# Patient Record
Sex: FEMALE | Race: BLACK OR AFRICAN AMERICAN | NOT HISPANIC OR LATINO | Employment: FULL TIME | URBAN - METROPOLITAN AREA
[De-identification: names, ages, dates, MRNs, and addresses within clinical notes are randomized per-mention and may not be internally consistent; named-entity substitution may affect disease eponyms.]

---

## 2017-10-23 ENCOUNTER — OFFICE VISIT (OUTPATIENT)
Dept: OPTOMETRY | Facility: CLINIC | Age: 32
End: 2017-10-23
Payer: COMMERCIAL

## 2017-10-23 DIAGNOSIS — H10.13 ALLERGIC CONJUNCTIVITIS OF BOTH EYES: Primary | ICD-10-CM

## 2017-10-23 DIAGNOSIS — H04.123 DRY EYE SYNDROME, BILATERAL: ICD-10-CM

## 2017-10-23 PROCEDURE — 92002 INTRM OPH EXAM NEW PATIENT: CPT | Mod: S$GLB,,, | Performed by: OPTOMETRIST

## 2017-10-23 PROCEDURE — 99999 PR PBB SHADOW E&M-NEW PATIENT-LVL II: CPT | Mod: PBBFAC,,, | Performed by: OPTOMETRIST

## 2017-10-23 RX ORDER — FLUOROMETHOLONE 1 MG/ML
1 SUSPENSION/ DROPS OPHTHALMIC 4 TIMES DAILY
Qty: 5 ML | Refills: 0 | Status: SHIPPED | OUTPATIENT
Start: 2017-10-23 | End: 2017-10-27

## 2017-10-25 NOTE — PROGRESS NOTES
HPI     Patient's age: 32 y.o.    Approximate date of last eye examination:  February 2017  Name of last eye doctor seen: [facility]    Pt states that she does have allergic conjunctivitis, but on the weekend   the eyes mainly the right got red, no pain but had a discharge this   morning from the right eye and has gotten redder.  before all this did use   a new facial cream.    Wears glasses? Yes, left       Wears CLs?:  no              ! OTC eyedrops currently using:  none   ! Prescription eye meds currently using:  Cromoglasic acid - OU for   allergic conjuctivitis         Last edited by Ebony Avila MA on 10/23/2017 10:20 AM. (History)            Assessment /Plan     For exam results, see Encounter Report.    Allergic conjunctivitis of both eyes    Dry eye syndrome, bilateral      -     fluorometholone 0.1% (FML) 0.1 % DrpS; Place 1 drop into the right eye 4 (four) times daily.x 4 days, then BID OU    Refresh BID OU    RTC if condition worsens or does not improve

## 2017-11-08 ENCOUNTER — OFFICE VISIT (OUTPATIENT)
Dept: OPTOMETRY | Facility: CLINIC | Age: 32
End: 2017-11-08
Payer: COMMERCIAL

## 2017-11-08 DIAGNOSIS — H52.13 MYOPIA, BILATERAL: Primary | ICD-10-CM

## 2017-11-08 DIAGNOSIS — H04.123 DRY EYE SYNDROME, BILATERAL: ICD-10-CM

## 2017-11-08 PROCEDURE — 92014 COMPRE OPH EXAM EST PT 1/>: CPT | Mod: S$GLB,,, | Performed by: OPTOMETRIST

## 2017-11-08 PROCEDURE — 92015 DETERMINE REFRACTIVE STATE: CPT | Mod: S$GLB,,, | Performed by: OPTOMETRIST

## 2017-11-08 PROCEDURE — 99999 PR PBB SHADOW E&M-EST. PATIENT-LVL I: CPT | Mod: PBBFAC,,, | Performed by: OPTOMETRIST

## 2017-11-08 NOTE — PROGRESS NOTES
HPI     Patient's age: 32 y.o.    Approximate date of last eye examination:  10/23/17  Name of last eye doctor seen: Dr. Goldberg    Pt states that eyes are doing better here for the full exam today.  Used   the drops as instructed only on the tears now.    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  no  Eye pain/discomfort?  no                                                                                     Flashes?  no  Floaters?  no  Diplopia/Double vision?  no    Patient's Ocular History:         Any eye surgeries? on         Any eye injury?  onno         Any treatment for eye disease?  no  Family history of eye disease?  no    Significant patient medical history:         1. Diabetes?  no       If yes, IDDM or NIDDM? no   2. HBP?  no                ! OTC eyedrops currently using:  Refresh - OU PRN   ! Prescription eye meds currently using:  none         Last edited by Ebony Avila MA on 11/8/2017  2:24 PM. (History)            Assessment /Plan     For exam results, see Encounter Report.    Myopia, bilateral    Dry eye syndrome, bilateral      Rx specs    Continue with refresh BID OU  Consider Xiidra in the future if problems persist    RTC 1 year, sooner PRN

## 2017-12-06 ENCOUNTER — OFFICE VISIT (OUTPATIENT)
Dept: INTERNAL MEDICINE | Facility: CLINIC | Age: 32
End: 2017-12-06
Payer: COMMERCIAL

## 2017-12-06 VITALS
SYSTOLIC BLOOD PRESSURE: 118 MMHG | TEMPERATURE: 99 F | DIASTOLIC BLOOD PRESSURE: 78 MMHG | RESPIRATION RATE: 16 BRPM | HEART RATE: 68 BPM | HEIGHT: 67 IN | BODY MASS INDEX: 34.5 KG/M2 | WEIGHT: 219.81 LBS

## 2017-12-06 DIAGNOSIS — Z00.00 ANNUAL PHYSICAL EXAM: Primary | ICD-10-CM

## 2017-12-06 PROCEDURE — 99385 PREV VISIT NEW AGE 18-39: CPT | Mod: S$GLB,,, | Performed by: INTERNAL MEDICINE

## 2017-12-06 PROCEDURE — 99999 PR PBB SHADOW E&M-EST. PATIENT-LVL III: CPT | Mod: PBBFAC,,, | Performed by: INTERNAL MEDICINE

## 2017-12-06 NOTE — PROGRESS NOTES
Subjective:       Patient ID: Joan Wilde is a 32 y.o. female.    Chief Complaint: Annual Exam (np, no labs done yet.  0 pain today   pronounce aw - zita arzate)    HPI   Previously was seen at [facility] for healthcare needs.     32 y.o. female here for annual exam.     Lipid disorders/ASCVD risk (ages >/= 45 or >/= 20 if increased risk ): due    DM (>45y yearly or if obese, HTN): A1c due  HIV (ages 15-65): negative   Sexual Screening: no concerns  STD screening: no concerns  Eye exam: utd yearly - wears glasses  Cervical Cancer (Pap Smear ages 21-65 every 3 years or Pap + HPV q5 years after 30 years of age):   Dr. Norm alvarado/ [facility] 05/2017 - normal     Vaccines:   Influenza (yearly) utd   Tetanus (every 10 yrs - 1st tdap) utd- unsure exact date       Exercise: cardio - treadmill 40 min x 5 days per week  Diet: low carb - ketogenic     Headaches a few weeks ago, now resolved. She thinks it may be d/t stress then. Still some neck pain. Normal ROM and no pain with that or tenderness. Suspect MSK- she works on computer a lot.   Past Medical History:   Diagnosis Date    Fibroid      Past Surgical History:   Procedure Laterality Date    HERNIA REPAIR  2014    umbilical     Family History   Problem Relation Age of Onset    Hypertension Mother     Diabetes Father     Hypertension Father     No Known Problems Brother     No Known Problems Brother      Social History     Social History    Marital status: Single     Spouse name: N/A    Number of children: N/A    Years of education: N/A     Occupational History    Not on file.     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          Review of Systems   Constitutional: Negative for activity change and unexpected weight change.   HENT: Negative for hearing loss, rhinorrhea and trouble swallowing.    Eyes: Negative for discharge and visual disturbance.   Respiratory: Negative for chest tightness and wheezing.    Cardiovascular: Negative for chest pain and palpitations.   Gastrointestinal: Negative for blood in stool, constipation, diarrhea and vomiting.   Endocrine: Negative for polydipsia and polyuria.   Genitourinary: Negative for difficulty urinating, dysuria, hematuria and menstrual problem.   Musculoskeletal: Positive for neck pain. Negative for arthralgias and joint swelling.   Neurological: Positive for headaches. Negative for weakness.   Psychiatric/Behavioral: Negative for confusion and dysphoric mood.       Objective:        Vitals:    12/06/17 1303   BP: 118/78   BP Location: Left arm   Patient Position: Sitting   BP Method: Medium (Manual)   Pulse: 68   Resp: 16   Temp: 99 °F (37.2 °C)   TempSrc: Oral   Weight: 99.7 kg (219 lb 12.8 oz)   Height: 5' 7" (1.702 m)       Body mass index is 34.43 kg/m².    Physical Exam   Constitutional: She is oriented to person, place, and time. She appears well-developed. No distress.   HENT:   Head: Normocephalic and atraumatic.   Right Ear: Tympanic membrane normal.   Left Ear: Tympanic membrane normal.   Nose: Nose normal.   Mouth/Throat: Oropharynx is clear and moist.   Eyes: Conjunctivae and EOM are normal. Pupils are equal, round, and reactive to light.   Neck: Normal range of motion and full passive range of motion without pain. Neck supple. No spinous process tenderness and no muscular tenderness present. No neck rigidity. No tracheal deviation and normal range of motion present. No thyromegaly present.   Cardiovascular: Normal rate, regular rhythm, normal heart sounds and intact " distal pulses.    Pulmonary/Chest: Effort normal and breath sounds normal.   Abdominal: Soft. Bowel sounds are normal. She exhibits no distension and no mass. There is no tenderness.   Musculoskeletal: Normal range of motion. She exhibits no edema.   Lymphadenopathy:     She has no cervical adenopathy.   Neurological: She is alert and oriented to person, place, and time. No sensory deficit.   Skin: Skin is warm and dry. She is not diaphoretic. No cyanosis. Nails show no clubbing.   Psychiatric: She has a normal mood and affect. Her behavior is normal. Judgment normal.       Assessment:     1. Annual physical exam           Plan:         1. Annual physical exam  - utd on immunizations- requesting immunization records from [facility] for tetanus date  - utd on pap, will need annual pelvic exam in May 2018 - pt to let me know if she needs referral to schedule  - CBC auto differential; Future  - Comprehensive metabolic panel; Future  - Urinalysis; Future  - Hemoglobin A1c; Future  - Lipid panel; Future  - TSH; Future

## 2017-12-12 ENCOUNTER — PATIENT MESSAGE (OUTPATIENT)
Dept: INTERNAL MEDICINE | Facility: CLINIC | Age: 32
End: 2017-12-12

## 2017-12-12 ENCOUNTER — LAB VISIT (OUTPATIENT)
Dept: LAB | Facility: HOSPITAL | Age: 32
End: 2017-12-12
Attending: INTERNAL MEDICINE
Payer: COMMERCIAL

## 2017-12-12 DIAGNOSIS — Z00.00 ANNUAL PHYSICAL EXAM: ICD-10-CM

## 2017-12-12 LAB
ALBUMIN SERPL BCP-MCNC: 3.5 G/DL
ALP SERPL-CCNC: 72 U/L
ALT SERPL W/O P-5'-P-CCNC: 32 U/L
ANION GAP SERPL CALC-SCNC: 7 MMOL/L
AST SERPL-CCNC: 30 U/L
BASOPHILS # BLD AUTO: 0.02 K/UL
BASOPHILS NFR BLD: 0.4 %
BILIRUB SERPL-MCNC: 0.3 MG/DL
BUN SERPL-MCNC: 14 MG/DL
CALCIUM SERPL-MCNC: 9.4 MG/DL
CHLORIDE SERPL-SCNC: 107 MMOL/L
CHOLEST SERPL-MCNC: 168 MG/DL
CHOLEST/HDLC SERPL: 3.2 {RATIO}
CO2 SERPL-SCNC: 25 MMOL/L
CREAT SERPL-MCNC: 1.1 MG/DL
DIFFERENTIAL METHOD: ABNORMAL
EOSINOPHIL # BLD AUTO: 0 K/UL
EOSINOPHIL NFR BLD: 0.8 %
ERYTHROCYTE [DISTWIDTH] IN BLOOD BY AUTOMATED COUNT: 14 %
EST. GFR  (AFRICAN AMERICAN): >60 ML/MIN/1.73 M^2
EST. GFR  (NON AFRICAN AMERICAN): >60 ML/MIN/1.73 M^2
ESTIMATED AVG GLUCOSE: 108 MG/DL
GLUCOSE SERPL-MCNC: 99 MG/DL
HBA1C MFR BLD HPLC: 5.4 %
HCT VFR BLD AUTO: 37.3 %
HDLC SERPL-MCNC: 53 MG/DL
HDLC SERPL: 31.5 %
HGB BLD-MCNC: 12.4 G/DL
IMM GRANULOCYTES # BLD AUTO: 0.01 K/UL
IMM GRANULOCYTES NFR BLD AUTO: 0.2 %
LDLC SERPL CALC-MCNC: 98.4 MG/DL
LYMPHOCYTES # BLD AUTO: 2.1 K/UL
LYMPHOCYTES NFR BLD: 42.1 %
MCH RBC QN AUTO: 25.6 PG
MCHC RBC AUTO-ENTMCNC: 33.2 G/DL
MCV RBC AUTO: 77 FL
MONOCYTES # BLD AUTO: 0.4 K/UL
MONOCYTES NFR BLD: 8.2 %
NEUTROPHILS # BLD AUTO: 2.4 K/UL
NEUTROPHILS NFR BLD: 48.3 %
NONHDLC SERPL-MCNC: 115 MG/DL
NRBC BLD-RTO: 0 /100 WBC
PLATELET # BLD AUTO: 318 K/UL
PMV BLD AUTO: 10.2 FL
POTASSIUM SERPL-SCNC: 4.3 MMOL/L
PROT SERPL-MCNC: 6.9 G/DL
RBC # BLD AUTO: 4.85 M/UL
SODIUM SERPL-SCNC: 139 MMOL/L
TRIGL SERPL-MCNC: 83 MG/DL
TSH SERPL DL<=0.005 MIU/L-ACNC: 1.78 UIU/ML
WBC # BLD AUTO: 4.89 K/UL

## 2017-12-12 PROCEDURE — 85025 COMPLETE CBC W/AUTO DIFF WBC: CPT

## 2017-12-12 PROCEDURE — 83036 HEMOGLOBIN GLYCOSYLATED A1C: CPT

## 2017-12-12 PROCEDURE — 80053 COMPREHEN METABOLIC PANEL: CPT

## 2017-12-12 PROCEDURE — 80061 LIPID PANEL: CPT

## 2017-12-12 PROCEDURE — 36415 COLL VENOUS BLD VENIPUNCTURE: CPT

## 2017-12-12 PROCEDURE — 84443 ASSAY THYROID STIM HORMONE: CPT

## 2018-01-25 ENCOUNTER — OFFICE VISIT (OUTPATIENT)
Dept: OBSTETRICS AND GYNECOLOGY | Facility: CLINIC | Age: 33
End: 2018-01-25
Payer: COMMERCIAL

## 2018-01-25 VITALS
BODY MASS INDEX: 34.08 KG/M2 | DIASTOLIC BLOOD PRESSURE: 72 MMHG | WEIGHT: 217.13 LBS | SYSTOLIC BLOOD PRESSURE: 124 MMHG | HEIGHT: 67 IN

## 2018-01-25 DIAGNOSIS — N89.8 VAGINAL DISCHARGE: Primary | ICD-10-CM

## 2018-01-25 DIAGNOSIS — D25.9 UTERINE LEIOMYOMA, UNSPECIFIED LOCATION: ICD-10-CM

## 2018-01-25 PROCEDURE — 87480 CANDIDA DNA DIR PROBE: CPT

## 2018-01-25 PROCEDURE — 99203 OFFICE O/P NEW LOW 30 MIN: CPT | Mod: S$GLB,,, | Performed by: NURSE PRACTITIONER

## 2018-01-25 PROCEDURE — 99999 PR PBB SHADOW E&M-EST. PATIENT-LVL III: CPT | Mod: PBBFAC,,, | Performed by: NURSE PRACTITIONER

## 2018-01-25 NOTE — PROGRESS NOTES
HISTORY OF PRESENT ILLNESS:    Joan Wilde is a 32 y.o. female, , No LMP recorded (approximate). Patient is not currently having periods (Reason: Other).,  presents with c/o vaginal discharge and fibroids.  -Had a vaginal discharge with irritation and odor and symptoms resolved with OTC Monistat.   -Wants culture to be sure her symptoms are gone.   -Also, was told she has fibroids and was to have an U/S every 6 months - is transferring her care to [facility] and is due for the ultrasound.   -Denies heavy periods, cramping, bladder symptoms and is on Nexplanon for cycle control (no periods).    Past Medical History:   Diagnosis Date    Fibroid        Past Surgical History:   Procedure Laterality Date    HERNIA REPAIR      umbilical       MEDICATIONS AND ALLERGIES:  No current outpatient prescriptions on file.    Review of patient's allergies indicates:   Allergen Reactions    Metoclopramide Other (See Comments)     Causes tongue to roll back in mouth       Family History   Problem Relation Age of Onset    Hypertension Mother     Diabetes Father     Hypertension Father     No Known Problems Brother     No Known Problems Brother        Social History     Social History    Marital status: Single     Spouse name: N/A    Number of children: N/A    Years of education: N/A     Occupational History    Not on file.     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     COMPREHENSIVE GYN HISTORY:  PAP History: Denies abnormal Paps. REPORTS NORMAL PAP OUTSIDE [facility] 2017.   Infection History: Denies STDs. Denies PID.  Benign History: Reports uterine fibroids. Denies ovarian cysts. Denies endometriosis. Denies other conditions.  Cancer History:  "Denies cervical cancer. Denies uterine cancer or hyperplasia. Denies ovarian cancer. Denies vulvar cancer or pre-cancer. Denies vaginal cancer or pre-cancer. Denies breast cancer. Denies colon cancer.  Sexual Activity History: Reports currently being sexually active  Menstrual History: Denies menses.   Dysmenorrhea History: Denies dysmenorrhea.   Contraception: per pt Nexplanon inserted one year ago.    ROS:  GENERAL: No fever or chills.   ABDOMEN: No pain. No nausea. No vomiting. No diarrhea. No constipation.  REPRODUCTIVE: No abnormal bleeding.   VULVA: No pain. No lesions. No itching.  VAGINA: No relaxation. No itching. No odor. No discharge. No lesions.  URINARY: No incontinence. No nocturia. No frequency. No dysuria.    /72   Ht 5' 7" (1.702 m)   Wt 98.5 kg (217 lb 2.5 oz)   LMP  (Approximate) Comment: Nexplanon  BMI 34.01 kg/m²     PE:  APPEARANCE: Well nourished, well developed, in no acute distress.  AFFECT: WNL, alert and oriented x 3.  PELVIC: External female genitalia without lesions.  Female hair distribution. Adequate perineal body, Normal urethral meatus. Vagina moist and well rugated without lesions or discharge.  No significant cystocele or rectocele present. Cervix pink without lesions, discharge or tenderness. Uterus is ? 8 week size, regular, mobile and nontender. Adnexa without masses or tenderness. EXAM DIFFICULT DUE TO BODY HABITUS.    DIAGNOSIS:  1. Vaginal discharge    2. Uterine leiomyoma, unspecified location        PLAN:    LABS AND TESTS ORDERED:  Affirm  Declined STD testing  Pelvic US    COUNSELING:  The patient was counseled today on:  -Vaginitis prevention including :  a. avoiding feminine products such as deoderant soaps, body wash, bubble bath, douches, scented toilet paper, deoderant tampons or pads, baby or feminine wipes, chronic pad use, etc. and       b. avoiding other vulvovaginal irritants such as long hot baths, humidity, tight, synthetic clothing, chlorine and " sitting around in wet bathing suits and   c. wearing cotton underwear, avoiding thong underwear and no underwear to bed and      d. taking showers instead of baths and use a hair dryer on cool setting afterwards to dry and  e.wearing cotton to exercise and shower immediately after exercise and change clothes and  f. using polyurethane condoms without spermicide if sexually active and symptoms are triggered by intercourse.  -STDs and prevention.    FOLLOW-UP with me pending test results and annually.

## 2018-01-26 ENCOUNTER — HOSPITAL ENCOUNTER (OUTPATIENT)
Dept: RADIOLOGY | Facility: HOSPITAL | Age: 33
Discharge: HOME | End: 2018-01-26
Attending: NURSE PRACTITIONER
Payer: COMMERCIAL

## 2018-01-26 DIAGNOSIS — D25.9 UTERINE LEIOMYOMA, UNSPECIFIED LOCATION: ICD-10-CM

## 2018-01-26 PROCEDURE — 76856 US EXAM PELVIC COMPLETE: CPT | Mod: 26,,, | Performed by: RADIOLOGY

## 2018-01-26 PROCEDURE — 76830 TRANSVAGINAL US NON-OB: CPT | Mod: TC

## 2018-01-26 PROCEDURE — 76830 TRANSVAGINAL US NON-OB: CPT | Mod: 26,,, | Performed by: RADIOLOGY

## 2018-01-27 LAB
CANDIDA RRNA VAG QL PROBE: NEGATIVE
G VAGINALIS RRNA GENITAL QL PROBE: NEGATIVE
T VAGINALIS RRNA GENITAL QL PROBE: NEGATIVE

## 2018-02-04 ENCOUNTER — NURSE TRIAGE (OUTPATIENT)
Dept: ADMINISTRATIVE | Facility: CLINIC | Age: 33
End: 2018-02-04

## 2018-02-04 ENCOUNTER — PATIENT MESSAGE (OUTPATIENT)
Dept: INTERNAL MEDICINE | Facility: CLINIC | Age: 33
End: 2018-02-04

## 2018-02-05 ENCOUNTER — PATIENT MESSAGE (OUTPATIENT)
Dept: INTERNAL MEDICINE | Facility: CLINIC | Age: 33
End: 2018-02-05

## 2018-02-05 ENCOUNTER — OFFICE VISIT (OUTPATIENT)
Dept: INTERNAL MEDICINE | Facility: CLINIC | Age: 33
End: 2018-02-05
Payer: COMMERCIAL

## 2018-02-05 VITALS
SYSTOLIC BLOOD PRESSURE: 127 MMHG | RESPIRATION RATE: 16 BRPM | HEART RATE: 88 BPM | DIASTOLIC BLOOD PRESSURE: 81 MMHG | BODY MASS INDEX: 34.05 KG/M2 | WEIGHT: 216.94 LBS | TEMPERATURE: 99 F | HEIGHT: 67 IN

## 2018-02-05 DIAGNOSIS — R68.89 FLU-LIKE SYMPTOMS: Primary | ICD-10-CM

## 2018-02-05 DIAGNOSIS — R11.0 NAUSEA: ICD-10-CM

## 2018-02-05 LAB
FLUAV AG SPEC QL IA: NEGATIVE
FLUBV AG SPEC QL IA: NEGATIVE
SPECIMEN SOURCE: NORMAL

## 2018-02-05 PROCEDURE — 99999 PR PBB SHADOW E&M-EST. PATIENT-LVL III: CPT | Mod: PBBFAC,,, | Performed by: INTERNAL MEDICINE

## 2018-02-05 PROCEDURE — 87400 INFLUENZA A/B EACH AG IA: CPT | Mod: 59,PO

## 2018-02-05 PROCEDURE — 3008F BODY MASS INDEX DOCD: CPT | Mod: S$GLB,,, | Performed by: INTERNAL MEDICINE

## 2018-02-05 PROCEDURE — 99213 OFFICE O/P EST LOW 20 MIN: CPT | Mod: S$GLB,,, | Performed by: INTERNAL MEDICINE

## 2018-02-05 RX ORDER — ONDANSETRON 4 MG/1
4 TABLET, FILM COATED ORAL EVERY 6 HOURS PRN
Qty: 20 TABLET | Refills: 0 | Status: SHIPPED | OUTPATIENT
Start: 2018-02-05 | End: 2018-04-16

## 2018-02-05 RX ORDER — OSELTAMIVIR PHOSPHATE 75 MG/1
75 CAPSULE ORAL 2 TIMES DAILY
Qty: 10 CAPSULE | Refills: 0 | Status: SHIPPED | OUTPATIENT
Start: 2018-02-05 | End: 2018-02-10

## 2018-02-05 NOTE — PROGRESS NOTES
Subjective:       Patient ID: Joan Wilde is a 32 y.o. female.    Chief Complaint: Cough (yesterday); Fever; Generalized Body Aches; Nasal Congestion; Headache; Fatigue; and Nausea    Cough   Associated symptoms include a fever, headaches, rhinorrhea and a sore throat. Pertinent negatives include no chest pain or wheezing.   Fever    Associated symptoms include coughing, diarrhea, headaches, nausea and a sore throat. Pertinent negatives include no chest pain, urinary pain, vomiting or wheezing.   Headache    Associated symptoms include coughing, a fever, nausea, rhinorrhea, a sore throat and weakness. Pertinent negatives include no hearing loss, neck pain or vomiting.   Fatigue   Associated symptoms include coughing, fatigue, a fever, headaches, nausea, a sore throat and weakness. Pertinent negatives include no arthralgias, chest pain, joint swelling, neck pain or vomiting.   Nausea   Associated symptoms include coughing, fatigue, a fever, headaches, nausea, a sore throat and weakness. Pertinent negatives include no arthralgias, chest pain, joint swelling, neck pain or vomiting.     Friday started sore throat. Saturday had nasal congestion. Sunday symptoms dramatically worsened to include cold sweats, fatigue, body aches, nausea. She took tylenol this morning.    Review of Systems   Constitutional: Positive for fatigue and fever. Negative for activity change and unexpected weight change.   HENT: Positive for rhinorrhea and sore throat. Negative for hearing loss and trouble swallowing.    Eyes: Negative for discharge and visual disturbance.   Respiratory: Positive for cough. Negative for chest tightness and wheezing.    Cardiovascular: Negative for chest pain and palpitations.   Gastrointestinal: Positive for diarrhea and nausea. Negative for blood in stool, constipation and vomiting.   Endocrine: Negative for polydipsia and polyuria.   Genitourinary: Negative for difficulty urinating, dysuria,  "hematuria and menstrual problem.   Musculoskeletal: Negative for arthralgias, joint swelling and neck pain.   Neurological: Positive for weakness and headaches.   Psychiatric/Behavioral: Negative for confusion and dysphoric mood.       Objective:        Vitals:    02/05/18 1107   BP: 127/81   BP Location: Left arm   Patient Position: Sitting   BP Method: Medium (Automatic)   Pulse: 88   Resp: 16   Temp: 98.6 °F (37 °C)   TempSrc: Oral   Weight: 98.4 kg (216 lb 14.9 oz)   Height: 5' 7" (1.702 m)       Body mass index is 33.98 kg/m².    Physical Exam   Constitutional: She is oriented to person, place, and time. She appears well-developed and well-nourished. She appears ill.   HENT:   Head: Normocephalic and atraumatic.   Right Ear: External ear and ear canal normal. Tympanic membrane is not injected, not erythematous and not bulging.   Left Ear: External ear and ear canal normal. Tympanic membrane is not injected, not erythematous and not bulging.   Nose: Mucosal edema and rhinorrhea present. Right sinus exhibits no maxillary sinus tenderness and no frontal sinus tenderness. Left sinus exhibits no maxillary sinus tenderness and no frontal sinus tenderness.   Mouth/Throat: Posterior oropharyngeal erythema present. No posterior oropharyngeal edema. No tonsillar exudate.   Eyes: Conjunctivae are normal. Right eye exhibits no discharge. Left eye exhibits no discharge. Right conjunctiva is not injected. Left conjunctiva is not injected.   Neck: Normal range of motion.   Cardiovascular: Normal rate, regular rhythm, normal heart sounds and intact distal pulses.    Pulmonary/Chest: Effort normal and breath sounds normal. No respiratory distress. She has no wheezes.   Abdominal: Soft. Bowel sounds are normal.   Lymphadenopathy:     She has no cervical adenopathy.   Neurological: She is alert and oriented to person, place, and time.   Skin: Skin is warm and dry. No rash noted.   Psychiatric: She has a normal mood and affect.    "    Assessment:     1. Flu-like symptoms    2. Nausea           Plan:         1. Flu-like symptoms  - symptomatic tx w/ alternating ibuprofen/tylenol; hydration, rest  - Influenza antigen Nasopharyngeal Swab  - oseltamivir (TAMIFLU) 75 MG capsule; Take 1 capsule (75 mg total) by mouth 2 (two) times daily.  Dispense: 10 capsule; Refill: 0    2. Nausea  - ondansetron (ZOFRAN) 4 MG tablet; Take 1 tablet (4 mg total) by mouth every 6 (six) hours as needed for Nausea.  Dispense: 20 tablet; Refill: 0    Work excuse provided

## 2018-02-05 NOTE — TELEPHONE ENCOUNTER
"  Reason for Disposition   [1] Patient is NOT HIGH RISK AND [2] strongly requests antiviral medicine AND [3] flu symptoms present < 48 hours    Answer Assessment - Initial Assessment Questions  1. WORST SYMPTOM: "What is your worst symptom?" (e.g., cough, runny nose, muscle aches, headache, sore throat, fever)       Fatigue/chills  2. ONSET: "When did your flu symptoms start?"       Last pm started with nasal congestion  3. COUGH: "How bad is the cough?"        Intermittent dry cough  4. RESPIRATORY DISTRESS: "Describe your breathing."       Stuffy nose but no distress   5. FEVER: "Do you have a fever?" If so, ask: "What is your temperature, how was it measured, and when did it start?"      Subjective - chills   6. EXPOSURE: "Were you exposed to someone with influenza?"        Fellow employess  7. FLU VACCINE: "Did you get a flu shot this year?"      Yes-   8. HIGH RISK DISEASE: "Do you any major health problems?" (e.g., heart or lung disease, asthma, weak immune system, or other HIGH RISK conditions)      none  9. PREGNANCY: "Is there any chance you are pregnant?" "When was your last menstrual period?"      no  10. OTHER SYMPTOMS: "Do you have any other symptoms?"  (e.g., runny nose, muscle aches, headache, sore throat)        Stuffy nose, headache, dry cough, fatigue,nausea    Protocols used: ST INFLUENZA - SEASONAL-A-    "

## 2018-02-06 ENCOUNTER — PATIENT MESSAGE (OUTPATIENT)
Dept: INTERNAL MEDICINE | Facility: CLINIC | Age: 33
End: 2018-02-06

## 2018-02-06 ENCOUNTER — TELEPHONE (OUTPATIENT)
Dept: INTERNAL MEDICINE | Facility: CLINIC | Age: 33
End: 2018-02-06

## 2018-02-06 NOTE — TELEPHONE ENCOUNTER
----- Message from Debbie Hanks sent at 2/6/2018  1:55 PM CST -----  Contact: Self   Pt is requesting a call back in regards to her rx. Pt would like advice on her rx and possibly calling in something else for her.       Pt can be contacted at [phone]

## 2018-03-23 ENCOUNTER — OFFICE VISIT (OUTPATIENT)
Dept: DERMATOLOGY | Facility: CLINIC | Age: 33
End: 2018-03-23
Payer: COMMERCIAL

## 2018-03-23 VITALS — WEIGHT: 216 LBS | BODY MASS INDEX: 33.83 KG/M2

## 2018-03-23 DIAGNOSIS — L70.9 ACNE, UNSPECIFIED ACNE TYPE: Primary | ICD-10-CM

## 2018-03-23 PROCEDURE — 99202 OFFICE O/P NEW SF 15 MIN: CPT | Mod: S$GLB,,, | Performed by: DERMATOLOGY

## 2018-03-23 PROCEDURE — 99999 PR PBB SHADOW E&M-EST. PATIENT-LVL III: CPT | Mod: PBBFAC,,, | Performed by: DERMATOLOGY

## 2018-03-23 RX ORDER — DOXYCYCLINE HYCLATE 100 MG
100 TABLET ORAL DAILY
Qty: 30 TABLET | Refills: 3 | Status: SHIPPED | OUTPATIENT
Start: 2018-03-23 | End: 2018-05-04

## 2018-03-23 RX ORDER — CLINDAMYCIN PHOSPHATE 10 UG/ML
LOTION TOPICAL
Qty: 60 ML | Refills: 3 | Status: SHIPPED | OUTPATIENT
Start: 2018-03-23 | End: 2018-03-23 | Stop reason: SDUPTHER

## 2018-03-23 RX ORDER — CLINDAMYCIN PHOSPHATE 10 UG/ML
LOTION TOPICAL
Qty: 60 ML | Refills: 3 | Status: SHIPPED | OUTPATIENT
Start: 2018-03-23 | End: 2019-03-12 | Stop reason: SDUPTHER

## 2018-03-23 RX ORDER — DOXYCYCLINE HYCLATE 100 MG
100 TABLET ORAL DAILY
Qty: 30 TABLET | Refills: 3 | Status: SHIPPED | OUTPATIENT
Start: 2018-03-23 | End: 2018-03-23 | Stop reason: SDUPTHER

## 2018-03-23 NOTE — PROGRESS NOTES
Subjective:       Patient ID:  Joan Wilde is a 33 y.o. female who presents for   Chief Complaint   Patient presents with    Acne     face     History of Present Illness: The patient presents with chief complaint of acne.  Location: face  Duration: months  Signs/Symptoms: none    Prior treatments: none          Review of Systems   Constitutional: Negative for fever.   Skin: Negative for itching and rash.   Hematologic/Lymphatic: Does not bruise/bleed easily.        Objective:    Physical Exam   Skin:   Areas Examined (abnormalities noted in diagram):   Head / Face Inspection Performed  Neck Inspection Performed  Chest / Axilla Inspection Performed  RUE Inspected  LUE Inspection Performed              Diagram Legend     Erythematous scaling macule/papule c/w actinic keratosis       Vascular papule c/w angioma      Pigmented verrucoid papule/plaque c/w seborrheic keratosis      Yellow umbilicated papule c/w sebaceous hyperplasia      Irregularly shaped tan macule c/w lentigo     1-2 mm smooth white papules consistent with Milia      Movable subcutaneous cyst with punctum c/w epidermal inclusion cyst      Subcutaneous movable cyst c/w pilar cyst      Firm pink to brown papule c/w dermatofibroma      Pedunculated fleshy papule(s) c/w skin tag(s)      Evenly pigmented macule c/w junctional nevus     Mildly variegated pigmented, slightly irregular-bordered macule c/w mildly atypical nevus      Flesh colored to evenly pigmented papule c/w intradermal nevus       Pink pearly papule/plaque c/w basal cell carcinoma      Erythematous hyperkeratotic cursted plaque c/w SCC      Surgical scar with no sign of skin cancer recurrence      Open and closed comedones      Inflammatory papules and pustules      Verrucoid papule consistent consistent with wart     Erythematous eczematous patches and plaques     Dystrophic onycholytic nail with subungual debris c/w onychomycosis     Umbilicated papule    Erythematous-base  heme-crusted tan verrucoid plaque consistent with inflamed seborrheic keratosis     Erythematous Silvery Scaling Plaque c/w Psoriasis     See annotation      Assessment / Plan:        Acne, unspecified acne type  -     doxycycline (VIBRA-TABS) 100 MG tablet; Take 1 tablet (100 mg total) by mouth once daily.  Dispense: 30 tablet; Refill: 3  -     clindamycin (CLEOCIN T) 1 % lotion; Use hs on face  Dispense: 60 mL; Refill: 3             Follow-up if symptoms worsen or fail to improve.

## 2018-04-16 ENCOUNTER — OFFICE VISIT (OUTPATIENT)
Dept: OBSTETRICS AND GYNECOLOGY | Facility: CLINIC | Age: 33
End: 2018-04-16
Payer: COMMERCIAL

## 2018-04-16 VITALS
WEIGHT: 216.25 LBS | BODY MASS INDEX: 33.94 KG/M2 | HEIGHT: 67 IN | DIASTOLIC BLOOD PRESSURE: 78 MMHG | SYSTOLIC BLOOD PRESSURE: 118 MMHG

## 2018-04-16 DIAGNOSIS — Z97.5 BREAKTHROUGH BLEEDING ON NEXPLANON: Primary | ICD-10-CM

## 2018-04-16 DIAGNOSIS — N92.1 BREAKTHROUGH BLEEDING ON NEXPLANON: Primary | ICD-10-CM

## 2018-04-16 DIAGNOSIS — N80.9 ENDOMETRIOSIS: ICD-10-CM

## 2018-04-16 PROCEDURE — 99999 PR PBB SHADOW E&M-EST. PATIENT-LVL II: CPT | Mod: PBBFAC,,, | Performed by: OBSTETRICS & GYNECOLOGY

## 2018-04-16 PROCEDURE — 99213 OFFICE O/P EST LOW 20 MIN: CPT | Mod: S$GLB,,, | Performed by: OBSTETRICS & GYNECOLOGY

## 2018-04-16 RX ORDER — IBUPROFEN 600 MG/1
600 TABLET ORAL EVERY 8 HOURS PRN
Qty: 15 TABLET | Refills: 2 | Status: SHIPPED | OUTPATIENT
Start: 2018-04-16 | End: 2018-04-21

## 2018-04-16 RX ORDER — NAPROXEN SODIUM 220 MG
220 TABLET ORAL
COMMUNITY
End: 2018-05-04

## 2018-04-16 NOTE — PROGRESS NOTES
Gynecology    SUBJECTIVE:     Chief Complaint: Metrorrhagia       History of Present Illness:  33 year old who presents with menstrual irregularities.  She has the nexplanon in place since May 2017.  She reports that with the nexplanon she had no period for one year, then had 10 days of bleeding the past ten days.  Bleeding was requiring 1-2 pads per day.      Patient also has pain.  She has a history of a periumbilical hernia repair in 2014.  Since that time, she has had pain with menstrual cycles and bloating just under the skin above her umbilicus.  Has tried OCPs for this pain, but this made her pain worse in this area.  She also had steroid injections in this area as well which have not helped.  Without the nexplanon, the patient does have painful menstrual cycles.  The pain is currently happening because of this bleeding that she is going through.    Patient is concerned that the soy in her diet is contributing to her pain given that she has had pain in the past with soy in this umbilical area.  She recently had soy the other day and is now having the pain again.    Patient has used depo provera in the past, but developed osteopenia (had a DEXA scan that was offered at work) and is not interested in using this again.  She has also used POP and this was very successful for her.  Has not tried a mirena.    Review of Systems:  Review of Systems   Constitutional: Negative for appetite change, fever and unexpected weight change.   Respiratory: Negative for shortness of breath.    Cardiovascular: Negative for chest pain.   Gastrointestinal: Positive for abdominal pain (see HPI). Negative for constipation, diarrhea, nausea and vomiting.   Genitourinary: Positive for menstrual problem and pelvic pain. Negative for menorrhagia, vaginal bleeding, vaginal discharge and vaginal pain.        OBJECTIVE:     Physical Exam:  Physical Exam   Constitutional: She is oriented to person, place, and time. She appears well-developed  and well-nourished.   Pulmonary/Chest: Effort normal.   Neurological: She is oriented to person, place, and time.   Skin: No pallor.   Psychiatric: She has a normal mood and affect. Her behavior is normal. Judgment and thought content normal.   Nursing note and vitals reviewed.        ASSESSMENT:       ICD-10-CM ICD-9-CM    1. Breakthrough bleeding on Nexplanon N92.1 626.6 ibuprofen (ADVIL,MOTRIN) 600 MG tablet    Z97.8 V45.59    2. Endometriosis N80.9 617.9           Plan:      Joan was seen today for metrorrhagia.    Diagnoses and all orders for this visit:    Breakthrough bleeding on Nexplanon  -     ibuprofen (ADVIL,MOTRIN) 600 MG tablet; Take 1 tablet (600 mg total) by mouth every 8 (eight) hours as needed for Pain.    Endometriosis    - discussion about bleeding irregularities with nexplanon; discussed treatment options including short course of estrogen vs NSAIDs; patient hesitant to try estrogen as this has worsened her pain in the umbilical region; will consider if ibuprofen doesn't work  - if continues bleeding irregularities, consider switching back to POP which worked well for patient in the past.     No orders of the defined types were placed in this encounter.      Follow-up for annual or prn.    Rani Ambrocio

## 2018-04-24 ENCOUNTER — PATIENT MESSAGE (OUTPATIENT)
Dept: OBSTETRICS AND GYNECOLOGY | Facility: CLINIC | Age: 33
End: 2018-04-24

## 2018-05-01 ENCOUNTER — PATIENT MESSAGE (OUTPATIENT)
Dept: OBSTETRICS AND GYNECOLOGY | Facility: CLINIC | Age: 33
End: 2018-05-01

## 2018-05-04 ENCOUNTER — OFFICE VISIT (OUTPATIENT)
Dept: OBSTETRICS AND GYNECOLOGY | Facility: CLINIC | Age: 33
End: 2018-05-04
Payer: COMMERCIAL

## 2018-05-04 VITALS
BODY MASS INDEX: 33.25 KG/M2 | SYSTOLIC BLOOD PRESSURE: 122 MMHG | WEIGHT: 212.31 LBS | DIASTOLIC BLOOD PRESSURE: 82 MMHG

## 2018-05-04 DIAGNOSIS — N80.9 ENDOMETRIOSIS: Primary | ICD-10-CM

## 2018-05-04 PROCEDURE — 99213 OFFICE O/P EST LOW 20 MIN: CPT | Mod: 25,S$GLB,, | Performed by: OBSTETRICS & GYNECOLOGY

## 2018-05-04 PROCEDURE — 3008F BODY MASS INDEX DOCD: CPT | Mod: CPTII,S$GLB,, | Performed by: OBSTETRICS & GYNECOLOGY

## 2018-05-04 PROCEDURE — 99999 PR PBB SHADOW E&M-EST. PATIENT-LVL II: CPT | Mod: PBBFAC,,, | Performed by: OBSTETRICS & GYNECOLOGY

## 2018-05-04 PROCEDURE — 11982 REMOVE DRUG IMPLANT DEVICE: CPT | Mod: S$GLB,,, | Performed by: OBSTETRICS & GYNECOLOGY

## 2018-05-04 RX ORDER — ACETAMINOPHEN AND CODEINE PHOSPHATE 120; 12 MG/5ML; MG/5ML
1 SOLUTION ORAL DAILY
Qty: 28 TABLET | Refills: 11 | Status: SHIPPED | OUTPATIENT
Start: 2018-05-04 | End: 2019-04-02 | Stop reason: SDUPTHER

## 2018-05-04 RX ORDER — IBUPROFEN 600 MG/1
TABLET ORAL
COMMUNITY
Start: 2018-04-30 | End: 2018-09-10

## 2018-05-04 RX ORDER — ACETAMINOPHEN AND CODEINE PHOSPHATE 120; 12 MG/5ML; MG/5ML
1 SOLUTION ORAL DAILY
Qty: 30 TABLET | Refills: 11 | Status: SHIPPED | OUTPATIENT
Start: 2018-05-04 | End: 2018-05-04 | Stop reason: SDUPTHER

## 2018-05-04 NOTE — PROCEDURES
Procedures     NEXPLANON REMOVAL:    Pt is a 33 y.o. with LMP Patient's last menstrual period was 04/06/2018. here for nexplanon removal because she desires a different progesterone only contraceptive method.      PRE-IMPLANON REMOVAL COUNSELING:  The patient was advised of minimal risks of bleeding and pain and she agrees to proceed.    EXAM:  Implanon palpable by palpation or imaging.  The end closest to the elbow was marked.    PROCEDURE:  TIME OUT PERFORMED.  The patient was placed in same position as for insertion.  The area was prepped with antiseptic.  2 cc of 1% lidocaine was injected just underneath end of implant closest to the elbow.  Downward pressure was placed on the end of the implant closest to the axilla.  Using sterile technique, a 2-3 mm incision was made in longitudinal direction of arm at tip of the implant closest to the elbow.    The entire 4 cm implant was removed.  The incision site was closed with steri strips and a small adhesive bandage and then pressure bandage was placed over insertion site.  The procedure was well tolerated     ASSESSMENT:  Contraceptive Management / Removal Implanon    POST IMPLANON REMOVAL COUNSELING:  Expect period-like flow to occur after Implanon removal and periods to return to pre-Implanon  pattern.  Manage post Implanon arm pain with Tylenol or Rx per [employer].  Keep arm elevated and apply intermittent ice packs to decrease pain and bruising for 24 hours.  May remove bandage in 24 hours.  Implanon removal danger signs (worsening pain at incision site, bleeding through  bandage, redness and/or pus drainage at incision site).    POST NEXPLANON REMOVAL CONTRACEPTION: micronor    Counseling lasted approximately 15 minutes and all her questions were answered.    FOLLOW-UP: with me for annual gyn exam or prn.

## 2018-05-04 NOTE — PROGRESS NOTES
Gynecology    SUBJECTIVE:     Chief Complaint: Follow-up       History of Present Illness:  33 year old who presents for follow up of painful cycles.  Is having irregular bleeding with the nexplanon and desires removal.  Pain is located in the umbilicus- see last note- and concerning for endometriosis. Patient did better with continuous progesterone and wants to return to this.     Review of Systems:  Review of Systems   Constitutional: Negative for appetite change, fever and unexpected weight change.   Respiratory: Negative for shortness of breath.    Cardiovascular: Negative for chest pain.   Gastrointestinal: Negative for nausea and vomiting.   Genitourinary: Positive for menstrual problem. Negative for menorrhagia, pelvic pain, vaginal bleeding, vaginal discharge and vaginal pain.        OBJECTIVE:     Physical Exam:  Physical Exam   Constitutional: She is oriented to person, place, and time. She appears well-developed and well-nourished.   Pulmonary/Chest: Effort normal.   Neurological: She is oriented to person, place, and time.   Skin: No pallor.   Psychiatric: She has a normal mood and affect. Her behavior is normal. Judgment and thought content normal.   Nursing note and vitals reviewed.      ASSESSMENT:       ICD-10-CM ICD-9-CM    1. Endometriosis N80.9 617.9 norethindrone (ORTHO MICRONOR) 0.35 mg tablet      DISCONTINUED: norethindrone (ORTHO MICRONOR) 0.35 mg tablet          Plan:      Joan was seen today for follow-up.    Diagnoses and all orders for this visit:    Endometriosis  -     Discontinue: norethindrone (ORTHO MICRONOR) 0.35 mg tablet; Take 1 tablet (0.35 mg total) by mouth once daily.  -     norethindrone (ORTHO MICRONOR) 0.35 mg tablet; Take 1 tablet (0.35 mg total) by mouth once daily.    - nexplanon removed today and micronor started  - patient desires surgery for removal of possible endometrial implant near umbilicus  - fax sent for op report from umblical hernia repair  - discussed  that surgery may not locate endometriosis, may locate something more serious than endometriosis, and may not resolve pain  - patient to notify me when there is a good time to proceed with surgery    No orders of the defined types were placed in this encounter.      Follow-up for preop.    Rani Ambrocio

## 2018-05-17 ENCOUNTER — PATIENT MESSAGE (OUTPATIENT)
Dept: OBSTETRICS AND GYNECOLOGY | Facility: CLINIC | Age: 33
End: 2018-05-17

## 2018-05-31 ENCOUNTER — HOSPITAL ENCOUNTER (OUTPATIENT)
Dept: RADIOLOGY | Facility: HOSPITAL | Age: 33
Discharge: HOME | End: 2018-05-31
Attending: INTERNAL MEDICINE
Payer: COMMERCIAL

## 2018-05-31 ENCOUNTER — PATIENT MESSAGE (OUTPATIENT)
Dept: INTERNAL MEDICINE | Facility: CLINIC | Age: 33
End: 2018-05-31

## 2018-05-31 ENCOUNTER — OFFICE VISIT (OUTPATIENT)
Dept: INTERNAL MEDICINE | Facility: CLINIC | Age: 33
End: 2018-05-31
Payer: COMMERCIAL

## 2018-05-31 VITALS
HEIGHT: 67 IN | DIASTOLIC BLOOD PRESSURE: 70 MMHG | WEIGHT: 204.38 LBS | HEART RATE: 82 BPM | BODY MASS INDEX: 32.08 KG/M2 | SYSTOLIC BLOOD PRESSURE: 101 MMHG | RESPIRATION RATE: 19 BRPM | TEMPERATURE: 98 F

## 2018-05-31 DIAGNOSIS — S90.424A BLISTER OF TOE OF RIGHT FOOT WITH INFECTION, INITIAL ENCOUNTER: Primary | ICD-10-CM

## 2018-05-31 DIAGNOSIS — L08.9 BLISTER OF TOE OF RIGHT FOOT WITH INFECTION, INITIAL ENCOUNTER: Primary | ICD-10-CM

## 2018-05-31 DIAGNOSIS — M86.171 ACUTE OSTEOMYELITIS OF PHALANX OF FOOT, RIGHT: ICD-10-CM

## 2018-05-31 DIAGNOSIS — L08.9 BLISTER OF TOE OF RIGHT FOOT WITH INFECTION, INITIAL ENCOUNTER: ICD-10-CM

## 2018-05-31 DIAGNOSIS — J45.20 MILD INTERMITTENT EXTRINSIC ASTHMA WITHOUT COMPLICATION: ICD-10-CM

## 2018-05-31 DIAGNOSIS — S90.424A BLISTER OF TOE OF RIGHT FOOT WITH INFECTION, INITIAL ENCOUNTER: ICD-10-CM

## 2018-05-31 PROBLEM — J45.909 EXTRINSIC ASTHMA WITHOUT COMPLICATION: Status: ACTIVE | Noted: 2018-05-31

## 2018-05-31 PROCEDURE — 73660 X-RAY EXAM OF TOE(S): CPT | Mod: TC,RT

## 2018-05-31 PROCEDURE — 3008F BODY MASS INDEX DOCD: CPT | Mod: CPTII,S$GLB,, | Performed by: INTERNAL MEDICINE

## 2018-05-31 PROCEDURE — 73660 X-RAY EXAM OF TOE(S): CPT | Mod: 26,RT,, | Performed by: RADIOLOGY

## 2018-05-31 PROCEDURE — 99214 OFFICE O/P EST MOD 30 MIN: CPT | Mod: S$GLB,,, | Performed by: INTERNAL MEDICINE

## 2018-05-31 PROCEDURE — 99999 PR PBB SHADOW E&M-EST. PATIENT-LVL III: CPT | Mod: PBBFAC,,, | Performed by: INTERNAL MEDICINE

## 2018-05-31 RX ORDER — MUPIROCIN 20 MG/G
OINTMENT TOPICAL 3 TIMES DAILY
Qty: 15 G | Refills: 0 | Status: SHIPPED | OUTPATIENT
Start: 2018-05-31 | End: 2019-06-11

## 2018-05-31 RX ORDER — ALBUTEROL SULFATE 90 UG/1
2 AEROSOL, METERED RESPIRATORY (INHALATION) EVERY 6 HOURS PRN
Qty: 18 G | Refills: 11 | Status: SHIPPED | OUTPATIENT
Start: 2018-05-31 | End: 2019-06-11 | Stop reason: SDUPTHER

## 2018-05-31 RX ORDER — SULFAMETHOXAZOLE AND TRIMETHOPRIM 800; 160 MG/1; MG/1
1 TABLET ORAL 2 TIMES DAILY
Qty: 14 TABLET | Refills: 0 | Status: SHIPPED | OUTPATIENT
Start: 2018-05-31 | End: 2018-06-07
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
## 2018-06-01 ENCOUNTER — OFFICE VISIT (OUTPATIENT)
Dept: ORTHOPEDICS | Facility: CLINIC | Age: 33
End: 2018-06-01
Payer: COMMERCIAL

## 2018-06-01 VITALS
WEIGHT: 205.94 LBS | HEART RATE: 91 BPM | BODY MASS INDEX: 32.32 KG/M2 | SYSTOLIC BLOOD PRESSURE: 117 MMHG | HEIGHT: 67 IN | DIASTOLIC BLOOD PRESSURE: 74 MMHG

## 2018-06-01 DIAGNOSIS — M79.674 PAIN OF TOE OF RIGHT FOOT: Primary | ICD-10-CM

## 2018-06-01 PROCEDURE — 3008F BODY MASS INDEX DOCD: CPT | Mod: CPTII,S$GLB,, | Performed by: PHYSICIAN ASSISTANT

## 2018-06-01 PROCEDURE — 99999 PR PBB SHADOW E&M-EST. PATIENT-LVL III: CPT | Mod: PBBFAC,,, | Performed by: PHYSICIAN ASSISTANT

## 2018-06-01 PROCEDURE — 99203 OFFICE O/P NEW LOW 30 MIN: CPT | Mod: S$GLB,,, | Performed by: PHYSICIAN ASSISTANT

## 2018-06-01 NOTE — PROGRESS NOTES
SUBJECTIVE:     Chief Complaint & History of Present Illness:  Joan Wilde is a  New  patient 33 y.o. female who is seen here today with a complaint of    Chief Complaint   Patient presents with    Right Foot - Pain    .   seen today as a referral from her primary care for a 1 mm blister on the lateral distal aspect of the little toe of her right foot.  She does not remember a specific trauma or injury to the foot but was wearing some tight shoes.  When she attempted to dress the toe 2 days ago she was.1 or 2 drops of blood and pus from this area was seen by her primary care has been placed on Bactrim and Band-Aid for dressing.  X-rays demonstrate no evidence of fracture dislocation patient reports that the blister did not erode into the underlying tissues was primarily surface in nature.  On a scale of 1-10, with 10 being worst pain imaginable, he rates this pain as 1 on good days and 3 on bad days.  she describes the pain as tender.    Review of patient's allergies indicates:   Allergen Reactions    Metoclopramide Other (See Comments)     Causes tongue to roll back in mouth         Current Outpatient Prescriptions   Medication Sig Dispense Refill    albuterol 90 mcg/actuation inhaler Inhale 2 puffs into the lungs every 6 (six) hours as needed for Wheezing. 18 g 11    clindamycin (CLEOCIN T) 1 % lotion Use hs on face 60 mL 3    ibuprofen (ADVIL,MOTRIN) 600 MG tablet       mupirocin (BACTROBAN) 2 % ointment Apply topically 3 (three) times daily. 15 g 0    norethindrone (ORTHO MICRONOR) 0.35 mg tablet Take 1 tablet (0.35 mg total) by mouth once daily. 30 tablet 11    sulfamethoxazole-trimethoprim 800-160mg (BACTRIM DS) 800-160 mg Tab Take 1 tablet by mouth 2 (two) times daily. 14 tablet 0     No current facility-administered medications for this visit.        Past Medical History:   Diagnosis Date    Fibroid     Obesity        Past Surgical History:   Procedure Laterality Date     "HERNIA REPAIR  2014    umbilical       Vital Signs (Most Recent)  Vitals:    06/01/18 1327   BP: 117/74   Pulse: 91       Review of Systems:  ROS:  Constitutional: no fever or chills  Eyes: no visual changes  ENT: no nasal congestion or sore throat  Respiratory: no cough or shortness of breath, Positive history of asthma  Cardiovascular: no chest pain or palpitations  Gastrointestinal: no nausea or vomiting, tolerating diet  Genitourinary: no hematuria or dysuria  Integument/Breast: no rash or pruritis  Hematologic/Lymphatic: no easy bruising or lymphadenopathy  Musculoskeletal: no arthralgias or myalgias  Neurological: no seizures or tremors  Behavioral/Psych: no auditory or visual hallucinations  Endocrine: no heat or cold intolerance      OBJECTIVE:     PHYSICAL EXAM:  Height: 5' 7" (170.2 cm) Weight: 93.4 kg (205 lb 14.6 oz), General Appearance: Well nourished, well developed, in no acute distress.  Neurological: Mood & affect are normal.  right  Foot/Ankle    Skin: normal and ulceration 1 mm ashley.  Swelling: none  Warmth: no warmth  Tenderness: mild  ROM: 90 degrees dorsiflexion, 180 degrees plantarflexion, 45 degrees inversion and 40 degrees eversion  Strength: 5 on 5  Gait: normal  Stability: stable to testing    One millimeter healing blister at the distal lateral aspect of the fifth digit without surrounding erythema and warmth drainage or discharge          RADIOGRAPHS:  X-rays from previous visit reviewed by me today demonstrate no ends of fracture dislocation no evidence of degenerative joint disease.  Bones of the distal phalanx of the fifth digit appeared to be well mineralized and in good condition    ASSESSMENT/PLAN:     Plan: Continue antibiotics as directed keep the toe clean and dry avoid impact and abrasions.  If symptoms do anything other than improve and resolve within 7-10 days contact clinic immediately.  Patient instructed she develops fevers chills increase in pain or erythema to contact the " clinic

## 2018-06-01 NOTE — LETTER
June 1, 2018      Jessica Barclay MD  [address] [address]           [facility] - Orthopedics  [address]  [facility] [address]  Phone: [phone]          Patient: Joan Wilde   MR Number: [id number]   YOB: 1985   Date of Visit: 6/1/2018       Dear Dr. Jessica Barclay:    Thank you for referring Joan Wilde to me for evaluation. Attached you will find relevant portions of my assessment and plan of care.    If you have questions, please do not hesitate to call me. I look forward to following Joan Wilde along with you.    Sincerely,    Casey Fleming PA-C    Enclosure  CC:  No Recipients    If you would like to receive this communication electronically, please contact [email or web address] or [phone] to request more information on [employer] [facility] access.    For providers and/or their staff who would like to refer a patient to [facility], please contact us through our one-stop-shop provider referral line, [facility], at [phone].    If you feel you have received this communication in error or would no longer like to receive these types of communications, please e-mail [email or web address]

## 2018-06-13 ENCOUNTER — OFFICE VISIT (OUTPATIENT)
Dept: DERMATOLOGY | Facility: CLINIC | Age: 33
End: 2018-06-13
Payer: COMMERCIAL

## 2018-06-13 DIAGNOSIS — D22.9 NEVUS: ICD-10-CM

## 2018-06-13 DIAGNOSIS — L81.0 POST-INFLAMMATORY HYPERPIGMENTATION: Primary | ICD-10-CM

## 2018-06-13 DIAGNOSIS — L70.0 ACNE VULGARIS: ICD-10-CM

## 2018-06-13 PROCEDURE — 99212 OFFICE O/P EST SF 10 MIN: CPT | Mod: S$GLB,,, | Performed by: DERMATOLOGY

## 2018-06-13 PROCEDURE — 99999 PR PBB SHADOW E&M-EST. PATIENT-LVL II: CPT | Mod: PBBFAC,,, | Performed by: DERMATOLOGY

## 2018-06-13 NOTE — PROGRESS NOTES
Subjective:       Patient ID:  Joan Wilde is a 33 y.o. female who presents for   Chief Complaint   Patient presents with    Skin Discoloration     neck    Lesion     right palm     HPI  Patient complains of lesion(s)  Location: neck  Duration: 10 days  Symptoms: flaking, scaling  Relieving factors/Previous treatments: Lamisil cream and miconazole cream OTC, helps    History of atopy and sensitive skin.  Rash now just discolored; no longer any symptoms.    Also noticed new brown spot on right palm to have checked.    Review of Systems   Skin: Positive for itching, rash, dry skin and daily sunscreen use.   Hematologic/Lymphatic: Does not bruise/bleed easily.        Objective:    Physical Exam   Constitutional: She appears well-developed and well-nourished. No distress.   Neurological: She is alert and oriented to person, place, and time. She is not disoriented.   Psychiatric: She has a normal mood and affect.   Skin:   Areas Examined (abnormalities noted in diagram):   Head / Face Inspection Performed  Neck Inspection Performed  RUE Inspected  LUE Inspection Performed              Diagram Legend     Erythematous scaling macule/papule c/w actinic keratosis       Vascular papule c/w angioma      Pigmented verrucoid papule/plaque c/w seborrheic keratosis      Yellow umbilicated papule c/w sebaceous hyperplasia      Irregularly shaped tan macule c/w lentigo     1-2 mm smooth white papules consistent with Milia      Movable subcutaneous cyst with punctum c/w epidermal inclusion cyst      Subcutaneous movable cyst c/w pilar cyst      Firm pink to brown papule c/w dermatofibroma      Pedunculated fleshy papule(s) c/w skin tag(s)      Evenly pigmented macule c/w junctional nevus     Mildly variegated pigmented, slightly irregular-bordered macule c/w mildly atypical nevus      Flesh colored to evenly pigmented papule c/w intradermal nevus       Pink pearly papule/plaque c/w basal cell carcinoma       Erythematous hyperkeratotic cursted plaque c/w SCC      Surgical scar with no sign of skin cancer recurrence      Open and closed comedones      Inflammatory papules and pustules      Verrucoid papule consistent consistent with wart     Erythematous eczematous patches and plaques     Dystrophic onycholytic nail with subungual debris c/w onychomycosis     Umbilicated papule    Erythematous-base heme-crusted tan verrucoid plaque consistent with inflamed seborrheic keratosis     Erythematous Silvery Scaling Plaque c/w Psoriasis     See annotation      Assessment / Plan:        Post-inflammatory hyperpigmentation - most likely from eczema - now resolved - in future could use OTC hydrocortisone cream  Patient instructed in importance in daily sun protection of at least spf 30. Sun avoidance and topical protection discussed.    Nevus  -appears benign, would monitor for changes  Discussed ABCDE's of nevi.  Monitor for new mole or moles that are becoming bigger, darker, irritated, or developing irregular borders.    Acne vulgaris  -much better after doxycycline and clindamycin topical             Follow-up if symptoms worsen or fail to improve.

## 2018-06-18 ENCOUNTER — PATIENT MESSAGE (OUTPATIENT)
Dept: OBSTETRICS AND GYNECOLOGY | Facility: CLINIC | Age: 33
End: 2018-06-18

## 2018-06-18 ENCOUNTER — TELEPHONE (OUTPATIENT)
Dept: OBSTETRICS AND GYNECOLOGY | Facility: CLINIC | Age: 33
End: 2018-06-18

## 2018-06-18 NOTE — TELEPHONE ENCOUNTER
Patient called.  Notified that surgery is not booked yet as we do not have her umbilical hernia operative report yet.

## 2018-07-05 ENCOUNTER — PATIENT MESSAGE (OUTPATIENT)
Dept: OBSTETRICS AND GYNECOLOGY | Facility: CLINIC | Age: 33
End: 2018-07-05

## 2018-07-06 ENCOUNTER — PATIENT MESSAGE (OUTPATIENT)
Dept: OBSTETRICS AND GYNECOLOGY | Facility: CLINIC | Age: 33
End: 2018-07-06

## 2018-07-13 ENCOUNTER — OFFICE VISIT (OUTPATIENT)
Dept: OBSTETRICS AND GYNECOLOGY | Facility: CLINIC | Age: 33
End: 2018-07-13
Payer: COMMERCIAL

## 2018-07-13 VITALS
HEIGHT: 67 IN | DIASTOLIC BLOOD PRESSURE: 70 MMHG | SYSTOLIC BLOOD PRESSURE: 110 MMHG | WEIGHT: 200 LBS | BODY MASS INDEX: 31.39 KG/M2

## 2018-07-13 DIAGNOSIS — N80.9 ENDOMETRIOSIS: Primary | ICD-10-CM

## 2018-07-13 PROCEDURE — 3008F BODY MASS INDEX DOCD: CPT | Mod: CPTII,S$GLB,, | Performed by: OBSTETRICS & GYNECOLOGY

## 2018-07-13 PROCEDURE — 99214 OFFICE O/P EST MOD 30 MIN: CPT | Mod: S$GLB,,, | Performed by: OBSTETRICS & GYNECOLOGY

## 2018-07-13 PROCEDURE — 99999 PR PBB SHADOW E&M-EST. PATIENT-LVL III: CPT | Mod: PBBFAC,,, | Performed by: OBSTETRICS & GYNECOLOGY

## 2018-07-13 RX ORDER — NORETHINDRONE 5 MG/1
5 TABLET ORAL DAILY
Qty: 30 TABLET | Refills: 11 | Status: SHIPPED | OUTPATIENT
Start: 2018-07-13 | End: 2019-07-10 | Stop reason: SDUPTHER

## 2018-07-13 NOTE — PROGRESS NOTES
Gynecology    SUBJECTIVE:     Chief Complaint: irregular bleeding       History of Present Illness:  33 year old who returns with endometriosis and irregular bleeding.   The patient's brief history is the following: umbilical hernia repair without mesh in 2014 at [facility] (op note received and in media), followed by supraumbilical swelling and pain with menstrual cycles.  Treated primarily with steroid shots in the umbilicus and with various contraceptive methods.  Combined OCPs made the pain worse.   Nexplanon worked well, but when she had irregular bleeding, pain was present.      Nexplanon was removed in May this year.  Patient was started on progestins.  Bleeding was absent for a while, but then 6/29-7/1, patient did have a heavy period.  Patient did not have a break in the pills during this time.  Pain was manageable because of the naproxen worked for her.  Patient has not had bleeding or pain since.  She does have some slight lingering umbilical pain, but tolerable.      Review of Systems:  Review of Systems   Constitutional: Negative for appetite change, fever and unexpected weight change.   Respiratory: Negative for shortness of breath.    Cardiovascular: Negative for chest pain.   Gastrointestinal: Negative for nausea and vomiting.   Genitourinary: Positive for menstrual problem and dysmenorrhea. Negative for menorrhagia, pelvic pain, vaginal bleeding, vaginal discharge and vaginal pain.        OBJECTIVE:     Physical Exam:  Physical Exam   Constitutional: She is oriented to person, place, and time. She appears well-developed and well-nourished.   Pulmonary/Chest: Effort normal.   Abdominal: Normal appearance. She exhibits mass. She exhibits no distension and no pulsatile midline mass. There is tenderness in the periumbilical area. There is no rigidity, no rebound and no guarding.       Neurological: She is oriented to person, place, and time.   Skin: No pallor.   Psychiatric: She has a normal mood and affect.  Her behavior is normal. Judgment and thought content normal.   Nursing note and vitals reviewed.        ASSESSMENT:       ICD-10-CM ICD-9-CM    1. Endometriosis N80.9 617.9 US Abdomen Limited      CANCELED: US Soft Tissue Misc          Plan:      Joan was seen today for irregular bleeding.    Diagnoses and all orders for this visit:    Endometriosis  -     Cancel: US Soft Tissue Misc; Future  -     US Abdomen Limited; Future    Other orders  -     norethindrone (AYGESTIN) 5 mg Tab; Take 1 tablet (5 mg total) by mouth once daily.    - long discussion with patient about options;  I also discussed the patient with Dr. Gao given his expertise in endometriosis/pain  - plan for an ultrasound of the abdomen to search for abnormalities present in the subcutaneous tissue  - patient is doing well with micronor, but will add aygestin to reduce irregular bleeding and improve pain; continue NSAIDs when patient does have menstrual cycle  - will try medical management for a few more months and if no relief, consider minor surgery to remove endometriosis; discussed with the patient that this may be difficult given that it is difficult to locate and remove endometriosis in the subcutaneous tissue even if it is found; patient agrees with plan    Orders Placed This Encounter   Procedures    US Abdomen Limited       Follow-up for follow up based on results.    Rani Ambrocio

## 2018-07-16 ENCOUNTER — HOSPITAL ENCOUNTER (OUTPATIENT)
Dept: RADIOLOGY | Facility: HOSPITAL | Age: 33
Discharge: HOME | End: 2018-07-16
Attending: OBSTETRICS & GYNECOLOGY
Payer: COMMERCIAL

## 2018-07-16 DIAGNOSIS — N80.9 ENDOMETRIOSIS: ICD-10-CM

## 2018-07-16 PROCEDURE — 76705 ECHO EXAM OF ABDOMEN: CPT | Mod: 26,,, | Performed by: INTERNAL MEDICINE

## 2018-07-16 PROCEDURE — 76705 ECHO EXAM OF ABDOMEN: CPT | Mod: TC

## 2018-07-30 ENCOUNTER — OFFICE VISIT (OUTPATIENT)
Dept: DERMATOLOGY | Facility: CLINIC | Age: 33
End: 2018-07-30
Payer: COMMERCIAL

## 2018-07-30 DIAGNOSIS — L70.9 ACNE, UNSPECIFIED ACNE TYPE: ICD-10-CM

## 2018-07-30 DIAGNOSIS — L30.9 ECZEMA, UNSPECIFIED TYPE: Primary | ICD-10-CM

## 2018-07-30 PROCEDURE — 99999 PR PBB SHADOW E&M-EST. PATIENT-LVL II: CPT | Mod: PBBFAC,,, | Performed by: DERMATOLOGY

## 2018-07-30 PROCEDURE — 99212 OFFICE O/P EST SF 10 MIN: CPT | Mod: S$GLB,,, | Performed by: DERMATOLOGY

## 2018-07-30 NOTE — PROGRESS NOTES
Subjective:       Patient ID:  Joan Wilde is a 33 y.o. female who presents for   Chief Complaint   Patient presents with    Rash     shoulders, axillas, scalp, x 1 months, comes and goes, after taking BC pills, tx hydrocortisone & claritin pills     Patient had PIH from eczema 6/13/2018 diagnosed t the last visit. She had been using OTC hydrocortisone for this as well. She notes the new rash below looked different from the neck rash before. Patient denies changes in cosmetic products and deodorants prior to rash onset.       Rash  - Initial  Affected locations: left axilla, right axilla, left shoulder and right shoulder  Duration: 1 week  Signs / symptoms: itching  Relieving factors/Treatments tried: OTC hydrocortisone and antihistamines (claritin)  Improvement on treatment: mild    This rash started after she sprayed a perfume under right breast.  History of eczema in past and asthma.    Review of Systems   Skin: Positive for rash. Negative for itching.   Hematologic/Lymphatic: Does not bruise/bleed easily.        Objective:    Physical Exam   Constitutional: She appears well-developed and well-nourished. No distress.   Neurological: She is alert and oriented to person, place, and time. She is not disoriented.   Psychiatric: She has a normal mood and affect.   Skin:   Areas Examined (abnormalities noted in diagram):   Head / Face Inspection Performed  Neck Inspection Performed  Chest / Axilla Inspection Performed  Abdomen Inspection Performed  Back Inspection Performed  RUE Inspected  LUE Inspection Performed              Diagram Legend     Erythematous scaling macule/papule c/w actinic keratosis       Vascular papule c/w angioma      Pigmented verrucoid papule/plaque c/w seborrheic keratosis      Yellow umbilicated papule c/w sebaceous hyperplasia      Irregularly shaped tan macule c/w lentigo     1-2 mm smooth white papules consistent with Milia      Movable subcutaneous cyst with punctum c/w  epidermal inclusion cyst      Subcutaneous movable cyst c/w pilar cyst      Firm pink to brown papule c/w dermatofibroma      Pedunculated fleshy papule(s) c/w skin tag(s)      Evenly pigmented macule c/w junctional nevus     Mildly variegated pigmented, slightly irregular-bordered macule c/w mildly atypical nevus      Flesh colored to evenly pigmented papule c/w intradermal nevus       Pink pearly papule/plaque c/w basal cell carcinoma      Erythematous hyperkeratotic cursted plaque c/w SCC      Surgical scar with no sign of skin cancer recurrence      Open and closed comedones      Inflammatory papules and pustules      Verrucoid papule consistent consistent with wart     Erythematous eczematous patches and plaques     Dystrophic onycholytic nail with subungual debris c/w onychomycosis     Umbilicated papule    Erythematous-base heme-crusted tan verrucoid plaque consistent with inflamed seborrheic keratosis     Erythematous Silvery Scaling Plaque c/w Psoriasis     See annotation      Assessment / Plan:        Eczema, unspecified type - resolved with OTC hydrocortisone and claritin  I advised changing to a fragrance free bar soap or body wash such as Dove, and fragrance free detergent such as Tide Free & Clear, for sensitive skin.      No fragrances or perfumes    Good skin care regimen discussed including limiting to one bath or shower/day, using lukewarm water with mild soap and moisturizing cream to skin 1 - 2x/day.     Acne, unspecified acne type  Improved with clindamycin lotion  Discussed topical retinoid vs vitamin c for hyperpigmentation, will try the latter, aware of irritation risk given her sensitive skin  Wearing spf daily             Follow-up if symptoms worsen or fail to improve.

## 2018-08-03 ENCOUNTER — PATIENT MESSAGE (OUTPATIENT)
Dept: OBSTETRICS AND GYNECOLOGY | Facility: CLINIC | Age: 33
End: 2018-08-03

## 2018-09-10 ENCOUNTER — OFFICE VISIT (OUTPATIENT)
Dept: DERMATOLOGY | Facility: CLINIC | Age: 33
End: 2018-09-10
Payer: COMMERCIAL

## 2018-09-10 DIAGNOSIS — L29.9 ITCHING: ICD-10-CM

## 2018-09-10 DIAGNOSIS — L30.9 ECZEMA, UNSPECIFIED TYPE: Primary | ICD-10-CM

## 2018-09-10 PROCEDURE — 99999 PR PBB SHADOW E&M-EST. PATIENT-LVL II: CPT | Mod: PBBFAC,,, | Performed by: DERMATOLOGY

## 2018-09-10 PROCEDURE — 99213 OFFICE O/P EST LOW 20 MIN: CPT | Mod: S$GLB,,, | Performed by: DERMATOLOGY

## 2018-09-10 RX ORDER — CLOTRIMAZOLE AND BETAMETHASONE DIPROPIONATE 10; .64 MG/G; MG/G
CREAM TOPICAL 2 TIMES DAILY
Qty: 15 G | Refills: 2 | Status: SHIPPED | OUTPATIENT
Start: 2018-09-10 | End: 2019-05-29 | Stop reason: SDUPTHER

## 2018-09-10 NOTE — PROGRESS NOTES
Subjective:       Patient ID:  Joan Wilde is a 33 y.o. female who presents for   Chief Complaint   Patient presents with    Rash     f/u     Ms. Llanos is a 34 yo F with a PMH of eczema, PIH from eczema who presents for follow up of a rash on bilateral axillae. She is very sensitive to fragrance but admits to still using an Bobby perfume, which flared up her rash.  She reports switched from Dove lavender to Dove coconut scented deodorant. She changed her body wash to Dove sensitive skin. She reports that using Hydrocortisone and claritin clears the rash temporarily, but when she uses fragrance again, it comes back.          Rash  - Follow-up  Symptom course: stable  Affected locations: left axilla, right axilla, left shoulder and right shoulder  Signs / symptoms: dryness and itching        Review of Systems   Skin: Positive for rash.   Hematologic/Lymphatic: Does not bruise/bleed easily.        Objective:    Physical Exam   Constitutional: She appears well-developed and well-nourished. No distress.   Neurological: She is alert and oriented to person, place, and time. She is not disoriented.   Psychiatric: She has a normal mood and affect.   Skin:   Areas Examined (abnormalities noted in diagram):   Head / Face Inspection Performed  Neck Inspection Performed  Chest / Axilla Inspection Performed  RUE Inspected  LUE Inspection Performed              Diagram Legend     Erythematous scaling macule/papule c/w actinic keratosis       Vascular papule c/w angioma      Pigmented verrucoid papule/plaque c/w seborrheic keratosis      Yellow umbilicated papule c/w sebaceous hyperplasia      Irregularly shaped tan macule c/w lentigo     1-2 mm smooth white papules consistent with Milia      Movable subcutaneous cyst with punctum c/w epidermal inclusion cyst      Subcutaneous movable cyst c/w pilar cyst      Firm pink to brown papule c/w dermatofibroma      Pedunculated fleshy papule(s) c/w skin tag(s)       Evenly pigmented macule c/w junctional nevus     Mildly variegated pigmented, slightly irregular-bordered macule c/w mildly atypical nevus      Flesh colored to evenly pigmented papule c/w intradermal nevus       Pink pearly papule/plaque c/w basal cell carcinoma      Erythematous hyperkeratotic cursted plaque c/w SCC      Surgical scar with no sign of skin cancer recurrence      Open and closed comedones      Inflammatory papules and pustules      Verrucoid papule consistent consistent with wart     Erythematous eczematous patches and plaques     Dystrophic onycholytic nail with subungual debris c/w onychomycosis     Umbilicated papule    Erythematous-base heme-crusted tan verrucoid plaque consistent with inflamed seborrheic keratosis     Erythematous Silvery Scaling Plaque c/w Psoriasis     See annotation      Assessment / Plan:        Eczema - flared by fragrances/perfumes - pt would like definitive patch testing to test for fragrance allergy, will schedule  -     Patch Testing; Future    Itching  -claritin PRN  , benadryl PRN severe itching    Trial of lotrisone given eczematous component and ? Tinea component as well  -     clotrimazole-betamethasone 1-0.05% (LOTRISONE) cream; Apply topically 2 (two) times daily. To rash under arms  Dispense: 15 g; Refill: 2    Encouraged to change to ALL sensitive skin fragrance free products and d/c all perfumes           Follow-up in about 2 weeks (around 9/24/2018) for for patch testing.

## 2018-09-11 ENCOUNTER — OFFICE VISIT (OUTPATIENT)
Dept: INTERNAL MEDICINE | Facility: CLINIC | Age: 33
End: 2018-09-11
Payer: COMMERCIAL

## 2018-09-11 ENCOUNTER — HOSPITAL ENCOUNTER (OUTPATIENT)
Dept: RADIOLOGY | Facility: HOSPITAL | Age: 33
Discharge: HOME | End: 2018-09-11
Attending: INTERNAL MEDICINE
Payer: COMMERCIAL

## 2018-09-11 VITALS
HEIGHT: 67 IN | DIASTOLIC BLOOD PRESSURE: 68 MMHG | HEART RATE: 88 BPM | BODY MASS INDEX: 30.93 KG/M2 | WEIGHT: 197.06 LBS | TEMPERATURE: 98 F | OXYGEN SATURATION: 99 % | SYSTOLIC BLOOD PRESSURE: 112 MMHG

## 2018-09-11 DIAGNOSIS — G89.29 CHRONIC PAIN OF LEFT KNEE: ICD-10-CM

## 2018-09-11 DIAGNOSIS — Z00.00 ANNUAL PHYSICAL EXAM: Primary | ICD-10-CM

## 2018-09-11 DIAGNOSIS — M25.562 CHRONIC PAIN OF LEFT KNEE: ICD-10-CM

## 2018-09-11 LAB
BILIRUB UR QL STRIP: NEGATIVE
CLARITY UR REFRACT.AUTO: CLEAR
COLOR UR AUTO: YELLOW
GLUCOSE UR QL STRIP: NEGATIVE
HGB UR QL STRIP: NEGATIVE
KETONES UR QL STRIP: NEGATIVE
LEUKOCYTE ESTERASE UR QL STRIP: NEGATIVE
NITRITE UR QL STRIP: NEGATIVE
PH UR STRIP: 6 [PH] (ref 5–8)
PROT UR QL STRIP: NEGATIVE
SP GR UR STRIP: 1.01 (ref 1–1.03)
URN SPEC COLLECT METH UR: NORMAL
UROBILINOGEN UR STRIP-ACNC: NEGATIVE EU/DL

## 2018-09-11 PROCEDURE — 99999 PR PBB SHADOW E&M-EST. PATIENT-LVL IV: CPT | Mod: PBBFAC,,, | Performed by: INTERNAL MEDICINE

## 2018-09-11 PROCEDURE — 73562 X-RAY EXAM OF KNEE 3: CPT | Mod: TC,LT

## 2018-09-11 PROCEDURE — 99395 PREV VISIT EST AGE 18-39: CPT | Mod: S$GLB,,, | Performed by: INTERNAL MEDICINE

## 2018-09-11 PROCEDURE — 81003 URINALYSIS AUTO W/O SCOPE: CPT

## 2018-09-11 PROCEDURE — 73562 X-RAY EXAM OF KNEE 3: CPT | Mod: 26,LT,, | Performed by: RADIOLOGY

## 2018-09-11 NOTE — PROGRESS NOTES
CHIEF COMPLAINT:  Annual exam.  Transferring care.    HISTORY OF PRESENT ILLNESS:  The patient is a 33-year-old female that is coming   here to establish.  She will be due for a physical in about one month, so we   will do it today.  She does have one new complaint and that is her left knee.    She reports falling off a shuttle bus about three weeks ago.  The knee is much   better, but she still has some pain with certain positions.    REVIEW OF SYSTEMS:  Please see the patient entered review of systems.  The   patient does report that she did manage to lose 20 pounds.  She is walking about   2-3 miles a day.  The patient did see GYN in July 2018.  She had an eye exam in   November 2017.    PHYSICAL EXAMINATION:  GENERAL APPEARANCE:  No acute distress.  HEENT:  Conjunctivae clear.  Pupils are equal and reactive.  TMs were clear.    Nasal septum is midline without discharge.  Oropharynx is without erythema.  NECK:  Trachea is midline without JVD.  PULMONARY:  Good inspiratory, expiratory breath sounds are heard.  Lungs are   clear to auscultation.  CARDIOVASCULAR:  S1, S2.  Rhythm appears to be normal.  EXTREMITIES:  Without edema.  ABDOMEN:  Nontender, nondistended, without hepatosplenomegaly.  The patient's left knee was evaluated.  She had a negative anterior, posterior   drawer sign, good collateral stability.    ASSESSMENT:  1.  Annual physical exam.  2.  Left knee pain status post fall.    PLAN:  We will put the patient in for CBC, CMP, lipid panel, A1c, and UA.  We   will also see about getting an x-ray of the left knee.  The patient is to follow   up pending results.      JDS/HN  dd: 09/11/2018 09:46:19 (CDT)  td: 09/11/2018 18:55:08 (CDT)  Doc ID   #[id number]  Job ID #[id number]    CC:     Answers for HPI/ROS submitted by the patient on 9/11/2018   activity change: No  unexpected weight change: No  neck pain: No  hearing loss: No  rhinorrhea: No  trouble swallowing: No  eye discharge: No  visual disturbance:  No  chest tightness: No  wheezing: No  chest pain: No  palpitations: No  blood in stool: No  constipation: No  vomiting: No  diarrhea: No  polydipsia: No  polyuria: No  difficulty urinating: No  hematuria: No  menstrual problem: No  dysuria: No  joint swelling: No  arthralgias: No  headaches: No  weakness: No  confusion: No  dysphoric mood: No

## 2018-09-12 ENCOUNTER — LAB VISIT (OUTPATIENT)
Dept: LAB | Facility: HOSPITAL | Age: 33
End: 2018-09-12
Attending: INTERNAL MEDICINE
Payer: COMMERCIAL

## 2018-09-12 DIAGNOSIS — Z00.00 ANNUAL PHYSICAL EXAM: ICD-10-CM

## 2018-09-12 LAB
ALBUMIN SERPL BCP-MCNC: 3.7 G/DL
ALP SERPL-CCNC: 60 U/L
ALT SERPL W/O P-5'-P-CCNC: 19 U/L
ANION GAP SERPL CALC-SCNC: 6 MMOL/L
AST SERPL-CCNC: 17 U/L
BASOPHILS # BLD AUTO: 0.04 K/UL
BASOPHILS NFR BLD: 0.7 %
BILIRUB SERPL-MCNC: 0.4 MG/DL
BUN SERPL-MCNC: 18 MG/DL
CALCIUM SERPL-MCNC: 8.9 MG/DL
CHLORIDE SERPL-SCNC: 110 MMOL/L
CHOLEST SERPL-MCNC: 129 MG/DL
CHOLEST/HDLC SERPL: 3.5 {RATIO}
CO2 SERPL-SCNC: 24 MMOL/L
CREAT SERPL-MCNC: 1 MG/DL
DIFFERENTIAL METHOD: ABNORMAL
EOSINOPHIL # BLD AUTO: 0 K/UL
EOSINOPHIL NFR BLD: 0.5 %
ERYTHROCYTE [DISTWIDTH] IN BLOOD BY AUTOMATED COUNT: 14 %
EST. GFR  (AFRICAN AMERICAN): >60 ML/MIN/1.73 M^2
EST. GFR  (NON AFRICAN AMERICAN): >60 ML/MIN/1.73 M^2
ESTIMATED AVG GLUCOSE: 100 MG/DL
GLUCOSE SERPL-MCNC: 85 MG/DL
HBA1C MFR BLD HPLC: 5.1 %
HCT VFR BLD AUTO: 38.9 %
HDLC SERPL-MCNC: 37 MG/DL
HDLC SERPL: 28.7 %
HGB BLD-MCNC: 12.4 G/DL
IMM GRANULOCYTES # BLD AUTO: 0.01 K/UL
IMM GRANULOCYTES NFR BLD AUTO: 0.2 %
LDLC SERPL CALC-MCNC: 83.6 MG/DL
LYMPHOCYTES # BLD AUTO: 1.9 K/UL
LYMPHOCYTES NFR BLD: 34.2 %
MCH RBC QN AUTO: 25.7 PG
MCHC RBC AUTO-ENTMCNC: 31.9 G/DL
MCV RBC AUTO: 81 FL
MONOCYTES # BLD AUTO: 0.4 K/UL
MONOCYTES NFR BLD: 6.3 %
NEUTROPHILS # BLD AUTO: 3.2 K/UL
NEUTROPHILS NFR BLD: 58.1 %
NONHDLC SERPL-MCNC: 92 MG/DL
NRBC BLD-RTO: 0 /100 WBC
PLATELET # BLD AUTO: 334 K/UL
PMV BLD AUTO: 10.3 FL
POTASSIUM SERPL-SCNC: 4.8 MMOL/L
PROT SERPL-MCNC: 6.6 G/DL
RBC # BLD AUTO: 4.82 M/UL
SODIUM SERPL-SCNC: 140 MMOL/L
TRIGL SERPL-MCNC: 42 MG/DL
WBC # BLD AUTO: 5.52 K/UL

## 2018-09-12 PROCEDURE — 36415 COLL VENOUS BLD VENIPUNCTURE: CPT

## 2018-09-12 PROCEDURE — 83036 HEMOGLOBIN GLYCOSYLATED A1C: CPT

## 2018-09-12 PROCEDURE — 80053 COMPREHEN METABOLIC PANEL: CPT

## 2018-09-12 PROCEDURE — 80061 LIPID PANEL: CPT

## 2018-09-12 PROCEDURE — 85025 COMPLETE CBC W/AUTO DIFF WBC: CPT

## 2018-09-13 ENCOUNTER — PATIENT MESSAGE (OUTPATIENT)
Dept: OBSTETRICS AND GYNECOLOGY | Facility: CLINIC | Age: 33
End: 2018-09-13

## 2018-09-13 ENCOUNTER — PATIENT MESSAGE (OUTPATIENT)
Dept: DERMATOLOGY | Facility: CLINIC | Age: 33
End: 2018-09-13

## 2018-09-13 DIAGNOSIS — Z11.3 SCREEN FOR STD (SEXUALLY TRANSMITTED DISEASE): Primary | ICD-10-CM

## 2018-09-13 DIAGNOSIS — R21 RASH: Primary | ICD-10-CM

## 2018-09-13 RX ORDER — CLOTRIMAZOLE AND BETAMETHASONE DIPROPIONATE 10; .64 MG/G; MG/G
CREAM TOPICAL 2 TIMES DAILY
Qty: 45 G | Refills: 2 | Status: SHIPPED | OUTPATIENT
Start: 2018-09-13 | End: 2018-11-20

## 2018-09-14 ENCOUNTER — PATIENT MESSAGE (OUTPATIENT)
Dept: INTERNAL MEDICINE | Facility: CLINIC | Age: 33
End: 2018-09-14

## 2018-09-17 ENCOUNTER — TELEPHONE (OUTPATIENT)
Dept: OBSTETRICS AND GYNECOLOGY | Facility: CLINIC | Age: 33
End: 2018-09-17

## 2018-09-17 ENCOUNTER — LAB VISIT (OUTPATIENT)
Dept: LAB | Facility: HOSPITAL | Age: 33
End: 2018-09-17
Attending: OBSTETRICS & GYNECOLOGY
Payer: COMMERCIAL

## 2018-09-17 DIAGNOSIS — Z11.3 SCREEN FOR STD (SEXUALLY TRANSMITTED DISEASE): Primary | ICD-10-CM

## 2018-09-17 DIAGNOSIS — Z11.3 SCREEN FOR STD (SEXUALLY TRANSMITTED DISEASE): ICD-10-CM

## 2018-09-17 LAB
HAV IGM SERPL QL IA: NEGATIVE
HBV CORE IGM SERPL QL IA: NEGATIVE
HBV SURFACE AG SERPL QL IA: NEGATIVE
HCV AB SERPL QL IA: NEGATIVE
HIV 1+2 AB+HIV1 P24 AG SERPL QL IA: NEGATIVE

## 2018-09-17 PROCEDURE — 36415 COLL VENOUS BLD VENIPUNCTURE: CPT

## 2018-09-17 PROCEDURE — 80074 ACUTE HEPATITIS PANEL: CPT

## 2018-09-17 PROCEDURE — 86592 SYPHILIS TEST NON-TREP QUAL: CPT

## 2018-09-17 PROCEDURE — 86703 HIV-1/HIV-2 1 RESULT ANTBDY: CPT

## 2018-09-18 LAB — RPR SER QL: NORMAL

## 2018-10-01 ENCOUNTER — TELEPHONE (OUTPATIENT)
Dept: DERMATOLOGY | Facility: CLINIC | Age: 33
End: 2018-10-01

## 2018-10-01 ENCOUNTER — PATIENT MESSAGE (OUTPATIENT)
Dept: DERMATOLOGY | Facility: CLINIC | Age: 33
End: 2018-10-01

## 2018-10-01 NOTE — TELEPHONE ENCOUNTER
Responded via patient portal. ----- Message from Stella Garcia sent at 10/1/2018  3:26 PM CDT -----  Contact: pt   Cool- pt- pt is calling to speak with the nurse pt needs a diagnosis code for her insurance company for the patch testing can you can please call pt at ext [phone] [place]

## 2018-10-08 ENCOUNTER — CLINICAL SUPPORT (OUTPATIENT)
Dept: DERMATOLOGY | Facility: CLINIC | Age: 33
End: 2018-10-08
Payer: COMMERCIAL

## 2018-10-08 DIAGNOSIS — L30.9 ECZEMA, UNSPECIFIED TYPE: ICD-10-CM

## 2018-10-08 PROCEDURE — 95044 PATCH/APPLICATION TESTS: CPT | Mod: S$GLB,,, | Performed by: DERMATOLOGY

## 2018-10-08 PROCEDURE — 99999 PR PBB SHADOW E&M-EST. PATIENT-LVL II: CPT | Mod: PBBFAC,,,

## 2018-10-08 NOTE — PROGRESS NOTES
The patient was seen today for patch testing. The [employer] standard Series patch test was applied to the patient's posterior thorax on Monday, October 8, 2018.  Seventy allergens were applied for the diagnosis of eczema, unspecified type (icd10 code:  L30.9).  The patient is to follow up on Wednesday, October 10, 2018 for the first patch reading and Friday, October 12, 2018 for the second patch reading.

## 2018-10-10 ENCOUNTER — TELEPHONE (OUTPATIENT)
Dept: DERMATOLOGY | Facility: CLINIC | Age: 33
End: 2018-10-10

## 2018-10-10 ENCOUNTER — OFFICE VISIT (OUTPATIENT)
Dept: DERMATOLOGY | Facility: CLINIC | Age: 33
End: 2018-10-10
Payer: COMMERCIAL

## 2018-10-10 DIAGNOSIS — L30.9 ECZEMA, UNSPECIFIED TYPE: Primary | ICD-10-CM

## 2018-10-10 PROCEDURE — 99212 OFFICE O/P EST SF 10 MIN: CPT | Mod: S$GLB,,, | Performed by: DERMATOLOGY

## 2018-10-10 PROCEDURE — 99999 PR PBB SHADOW E&M-EST. PATIENT-LVL I: CPT | Mod: PBBFAC,,, | Performed by: DERMATOLOGY

## 2018-10-10 NOTE — TELEPHONE ENCOUNTER
Spoke with pt, wanted to know what her limitation were for the next couple of days. Informed her to continue her normal routine but she still can not get her back wet yet. She can let her clothes touch her back. Informed her that the numbers in highlight is still on her back but she is ok to treat it like normal skin. Pt verbalized understanding.     ----- Message from Hector Woods sent at 10/10/2018  3:24 PM CDT -----  Contact: Patient   Needs Advice    Reason for call: pt has follow up questions regarding her body after her patch testing today, please contact to discuss         Communication Preference: ext [phone]    Additional Information:

## 2018-10-10 NOTE — PROGRESS NOTES
Patient here for 1st patch test reading.    HPI:  Recurrent axillary dermatitis, responds to Lotrisone, thought to be perfume triggered.    PE:      Nickel sulfate +/-  Disperse blue  Mix +/-  compositae mix +/-    IMP/PLAN:  R/O Allergic Contact Dermatitis    F/u: 2 days for final reading

## 2018-10-11 ENCOUNTER — OFFICE VISIT (OUTPATIENT)
Dept: OBSTETRICS AND GYNECOLOGY | Facility: CLINIC | Age: 33
End: 2018-10-11
Payer: COMMERCIAL

## 2018-10-11 VITALS
WEIGHT: 194 LBS | BODY MASS INDEX: 30.45 KG/M2 | SYSTOLIC BLOOD PRESSURE: 124 MMHG | DIASTOLIC BLOOD PRESSURE: 82 MMHG | HEIGHT: 67 IN

## 2018-10-11 DIAGNOSIS — N89.8 VAGINAL DISCHARGE: Primary | ICD-10-CM

## 2018-10-11 PROCEDURE — 99999 PR PBB SHADOW E&M-EST. PATIENT-LVL III: CPT | Mod: PBBFAC,,, | Performed by: NURSE PRACTITIONER

## 2018-10-11 PROCEDURE — 99213 OFFICE O/P EST LOW 20 MIN: CPT | Mod: S$GLB,,, | Performed by: NURSE PRACTITIONER

## 2018-10-11 PROCEDURE — 3008F BODY MASS INDEX DOCD: CPT | Mod: CPTII,S$GLB,, | Performed by: NURSE PRACTITIONER

## 2018-10-11 PROCEDURE — 87660 TRICHOMONAS VAGIN DIR PROBE: CPT

## 2018-10-11 RX ORDER — METRONIDAZOLE 500 MG/1
500 TABLET ORAL 2 TIMES DAILY
Qty: 14 TABLET | Refills: 1 | Status: SHIPPED | OUTPATIENT
Start: 2018-10-11 | End: 2018-10-18

## 2018-10-11 NOTE — PROGRESS NOTES
"HISTORY OF PRESENT ILLNESS:    Joan Wilde is a 33 y.o. female  No LMP recorded. Patient is not currently having periods (Reason: Birth Control). presents today complaining of vaginal burning.   -c/o vaginal burning which occurs only with insertion of dildo.   -denies associated fever, pelvic pain, odor, itching, vaginal discharge, UTI sx or AUB (on continuous hormonal Rx due to history of endometriosis).  -has self treated with Monistat x2, Diflucan x2 and antibiotics "in case it was a UTI" but the symptoms persist.  -washes dildo with rubbing alcohol and antibiotic soap and rinses "thoroughly".  -uses "dildo water based lubricant".  -has not been sexually active with partner for two years.  -reviewed Dr Ambrocio's note 18.    DATA REVIEWED:  18: Genprobe negative    Past Medical History:   Diagnosis Date    Allergy     seasonal    Fibroid     Obesity        Past Surgical History:   Procedure Laterality Date    HERNIA REPAIR      umbilical       MEDICATIONS AND ALLERGIES:      Current Outpatient Medications:     albuterol 90 mcg/actuation inhaler, Inhale 2 puffs into the lungs every 6 (six) hours as needed for Wheezing., Disp: 18 g, Rfl: 11    clindamycin (CLEOCIN T) 1 % lotion, Use hs on face, Disp: 60 mL, Rfl: 3    clotrimazole-betamethasone 1-0.05% (LOTRISONE) cream, Apply topically 2 (two) times daily. To rash under arms, Disp: 15 g, Rfl: 2    clotrimazole-betamethasone 1-0.05% (LOTRISONE) cream, Apply topically 2 (two) times daily., Disp: 45 g, Rfl: 2    metroNIDAZOLE (FLAGYL) 500 MG tablet, Take 1 tablet (500 mg total) by mouth 2 (two) times daily. for 7 days Do not drink alcohol while taking this medication, Disp: 14 tablet, Rfl: 1    mupirocin (BACTROBAN) 2 % ointment, Apply topically 3 (three) times daily., Disp: 15 g, Rfl: 0    norethindrone (AYGESTIN) 5 mg Tab, Take 1 tablet (5 mg total) by mouth once daily., Disp: 30 tablet, Rfl: 11    norethindrone (ORTHO " MICRONOR) 0.35 mg tablet, Take 1 tablet (0.35 mg total) by mouth once daily., Disp: 28 tablet, Rfl: 11    Review of patient's allergies indicates:   Allergen Reactions    Metoclopramide Other (See Comments)     Causes tongue to roll back in mouth       OB HISTORY: None.     COMPREHENSIVE GYN HISTORY:  PAP History: Denies abnormal Paps. REPORTS NORMAL PAP OUTSIDE [facility] 5/2017.   Infection History: Denies STDs. Denies PID.  Benign History: Reports uterine fibroids. Denies ovarian cysts. Reports endometriosis. Denies other conditions.  Cancer History: Denies cervical cancer. Denies uterine cancer or hyperplasia. Denies ovarian cancer. Denies vulvar cancer or pre-cancer. Denies vaginal cancer or pre-cancer. Denies breast cancer. Denies colon cancer.  Sexual Activity History: Denies currently being sexually active  Menstrual History: Denies menses.   Dysmenorrhea History: Denies dysmenorrhea.   Contraception: per pt: Nexplanon inserted 1/2017 and removed 5/2018. Now on continues hormonal Rx.    ROS:  GENERAL: No fever or chills.  VULVA: + BURNING.  No lesions. No itching.  VAGINA: No relaxation. No itching. No odor. No discharge. No lesions.  URINARY: No incontinence. No nocturia. No frequency. No dysuria.    PE:  APPEARANCE: Well nourished, well developed, in no acute distress.  AFFECT: WNL, alert and oriented x 3.  PELVIC: Normal external female genitalia without lesions. Normal hair distribution. Adequate perineal body, normal urethral meatus. Vagina pink and well rugated without lesions. MINIMAL SL FISHY D/C present. Cervix pink without lesions, discharge or tenderness. No significant cystocele or rectocele. Bimanual exam shows uterus to be 4-6 weeks size, regular, mobile and nontender. Adnexa without masses or tenderness.    DIAGNOSIS:  1. Vaginal discharge        PLAN:    Orders Placed This Encounter    Vaginosis Screen by DNA Probe    metroNIDAZOLE (FLAGYL) 500 MG tablet       COUNSELING:  The patient was  counseled today on:  -Vaginitis prevention including :  a. avoiding feminine products such as deoderant soaps or detergents, body wash, bubble bath, douches, scented toilet paper, deoderant tampons or pads, baby or feminine wipes, chronic pad use, etc. and       b. avoiding other vulvovaginal irritants such as long hot baths, humidity, tight, synthetic clothing, chlorine and sitting around in wet bathing suits and   c. wearing cotton underwear, avoiding thong underwear and no underwear to bed and      d. taking showers instead of baths and use a hair dryer on cool setting afterwards to dry and  e.wearing cotton to exercise and shower immediately after exercise and change clothes and  f. using polyurethane condoms without spermicide if sexually active and symptoms are triggered by intercourse.  -Flagyl use and potential side effects followed by use of OTC vaginal RepHresh;  -pelvic rest for 7 days;  -consider getting a new dildo.     FOLLOW-UP with me pending test results.

## 2018-10-12 ENCOUNTER — OFFICE VISIT (OUTPATIENT)
Dept: DERMATOLOGY | Facility: CLINIC | Age: 33
End: 2018-10-12
Payer: COMMERCIAL

## 2018-10-12 DIAGNOSIS — L30.9 ECZEMA, UNSPECIFIED TYPE: Primary | ICD-10-CM

## 2018-10-12 PROCEDURE — 99999 PR PBB SHADOW E&M-EST. PATIENT-LVL I: CPT | Mod: PBBFAC,,, | Performed by: DERMATOLOGY

## 2018-10-12 PROCEDURE — 99212 OFFICE O/P EST SF 10 MIN: CPT | Mod: S$GLB,,, | Performed by: DERMATOLOGY

## 2018-10-13 NOTE — PROGRESS NOTES
Patient here for 2nd patch test reading.    HPI:  34 yo female recurrent axillary dermatitis and neck c/w eczema. Suspect fragrance allergy.    PE:    Nickel sulfate 2  compositae mix 2    IMP/PLAN:  R/O Allergic Contact Dermatitis  Given sheets for avoidance of her allergens  Would consider Zyrtec or Allegra daily    F/u prn  lotrisone PRN

## 2018-10-18 ENCOUNTER — PATIENT MESSAGE (OUTPATIENT)
Dept: OBSTETRICS AND GYNECOLOGY | Facility: CLINIC | Age: 33
End: 2018-10-18

## 2018-10-28 ENCOUNTER — PATIENT MESSAGE (OUTPATIENT)
Dept: OBSTETRICS AND GYNECOLOGY | Facility: CLINIC | Age: 33
End: 2018-10-28

## 2018-10-29 ENCOUNTER — OFFICE VISIT (OUTPATIENT)
Dept: UROGYNECOLOGY | Facility: CLINIC | Age: 33
End: 2018-10-29
Payer: COMMERCIAL

## 2018-10-29 ENCOUNTER — TELEPHONE (OUTPATIENT)
Dept: OBSTETRICS AND GYNECOLOGY | Facility: CLINIC | Age: 33
End: 2018-10-29

## 2018-10-29 ENCOUNTER — TELEPHONE (OUTPATIENT)
Dept: UROGYNECOLOGY | Facility: CLINIC | Age: 33
End: 2018-10-29

## 2018-10-29 VITALS
SYSTOLIC BLOOD PRESSURE: 120 MMHG | WEIGHT: 193.31 LBS | DIASTOLIC BLOOD PRESSURE: 60 MMHG | BODY MASS INDEX: 30.34 KG/M2 | HEIGHT: 67 IN

## 2018-10-29 DIAGNOSIS — N94.10 FEMALE DYSPAREUNIA: Primary | ICD-10-CM

## 2018-10-29 PROCEDURE — 3008F BODY MASS INDEX DOCD: CPT | Mod: CPTII,S$GLB,, | Performed by: NURSE PRACTITIONER

## 2018-10-29 PROCEDURE — 99999 PR PBB SHADOW E&M-EST. PATIENT-LVL III: CPT | Mod: PBBFAC,,, | Performed by: NURSE PRACTITIONER

## 2018-10-29 PROCEDURE — 99214 OFFICE O/P EST MOD 30 MIN: CPT | Mod: S$GLB,,, | Performed by: NURSE PRACTITIONER

## 2018-10-29 NOTE — LETTER
October 29, 2018      Jessica Barclay MD  [address] [facility] [place] [address]           [facility] [address] [place] [address]  Phone: [phone]          Patient: Joan Wilde   MR Number: [id number]   YOB: 1985   Date of Visit: 10/29/2018       Dear Dr. Jessica Barclay:    Thank you for referring Joan Wilde to me for evaluation. Attached you will find relevant portions of my assessment and plan of care.    If you have questions, please do not hesitate to call me. I look forward to following Joan Wilde along with you.    Sincerely,    Eleanor Isaac NP    Enclosure  CC:  No Recipients    If you would like to receive this communication electronically, please contact [email or web address] or [phone] to request more information on [employer] [employer] access.    For providers and/or their staff who would like to refer a patient to [facility], please contact us through our one-stop-shop provider referral line, [facility] , at [phone].    If you feel you have received this communication in error or would no longer like to receive these types of communications, please e-mail [email or web address]

## 2018-10-29 NOTE — TELEPHONE ENCOUNTER
----- Message from Deborah Márquez sent at 10/29/2018  3:54 PM CDT -----  Contact: Navdeep  Name of Who is Calling: Navdeep [facility]      What is the request in detail: Patient went to the [facility] in error she is on her way now she may be a little late       Can the clinic reply by [facility]: no      What Number to Call Back if not in [facility]: [phone]

## 2018-10-29 NOTE — PROGRESS NOTES
10/29/2018    SUBJECTIVE:   33 y.o. female for vaginal pain with vaginal penetration.    Past Medical History:   Diagnosis Date    Allergy     seasonal    Fibroid     Obesity        Past Surgical History:   Procedure Laterality Date    HERNIA REPAIR  2014    umbilical       Current Outpatient Medications   Medication Sig Dispense Refill    albuterol 90 mcg/actuation inhaler Inhale 2 puffs into the lungs every 6 (six) hours as needed for Wheezing. 18 g 11    clindamycin (CLEOCIN T) 1 % lotion Use hs on face 60 mL 3    clotrimazole-betamethasone 1-0.05% (LOTRISONE) cream Apply topically 2 (two) times daily. To rash under arms 15 g 2    clotrimazole-betamethasone 1-0.05% (LOTRISONE) cream Apply topically 2 (two) times daily. 45 g 2    mupirocin (BACTROBAN) 2 % ointment Apply topically 3 (three) times daily. 15 g 0    norethindrone (AYGESTIN) 5 mg Tab Take 1 tablet (5 mg total) by mouth once daily. 30 tablet 11    norethindrone (ORTHO MICRONOR) 0.35 mg tablet Take 1 tablet (0.35 mg total) by mouth once daily. 28 tablet 11    conjugated estrogens (PREMARIN) vaginal cream Use dime size amount of estrogen cream in vagina nightly x 2 weeks, then twice a week thereafter. 30 g 2     No current facility-administered medications for this visit.      Allergies: Metoclopramide   No LMP recorded. Patient is not currently having periods (Reason: Birth Control).      Well Woman:  Pap:2016 normal per patient report  Mammo:  Colonoscopy:  Dexa:      Family History  Family History   Problem Relation Age of Onset    Hypertension Mother     Diabetes Father     Hypertension Father     No Known Problems Brother     No Known Problems Brother     Breast cancer Neg Hx     Colon cancer Neg Hx     Ovarian cancer Neg Hx     Melanoma Neg Hx         ROS:  Feeling well.   No dyspnea or chest pain on exertion.    No abdominal pain, change in bowel habits, black or bloody stools.    No urinary tract symptoms.   GYN ROS: she  "complains of dyspareunia x 2 months.  All std's negative.  Using replens-- has not helped yet.. No neurological complaints.    OBJECTIVE:   The patient appears well, alert, oriented x 3, in no distress.  /60   Ht 5' 7" (1.702 m)   Wt 87.7 kg (193 lb 5.5 oz)   BMI 30.28 kg/m²   Abdomen soft without tenderness, guarding, mass or organomegaly.   Extremities show no edema, normal peripheral pulses.   Neurological is normal, no focal findings.    PELVIC EXAM:   VULVA: normal appearing vulva with no masses, tenderness or lesions,   VAGINA: atrophic--mild, but may be contributing to dyspareunia.    CERVIX: normal appearing cervix without discharge or lesions,   UTERUS: uterus is normal size, shape, consistency and nontender,   ADNEXA: no masses,   RECTAL: deferred    ASSESSMENT:   1. Female dyspareunia  conjugated estrogens (PREMARIN) vaginal cream       PLAN:   1. dypsaruenia  --start vaginal estrogen.  Dime size amount of estrogen cream in vagina nightly x 2 weeks, then twice a week thereafter.    --Use REPLENS or REFRESH OTC: 1/2 applicator full in vagina twice a week.    --after 2 months, consider changing to intrarosa  --message me if you are interested    2. RTC prn          25 minutes were spent in face to face time with this patient  90 % of this time was spent in counseling and/or coordination of care  Eleanor SANCHEZ [facility]and [facility]  Division of Female Pelvic Medicine and Reconstructive Surgery  Department of Obstetrics & Gynecology          "

## 2018-10-29 NOTE — PATIENT INSTRUCTIONS
1. dypsaruenia  --start vaginal estrogen.  Dime size amount of estrogen cream in vagina nightly x 2 weeks, then twice a week thereafter.    --Use REPLENS or REFRESH OTC: 1/2 applicator full in vagina twice a week.    --after 2 months, consider changing to intrarosa  --message me if you are interested    2. RTC prn

## 2018-11-06 ENCOUNTER — OFFICE VISIT (OUTPATIENT)
Dept: OPTOMETRY | Facility: CLINIC | Age: 33
End: 2018-11-06
Payer: COMMERCIAL

## 2018-11-06 DIAGNOSIS — H04.123 DRY EYE SYNDROME, BILATERAL: ICD-10-CM

## 2018-11-06 DIAGNOSIS — H52.13 MYOPIA, BILATERAL: Primary | ICD-10-CM

## 2018-11-06 DIAGNOSIS — H10.13 ALLERGIC CONJUNCTIVITIS OF BOTH EYES: ICD-10-CM

## 2018-11-06 PROCEDURE — 92015 DETERMINE REFRACTIVE STATE: CPT | Mod: S$GLB,,, | Performed by: OPTOMETRIST

## 2018-11-06 PROCEDURE — 99999 PR PBB SHADOW E&M-EST. PATIENT-LVL II: CPT | Mod: PBBFAC,,, | Performed by: OPTOMETRIST

## 2018-11-06 PROCEDURE — 92012 INTRM OPH EXAM EST PATIENT: CPT | Mod: S$GLB,,, | Performed by: OPTOMETRIST

## 2018-11-06 RX ORDER — AZELASTINE HYDROCHLORIDE 0.5 MG/ML
1 SOLUTION/ DROPS OPHTHALMIC 2 TIMES DAILY
Qty: 6 ML | Refills: 6 | Status: SHIPPED | OUTPATIENT
Start: 2018-11-06 | End: 2019-10-23 | Stop reason: SDUPTHER

## 2018-11-06 NOTE — PROGRESS NOTES
HPI     33yr old female present for Annual eye exam. Patient states blurry vision   increased OU, even when wearing glasses. Patient says she got Ronda color   computer glasses to help with the contrast of light. Pt having itching and   redness OU x 2 weeks. Pt using Refresh Gel PRN-OU and Cromoflicic TID-OU.   Pt denies seeing floaters, headaches and eye pain.     Last edited by Gasper Cunningham MA on 11/6/2018 10:45 AM. (History)            Assessment /Plan     For exam results, see Encounter Report.    Myopia, bilateral  Rx specs    Allergic conjunctivitis of both eyes       -     fluorometholone 0.25% (FML) 0.25 % DrpS; Place 1 drop into both eyes 4 (four) times daily. for 4 days  Then BID x 1 week,   then start    azelastine (OPTIVAR) 0.05 % ophthalmic solution; Place 1 drop into both eyes 2 (two) times daily.  Dispense: 6 mL; Refill: 6    Dry eye syndrome, bilateral    Use refresh liquigel PRN daily    RTC 1 year DFE/ annual

## 2018-11-07 ENCOUNTER — PATIENT MESSAGE (OUTPATIENT)
Dept: OPTOMETRY | Facility: CLINIC | Age: 33
End: 2018-11-07

## 2018-11-20 ENCOUNTER — OFFICE VISIT (OUTPATIENT)
Dept: INTERNAL MEDICINE | Facility: CLINIC | Age: 33
End: 2018-11-20
Payer: COMMERCIAL

## 2018-11-20 VITALS
WEIGHT: 192.56 LBS | HEART RATE: 94 BPM | DIASTOLIC BLOOD PRESSURE: 74 MMHG | TEMPERATURE: 99 F | HEIGHT: 67 IN | SYSTOLIC BLOOD PRESSURE: 112 MMHG | BODY MASS INDEX: 30.22 KG/M2 | OXYGEN SATURATION: 98 %

## 2018-11-20 DIAGNOSIS — L70.9 ACNE, UNSPECIFIED ACNE TYPE: ICD-10-CM

## 2018-11-20 DIAGNOSIS — B34.9 VIRAL SYNDROME: Primary | ICD-10-CM

## 2018-11-20 DIAGNOSIS — N80.9 ENDOMETRIOSIS: ICD-10-CM

## 2018-11-20 PROBLEM — M86.171: Status: RESOLVED | Noted: 2018-05-31 | Resolved: 2018-11-20

## 2018-11-20 LAB
FLUAV AG SPEC QL IA: NEGATIVE
FLUBV AG SPEC QL IA: NEGATIVE
SPECIMEN SOURCE: NORMAL

## 2018-11-20 PROCEDURE — 87400 INFLUENZA A/B EACH AG IA: CPT | Mod: 59

## 2018-11-20 PROCEDURE — 3008F BODY MASS INDEX DOCD: CPT | Mod: CPTII,S$GLB,, | Performed by: INTERNAL MEDICINE

## 2018-11-20 PROCEDURE — 99213 OFFICE O/P EST LOW 20 MIN: CPT | Mod: S$GLB,,, | Performed by: INTERNAL MEDICINE

## 2018-11-20 PROCEDURE — 99999 PR PBB SHADOW E&M-EST. PATIENT-LVL III: CPT | Mod: PBBFAC,,, | Performed by: INTERNAL MEDICINE

## 2018-11-20 RX ORDER — ONDANSETRON HYDROCHLORIDE 8 MG/1
8 TABLET, FILM COATED ORAL EVERY 8 HOURS PRN
Qty: 10 TABLET | Refills: 0 | Status: SHIPPED | OUTPATIENT
Start: 2018-11-20 | End: 2019-02-08 | Stop reason: ALTCHOICE

## 2018-11-20 NOTE — PROGRESS NOTES
Subjective:       Patient ID: Joan Wilde is a 33 y.o. female.    Chief Complaint: Emesis; Fever; Fatigue; Generalized Body Aches; Headache; Nausea; and Sore Throat    HPI   Sunday developed mild nausea and headaches.  Throbbing headache.  Aleve helpful.  Yesterday anorexia.  This am fever, severe nausea.  Sore throat this am and post nasal drip.  Sneezing.   Very fatigued.   Body aches. She had flu fax.   No vomiting.  No cough.    No dairrhea. C/o cramping this am.  Headache much better now; tends to occur at end of day.    Problem list updated with endometriosis, acne.    Review of Systems   Constitutional: Negative for fever and unexpected weight change.   HENT: Negative for congestion and postnasal drip.    Eyes: Negative for pain, discharge and visual disturbance.   Respiratory: Negative for cough, chest tightness, shortness of breath and wheezing.    Cardiovascular: Negative for chest pain and leg swelling.   Gastrointestinal: Positive for nausea. Negative for abdominal pain, constipation and diarrhea.   Genitourinary: Negative for difficulty urinating, dysuria and hematuria.   Musculoskeletal: Positive for myalgias.   Skin: Negative for rash.   Neurological: Positive for headaches.   Psychiatric/Behavioral: Negative for dysphoric mood and sleep disturbance. The patient is not nervous/anxious.        Objective:      Physical Exam   Constitutional: She is oriented to person, place, and time. She appears well-developed and well-nourished. No distress.   HENT:   Head: Normocephalic and atraumatic.   Mouth/Throat: Oropharynx is clear and moist. No oropharyngeal exudate.   Tm's clear   Neck: Neck supple.   Cardiovascular: Normal rate and regular rhythm.   Pulmonary/Chest: Effort normal and breath sounds normal. No respiratory distress. She has no wheezes. She has no rales.   Lymphadenopathy:     She has no cervical adenopathy.   Neurological: She is alert and oriented to person, place, and time.    Psychiatric: She has a normal mood and affect. Her behavior is normal.       Assessment:       1. Viral syndrome    2. Endometriosis    3. Acne, unspecified acne type        Plan:       Joan was seen today for emesis, fever, fatigue, generalized body aches, headache, nausea and sore throat.    Diagnoses and all orders for this visit:    Viral syndrome  -     Influenza antigen Nasopharyngeal Swab    Endometriosis    Acne, unspecified acne type    Other orders  -     ondansetron (ZOFRAN) 8 MG tablet; Take 1 tablet (8 mg total) by mouth every 8 (eight) hours as needed for Nausea.       Fluid, rest.     Work excuse for today.

## 2018-11-21 ENCOUNTER — PATIENT MESSAGE (OUTPATIENT)
Dept: INTERNAL MEDICINE | Facility: CLINIC | Age: 33
End: 2018-11-21

## 2018-11-21 NOTE — LETTER
November 21, 2018    Joan Wilde  [address] [facility] [place] [address]             [facility]  [address]  [place] [address]  Phone: [phone]  Fax: [phone] To Whom It May Concern:    Ms Wilde is ill. Please excuse her today.    Sincerely,        Alba Copeland MD

## 2018-11-21 NOTE — TELEPHONE ENCOUNTER
Pt was seen yesterday by Dr. White is still having some symptoms but is wondering if she would be ok and not contagious to return to work today. Pls advise.

## 2018-11-21 NOTE — TELEPHONE ENCOUNTER
Pt seen yesterday by Dr. White and has a few questions about some symptoms she is still having/ Pls see MO msg and advise.

## 2019-01-21 ENCOUNTER — PATIENT MESSAGE (OUTPATIENT)
Dept: INTERNAL MEDICINE | Facility: CLINIC | Age: 34
End: 2019-01-21

## 2019-01-25 ENCOUNTER — OFFICE VISIT (OUTPATIENT)
Dept: OBSTETRICS AND GYNECOLOGY | Facility: CLINIC | Age: 34
End: 2019-01-25
Payer: COMMERCIAL

## 2019-01-25 VITALS
BODY MASS INDEX: 31.71 KG/M2 | WEIGHT: 202 LBS | DIASTOLIC BLOOD PRESSURE: 80 MMHG | SYSTOLIC BLOOD PRESSURE: 132 MMHG | HEIGHT: 67 IN

## 2019-01-25 DIAGNOSIS — G44.219 EPISODIC TENSION-TYPE HEADACHE, NOT INTRACTABLE: ICD-10-CM

## 2019-01-25 DIAGNOSIS — N80.9 ENDOMETRIOSIS: Primary | ICD-10-CM

## 2019-01-25 PROCEDURE — 3008F PR BODY MASS INDEX (BMI) DOCUMENTED: ICD-10-PCS | Mod: CPTII,S$GLB,, | Performed by: OBSTETRICS & GYNECOLOGY

## 2019-01-25 PROCEDURE — 99213 OFFICE O/P EST LOW 20 MIN: CPT | Mod: S$GLB,,, | Performed by: OBSTETRICS & GYNECOLOGY

## 2019-01-25 PROCEDURE — 99213 PR OFFICE/OUTPT VISIT, EST, LEVL III, 20-29 MIN: ICD-10-PCS | Mod: S$GLB,,, | Performed by: OBSTETRICS & GYNECOLOGY

## 2019-01-25 PROCEDURE — 99999 PR PBB SHADOW E&M-EST. PATIENT-LVL III: ICD-10-PCS | Mod: PBBFAC,,, | Performed by: OBSTETRICS & GYNECOLOGY

## 2019-01-25 PROCEDURE — 3008F BODY MASS INDEX DOCD: CPT | Mod: CPTII,S$GLB,, | Performed by: OBSTETRICS & GYNECOLOGY

## 2019-01-25 PROCEDURE — 99999 PR PBB SHADOW E&M-EST. PATIENT-LVL III: CPT | Mod: PBBFAC,,, | Performed by: OBSTETRICS & GYNECOLOGY

## 2019-01-25 NOTE — PROGRESS NOTES
Gynecology    SUBJECTIVE:     Chief Complaint: Follow-up (discuss birth control(thinks headaches being caused by bc))       History of Present Illness:  33 year old who returns for follow up.  Patient has a long history of endometriosis now treated with micronor and aygestin.  She does have relief of her symptoms with this.  She is concerned because she has had about 3 months of headaches.  Typically at the end of the day.  Does have relief with tylenol.  Bitemporal, no vision changes, numbness, weakness.  Rest helps headaches as well.  She first noticed these when she was sick a few months ago.  The headaches were bad then, but have improved.        Review of Systems:  Review of Systems   Constitutional: Negative for appetite change, fever and unexpected weight change.   Respiratory: Negative for shortness of breath.    Cardiovascular: Negative for chest pain.   Gastrointestinal: Negative for abdominal pain, nausea and vomiting.   Genitourinary: Negative for menorrhagia, menstrual problem, pelvic pain, vaginal bleeding, vaginal discharge and vaginal pain.   Neurological: Positive for headaches. Negative for numbness.        OBJECTIVE:     Physical Exam:  Physical Exam   Constitutional: She is oriented to person, place, and time. She appears well-developed and well-nourished.   Pulmonary/Chest: Effort normal.   Neurological: She is oriented to person, place, and time.   Skin: No pallor.   Psychiatric: She has a normal mood and affect. Her behavior is normal. Judgment and thought content normal.   Nursing note and vitals reviewed.      ASSESSMENT:       ICD-10-CM ICD-9-CM    1. Endometriosis N80.9 617.9    2. Episodic tension-type headache, not intractable G44.219 339.11           Plan:      Joan was seen today for follow-up.    Diagnoses and all orders for this visit:    Endometriosis    Episodic tension-type headache, not intractable    - discussed the progesterone therapy is associated with headaches; low  suspicion for thrombosis given that her contraception is without estrogen and she has no other neurological findings; also headaches are relieved with medication  - reassurance provided    No orders of the defined types were placed in this encounter.      Follow-up for annual or prn.    Rani Ambrocio

## 2019-02-08 ENCOUNTER — OFFICE VISIT (OUTPATIENT)
Dept: INTERNAL MEDICINE | Facility: CLINIC | Age: 34
End: 2019-02-08
Attending: FAMILY MEDICINE
Payer: COMMERCIAL

## 2019-02-08 VITALS
SYSTOLIC BLOOD PRESSURE: 108 MMHG | BODY MASS INDEX: 31.76 KG/M2 | HEIGHT: 67 IN | WEIGHT: 202.38 LBS | DIASTOLIC BLOOD PRESSURE: 78 MMHG | TEMPERATURE: 99 F | OXYGEN SATURATION: 98 % | HEART RATE: 101 BPM

## 2019-02-08 DIAGNOSIS — R51.9 NONINTRACTABLE HEADACHE, UNSPECIFIED CHRONICITY PATTERN, UNSPECIFIED HEADACHE TYPE: Primary | ICD-10-CM

## 2019-02-08 PROCEDURE — 99214 OFFICE O/P EST MOD 30 MIN: CPT | Mod: S$GLB,,, | Performed by: FAMILY MEDICINE

## 2019-02-08 PROCEDURE — 99999 PR PBB SHADOW E&M-EST. PATIENT-LVL III: ICD-10-PCS | Mod: PBBFAC,,, | Performed by: FAMILY MEDICINE

## 2019-02-08 PROCEDURE — 99999 PR PBB SHADOW E&M-EST. PATIENT-LVL III: CPT | Mod: PBBFAC,,, | Performed by: FAMILY MEDICINE

## 2019-02-08 PROCEDURE — 99214 PR OFFICE/OUTPT VISIT, EST, LEVL IV, 30-39 MIN: ICD-10-PCS | Mod: S$GLB,,, | Performed by: FAMILY MEDICINE

## 2019-02-08 PROCEDURE — 3008F PR BODY MASS INDEX (BMI) DOCUMENTED: ICD-10-PCS | Mod: CPTII,S$GLB,, | Performed by: FAMILY MEDICINE

## 2019-02-08 PROCEDURE — 3008F BODY MASS INDEX DOCD: CPT | Mod: CPTII,S$GLB,, | Performed by: FAMILY MEDICINE

## 2019-02-08 RX ORDER — VERAPAMIL HYDROCHLORIDE 40 MG/1
40 TABLET ORAL 2 TIMES DAILY
Qty: 60 TABLET | Refills: 2 | Status: SHIPPED | OUTPATIENT
Start: 2019-02-08 | End: 2019-06-11

## 2019-02-08 NOTE — PROGRESS NOTES
Subjective:       Patient ID: Joan Wilde is a 33 y.o. female.    Chief Complaint: Headache    HPI  Review of Systems   Constitutional: Negative for chills, fatigue, fever and unexpected weight change.   HENT: Negative for congestion and trouble swallowing.    Eyes: Negative for redness and visual disturbance.   Respiratory: Negative for cough, chest tightness and shortness of breath.    Cardiovascular: Negative for chest pain, palpitations and leg swelling.   Gastrointestinal: Negative for abdominal pain and blood in stool.   Genitourinary: Negative for difficulty urinating, hematuria and menstrual problem.   Musculoskeletal: Negative for arthralgias, back pain, gait problem, joint swelling, myalgias and neck pain.   Skin: Negative for color change and rash.   Neurological: Positive for headaches. Negative for tremors, speech difficulty, weakness and numbness.   Hematological: Negative for adenopathy. Does not bruise/bleed easily.   Psychiatric/Behavioral: Negative for behavioral problems, confusion and sleep disturbance. The patient is not nervous/anxious.        Objective:      Physical Exam   Constitutional: She is oriented to person, place, and time. She appears well-developed and well-nourished. No distress.   HENT:   Head: Normocephalic.   Right Ear: Tympanic membrane, external ear and ear canal normal.   Left Ear: Tympanic membrane, external ear and ear canal normal.   Nose: Nose normal.   Mouth/Throat: Oropharynx is clear and moist. No oropharyngeal exudate.   Eyes: Conjunctivae are normal. Right eye exhibits no discharge. Left eye exhibits no discharge. No scleral icterus.   Fundoscopic exam:       The right eye shows no papilledema.        The left eye shows no papilledema.   Neck: Normal range of motion. Neck supple. No thyromegaly present.   Cardiovascular: Normal rate, regular rhythm, normal heart sounds and intact distal pulses. Exam reveals no gallop and no friction rub.   No murmur  heard.  Pulmonary/Chest: Effort normal and breath sounds normal. No respiratory distress. She has no wheezes. She has no rales. She exhibits no tenderness.   Abdominal: Soft. There is no tenderness.   Musculoskeletal: She exhibits no edema.   Lymphadenopathy:     She has no cervical adenopathy.   Neurological: She is alert and oriented to person, place, and time. No cranial nerve deficit. Coordination and gait normal. GCS eye subscore is 4. GCS verbal subscore is 5. GCS motor subscore is 6.   Skin: Skin is warm and dry. No rash noted. She is not diaphoretic.   Nursing note and vitals reviewed.      Assessment:       1. Nonintractable headache, unspecified chronicity pattern, unspecified headache type        Plan:   Joan was seen today for headache.    Diagnoses and all orders for this visit:    Nonintractable headache, unspecified chronicity pattern, unspecified headache type    Other orders  -     verapamil (CALAN) 40 MG Tab; Take 1 tablet (40 mg total) by mouth 2 (two) times daily.      See meds, orders, follow up, routing and instructions sections of encounter.  This is a patient with a headache following upper respiratory infection in   October.  It has been persistent, typically frontotemporal, occurring on a daily   basis, seems to be worse at the end of a day, rest helps.    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  She denies any significant posterior neck pain,   numbness, tingling or weakness in the upper extremities at this time.    RECOMMENDATIONS:  Discussed a prophylactic trial of Calan at low dose.    Discussed the fact that her hormonal treatment may be affecting her headaches to   some degree.  There is no aura reported, no focal neurologic deficits reported.    However, we could consider Neurology consult should things worsen or persist.    She would like to schedule a followup with her PCP, Dr. Phipps in approximately   3-4 weeks to  discuss the effectiveness of her medication.  Notify me for   worsening in the meantime.      GRACE/HN  dd: 02/08/2019 16:29:01 (CST)  td: 02/09/2019 07:19:43 (CST)  Doc ID   #[id number]  Job ID #[id number]    CC:

## 2019-03-12 RX ORDER — CLINDAMYCIN PHOSPHATE 10 UG/ML
LOTION TOPICAL
Qty: 60 ML | Refills: 3 | Status: SHIPPED | OUTPATIENT
Start: 2019-03-12 | End: 2020-06-01 | Stop reason: SDUPTHER

## 2019-03-28 NOTE — PROGRESS NOTES
INTERNAL MEDICINE CLINIC - SAME DAY APPOINTMENT  Progress Note    PRESENTING HISTORY     PCP: Jessica Barclay MD  Chief Complaint/Reason for Visit:   No chief complaint on file.      History of Present Illness & ROS : Ms. Joan Wilde is a 34 y.o. female.    Here for an UC visit.   New to this provider.   Presents to clinic today with recurrent symptoms that began 2 days ago,  including myalgia, fatigue, fever (not documented), no appetite, nasal congestion and nausea.   Works in [employer] dept at [employer].   + Sick contacts  No recent travels.   No history of smoking, active or remote.   Traveled to [place] in December.     She has been taking Oscillococccinum therapy and berry therapy, which are OTCs.     Of significance, has reportedly had a Positive Flu Swab in 11/2018.       Review of Systems:  Eyes: denies visual changes at this time denies floaters   ENT: no nasal congestion or sore throat  Respiratory: no cough or shorness of breath  Cardiovascular: no chest pain or palpitations  Gastrointestinal: no nausea or vomiting, no abdominal pain or change in bowel habits  Genitourinary: no hematuria or dysuria; denies frequency  Hematologic/Lymphatic: no easy bruising or lymphadenopathy  Musculoskeletal: no arthralgias or myalgias  Neurological: no seizures or tremors  Endocrine: no heat or cold intolerance      PAST HISTORY:     Past Medical History:   Diagnosis Date    Allergy     seasonal    Endometriosis     Fibroid     Obesity        Past Surgical History:   Procedure Laterality Date    HERNIA REPAIR  2014    umbilical       Family History   Problem Relation Age of Onset    Hypertension Mother     Diabetes Father     Hypertension Father     No Known Problems Brother     No Known Problems Brother     Breast cancer Neg Hx     Colon cancer Neg Hx     Ovarian cancer Neg Hx     Melanoma Neg Hx     Blindness Neg Hx     Amblyopia Neg Hx     Cataracts Neg Hx     Glaucoma Neg Hx     Macular  degeneration Neg Hx     Retinal detachment Neg Hx     Strabismus Neg Hx        Social History     Socioeconomic History    Marital status: Single     Spouse name: Not on file    Number of children: Not on file    Years of education: Not on file    Highest education level: Not on file   Occupational History    Not on file   Social Needs    Financial resource strain: Not on file    Food insecurity:     Worry: Not on file     Inability: Not on file    Transportation needs:     Medical: Not on file     Non-medical: Not on file   Tobacco Use    Smoking status: Never Smoker    Smokeless tobacco: Never Used   Substance and Sexual Activity    Alcohol use: No     Frequency: Never    Drug use: No    Sexual activity: Not Currently   Lifestyle    Physical activity:     Days per week: Not on file     Minutes per session: Not on file    Stress: Not on file   Relationships    Social connections:     Talks on phone: Not on file     Gets together: Not on file     Attends Sikhism service: Not on file     Active member of club or organization: Not on file     Attends meetings of clubs or organizations: Not on file     Relationship status: Not on file    Intimate partner violence:     Fear of current or ex partner: Not on file     Emotionally abused: Not on file     Physically abused: Not on file     Forced sexual activity: Not on file   Other Topics Concern    Are you pregnant or think you may be? Not Asked    Breast-feeding Not Asked   Social History Narrative    Performance improvement specialist at [employer]       MEDICATIONS & ALLERGIES:     Current Outpatient Medications on File Prior to Visit   Medication Sig Dispense Refill    albuterol 90 mcg/actuation inhaler Inhale 2 puffs into the lungs every 6 (six) hours as needed for Wheezing. 18 g 11    azelastine (OPTIVAR) 0.05 % ophthalmic solution Place 1 drop into both eyes 2 (two) times daily. 6 mL 6    clindamycin (CLEOCIN T) 1 % lotion Use every night at  bedtime on face 60 mL 3    clotrimazole-betamethasone 1-0.05% (LOTRISONE) cream Apply topically 2 (two) times daily. To rash under arms 15 g 2    conjugated estrogens (PREMARIN) vaginal cream Use dime size amount of estrogen cream in vagina nightly x 2 weeks, then twice a week thereafter. 30 g 2    mupirocin (BACTROBAN) 2 % ointment Apply topically 3 (three) times daily. 15 g 0    norethindrone (AYGESTIN) 5 mg Tab Take 1 tablet (5 mg total) by mouth once daily. 30 tablet 11    norethindrone (ORTHO MICRONOR) 0.35 mg tablet Take 1 tablet (0.35 mg total) by mouth once daily. 28 tablet 11    verapamil (CALAN) 40 MG Tab Take 1 tablet (40 mg total) by mouth 2 (two) times daily. 60 tablet 2     No current facility-administered medications on file prior to visit.         Review of patient's allergies indicates:   Allergen Reactions    Metoclopramide Other (See Comments)     Causes tongue to roll back in mouth       Medications Reconciliation:   I have reconciled the patient's home medications with the patient/family. I have updated all changes.  Refer to After-Visit Medication List.    OBJECTIVE:     Vital Signs:  There were no vitals filed for this visit.  Wt Readings from Last 1 Encounters:   02/08/19 1424 91.8 kg (202 lb 6.1 oz)     There is no height or weight on file to calculate BMI.     Wt Readings from Last 3 Encounters:   03/29/19 93 kg (205 lb 0.4 oz)   02/08/19 91.8 kg (202 lb 6.1 oz)   01/25/19 91.6 kg (202 lb)     Temp Readings from Last 3 Encounters:   03/29/19 98.8 °F (37.1 °C)   02/08/19 98.6 °F (37 °C)   11/20/18 98.8 °F (37.1 °C) (Oral)     BP Readings from Last 3 Encounters:   03/29/19 134/74   02/08/19 108/78   01/25/19 132/80     Pulse Readings from Last 3 Encounters:   03/29/19 88   02/08/19 101   11/20/18 94           Physical Exam:  General: Well developed, well nourished. No distress.  HEENT: Head is normocephalic, atraumatic;   Left Ear: + serous fluid; no erythema  Eyes: Clear  "conjunctiva.  Neck: Supple, symmetrical neck; trachea midline.  Lungs: Clear to auscultation bilaterally and normal respiratory effort.  Cardiovascular: Heart with regular rate and rhythm. No murmurs, gallops or rubs  Extremities: No LE edema. Pulses 2+ and symmetric.   Abdomen: Abdomen is soft, non-tender non-distended with normal bowel sounds.  Skin: Skin color, texture, turgor normal. No rashes.  Musculoskeletal: Normal gait.   Lymph Nodes: No cervical or supraclavicular adenopathy.  Neurologic: Normal strength and tone. No focal numbness or weakness.   Psychiatric: Not depressed.        Laboratory  Lab Results   Component Value Date    WBC 5.52 09/12/2018    HGB 12.4 09/12/2018    HCT 38.9 09/12/2018     09/12/2018    CHOL 129 09/12/2018    TRIG 42 09/12/2018    HDL 37 (L) 09/12/2018    ALT 19 09/12/2018    AST 17 09/12/2018     09/12/2018    K 4.8 09/12/2018     09/12/2018    CREATININE 1.0 09/12/2018    BUN 18 09/12/2018    CO2 24 09/12/2018    TSH 1.780 12/12/2017    HGBA1C 5.1 09/12/2018         ASSESSMENT & PLAN:       Here for an UC visit.   Flu Vaccine: had in 2018    Flu-like symptoms:   (cc: Myalgias, reported "fever and chills", body aches, anorexia)  Viral Etiology / Syndrome:   (Supportive care)  Based on symptomatology and clinical presentation, with given high risk exposures, will commence to tx therapy for Influenza  ` Tamiflu   ` check Flu Swab: negative  ` Medrol Dose       History of multiple allergies  Chronic Allergies:   ` suggest referral to Allergist  / Immunologist, and have advised to discuss this with his PCP.     Other orders  -     methylPREDNISolone (MEDROL DOSEPACK) 4 mg tablet; use as directed  Dispense: 1 Package; Refill: 0  -     oseltamivir (TAMIFLU) 75 MG capsule; Take 1 capsule (75 mg total) by mouth 2 (two) times daily. for 5 days  Dispense: 10 capsule; Refill: 0      *Have advised that if not improved to follow up with PCP or may report the ED for more " emergent medical care.        Medication List           Accurate as of 3/29/19  9:18 AM. If you have any questions, ask your nurse or doctor.               START taking these medications    methylPREDNISolone 4 mg tablet  Commonly known as:  MEDROL DOSEPACK  use as directed  Started by:  CONG Roberts     oseltamivir 75 MG capsule  Commonly known as:  TAMIFLU  Take 1 capsule (75 mg total) by mouth 2 (two) times daily. for 5 days  Started by:  CONG Roberts        CONTINUE taking these medications    azelastine 0.05 % ophthalmic solution  Commonly known as:  OPTIVAR  Place 1 drop into both eyes 2 (two) times daily.     clindamycin 1 % lotion  Commonly known as:  CLEOCIN T  Use every night at bedtime on face     clotrimazole-betamethasone 1-0.05% cream  Commonly known as:  LOTRISONE  Apply topically 2 (two) times daily. To rash under arms     mupirocin 2 % ointment  Commonly known as:  BACTROBAN  Apply topically 3 (three) times daily.     norethindrone 0.35 mg tablet  Commonly known as:  MICRONOR  Take 1 tablet (0.35 mg total) by mouth once daily.     norethindrone 5 mg Tab  Commonly known as:  AYGESTIN  Take 1 tablet (5 mg total) by mouth once daily.     PREMARIN vaginal cream  Generic drug:  conjugated estrogens  Use dime size amount of estrogen cream in vagina nightly x 2 weeks, then twice a week thereafter.     VENTOLIN HFA 90 mcg/actuation inhaler  Generic drug:  albuterol  Inhale 2 puffs into the lungs every 6 (six) hours as needed for Wheezing.     verapamil 40 MG Tab  Commonly known as:  CALAN  Take 1 tablet (40 mg total) by mouth 2 (two) times daily.           Where to Get Your Medications      These medications were sent to [facility] [address] [address]    Hours:  Mon-Fri 7a-7p, Sat 10a-4p, Sun 10a-4p Phone:  [phone]   · methylPREDNISolone 4 mg tablet  · oseltamivir 75 MG capsule       Signing Physician:  CONG Roberts

## 2019-03-29 ENCOUNTER — OFFICE VISIT (OUTPATIENT)
Dept: INTERNAL MEDICINE | Facility: CLINIC | Age: 34
End: 2019-03-29
Payer: COMMERCIAL

## 2019-03-29 ENCOUNTER — PATIENT MESSAGE (OUTPATIENT)
Dept: INTERNAL MEDICINE | Facility: CLINIC | Age: 34
End: 2019-03-29

## 2019-03-29 ENCOUNTER — TELEPHONE (OUTPATIENT)
Dept: INTERNAL MEDICINE | Facility: CLINIC | Age: 34
End: 2019-03-29

## 2019-03-29 VITALS
DIASTOLIC BLOOD PRESSURE: 74 MMHG | HEART RATE: 88 BPM | SYSTOLIC BLOOD PRESSURE: 134 MMHG | OXYGEN SATURATION: 98 % | TEMPERATURE: 99 F | BODY MASS INDEX: 32.18 KG/M2 | HEIGHT: 67 IN | WEIGHT: 205 LBS

## 2019-03-29 DIAGNOSIS — R68.89 FLU-LIKE SYMPTOMS: Primary | ICD-10-CM

## 2019-03-29 DIAGNOSIS — Z88.9 HISTORY OF MULTIPLE ALLERGIES: ICD-10-CM

## 2019-03-29 DIAGNOSIS — B34.9 ACUTE VIRAL SYNDROME: ICD-10-CM

## 2019-03-29 LAB
INFLUENZA A, MOLECULAR: NEGATIVE
INFLUENZA B, MOLECULAR: NEGATIVE
SPECIMEN SOURCE: NORMAL

## 2019-03-29 PROCEDURE — 99999 PR PBB SHADOW E&M-EST. PATIENT-LVL V: CPT | Mod: PBBFAC,,, | Performed by: NURSE PRACTITIONER

## 2019-03-29 PROCEDURE — 99214 OFFICE O/P EST MOD 30 MIN: CPT | Mod: S$GLB,,, | Performed by: NURSE PRACTITIONER

## 2019-03-29 PROCEDURE — 3008F BODY MASS INDEX DOCD: CPT | Mod: CPTII,S$GLB,, | Performed by: NURSE PRACTITIONER

## 2019-03-29 PROCEDURE — 99999 PR PBB SHADOW E&M-EST. PATIENT-LVL V: ICD-10-PCS | Mod: PBBFAC,,, | Performed by: NURSE PRACTITIONER

## 2019-03-29 PROCEDURE — 3008F PR BODY MASS INDEX (BMI) DOCUMENTED: ICD-10-PCS | Mod: CPTII,S$GLB,, | Performed by: NURSE PRACTITIONER

## 2019-03-29 PROCEDURE — 87502 INFLUENZA DNA AMP PROBE: CPT

## 2019-03-29 PROCEDURE — 99214 PR OFFICE/OUTPT VISIT, EST, LEVL IV, 30-39 MIN: ICD-10-PCS | Mod: S$GLB,,, | Performed by: NURSE PRACTITIONER

## 2019-03-29 RX ORDER — ONDANSETRON 4 MG/1
4 TABLET, FILM COATED ORAL EVERY 6 HOURS PRN
Qty: 20 TABLET | Refills: 1 | Status: SHIPPED | OUTPATIENT
Start: 2019-03-29 | End: 2019-04-15

## 2019-03-29 RX ORDER — OSELTAMIVIR PHOSPHATE 75 MG/1
75 CAPSULE ORAL 2 TIMES DAILY
Qty: 10 CAPSULE | Refills: 0 | Status: SHIPPED | OUTPATIENT
Start: 2019-03-29 | End: 2019-04-03

## 2019-03-29 RX ORDER — METHYLPREDNISOLONE 4 MG/1
TABLET ORAL
Qty: 21 TABLET | Refills: 0 | Status: SHIPPED | OUTPATIENT
Start: 2019-03-29 | End: 2019-04-15 | Stop reason: ALTCHOICE

## 2019-04-02 DIAGNOSIS — N80.9 ENDOMETRIOSIS: ICD-10-CM

## 2019-04-02 RX ORDER — ACETAMINOPHEN AND CODEINE PHOSPHATE 120; 12 MG/5ML; MG/5ML
1 SOLUTION ORAL DAILY
Qty: 28 TABLET | Refills: 11 | Status: SHIPPED | OUTPATIENT
Start: 2019-04-02 | End: 2020-01-31 | Stop reason: SDUPTHER

## 2019-04-15 ENCOUNTER — OFFICE VISIT (OUTPATIENT)
Dept: ALLERGY | Facility: CLINIC | Age: 34
End: 2019-04-15
Payer: COMMERCIAL

## 2019-04-15 ENCOUNTER — LAB VISIT (OUTPATIENT)
Dept: LAB | Facility: HOSPITAL | Age: 34
End: 2019-04-15
Attending: ALLERGY & IMMUNOLOGY
Payer: COMMERCIAL

## 2019-04-15 VITALS
SYSTOLIC BLOOD PRESSURE: 128 MMHG | WEIGHT: 209.19 LBS | DIASTOLIC BLOOD PRESSURE: 84 MMHG | HEIGHT: 67 IN | BODY MASS INDEX: 32.83 KG/M2

## 2019-04-15 DIAGNOSIS — R05.9 COUGH: Primary | ICD-10-CM

## 2019-04-15 DIAGNOSIS — H10.403 CHRONIC CONJUNCTIVITIS OF BOTH EYES, UNSPECIFIED CHRONIC CONJUNCTIVITIS TYPE: ICD-10-CM

## 2019-04-15 DIAGNOSIS — J31.0 CHRONIC RHINITIS: ICD-10-CM

## 2019-04-15 DIAGNOSIS — R06.2 WHEEZING: ICD-10-CM

## 2019-04-15 PROCEDURE — 86003 ALLG SPEC IGE CRUDE XTRC EA: CPT

## 2019-04-15 PROCEDURE — 36415 COLL VENOUS BLD VENIPUNCTURE: CPT

## 2019-04-15 PROCEDURE — 99244 OFF/OP CNSLTJ NEW/EST MOD 40: CPT | Mod: S$GLB,,, | Performed by: ALLERGY & IMMUNOLOGY

## 2019-04-15 PROCEDURE — 82785 ASSAY OF IGE: CPT

## 2019-04-15 PROCEDURE — 3008F BODY MASS INDEX DOCD: CPT | Mod: CPTII,S$GLB,, | Performed by: ALLERGY & IMMUNOLOGY

## 2019-04-15 PROCEDURE — 3008F PR BODY MASS INDEX (BMI) DOCUMENTED: ICD-10-PCS | Mod: CPTII,S$GLB,, | Performed by: ALLERGY & IMMUNOLOGY

## 2019-04-15 PROCEDURE — 99244 PR OFFICE CONSULTATION,LEVEL IV: ICD-10-PCS | Mod: S$GLB,,, | Performed by: ALLERGY & IMMUNOLOGY

## 2019-04-15 PROCEDURE — 86003 ALLG SPEC IGE CRUDE XTRC EA: CPT | Mod: 59

## 2019-04-15 NOTE — LETTER
April 15, 2019      Brigitte Villalta, FNP  [address]  [facility] [address]           [facility] All[facility]  [address]  [facility] [address]  Phone: [phone]  Fax: [phone]          Patient: Joan Wilde   MR Number: [id number]   YOB: 1985   Date of Visit: 4/15/2019       Dear Brigitte Villalta:    Thank you for referring Joan Wilde to me for evaluation. Attached you will find relevant portions of my assessment and plan of care.    If you have questions, please do not hesitate to call me. I look forward to following Joan Wilde along with you.    Sincerely,    ROSSY Mendoza III, MD    Enclosure  CC:  No Recipients    If you would like to receive this communication electronically, please contact [email or web address] or [phone] to request more information on [employer] access.    For providers and/or their staff who would like to refer a patient to [facility], please contact us through our one-stop-shop provider referral line, [facility], at [phone].    If you feel you have received this communication in error or would no longer like to receive these types of communications, please e-mail [email or web address]

## 2019-04-15 NOTE — PROGRESS NOTES
Joan Wilde is referred by DAVIDE Villalta for a consult regarding chronic rhinitis and wheezing.  She is here alone.    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  She [facility].  She is originally from [facility] [place] and has lived in the [place] for the past six years.    When she was growing up in [place] she had chronic rhinitis and conjunctivitis.  Her symptoms did initially improve but have worsened last several years.    She has pruritus of the nose, eyes, ears, and throat, sneezing, clear rhinorrhea, nasal congestion, and frequent throat clearing.  She also has intermittent shortness of breath and wheezing particularly in the summer.    About five days ago she developed increased rhinitis with sore throat and nonproductive cough.  She has been taking Claritin for the past five days.  She does think that she has a virus and is improving.    She has never been diagnosed with asthma.  She does have an albuterol inhaler.    She does not take Flonase or any other topical nasal steroids.  She does use cromolyn eyedrops.    She used to have chronic recurrent rashes that she attributes to fragrances in perfumes and deodorant.  She has not had hives.  She denies any angioedema.    Her significant other for the past nine months has said that she snores.  He has not seen any evidence of apnea.    [facility] ALLERGY QUESTIONNAIRE LONG 4/9/2019   Do you have symptoms in your head, eyes, ears, nose, or sinuses? Yes   Head or facial pain: No symptoms   Eyes: Itching   Do you have difficulty wearing contacts, if applicable?  Yes   Which kind of eye drops do you use, if applicable? cant remember   Ears: No symptoms   When did you have ear infections, if applicable? N/A   When did you have ear tubes, if applicable?  N/A   When did you have ear surgery, if applicable? N/A   Nose: Runny nose   If you had a blocked nose, was it blocked on both sides equally? Yes    When did you have nasal surgery, if applicable? Never   When did you break your nose, if applicable? Never   Throat: Frequent clearing   Sinuses: No symptoms   When and where did you have x-rays, if applicable? N/A   When and where did you have a CT scan, if applicable? N/A   Do you have symptoms in your lungs?  Yes   Lungs: Cough   Is your cough productive of mucus and/or phlegm , if applicable? No   Was your last chest x-ray normal or abnormal, if applicable? Normal   Have you ever has a tuberculosis skin test?  Yes   When did you have a tuberculosis skin test, if applicable? 2018   Was your tuberculosis skin test positive or negative, if applicable? Negative   Have you ever had a lung-function test? No   Have you had a flu shot this year? Yes   When was your flu shot, if applicable? September 3, 2018   Have you had the pneumonia vaccine?  No   Do you have any known problems with your immune system? Yes   Do you suspect you may have problems with your immune system? Yes   Do you have frequent infections? Yes   What type of frequent infection(s) do you have, if applicable? Runny nose, red and itchy eyes   Do you have skin symptoms? Yes   Skin: Itching, Hives, Rash   When did your hives and/or swelling first begin? Childhood   Please note the frequency of hives and/or swelling in days, weeks OR months. Every other month   Body location most commonly affected by hives: Arms, neck and chest   If you chose other, please list the body part that swells most commonly. None   What are the substances, contacts, activity, foods, or drugs, which you think are related to hives or swelling? Perfumes, rugs,  grass and other unknown   Which medications have been helpful in controlling hives or swelling? claritin   Are you taking this medication regularly? No   Have you associated the hives or swelling with any of the following? Not applicable   Have you had any other associated symptoms with the hives or swelling such as:  Shortness of breath, Chest tightness   When did these symptoms first occur? Runny nose and itchy eyes has happened since my early teens, Rashes and hives has happened due to use of a certain perfume and deoderant in 2018,  Chest tightness and shortness of breath started in 2016 during summer (seasonal)   Are they getting worse or better? Worse   How often do these symptoms occur? They occur with weather changes, and worse during summer or with the use of a perfume recently (Felipe villatoro) which I started using late 2018   When do these symptoms occur? Haphazardly   Do they occur year round? No   If there is any seasonal variation in your symptoms, when are they worse? During summer   Is there a particular time of the day or night when the symptoms are worse? Mornings   Is there anything you have identified, which can cause symptoms or make them worse? (such as dust, grass, plant or animal products, mold, heat, cold, strong odors, exercise) Dust, grass, mold, changes in heat to cold and vice versa   Is there anything you have identified, which can make symptoms better?  Claritin and sodium cromogylcic acid eye drops   What medications have you tried in the past to help control these symptoms?  Claritin and sodium cromogylcic acid eye drops   Please list all the vitamins or herbal medications you are taking. None   Please list all the other medications you are taking, including over-the-counter medications. Aygestin, progestin pills 0.35mg, Vitamin C, Albuterol inhaler   Have you ever seen an allergist for these symptoms? No   When did you see the allergist, if applicable? N/A   Have you ever had skin tests? Yes   When did you have skin tests, if applicable? Nov 2018   Have you ever had any other type of allergy testing? No   When did you have allergy tests, if applicable?  N/A   Have you ever had allergy shots? No   How long did you receive allergy shots, if applicable? Not applicable   Were the allergy  shots helpful, if applicable? No   Do you have food allergies? No   Do you have drug allergies? Yes   Please list the drug(s), type of reaction(s), last date of reaction(s), and if you have used the medication since discovering you are allergic.  Metaclopramide. I develop an acute dystonic reaction to it with my tongue rolling backwards.   Do you have insect allergies? No   Do you have latex allergies? No   Constitution: No symptoms   Cardiovascular: No symptoms   Gastrointestinal: Abdominal bloating   Genital/ urinary No symptoms   Musculoskeletal: No symptoms   Endocrine: No symptoms   Hematologic: No symptoms   Please note which family members have allergies or asthma and specify which they have. Father has  chronic sinusitis, Brother has allergic rhinitis and eczema/dermatitis, Uncle has chronic sinusitis   How long have you lived at your current address? 2 years   Has your residence ever had water or flood damage? No   When did your residence have water or flood damange, if applicable? None   Is there any evidence of mold in the house? No   Does your house have: Central air conditioning, Humidifier, Attic fan   Does your bedroom have: Carpeting, Ceiling fan, Venetian blinds   What type of pillow do you have, for example feather, foam and fiberfill?  bamboo fiber   Do you have pets? No   Does anyone in the house smoke? No   If you are answering this questionnaire for your child, is he or she in ? 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 Please specify which symptoms, if applicable.  It increases whenever I am outside the building and around nature/grass   Do you suspect anything at work or school, which may be causing your symptoms? If so, please elaborate.  No   Is there anything else that might be important for the doctor to know?  If I can think of anything, I will let him know during the appointment   Did you find this  questionnaire helpful in addressing your symptoms?  No     Physical Examination:  General: Well-developed, well-nourished, no acute distress.  Head: No sinus tenderness.  Eyes: Conjunctivae:  No bulbar or palpebral conjunctival injection.  Ears: EAC's clear.  TM's clear.  No pre-auricular nodes.  Nose: Nasal Mucosa:  Pink.  Septum: No apparent deviation.  Turbinates:  No significant edema.  Polyps/Mass:  None visible.  Teeth/Gums:  No bleeding noted.  Oropharynx: No exudates.  Neck: Supple without thyromegaly. No cervical lymphadenopathy.    Respiratory/Chest: Effort: Good.  Auscultation:  Clear bilaterally.  Cardiovascular:  No murmur, rubs, or gallop heard.   GI:  Non-tender.  No masses.  No organomegaly.  Extremities:  No cyanosis, clubbing, or edema.  Skin: Good turgor.  No urticaria or angioedema.  Neuro/Psych: Oriented x 3.    Assessment:  1.  Chronic rhinitis, consider allergic.  2.  Chronic conjunctivitis, consider allergic.  3.  Chronic cough with wheezing, consider bronchospasm.  4.  Chronic dermatitis, improved.  5.  Viral URI.  6.  Snoring.    Recommendations:  1.  Laboratory as ordered.  2.  Spirometry.  3.  Continue Claritin daily.  4.  Consider adding fluticasone two sprays each nostril daily.  5.  Albuterol as needed.

## 2019-04-16 ENCOUNTER — HOSPITAL ENCOUNTER (OUTPATIENT)
Dept: PULMONOLOGY | Facility: CLINIC | Age: 34
Discharge: HOME | End: 2019-04-16
Payer: COMMERCIAL

## 2019-04-16 DIAGNOSIS — R05.9 COUGH: ICD-10-CM

## 2019-04-16 LAB
IGE SERPL-ACNC: <35 IU/ML (ref 0–100)
POST FEV1 FVC: 0.81
POST FEV1: 2.64
POST FVC: 3.27
PRE FEV1 FVC: 78
PRE FEV1: 2.47
PRE FVC: 3.18
PREDICTED FEV1 FVC: 84
PREDICTED FEV1: 3.06
PREDICTED FVC: 3.65

## 2019-04-16 PROCEDURE — 94060 EVALUATION OF WHEEZING: CPT | Mod: S$GLB,,, | Performed by: INTERNAL MEDICINE

## 2019-04-16 PROCEDURE — 94060 PR EVAL OF BRONCHOSPASM: ICD-10-PCS | Mod: S$GLB,,, | Performed by: INTERNAL MEDICINE

## 2019-04-17 LAB
A ALTERNATA IGE QN: <0.35 KU/L
A FUMIGATUS IGE QN: <0.35 KU/L
BERMUDA GRASS IGE QN: <0.35 KU/L
CAT DANDER IGE QN: <0.35 KU/L
CEDAR IGE QN: <0.35 KU/L
D FARINAE IGE QN: <0.35 KU/L
D PTERONYSS IGE QN: <0.35 KU/L
DEPRECATED A ALTERNATA IGE RAST QL: NORMAL
DEPRECATED A FUMIGATUS IGE RAST QL: NORMAL
DEPRECATED BERMUDA GRASS IGE RAST QL: NORMAL
DEPRECATED CAT DANDER IGE RAST QL: NORMAL
DEPRECATED CEDAR IGE RAST QL: NORMAL
DEPRECATED D FARINAE IGE RAST QL: NORMAL
DEPRECATED D PTERONYSS IGE RAST QL: NORMAL
DEPRECATED DOG DANDER IGE RAST QL: NORMAL
DEPRECATED ELDER IGE RAST QL: NORMAL
DEPRECATED ENGL PLANTAIN IGE RAST QL: NORMAL
DEPRECATED PECAN/HICK TREE IGE RAST QL: NORMAL
DEPRECATED ROACH IGE RAST QL: NORMAL
DEPRECATED TIMOTHY IGE RAST QL: NORMAL
DEPRECATED WEST RAGWEED IGE RAST QL: NORMAL
DEPRECATED WHITE OAK IGE RAST QL: NORMAL
DOG DANDER IGE QN: <0.35 KU/L
ELDER IGE QN: <0.35 KU/L
ENGL PLANTAIN IGE QN: <0.35 KU/L
PECAN/HICK TREE IGE QN: <0.35 KU/L
ROACH IGE QN: <0.35 KU/L
TIMOTHY IGE QN: <0.35 KU/L
WEST RAGWEED IGE QN: <0.35 KU/L
WHITE OAK IGE QN: <0.35 KU/L

## 2019-04-30 ENCOUNTER — PATIENT OUTREACH (OUTPATIENT)
Dept: ADMINISTRATIVE | Facility: HOSPITAL | Age: 34
End: 2019-04-30

## 2019-05-01 ENCOUNTER — OFFICE VISIT (OUTPATIENT)
Dept: ALLERGY | Facility: CLINIC | Age: 34
End: 2019-05-01
Payer: COMMERCIAL

## 2019-05-01 VITALS — WEIGHT: 206.13 LBS | HEIGHT: 67 IN | TEMPERATURE: 99 F | BODY MASS INDEX: 32.35 KG/M2

## 2019-05-01 DIAGNOSIS — H10.403 CHRONIC CONJUNCTIVITIS OF BOTH EYES, UNSPECIFIED CHRONIC CONJUNCTIVITIS TYPE: ICD-10-CM

## 2019-05-01 DIAGNOSIS — R05.9 COUGH: ICD-10-CM

## 2019-05-01 DIAGNOSIS — J31.0 CHRONIC RHINITIS: Primary | ICD-10-CM

## 2019-05-01 PROCEDURE — 99999 PR PBB SHADOW E&M-EST. PATIENT-LVL III: CPT | Mod: PBBFAC,,, | Performed by: ALLERGY & IMMUNOLOGY

## 2019-05-01 PROCEDURE — 99999 PR PBB SHADOW E&M-EST. PATIENT-LVL III: ICD-10-PCS | Mod: PBBFAC,,, | Performed by: ALLERGY & IMMUNOLOGY

## 2019-05-01 PROCEDURE — 3008F BODY MASS INDEX DOCD: CPT | Mod: CPTII,S$GLB,, | Performed by: ALLERGY & IMMUNOLOGY

## 2019-05-01 PROCEDURE — 3008F PR BODY MASS INDEX (BMI) DOCUMENTED: ICD-10-PCS | Mod: CPTII,S$GLB,, | Performed by: ALLERGY & IMMUNOLOGY

## 2019-05-01 PROCEDURE — 99214 OFFICE O/P EST MOD 30 MIN: CPT | Mod: S$GLB,,, | Performed by: ALLERGY & IMMUNOLOGY

## 2019-05-01 PROCEDURE — 99214 PR OFFICE/OUTPT VISIT, EST, LEVL IV, 30-39 MIN: ICD-10-PCS | Mod: S$GLB,,, | Performed by: ALLERGY & IMMUNOLOGY

## 2019-05-01 NOTE — PROGRESS NOTES
Joan Wilde returns to clinic today for continued evaluation of chronic rhinitis and wheezing.  She is here alone.  She was last seen April 15, 2019.  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      Since her last visit, she has done well.  She is no longer having any rhinitis.  She is not having any wheezing.  Her cough has resolved.    She has not needed any Claritin or albuterol for the past two weeks.    She is originally from [place] [place] and has lived in the [place] for the past six years.  When she was growing up she had recurrent rhinitis and conjunctivitis.      Her symptoms initially improved but since being in [place] her symptoms have again increased.  She has had pruritus of the nose, eyes, ears, and throat, sneezing, clear rhinorrhea, nasal congestion, and frequent throat clearing.  Her symptoms are worse in the summer months.    [facility] ALLERGY QUESTIONNAIRE SHORT 5/1/2019   Are you taking any new medications since your last visit? No   Constitution: No symptoms   Head or facial pain: No symptoms   Eyes: Itching   Ears: No symptoms   Nose: No symptoms   Throat: No symptoms   Sinuses: No symptoms   Lungs: Chest tightness   Skin: Rash   Cardiovascular: No symptoms   Gastrointestinal: No symptoms   Genital/ urinary No symptoms   Musculoskeletal: No symptoms   Neurologic: No symptoms   Endocrine: No symptoms   Hematologic: No symptoms     Physical Examination:  General: Well-developed, well-nourished, no acute distress.  Head: No sinus tenderness.  Eyes: Conjunctivae:  No bulbar or palpebral conjunctival injection.  Ears: EAC's clear.  TM's clear.  No pre-auricular nodes.  Nose: Nasal Mucosa:  Pink.  Septum: No apparent deviation.  Turbinates:  No significant edema.  Polyps/Mass:  None visible.  Teeth/Gums:  No bleeding noted.  Oropharynx: No exudates.  Neck: Supple without thyromegaly. No cervical lymphadenopathy.    Respiratory/Chest: Effort: Good.  Auscultation:  Clear  bilaterally.  Skin: Good turgor.  No urticaria or angioedema.  Neuro/Psych: Oriented x 3.    Laboratory 04/15/2019:  IgE level:  Less than 35.  ImmunoCAP:  Normal.    Spirometry 04/16/2019:  Normal spirometry. Airflow not improved after bronchodilator.    Assessment:  1.  Chronic rhinitis, consider allergic.  2.  Chronic conjunctivitis, consider allergic.  3.  Chronic cough with wheezing, consider bronchospasm, resolved.  4.  Chronic dermatitis, improved.  5.  Viral URI, resolved.  6.  Snoring.    Recommendations:  1.  Claritin as needed.  2.  Albuterol as needed.  3.  Inhalant skin testing in two weeks.  4.  Return to clinic if any symptoms before then.

## 2019-05-06 ENCOUNTER — OFFICE VISIT (OUTPATIENT)
Dept: OBSTETRICS AND GYNECOLOGY | Facility: CLINIC | Age: 34
End: 2019-05-06
Payer: COMMERCIAL

## 2019-05-06 VITALS
BODY MASS INDEX: 33.11 KG/M2 | DIASTOLIC BLOOD PRESSURE: 73 MMHG | SYSTOLIC BLOOD PRESSURE: 113 MMHG | HEIGHT: 66 IN | WEIGHT: 206 LBS

## 2019-05-06 DIAGNOSIS — N92.1 BREAKTHROUGH BLEEDING ON BIRTH CONTROL PILLS: Primary | ICD-10-CM

## 2019-05-06 PROCEDURE — 99999 PR PBB SHADOW E&M-EST. PATIENT-LVL III: CPT | Mod: PBBFAC,,, | Performed by: OBSTETRICS & GYNECOLOGY

## 2019-05-06 PROCEDURE — 99213 OFFICE O/P EST LOW 20 MIN: CPT | Mod: S$GLB,,, | Performed by: OBSTETRICS & GYNECOLOGY

## 2019-05-06 PROCEDURE — 3008F PR BODY MASS INDEX (BMI) DOCUMENTED: ICD-10-PCS | Mod: CPTII,S$GLB,, | Performed by: OBSTETRICS & GYNECOLOGY

## 2019-05-06 PROCEDURE — 99999 PR PBB SHADOW E&M-EST. PATIENT-LVL III: ICD-10-PCS | Mod: PBBFAC,,, | Performed by: OBSTETRICS & GYNECOLOGY

## 2019-05-06 PROCEDURE — 3008F BODY MASS INDEX DOCD: CPT | Mod: CPTII,S$GLB,, | Performed by: OBSTETRICS & GYNECOLOGY

## 2019-05-06 PROCEDURE — 99213 PR OFFICE/OUTPT VISIT, EST, LEVL III, 20-29 MIN: ICD-10-PCS | Mod: S$GLB,,, | Performed by: OBSTETRICS & GYNECOLOGY

## 2019-05-06 NOTE — PROGRESS NOTES
Gynecology    SUBJECTIVE:     Chief Complaint: Vaginal Bleeding       History of Present Illness:  34 year old who has been treated with aygestin and micronor for endometriosis.  Has been working well up until 2-3 weeks ago when she started developing abdominal cramping like she was about to get her period.  Had to use naproxen during this time.  Just started her period today.  Bleeding is light, but she is in pain.  Pain is 6/10.  Patient also has some nausea.  Last intercourse was 5 months ago.    Review of Systems:  Review of Systems   Genitourinary: Positive for menstrual problem and pelvic pain. Negative for menorrhagia, vaginal bleeding, vaginal discharge, vaginal pain, postcoital bleeding, vaginal dryness and vaginal odor.        OBJECTIVE:     Physical Exam:  Physical Exam   Constitutional: She is oriented to person, place, and time. She appears well-developed and well-nourished.   Pulmonary/Chest: Effort normal.   Abdominal: Soft.   Genitourinary: Vagina normal and uterus normal. No labial fusion. There is no rash, tenderness, lesion or injury on the right labia. There is no rash, tenderness, lesion or injury on the left labia. Uterus is not deviated, not enlarged, not fixed and not tender. Cervix exhibits no motion tenderness, no discharge and no friability. Right adnexum displays no mass, no tenderness and no fullness. Left adnexum displays no mass, no tenderness and no fullness. No erythema, tenderness or bleeding in the vagina. No foreign body in the vagina. No signs of injury around the vagina. No vaginal discharge found.   Genitourinary Comments: Urethra: normal appearing urethra with no masses, tenderness or lesions  Urethral meatus: normal size, anterior vaginal wall with no prolapse, no lesions   Neurological: She is alert and oriented to person, place, and time.   Psychiatric: She has a normal mood and affect. Her behavior is normal. Judgment and thought content normal.   Nursing note and vitals  reviewed.      Chaperoned by: viri    ASSESSMENT:       ICD-10-CM ICD-9-CM    1. Breakthrough bleeding on birth control pills N92.1 626.6           Plan:      Joan was seen today for vaginal bleeding.    Diagnoses and all orders for this visit:    Breakthrough bleeding on birth control pills    - discussed that bleeding is likely breakthrough; recommended stopping pill for five days then restarting    No orders of the defined types were placed in this encounter.      Follow up for annual or prn.    Rani Ambrocio

## 2019-05-13 ENCOUNTER — OFFICE VISIT (OUTPATIENT)
Dept: ALLERGY | Facility: CLINIC | Age: 34
End: 2019-05-13
Payer: COMMERCIAL

## 2019-05-13 VITALS
HEIGHT: 66 IN | WEIGHT: 205.94 LBS | BODY MASS INDEX: 33.1 KG/M2 | DIASTOLIC BLOOD PRESSURE: 78 MMHG | SYSTOLIC BLOOD PRESSURE: 112 MMHG

## 2019-05-13 DIAGNOSIS — J30.89 ALLERGIC RHINITIS DUE TO DUST MITE: ICD-10-CM

## 2019-05-13 DIAGNOSIS — H10.13 ALLERGIC CONJUNCTIVITIS, BILATERAL: ICD-10-CM

## 2019-05-13 DIAGNOSIS — R05.9 COUGH: Primary | ICD-10-CM

## 2019-05-13 PROCEDURE — 99214 OFFICE O/P EST MOD 30 MIN: CPT | Mod: 25,S$GLB,, | Performed by: ALLERGY & IMMUNOLOGY

## 2019-05-13 PROCEDURE — 95004 PR ALLERGY SKIN TESTS,ALLERGENS: ICD-10-PCS | Mod: S$GLB,,, | Performed by: ALLERGY & IMMUNOLOGY

## 2019-05-13 PROCEDURE — 99214 PR OFFICE/OUTPT VISIT, EST, LEVL IV, 30-39 MIN: ICD-10-PCS | Mod: 25,S$GLB,, | Performed by: ALLERGY & IMMUNOLOGY

## 2019-05-13 PROCEDURE — 99999 PR PBB SHADOW E&M-EST. PATIENT-LVL III: CPT | Mod: PBBFAC,,, | Performed by: ALLERGY & IMMUNOLOGY

## 2019-05-13 PROCEDURE — 3008F PR BODY MASS INDEX (BMI) DOCUMENTED: ICD-10-PCS | Mod: CPTII,S$GLB,, | Performed by: ALLERGY & IMMUNOLOGY

## 2019-05-13 PROCEDURE — 99999 PR PBB SHADOW E&M-EST. PATIENT-LVL III: ICD-10-PCS | Mod: PBBFAC,,, | Performed by: ALLERGY & IMMUNOLOGY

## 2019-05-13 PROCEDURE — 95004 PERQ TESTS W/ALRGNC XTRCS: CPT | Mod: S$GLB,,, | Performed by: ALLERGY & IMMUNOLOGY

## 2019-05-13 PROCEDURE — 3008F BODY MASS INDEX DOCD: CPT | Mod: CPTII,S$GLB,, | Performed by: ALLERGY & IMMUNOLOGY

## 2019-05-13 NOTE — PROGRESS NOTES
Joan Wilde returns to clinic today for continued evaluation of chronic rhinitis and wheezing.  She is here alone.  She was last seen May 1, 2019.  Cold    Since her last visit, her symptoms have improved.  She is no longer having a cough.  Her wheezing has resolved.    Her rhinitis has been controlled.  She has not had any sneezing, nasal congestion, or runny nose.    She has had some itching of her eyes particularly the last two days.  She used azelastine yesterday and today with improvement.    She has avoided all other antihistamines.    She is not taking any topical nasal steroids.    [facility] ALLERGY QUESTIONNAIRE SHORT 5/7/2019   Are you taking any new medications since your last visit? No   Constitution: No symptoms, No changes since my last visit with this provider   Head or facial pain: No changes since my last visit with this provider   Eyes: Itching, Tearing, Redness   Ears: No symptoms   Nose: Sniffling, Sneezing   Throat: No symptoms   Sinuses: No symptoms   Lungs: Use of inhalers, Chest tightness   Skin: No symptoms   Cardiovascular: No symptoms   Gastrointestinal: No symptoms   Genital/ urinary No symptoms   Musculoskeletal: No symptoms   Neurologic: No symptoms   Endocrine: No symptoms   Hematologic: No symptoms     Physical Examination:  General: Well-developed, well-nourished, no acute distress.  Head: No sinus tenderness.  Eyes: Conjunctivae:  No bulbar or palpebral conjunctival injection.  Ears: EAC's clear.  TM's clear.  No pre-auricular nodes.  Nose: Nasal Mucosa:  Pink.  Septum: No apparent deviation.  Turbinates:  No significant edema.  Polyps/Mass:  None visible.  Teeth/Gums:  No bleeding noted.  Oropharynx: No exudates.  Neck: Supple without thyromegaly. No cervical lymphadenopathy.    Respiratory/Chest: Effort: Good.  Auscultation:  Clear bilaterally.  Skin: Good turgor.  No urticaria or angioedema.  Neuro/Psych: Oriented x 3.    Laboratory 04/15/2019:  IgE level:  Less than  35.  ImmunoCAP:  Normal.    Spirometry 04/16/2019:  Normal spirometry. Airflow not improved after bronchodilator.    Inhalant skin tests 05/13/2019:  3+ histamine control.  3+ dust mites.    Assessment:  1.  Allergic rhinitis.  2.  Allergic conjunctivitis.  3.  Chronic cough with wheezing, consider bronchospasm, resolved.  4.  Chronic dermatitis, improved.  5.  Viral URI, resolved.  6.  Snoring.    Recommendations:  1.  House dust mite avoidance.  2.  Claritin as needed.  3.  Albuterol as needed.  4.  Return to clinic in 2 to 3 months or sooner if needed.  5.  Discuss mechanisms on return to clinic.    May 13, 2019:  House dust mite avoidance was reviewed.  Handouts were given.

## 2019-05-29 ENCOUNTER — OFFICE VISIT (OUTPATIENT)
Dept: DERMATOLOGY | Facility: CLINIC | Age: 34
End: 2019-05-29
Payer: COMMERCIAL

## 2019-05-29 ENCOUNTER — PATIENT MESSAGE (OUTPATIENT)
Dept: DERMATOLOGY | Facility: CLINIC | Age: 34
End: 2019-05-29

## 2019-05-29 DIAGNOSIS — L30.9 ECZEMA, UNSPECIFIED TYPE: ICD-10-CM

## 2019-05-29 DIAGNOSIS — L65.9 ALOPECIA: ICD-10-CM

## 2019-05-29 DIAGNOSIS — L70.0 ACNE VULGARIS: Primary | ICD-10-CM

## 2019-05-29 PROCEDURE — 99213 PR OFFICE/OUTPT VISIT, EST, LEVL III, 20-29 MIN: ICD-10-PCS | Mod: S$GLB,,, | Performed by: DERMATOLOGY

## 2019-05-29 PROCEDURE — 99999 PR PBB SHADOW E&M-EST. PATIENT-LVL II: ICD-10-PCS | Mod: PBBFAC,,, | Performed by: DERMATOLOGY

## 2019-05-29 PROCEDURE — 99999 PR PBB SHADOW E&M-EST. PATIENT-LVL II: CPT | Mod: PBBFAC,,, | Performed by: DERMATOLOGY

## 2019-05-29 PROCEDURE — 99213 OFFICE O/P EST LOW 20 MIN: CPT | Mod: S$GLB,,, | Performed by: DERMATOLOGY

## 2019-05-29 RX ORDER — TRETINOIN 0.25 MG/G
CREAM TOPICAL NIGHTLY
Qty: 45 G | Refills: 2 | Status: SHIPPED | OUTPATIENT
Start: 2019-05-29 | End: 2021-07-23

## 2019-05-29 RX ORDER — CLOTRIMAZOLE AND BETAMETHASONE DIPROPIONATE 10; .64 MG/G; MG/G
CREAM TOPICAL 2 TIMES DAILY
Qty: 45 G | Refills: 2 | Status: SHIPPED | OUTPATIENT
Start: 2019-05-29 | End: 2023-11-02

## 2019-05-29 NOTE — PROGRESS NOTES
Subjective:       Patient ID:  Joan Wilde is a 34 y.o. female who presents for   Chief Complaint   Patient presents with    Itching     around neck     Ms. Llanos is a 34 yo F with a PMH of contact dermatitis, PIH from contact dermatitis who presents for pruritus on her posterior neck. No rash per patient. Very itchy. Improvement with lotrisone but itch returns after the medications wears off. However, notes that her shampoo and conditioner is fragranced. Uses Sheseido toner on neck and face.     Stopped frangrant deodorants, uses Dr. Conway's non fragrance deodarant, dove sensitive soap, Babb growth shampoo and conditioner. Skylar shampoo contains Y[place] which is a component of compositae mix which the patient is allergic to.     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      Review of Systems   Skin: Positive for itching and rash.   Hematologic/Lymphatic: Does not bruise/bleed easily.        Objective:    Physical Exam   Constitutional: She appears well-developed and well-nourished. No distress.   Neurological: She is alert and oriented to person, place, and time. She is not disoriented.   Psychiatric: She has a normal mood and affect.   Skin:   Areas Examined (abnormalities noted in diagram):   Scalp / Hair Palpated and Inspected  Head / Face Inspection Performed  Neck Inspection Performed              Diagram Legend     Erythematous scaling macule/papule c/w actinic keratosis       Vascular papule c/w angioma      Pigmented verrucoid papule/plaque c/w seborrheic keratosis      Yellow umbilicated papule c/w sebaceous hyperplasia      Irregularly shaped tan macule c/w lentigo     1-2 mm smooth white papules consistent with Milia      Movable subcutaneous cyst with punctum c/w epidermal inclusion cyst      Subcutaneous movable cyst c/w pilar cyst      Firm pink to  brown papule c/w dermatofibroma      Pedunculated fleshy papule(s) c/w skin tag(s)      Evenly pigmented macule c/w junctional nevus     Mildly variegated pigmented, slightly irregular-bordered macule c/w mildly atypical nevus      Flesh colored to evenly pigmented papule c/w intradermal nevus       Pink pearly papule/plaque c/w basal cell carcinoma      Erythematous hyperkeratotic cursted plaque c/w SCC      Surgical scar with no sign of skin cancer recurrence      Open and closed comedones      Inflammatory papules and pustules      Verrucoid papule consistent consistent with wart     Erythematous eczematous patches and plaques     Dystrophic onycholytic nail with subungual debris c/w onychomycosis     Umbilicated papule    Erythematous-base heme-crusted tan verrucoid plaque consistent with inflamed seborrheic keratosis     Erythematous Silvery Scaling Plaque c/w Psoriasis     See annotation      Assessment / Plan:        Acne vulgaris/facial hyperpigmentation  -     tretinoin (RETIN-A) 0.025 % cream; Apply topically to affected area every evening.  Dispense: 45 g; Refill: 2  If not covered OTC differin gel  Do not start until current rash resolved    Eczema, unspecified type - posterior neck - hx of contact dermatitis - ? Triggered by new shampoo. Stop shampoo, stop toner, hold retinol.    -     clotrimazole-betamethasone 1-0.05% (LOTRISONE) cream; Apply topically 2 (two) times daily to rash on neck x 2 weeks  Dispense: 45 g; Refill: 2    Alopecia  Ref to dr Nayla De Paz for 2nd opinion           Follow up if symptoms worsen or fail to improve.

## 2019-05-31 ENCOUNTER — TELEPHONE (OUTPATIENT)
Dept: PHARMACY | Facility: CLINIC | Age: 34
End: 2019-05-31

## 2019-06-03 ENCOUNTER — TELEPHONE (OUTPATIENT)
Dept: PHARMACY | Facility: CLINIC | Age: 34
End: 2019-06-03

## 2019-06-11 ENCOUNTER — OFFICE VISIT (OUTPATIENT)
Dept: INTERNAL MEDICINE | Facility: CLINIC | Age: 34
End: 2019-06-11
Payer: COMMERCIAL

## 2019-06-11 VITALS
HEIGHT: 66 IN | WEIGHT: 205 LBS | HEART RATE: 91 BPM | BODY MASS INDEX: 32.95 KG/M2 | SYSTOLIC BLOOD PRESSURE: 117 MMHG | DIASTOLIC BLOOD PRESSURE: 74 MMHG | OXYGEN SATURATION: 99 %

## 2019-06-11 DIAGNOSIS — N80.9 ENDOMETRIOSIS: ICD-10-CM

## 2019-06-11 DIAGNOSIS — Z76.89 ESTABLISHING CARE WITH NEW DOCTOR, ENCOUNTER FOR: Primary | ICD-10-CM

## 2019-06-11 DIAGNOSIS — J30.89 ALLERGIC RHINITIS DUE TO DUST MITE: ICD-10-CM

## 2019-06-11 DIAGNOSIS — J45.20 MILD INTERMITTENT ASTHMA WITHOUT COMPLICATION: ICD-10-CM

## 2019-06-11 DIAGNOSIS — L70.0 ACNE VULGARIS: ICD-10-CM

## 2019-06-11 PROCEDURE — 99395 PR PREVENTIVE VISIT,EST,18-39: ICD-10-PCS | Mod: S$GLB,,, | Performed by: INTERNAL MEDICINE

## 2019-06-11 PROCEDURE — 99999 PR PBB SHADOW E&M-EST. PATIENT-LVL IV: ICD-10-PCS | Mod: PBBFAC,,, | Performed by: INTERNAL MEDICINE

## 2019-06-11 PROCEDURE — 99999 PR PBB SHADOW E&M-EST. PATIENT-LVL IV: CPT | Mod: PBBFAC,,, | Performed by: INTERNAL MEDICINE

## 2019-06-11 PROCEDURE — 99395 PREV VISIT EST AGE 18-39: CPT | Mod: S$GLB,,, | Performed by: INTERNAL MEDICINE

## 2019-06-11 RX ORDER — ALBUTEROL SULFATE 90 UG/1
2 AEROSOL, METERED RESPIRATORY (INHALATION) EVERY 6 HOURS PRN
Qty: 18 G | Refills: 11 | Status: SHIPPED | OUTPATIENT
Start: 2019-06-11 | End: 2020-04-02 | Stop reason: SDUPTHER

## 2019-06-11 RX ORDER — IBUPROFEN 100 MG/5ML
2000 SUSPENSION, ORAL (FINAL DOSE FORM) ORAL DAILY
COMMUNITY
Start: 2019-06-11

## 2019-06-11 NOTE — PROGRESS NOTES
INTERNAL MEDICINE CLINIC    Initial Visit to Establish Care    PRESENTING HISTORY     Previous PCP: Jessica Barclay MD  Chief Complaint/Reason for Visit:     Chief Complaint   Patient presents with    Establish Care     History of Present Illness & ROS : Ms. Joan Wilde is a 34 y.o. female.      Review of Systems:  Answers for HPI/ROS submitted by the patient on 6/9/2019   activity change: No  unexpected weight change: No  neck pain: No  hearing loss: No  rhinorrhea: No  trouble swallowing: No  eye discharge: No  visual disturbance: No  chest tightness: No  wheezing: No  chest pain: No  palpitations: No  blood in stool: No  constipation: No  vomiting: No  diarrhea: No  polydipsia: No  polyuria: No  difficulty urinating: No  hematuria: No  menstrual problem: No  dysuria: No  joint swelling: No  arthralgias: No  headaches: No  weakness: No  confusion: No  dysphoric mood: No      PAST HISTORY:     Past Medical History:   Diagnosis Date    Acne vulgaris 11/20/2018    Allergic rhinitis due to dust mite 5/13/2019    Endometriosis 11/20/2018    Fibroid     Migraine syndrome     associated with flu vaccines    Mild intermittent asthma without complication 5/31/2018       Past Surgical History:   Procedure Laterality Date    HERNIA REPAIR  2014    umbilical       Family History   Problem Relation Age of Onset    Hypertension Mother     Diabetes Father     Hypertension Father     No Known Problems Brother     No Known Problems Brother     Breast cancer Neg Hx     Colon cancer Neg Hx     Ovarian cancer Neg Hx     Melanoma Neg Hx     Blindness Neg Hx     Amblyopia Neg Hx     Cataracts Neg Hx     Glaucoma Neg Hx     Macular degeneration Neg Hx     Retinal detachment Neg Hx     Strabismus Neg Hx        Social History     Socioeconomic History    Marital status: Single     Spouse name: Not on file    Number of children: Not on file    Years of education: Not on file    Highest education  level: Not on file   Occupational History    Not on file   Social Needs    Financial resource strain: Not hard at all    Food insecurity:     Worry: Never true     Inability: Never true    Transportation needs:     Medical: No     Non-medical: No   Tobacco Use    Smoking status: Never Smoker    Smokeless tobacco: Never Used   Substance and Sexual Activity    Alcohol use: No     Frequency: Never     Binge frequency: Never    Drug use: No    Sexual activity: Not Currently     Partners: Male   Lifestyle    Physical activity:     Days per week: 2 days     Minutes per session: 20 min    Stress: Rather much   Relationships    Social connections:     Talks on phone: Three times a week     Gets together: Patient refused     Attends Zoroastrianism service: Not on file     Active member of club or organization: No     Attends meetings of clubs or organizations: 1 to 4 times per year     Relationship status: Never    Other Topics Concern    Are you pregnant or think you may be? 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        MEDICATIONS & ALLERGIES:     Current Outpatient Medications on File Prior to Visit   Medication Sig Dispense Refill    azelastine (OPTIVAR) 0.05 % ophthalmic solution Place 1 drop into both eyes 2 (two) times daily. 6 mL 6    clindamycin (CLEOCIN T) 1 % lotion Use every night at bedtime on face 60 mL 3    clotrimazole-betamethasone 1-0.05% (LOTRISONE) cream Apply topically 2 (two) times daily to rash on neck x 2 weeks 45 g 2    conjugated estrogens (PREMARIN) vaginal cream Use dime size amount of estrogen cream in vagina nightly x 2 weeks, then twice a week thereafter. 30 g 2    norethindrone (AYGESTIN) 5 mg Tab Take 1 tablet (5 mg total) by mouth once daily. 30 tablet 11    norethindrone (ORTHO MICRONOR) 0.35 mg tablet Take 1 tablet (0.35 mg total) by mouth once daily. 28 tablet 11    tretinoin  (RETIN-A) 0.025 % cream Apply topically to affected area every evening. 45 g 2     albuterol 90 mcg/actuation inhaler Inhale 2 puffs into the lungs every 6 (six) hours as needed for Wheezing. 18 g 11       Review of patient's allergies indicates:   Allergen Reactions    Metoclopramide Other (See Comments)     Causes tongue to roll back in mouth       Medications Reconciliation:   I have reconciled the patient's home medications with the patient/family. I have updated all changes.  Refer to After-Visit Medication List.    OBJECTIVE:     Vital Signs:  Vitals:    06/11/19 1601   BP: 117/74   Pulse: 91     Wt Readings from Last 1 Encounters:   06/11/19 1601 93 kg (205 lb 0.4 oz)     Body mass index is 33.09 kg/m².     Physical Exam:  General: Well developed, well nourished. No distress.  HEENT: Head is normocephalic, atraumatic.  Eyes: Clear conjunctiva.  Neck: Supple, symmetrical neck; trachea midline.  Lungs: Clear to auscultation bilaterally and normal respiratory effort.  Cardiovascular: Heart with regular rate and rhythm.    Extremities: No LE edema.  Abdomen: Abdomen is soft, non-tender non-distended with normal bowel sounds.  Skin: Skin color, texture, turgor normal. No rashes.  Musculoskeletal: Normal gait.   Neurologic: Normal strength and tone. No focal numbness or weakness.   Psychiatric: Normal affect. Alert.    Laboratory  Lab Results   Component Value Date    WBC 5.52 09/12/2018    HGB 12.4 09/12/2018    HCT 38.9 09/12/2018     09/12/2018    CHOL 129 09/12/2018    TRIG 42 09/12/2018    HDL 37 (L) 09/12/2018    ALT 19 09/12/2018    AST 17 09/12/2018     09/12/2018    K 4.8 09/12/2018     09/12/2018    CREATININE 1.0 09/12/2018    BUN 18 09/12/2018    CO2 24 09/12/2018    TSH 1.780 12/12/2017    HGBA1C 5.1 09/12/2018       ASSESSMENT & PLAN:     Establishing care with new doctor, encounter for  - Reviewed and updated past and current medical problems.  Discussed treatment of current  medical problems    Mild intermittent asthma without complication  - No exacerbation.    Will refill:  -     albuterol (PROVENTIL/VENTOLIN HFA) 90 mcg/actuation inhaler; Inhale 2 puffs into the lungs every 6 (six) hours as needed for Wheezing.  Dispense: 18 g; Refill: 11    Allergic rhinitis due to dust mite  -     ascorbic acid, vitamin C, (VITAMIN C) 1000 MG tablet; Take 2 tablets (2,000 mg total) by mouth once daily.    Endometriosis  - On Hormones. Followed by GYN.    Acne vulgaris  - Followed by Derm.    Preventive Health Maintenance:  Up to date.    Recommend Vitamin C 2000 mg daily.  Walnuts.  Exercise.    Vitamin Replacement for migraine.  Vitamin D 1,000 units daily  Riboflavin (Vitamin B2) 100 mg daily  Coenzyme Q 10 200 mg daily    Return to Clinic for Follow Up with me:  1 Year    After Visit Medication List :     Medication List           Accurate as of 6/11/19  4:24 PM. If you have any questions, ask your nurse or doctor.               START taking these medications    ascorbic acid (vitamin C) 1000 MG tablet  Commonly known as:  VITAMIN C  Take 2 tablets (2,000 mg total) by mouth once daily.  Started by:  Fadi Wild MD        CONTINUE taking these medications    albuterol 90 mcg/actuation inhaler  Commonly known as:  PROVENTIL/VENTOLIN HFA  Inhale 2 puffs into the lungs every 6 (six) hours as needed for Wheezing.     azelastine 0.05 % ophthalmic solution  Commonly known as:  OPTIVAR  Place 1 drop into both eyes 2 (two) times daily.     clindamycin 1 % lotion  Commonly known as:  CLEOCIN T  Use every night at bedtime on face     clotrimazole-betamethasone 1-0.05% cream  Commonly known as:  LOTRISONE  Apply topically 2 (two) times daily to rash on neck x 2 weeks     norethindrone 0.35 mg tablet  Commonly known as:  MICRONOR  Take 1 tablet (0.35 mg total) by mouth once daily.     norethindrone 5 mg Tab  Commonly known as:  AYGESTIN  Take 1 tablet (5 mg total) by mouth once daily.     PREMARIN vaginal  cream  Generic drug:  conjugated estrogens  Use dime size amount of estrogen cream in vagina nightly x 2 weeks, then twice a week thereafter.     tretinoin 0.025 % cream  Commonly known as:  RETIN-A  Apply topically to affected area every evening.        STOP taking these medications    mupirocin 2 % ointment  Commonly known as:  BACTROBAN  Stopped by:  Fadi Wild MD     verapamil 40 MG Tab  Commonly known as:  CALAN  Stopped by:  Fadi Wild MD           Where to Get Your Medications      These medications were sent to [facility] [address], [place] [address]    Hours:  Mon-Fri 7a-7p, Sat 10a-4p, Sun 10a-4p Phone:  [phone]   · albuterol 90 mcg/actuation inhaler     You can get these medications from any pharmacy    You don't need a prescription for these medications  · ascorbic acid (vitamin C) 1000 MG tablet         Signing Physician:  Fadi Wild MD

## 2019-06-11 NOTE — PATIENT INSTRUCTIONS
Vitamin Replacement:  Vitamin D 1,000 units daily  Riboflavin (Vitamin B2) 100 mg daily  Coenzyme Q 10 200 mg daily

## 2019-07-10 RX ORDER — NORETHINDRONE 5 MG/1
5 TABLET ORAL DAILY
Qty: 30 TABLET | Refills: 1 | Status: SHIPPED | OUTPATIENT
Start: 2019-07-10 | End: 2019-07-12

## 2019-07-11 ENCOUNTER — PATIENT MESSAGE (OUTPATIENT)
Dept: OBSTETRICS AND GYNECOLOGY | Facility: CLINIC | Age: 34
End: 2019-07-11

## 2019-07-12 ENCOUNTER — PATIENT MESSAGE (OUTPATIENT)
Dept: OBSTETRICS AND GYNECOLOGY | Facility: CLINIC | Age: 34
End: 2019-07-12

## 2019-07-12 RX ORDER — NORETHINDRONE 5 MG/1
5 TABLET ORAL DAILY
Qty: 30 TABLET | Refills: 6 | Status: SHIPPED | OUTPATIENT
Start: 2019-07-12 | End: 2020-01-31 | Stop reason: SDUPTHER

## 2019-08-07 ENCOUNTER — PATIENT MESSAGE (OUTPATIENT)
Dept: OBSTETRICS AND GYNECOLOGY | Facility: CLINIC | Age: 34
End: 2019-08-07

## 2019-08-07 ENCOUNTER — TELEPHONE (OUTPATIENT)
Dept: OBSTETRICS AND GYNECOLOGY | Facility: CLINIC | Age: 34
End: 2019-08-07

## 2019-08-07 NOTE — TELEPHONE ENCOUNTER
Returned patient phone call regarding getting her annual schedule. Left voicemail for patient to give me a call back

## 2019-08-07 NOTE — TELEPHONE ENCOUNTER
Called patient to get her rescheduled for her annual. Left voicemail for patient to give me a call back to get this taken care of.

## 2019-08-07 NOTE — TELEPHONE ENCOUNTER
----- Message from Ayanna Marvin, Patient Care Assistant sent at 8/7/2019  4:57 PM CDT -----  Contact: RO CORBETT [[id number]]  Type:  Patient Returning Call    Who Called: RO CORBETT [[id number]]    Who Left Message for Patient: Kelly Ridley MA    Does the patient know what this is regarding?:Yes     Best Call Back Number: [phone]    Additional Information:   None

## 2019-08-07 NOTE — TELEPHONE ENCOUNTER
----- Message from Raven Shoshoni sent at 8/7/2019  4:38 PM CDT -----  Contact: RO CORBETT [[id number]]    Name of Who is Calling: RO CORBETT [[id number]]       What is the request in detail: Patient is requesting a call from staff in regards to rescheduling her appointment.....Please contact to further discuss and advise.     Can the clinic reply by [facility]: No     What Number to Call Back if not in [facility]:  [phone]

## 2019-08-19 ENCOUNTER — OFFICE VISIT (OUTPATIENT)
Dept: OBSTETRICS AND GYNECOLOGY | Facility: CLINIC | Age: 34
End: 2019-08-19
Payer: COMMERCIAL

## 2019-08-19 VITALS
DIASTOLIC BLOOD PRESSURE: 84 MMHG | SYSTOLIC BLOOD PRESSURE: 128 MMHG | HEIGHT: 66 IN | BODY MASS INDEX: 33.77 KG/M2 | WEIGHT: 210.13 LBS

## 2019-08-19 DIAGNOSIS — N94.10 FEMALE DYSPAREUNIA: ICD-10-CM

## 2019-08-19 DIAGNOSIS — Z01.419 WELL WOMAN EXAM WITH ROUTINE GYNECOLOGICAL EXAM: Primary | ICD-10-CM

## 2019-08-19 PROCEDURE — 99395 PR PREVENTIVE VISIT,EST,18-39: ICD-10-PCS | Mod: S$GLB,,, | Performed by: OBSTETRICS & GYNECOLOGY

## 2019-08-19 PROCEDURE — 99999 PR PBB SHADOW E&M-EST. PATIENT-LVL III: ICD-10-PCS | Mod: PBBFAC,,, | Performed by: OBSTETRICS & GYNECOLOGY

## 2019-08-19 PROCEDURE — 99999 PR PBB SHADOW E&M-EST. PATIENT-LVL III: CPT | Mod: PBBFAC,,, | Performed by: OBSTETRICS & GYNECOLOGY

## 2019-08-19 PROCEDURE — 88175 CYTOPATH C/V AUTO FLUID REDO: CPT

## 2019-08-19 PROCEDURE — 87624 HPV HI-RISK TYP POOLED RSLT: CPT

## 2019-08-19 PROCEDURE — 99395 PREV VISIT EST AGE 18-39: CPT | Mod: S$GLB,,, | Performed by: OBSTETRICS & GYNECOLOGY

## 2019-08-19 NOTE — PROGRESS NOTES
History & Physical  Gynecology      SUBJECTIVE:     Chief Complaint: Well Woman       History of Present Illness:  Annual Exam-Premenopausal  Patient presents for annual exam. The patient has complaints today of atrophic vaginitis; taking daily progestins for endometriosis. Menstrual cycles are absent.  The patient is sexually active. GYN screening history: last pap: approximate date  and was normal. The patient participates in regular exercise: yes.  Smoking status:  no    Contraception: aygestin and micronor, planning on freezing eggs    FH:  Breast cancer: neg  Colon cancer: neg  Ovarian cancer: neg    Review of patient's allergies indicates:   Allergen Reactions    Metoclopramide Other (See Comments)     Causes tongue to roll back in mouth       Past Medical History:   Diagnosis Date    Acne vulgaris 2018    Allergic rhinitis due to dust mite 2019    Endometriosis 2018    Fibroid     Migraine syndrome     associated with flu vaccines    Mild intermittent asthma without complication 2018     Past Surgical History:   Procedure Laterality Date    HERNIA REPAIR      umbilical     OB History        0    Para   0    Term   0       0    AB   0    Living   0       SAB   0    TAB   0    Ectopic   0    Multiple   0    Live Births   0               Family History   Problem Relation Age of Onset    Hypertension Mother     Diabetes Father     Hypertension Father     No Known Problems Brother     No Known Problems Brother     Breast cancer Neg Hx     Colon cancer Neg Hx     Ovarian cancer Neg Hx     Melanoma Neg Hx     Blindness Neg Hx     Amblyopia Neg Hx     Cataracts Neg Hx     Glaucoma Neg Hx     Macular degeneration Neg Hx     Retinal detachment Neg Hx     Strabismus Neg Hx      Social History     Tobacco Use    Smoking status: Never Smoker    Smokeless tobacco: Never Used   Substance Use Topics    Alcohol use: No     Frequency: Never     Binge  frequency: Never    Drug use: No       Current Outpatient Medications   Medication Sig    albuterol (PROVENTIL/VENTOLIN HFA) 90 mcg/actuation inhaler Inhale 2 puffs into the lungs every 6 (six) hours as needed for Wheezing.    ascorbic acid, vitamin C, (VITAMIN C) 1000 MG tablet Take 2 tablets (2,000 mg total) by mouth once daily.    azelastine (OPTIVAR) 0.05 % ophthalmic solution Place 1 drop into both eyes 2 (two) times daily.    clindamycin (CLEOCIN T) 1 % lotion Use every night at bedtime on face    clotrimazole-betamethasone 1-0.05% (LOTRISONE) cream Apply topically 2 (two) times daily to rash on neck x 2 weeks    conjugated estrogens (PREMARIN) vaginal cream Use dime size amount of estrogen cream in vagina nightly x 2 weeks, then twice a week thereafter.    norethindrone (AYGESTIN) 5 mg Tab Take 1 tablet (5 mg total) by mouth once daily.    norethindrone (ORTHO MICRONOR) 0.35 mg tablet Take 1 tablet (0.35 mg total) by mouth once daily.    tretinoin (RETIN-A) 0.025 % cream Apply topically to affected area every evening.     No current facility-administered medications for this visit.          Review of Systems:  Review of Systems   Constitutional: Negative for activity change, appetite change and fever.   Respiratory: Negative for shortness of breath.    Cardiovascular: Negative for chest pain.   Gastrointestinal: Negative for abdominal pain, constipation, diarrhea, nausea and vomiting.   Genitourinary: Negative for menorrhagia, menstrual problem, pelvic pain, vaginal bleeding, vaginal discharge and vaginal pain.   Integumentary:  Negative for nipple discharge.   Neurological: Negative for numbness and headaches.   Breast: Negative for mastodynia and nipple discharge       OBJECTIVE:     Physical Exam:  Physical Exam   Constitutional: She is oriented to person, place, and time. She appears well-developed and well-nourished.   Neck: Normal range of motion. Neck supple. No tracheal deviation present. No  thyromegaly present.   Cardiovascular: Normal rate, regular rhythm and normal heart sounds.   Pulmonary/Chest: Effort normal and breath sounds normal. Right breast exhibits no inverted nipple, no mass, no nipple discharge, no skin change and no tenderness. Left breast exhibits no inverted nipple, no mass, no nipple discharge, no skin change and no tenderness. Breasts are symmetrical.   Abdominal: Soft.   Genitourinary: Vagina normal and uterus normal. No labial fusion. There is no rash, tenderness, lesion or injury on the right labia. There is no rash, tenderness, lesion or injury on the left labia. Uterus is not deviated, not enlarged, not fixed and not tender. Cervix exhibits no motion tenderness, no discharge and no friability. Right adnexum displays no mass, no tenderness and no fullness. Left adnexum displays no mass, no tenderness and no fullness. No erythema, tenderness or bleeding in the vagina. No foreign body in the vagina. No signs of injury around the vagina. No vaginal discharge found.   Genitourinary Comments: Urethra: normal appearing urethra with no masses, tenderness or lesions  Urethral meatus: normal size, anterior vaginal wall with no prolapse, no lesions   Neurological: She is alert and oriented to person, place, and time.   Psychiatric: She has a normal mood and affect. Her behavior is normal. Judgment and thought content normal.   Nursing note and vitals reviewed.      Chaperoned by: French    ASSESSMENT:       ICD-10-CM ICD-9-CM    1. Well woman exam with routine gynecological exam Z01.419 V72.31 Liquid-based pap smear, screening      HPV High Risk Genotypes, PCR   2. Female dyspareunia N94.10 625.0 conjugated estrogens (PREMARIN) vaginal cream          Plan:      Joan was seen today for well woman.    Diagnoses and all orders for this visit:    Well woman exam with routine gynecological exam  -     Liquid-based pap smear, screening  -     HPV High Risk Genotypes, PCR    Female  dyspareunia  -     conjugated estrogens (PREMARIN) vaginal cream; Use dime size amount of estrogen cream in vagina nightly x 2 weeks, then twice a week thereafter.        Orders Placed This Encounter   Procedures    HPV High Risk Genotypes, PCR       Well Woman:  - Pap smear and hpv today  - Birth control: micronor  - GC/CT:n/a  - Mammogram: due age 38  - Smoking cessation: n/a  - Labs: none required   - Vaccines: none required  - Exercise counseling    Vaginal pain with intercourse:  - estrogen refilled    Follow up in one year for annual or prn.    Rani Ambrocio

## 2019-08-23 LAB
HPV HR 12 DNA CVX QL NAA+PROBE: NEGATIVE
HPV16 AG SPEC QL: NEGATIVE
HPV18 DNA SPEC QL NAA+PROBE: NEGATIVE

## 2019-09-20 ENCOUNTER — PATIENT MESSAGE (OUTPATIENT)
Dept: INTERNAL MEDICINE | Facility: CLINIC | Age: 34
End: 2019-09-20

## 2019-09-20 ENCOUNTER — OFFICE VISIT (OUTPATIENT)
Dept: URGENT CARE | Facility: CLINIC | Age: 34
End: 2019-09-20
Payer: COMMERCIAL

## 2019-09-20 VITALS
OXYGEN SATURATION: 100 % | HEIGHT: 66 IN | TEMPERATURE: 98 F | RESPIRATION RATE: 18 BRPM | HEART RATE: 79 BPM | DIASTOLIC BLOOD PRESSURE: 93 MMHG | BODY MASS INDEX: 33.27 KG/M2 | SYSTOLIC BLOOD PRESSURE: 128 MMHG | WEIGHT: 207 LBS

## 2019-09-20 DIAGNOSIS — R51.9 NONINTRACTABLE HEADACHE, UNSPECIFIED CHRONICITY PATTERN, UNSPECIFIED HEADACHE TYPE: ICD-10-CM

## 2019-09-20 DIAGNOSIS — R11.0 NAUSEA: ICD-10-CM

## 2019-09-20 DIAGNOSIS — B34.9 VIRAL SYNDROME: Primary | ICD-10-CM

## 2019-09-20 DIAGNOSIS — R68.83 CHILLS: ICD-10-CM

## 2019-09-20 LAB
CTP QC/QA: YES
FLUAV AG NPH QL: NEGATIVE
FLUBV AG NPH QL: NEGATIVE

## 2019-09-20 PROCEDURE — 3008F BODY MASS INDEX DOCD: CPT | Mod: CPTII,S$GLB,, | Performed by: FAMILY MEDICINE

## 2019-09-20 PROCEDURE — 99214 PR OFFICE/OUTPT VISIT, EST, LEVL IV, 30-39 MIN: ICD-10-PCS | Mod: S$GLB,,, | Performed by: FAMILY MEDICINE

## 2019-09-20 PROCEDURE — 87804 INFLUENZA ASSAY W/OPTIC: CPT | Mod: QW,S$GLB,, | Performed by: FAMILY MEDICINE

## 2019-09-20 PROCEDURE — 99214 OFFICE O/P EST MOD 30 MIN: CPT | Mod: S$GLB,,, | Performed by: FAMILY MEDICINE

## 2019-09-20 PROCEDURE — 87804 POCT INFLUENZA A/B: ICD-10-PCS | Mod: QW,S$GLB,, | Performed by: FAMILY MEDICINE

## 2019-09-20 PROCEDURE — 3008F PR BODY MASS INDEX (BMI) DOCUMENTED: ICD-10-PCS | Mod: CPTII,S$GLB,, | Performed by: FAMILY MEDICINE

## 2019-09-20 RX ORDER — ONDANSETRON 8 MG/1
8 TABLET, ORALLY DISINTEGRATING ORAL
Status: COMPLETED | OUTPATIENT
Start: 2019-09-20 | End: 2019-09-20

## 2019-09-20 RX ORDER — IBUPROFEN 800 MG/1
800 TABLET ORAL 3 TIMES DAILY
Qty: 30 TABLET | Refills: 0 | Status: SHIPPED | OUTPATIENT
Start: 2019-09-20 | End: 2019-09-30

## 2019-09-20 RX ORDER — ONDANSETRON 4 MG/1
4 TABLET, ORALLY DISINTEGRATING ORAL EVERY 8 HOURS PRN
Qty: 20 TABLET | Refills: 0 | Status: SHIPPED | OUTPATIENT
Start: 2019-09-20 | End: 2019-09-27

## 2019-09-20 RX ADMIN — ONDANSETRON 8 MG: 8 TABLET, ORALLY DISINTEGRATING ORAL at 04:09

## 2019-09-20 NOTE — PATIENT INSTRUCTIONS
"  Viral Syndrome (Adult)  A viral illness may cause a number of symptoms. The symptoms depend on the part of the body that the virus affects. If it settles in your nose, throat, and lungs, it may cause cough, sore throat, congestion, and sometimes headache. If it settles in your stomach and intestinal tract, it may cause vomiting and diarrhea. Sometimes it causes vague symptoms like "aching all over," feeling tired, loss of appetite, or fever.  A viral illness usually lasts 1 to 2 weeks, but sometimes it lasts longer. In some cases, a more serious infection can look like a viral syndrome in the first few days of the illness. You may need another exam and additional tests to know the difference. Watch for the warning signs listed below.  Home care  Follow these guidelines for taking care of yourself at home:  · If symptoms are severe, rest at home for the first 2 to 3 days.  · Stay away from cigarette smoke - both your smoke and the smoke from others.  · You may use over-the-counter acetaminophen or ibuprofen for fever, muscle aching, and headache, unless another medicine was prescribed for this. If you have chronic liver or kidney disease or ever had a stomach ulcer or GI bleeding, talk with your doctor before using these medicines. No one who is younger than 18 and ill with a fever should take aspirin. It may cause severe disease or death.  · Your appetite may be poor, so a light diet is fine. Avoid dehydration by drinking 8 to 12 8-ounce glasses of fluids each day. This may include water; orange juice; lemonade; apple, grape, and cranberry juice; clear fruit drinks; electrolyte replacement and sports drinks; and decaffeinated teas and coffee. If you have been diagnosed with a kidney disease, ask your doctor how much and what types of fluids you should drink to prevent dehydration. If you have kidney disease, drinking too much fluid can cause it build up in the your body and be dangerous to your " health.  · Over-the-counter remedies won't shorten the length of the illness but may be helpful for cough, sore throat; and nasal and sinus congestion. Don't use decongestants if you have high blood pressure.  Follow-up care  Follow up with your healthcare provider if you do not improve over the next week.  Call 911  Get emergency medical care if any of the following occur:  · Convulsion  · Feeling weak, dizzy, or like you are going to faint  · Chest pain, shortness of breath, wheezing, or difficulty breathing  When to seek medical advice  Call your healthcare provider right away if any of these occur:  · Cough with lots of colored sputum (mucus) or blood in your sputum  · Chest pain, shortness of breath, wheezing, or difficulty breathing  · Severe headache; face, neck, or ear pain  · Severe, constant pain in the lower right side of your belly (abdominal)  · Continued vomiting (cant keep liquids down)  · Frequent diarrhea (more than 5 times a day); blood (red or black color) or mucus in diarrhea  · Feeling weak, dizzy, or like you are going to faint  · Extreme thirst  · Fever of 100.4°F (38°C) or higher, or as directed by your healthcare provider  Date Last Reviewed: 9/25/2015  © 6587-7772 [employer]. [address] [place] [address], [place] [address]. All rights reserved. This information is not intended as a substitute for professional medical care. Always follow your healthcare professional's instructions.      Follow up with your doctor in a few days as needed.  Return to the urgent care or go to the ER if symptoms get worse.    Carl Fernández MD

## 2019-09-20 NOTE — PROGRESS NOTES
"Subjective:       Patient ID: Joan Wilde is a 34 y.o. female.    Vitals:  height is 5' 6" (1.676 m) and weight is 93.9 kg (207 lb). Her temperature is 98.3 °F (36.8 °C). Her blood pressure is 128/93 (abnormal) and her pulse is 79. Her respiration is 18 and oxygen saturation is 100%.     Chief Complaint: Nausea    Started today, feels like she has a heavy head, some dizziness, nausea and headeache    Nausea   This is a new problem. The current episode started today. The problem occurs constantly. The problem has been unchanged. Associated symptoms include chills, fatigue, headaches and nausea. Pertinent negatives include no arthralgias, chest pain, congestion, coughing, fever, joint swelling, myalgias, rash, sore throat, vertigo, vomiting or weakness. She has tried acetaminophen for the symptoms.       Constitution: Positive for chills and fatigue. Negative for fever.   HENT: Negative for congestion and sore throat.    Neck: Negative for painful lymph nodes.   Cardiovascular: Negative for chest pain and leg swelling.   Eyes: Negative for double vision and blurred vision.   Respiratory: Negative for cough and shortness of breath.    Gastrointestinal: Positive for nausea. Negative for vomiting and diarrhea.   Genitourinary: Negative for dysuria, frequency, urgency and history of kidney stones.   Musculoskeletal: Negative for joint pain, joint swelling, muscle cramps and muscle ache.   Skin: Negative for color change, pale, rash, erythema and bruising.   Allergic/Immunologic: Negative for seasonal allergies.   Neurological: Positive for dizziness and headaches. Negative for history of vertigo, light-headedness and passing out.   Hematologic/Lymphatic: Negative for swollen lymph nodes.   Psychiatric/Behavioral: Negative for nervous/anxious, sleep disturbance and depression. The patient is not nervous/anxious.        Objective:      Physical Exam   Constitutional: She is oriented to person, place, and time. " She appears well-developed and well-nourished.   HENT:   Head: Normocephalic and atraumatic.   Right Ear: External ear normal.   Left Ear: External ear normal.   Mouth/Throat: Oropharynx is clear and moist.   Eyes: Pupils are equal, round, and reactive to light. EOM are normal.   Neck: Normal range of motion. Neck supple.   Cardiovascular: Normal rate, regular rhythm and normal heart sounds. Exam reveals no gallop and no friction rub.   No murmur heard.  Pulmonary/Chest: Breath sounds normal. No respiratory distress. She has no wheezes. She has no rales. She exhibits no tenderness.   Abdominal: Soft. Bowel sounds are normal. She exhibits no distension. There is no tenderness. There is no rebound and no guarding.   Musculoskeletal: Normal range of motion. She exhibits no edema, tenderness or deformity.   Lymphadenopathy:     She has no cervical adenopathy.   Neurological: She is alert and oriented to person, place, and time. No cranial nerve deficit or sensory deficit. She exhibits normal muscle tone. Coordination normal.   Skin: Skin is warm. Capillary refill takes less than 2 seconds. No rash noted. No erythema. No pallor.   Psychiatric: She has a normal mood and affect. Her behavior is normal. Judgment and thought content normal.   Vitals reviewed.      Assessment:       1. Viral syndrome    2. Chills    3. Nausea    4. Nonintractable headache, unspecified chronicity pattern, unspecified headache type        Plan:         Viral syndrome  -     ibuprofen (ADVIL,MOTRIN) 800 MG tablet; Take 1 tablet (800 mg total) by mouth 3 (three) times daily. for 10 days  Dispense: 30 tablet; Refill: 0    Chills  -     POCT Influenza A/B    Nausea  -     ondansetron (ZOFRAN-ODT) 4 MG TbDL; Dissolve 1 tablet (4 mg total) by mouth every 8 (eight) hours as needed (nausea/vomit).  Dispense: 20 tablet; Refill: 0  -     ondansetron disintegrating tablet 8 mg    Nonintractable headache, unspecified chronicity pattern, unspecified  "headache type          Patient Instructions     Viral Syndrome (Adult)  A viral illness may cause a number of symptoms. The symptoms depend on the part of the body that the virus affects. If it settles in your nose, throat, and lungs, it may cause cough, sore throat, congestion, and sometimes headache. If it settles in your stomach and intestinal tract, it may cause vomiting and diarrhea. Sometimes it causes vague symptoms like "aching all over," feeling tired, loss of appetite, or fever.  A viral illness usually lasts 1 to 2 weeks, but sometimes it lasts longer. In some cases, a more serious infection can look like a viral syndrome in the first few days of the illness. You may need another exam and additional tests to know the difference. Watch for the warning signs listed below.  Home care  Follow these guidelines for taking care of yourself at home:  · If symptoms are severe, rest at home for the first 2 to 3 days.  · Stay away from cigarette smoke - both your smoke and the smoke from others.  · You may use over-the-counter acetaminophen or ibuprofen for fever, muscle aching, and headache, unless another medicine was prescribed for this. If you have chronic liver or kidney disease or ever had a stomach ulcer or GI bleeding, talk with your doctor before using these medicines. No one who is younger than 18 and ill with a fever should take aspirin. It may cause severe disease or death.  · Your appetite may be poor, so a light diet is fine. Avoid dehydration by drinking 8 to 12 8-ounce glasses of fluids each day. This may include water; orange juice; lemonade; apple, grape, and cranberry juice; clear fruit drinks; electrolyte replacement and sports drinks; and decaffeinated teas and coffee. If you have been diagnosed with a kidney disease, ask your doctor how much and what types of fluids you should drink to prevent dehydration. If you have kidney disease, drinking too much fluid can cause it build up in the your " body and be dangerous to your health.  · Over-the-counter remedies won't shorten the length of the illness but may be helpful for cough, sore throat; and nasal and sinus congestion. Don't use decongestants if you have high blood pressure.  Follow-up care  Follow up with your healthcare provider if you do not improve over the next week.  Call 911  Get emergency medical care if any of the following occur:  · Convulsion  · Feeling weak, dizzy, or like you are going to faint  · Chest pain, shortness of breath, wheezing, or difficulty breathing  When to seek medical advice  Call your healthcare provider right away if any of these occur:  · Cough with lots of colored sputum (mucus) or blood in your sputum  · Chest pain, shortness of breath, wheezing, or difficulty breathing  · Severe headache; face, neck, or ear pain  · Severe, constant pain in the lower right side of your belly (abdominal)  · Continued vomiting (cant keep liquids down)  · Frequent diarrhea (more than 5 times a day); blood (red or black color) or mucus in diarrhea  · Feeling weak, dizzy, or like you are going to faint  · Extreme thirst  · Fever of 100.4°F (38°C) or higher, or as directed by your healthcare provider  Date Last Reviewed: 9/25/2015  © 3423-2605 [employer] [address] [place] [address], [place] [address]. All rights reserved. This information is not intended as a substitute for professional medical care. Always follow your healthcare professional's instructions.      Follow up with your doctor in a few days as needed.  Return to the urgent care or go to the ER if symptoms get worse.    Carl Fernández MD

## 2019-10-23 ENCOUNTER — OFFICE VISIT (OUTPATIENT)
Dept: OPTOMETRY | Facility: CLINIC | Age: 34
End: 2019-10-23
Payer: COMMERCIAL

## 2019-10-23 DIAGNOSIS — H10.13 ALLERGIC CONJUNCTIVITIS OF BOTH EYES: ICD-10-CM

## 2019-10-23 DIAGNOSIS — H52.13 MYOPIA, BILATERAL: Primary | ICD-10-CM

## 2019-10-23 DIAGNOSIS — Z04.9 DISEASE RULED OUT AFTER EXAMINATION: ICD-10-CM

## 2019-10-23 DIAGNOSIS — H04.123 DRY EYE SYNDROME, BILATERAL: ICD-10-CM

## 2019-10-23 PROCEDURE — 92015 DETERMINE REFRACTIVE STATE: CPT | Mod: S$GLB,,, | Performed by: OPTOMETRIST

## 2019-10-23 PROCEDURE — 92014 COMPRE OPH EXAM EST PT 1/>: CPT | Mod: S$GLB,,, | Performed by: OPTOMETRIST

## 2019-10-23 PROCEDURE — 92015 PR REFRACTION: ICD-10-PCS | Mod: S$GLB,,, | Performed by: OPTOMETRIST

## 2019-10-23 PROCEDURE — 99999 PR PBB SHADOW E&M-EST. PATIENT-LVL II: ICD-10-PCS | Mod: PBBFAC,,, | Performed by: OPTOMETRIST

## 2019-10-23 PROCEDURE — 92014 PR EYE EXAM, EST PATIENT,COMPREHESV: ICD-10-PCS | Mod: S$GLB,,, | Performed by: OPTOMETRIST

## 2019-10-23 PROCEDURE — 99999 PR PBB SHADOW E&M-EST. PATIENT-LVL II: CPT | Mod: PBBFAC,,, | Performed by: OPTOMETRIST

## 2019-10-23 RX ORDER — AZELASTINE HYDROCHLORIDE 0.5 MG/ML
1 SOLUTION/ DROPS OPHTHALMIC 2 TIMES DAILY
Qty: 6 ML | Refills: 6 | Status: SHIPPED | OUTPATIENT
Start: 2019-10-23 | End: 2023-03-03

## 2019-10-24 NOTE — PROGRESS NOTES
HPI     34yr old female present for Annual eye exam. Patient states blurry vision   increased OU, even when wearing glasses. Patient says she got Ronda color   computer glasses to help with the contrast of light. Pt having itching and   redness OU x 2 weeks. Pt denies seeing floaters, headaches and eye pain.    Uses  Eye drops when she suffers seasonal allergies    Last edited by Huber Foster on 10/23/2019  3:23 PM.   (History)            Assessment /Plan     For exam results, see Encounter Report.      Disease ruled out after examination    Myopia, bilateral  Rx specs     Allergic conjunctivitis of both eyes       azelastine (OPTIVAR) 0.05 % ophthalmic solution; Place 1 drop into both eyes 2 (two) times daily.  Dispense: 6 mL; Refill: 6     Dry eye syndrome, bilateral     Use refresh liquigel PRN daily     RTC 1 year DFE/ annual

## 2019-10-29 ENCOUNTER — OFFICE VISIT (OUTPATIENT)
Dept: ALLERGY | Facility: CLINIC | Age: 34
End: 2019-10-29
Payer: COMMERCIAL

## 2019-10-29 VITALS — BODY MASS INDEX: 33.8 KG/M2 | HEIGHT: 67 IN | OXYGEN SATURATION: 99 % | HEART RATE: 83 BPM | WEIGHT: 215.38 LBS

## 2019-10-29 DIAGNOSIS — H10.13 ALLERGIC CONJUNCTIVITIS, BILATERAL: ICD-10-CM

## 2019-10-29 DIAGNOSIS — J45.20 MILD INTERMITTENT ASTHMA WITHOUT COMPLICATION: ICD-10-CM

## 2019-10-29 DIAGNOSIS — J06.9 VIRAL URI: ICD-10-CM

## 2019-10-29 DIAGNOSIS — J30.89 ALLERGIC RHINITIS DUE TO DUST MITE: Primary | ICD-10-CM

## 2019-10-29 PROCEDURE — 99999 PR PBB SHADOW E&M-EST. PATIENT-LVL III: ICD-10-PCS | Mod: PBBFAC,,, | Performed by: ALLERGY & IMMUNOLOGY

## 2019-10-29 PROCEDURE — 99999 PR PBB SHADOW E&M-EST. PATIENT-LVL III: CPT | Mod: PBBFAC,,, | Performed by: ALLERGY & IMMUNOLOGY

## 2019-10-29 PROCEDURE — 99214 PR OFFICE/OUTPT VISIT, EST, LEVL IV, 30-39 MIN: ICD-10-PCS | Mod: S$GLB,,, | Performed by: ALLERGY & IMMUNOLOGY

## 2019-10-29 PROCEDURE — 99214 OFFICE O/P EST MOD 30 MIN: CPT | Mod: S$GLB,,, | Performed by: ALLERGY & IMMUNOLOGY

## 2019-10-29 PROCEDURE — 3008F PR BODY MASS INDEX (BMI) DOCUMENTED: ICD-10-PCS | Mod: CPTII,S$GLB,, | Performed by: ALLERGY & IMMUNOLOGY

## 2019-10-29 PROCEDURE — 3008F BODY MASS INDEX DOCD: CPT | Mod: CPTII,S$GLB,, | Performed by: ALLERGY & IMMUNOLOGY

## 2019-10-29 NOTE — PROGRESS NOTES
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  She is here alone.  She was last seen May 13, 2019.    After her last visit, she did do some house dust mite avoidance procedures.  She does not have any covers for her bedding yet.  She did get a humidifier for her bedroom.  She also got a HEPA filter.    She has been taking Claritin as needed.  She has also been using albuterol as needed.  She does not need these frequently.    In September she developed a viral syndrome with chills, fatigue, headaches, and nausea.  She was seen in Urgent Care and prescribed Zofran.  She did improve.    Last week she developed upper respiratory infection that she thinks has a rhino virus.  She had increased rhinitis and nasal congestion.  She did not have any fever or chills.  She has been taking loratadine daily as well as NyQuil.  She does feel better but continues to have some mild nasal congestion.  She did not have any wheezing.    She does not take any topical nasal steroids.    [facility] ALLERGY QUESTIONNAIRE SHORT 10/27/2019   Are you taking any new medications since your last visit? No   Constitution: No changes since my last visit with this provider   Head or facial pain: No symptoms   Eyes: Itching   Ears: No symptoms   Nose: Sneezing, Runny nose, Blocked nose   Throat: Sore throat   Sinuses: No symptoms   Lungs: Cough   Skin: Hives   Cardiovascular: No symptoms   Gastrointestinal: No symptoms   Genital/ urinary No symptoms   Musculoskeletal: No symptoms   Neurologic: No symptoms   Endocrine: No symptoms   Hematologic: No symptoms     Physical Examination:  General: Well-developed, well-nourished, no acute distress.  Head: No sinus tenderness.  Eyes: Conjunctivae:  No bulbar or palpebral conjunctival injection.  Ears: EAC's clear.  TM's clear.  No pre-auricular nodes.  Nose: Nasal Mucosa:  Pink.  Septum: No apparent deviation.  Turbinates:  No significant edema.   Polyps/Mass:  None visible.  Teeth/Gums:  No bleeding noted.  Oropharynx: No exudates.  Neck: Supple without thyromegaly. No cervical lymphadenopathy.    Respiratory/Chest: Effort: Good.  Auscultation:  Clear bilaterally.  Skin: Good turgor.  No urticaria or angioedema.  Neuro/Psych: Oriented x 3.    Laboratory 04/15/2019:  IgE level:  Less than 35.  ImmunoCAP:  Negative.    Spirometry 04/16/2019:  Normal spirometry. Airflow not improved after bronchodilator.    Inhalant skin tests 05/13/2019:  3+ histamine control.  3+ dust mites.    Assessment:  1.  Allergic rhinitis.  2.  Allergic conjunctivitis.  3.  Chronic cough with wheezing, consider bronchospasm, resolved.  4.  Chronic dermatitis, improved.  5.  Viral URI, resolving.  6.  Snoring.    Recommendations:  1.  House dust mite avoidance.  Cautioned about humidifier.  She will get covers for for her pillows and mattress.  2.  Claritin as needed.  3.  Albuterol as needed.  4.  Return to clinic in 3 months or sooner if needed.      Patient education:  October 29, 2019:  Allergic mechanisms and treatment options were reviewed in detail.    May 13, 2019:  House dust mite avoidance was reviewed.  Handouts were given.

## 2019-10-30 ENCOUNTER — PATIENT MESSAGE (OUTPATIENT)
Dept: DERMATOLOGY | Facility: CLINIC | Age: 34
End: 2019-10-30

## 2019-10-30 ENCOUNTER — OFFICE VISIT (OUTPATIENT)
Dept: DERMATOLOGY | Facility: CLINIC | Age: 34
End: 2019-10-30
Payer: COMMERCIAL

## 2019-10-30 DIAGNOSIS — L25.9 CONTACT DERMATITIS, UNSPECIFIED CONTACT DERMATITIS TYPE, UNSPECIFIED TRIGGER: Primary | ICD-10-CM

## 2019-10-30 PROCEDURE — 99999 PR PBB SHADOW E&M-EST. PATIENT-LVL II: CPT | Mod: PBBFAC,,, | Performed by: PHYSICIAN ASSISTANT

## 2019-10-30 PROCEDURE — 99999 PR PBB SHADOW E&M-EST. PATIENT-LVL II: ICD-10-PCS | Mod: PBBFAC,,, | Performed by: PHYSICIAN ASSISTANT

## 2019-10-30 PROCEDURE — 99214 PR OFFICE/OUTPT VISIT, EST, LEVL IV, 30-39 MIN: ICD-10-PCS | Mod: S$GLB,,, | Performed by: PHYSICIAN ASSISTANT

## 2019-10-30 PROCEDURE — 99214 OFFICE O/P EST MOD 30 MIN: CPT | Mod: S$GLB,,, | Performed by: PHYSICIAN ASSISTANT

## 2019-10-30 RX ORDER — ALCLOMETASONE DIPROPIONATE 0.5 MG/G
CREAM TOPICAL
Qty: 60 G | Refills: 0 | Status: SHIPPED | OUTPATIENT
Start: 2019-10-30 | End: 2020-10-15 | Stop reason: SDUPTHER

## 2019-10-30 NOTE — PROGRESS NOTES
Subjective:       Patient ID:  Joan Wilde is a 34 y.o. female who presents for   Chief Complaint   Patient presents with    Dermatitis     face, X5days, dry skin, flaky, and itchy, no tx      Rash  - Initial  Affected locations: face  Duration: 5 days  Signs / symptoms: dryness, itching and scaling  Exacerbated by: started after using a shampoo that contains yarrow - known allergy.  Relieving factors/Treatments tried: nothing    Pt has a hx of contact derm. She was last seen 5/29/19 (Dr. Perea). Per previous note:  Pt uses [place] growth shampoo and conditioner. Skylar shampoo contains Yarrow which is a component of compositae mix which the patient is allergic to.      Patch test reading positive for  Nickel sulfate 2  Compositae mix 2    Review of Systems   Constitutional: Negative for fever and chills.   Eyes: Negative for itching.   Skin: Positive for itching, rash and dry skin.        Objective:    Physical Exam   Constitutional: She appears well-developed and well-nourished. No distress.   Neurological: She is alert and oriented to person, place, and time. She is not disoriented.   Psychiatric: She has a normal mood and affect.   Skin:   Areas Examined (abnormalities noted in diagram):   Head / Face Inspection Performed  Neck Inspection Performed              Diagram Legend     Erythematous scaling macule/papule c/w actinic keratosis       Vascular papule c/w angioma      Pigmented verrucoid papule/plaque c/w seborrheic keratosis      Yellow umbilicated papule c/w sebaceous hyperplasia      Irregularly shaped tan macule c/w lentigo     1-2 mm smooth white papules consistent with Milia      Movable subcutaneous cyst with punctum c/w epidermal inclusion cyst      Subcutaneous movable cyst c/w pilar cyst      Firm pink to brown papule c/w dermatofibroma      Pedunculated fleshy papule(s) c/w skin tag(s)      Evenly pigmented macule c/w junctional nevus     Mildly variegated pigmented, slightly  irregular-bordered macule c/w mildly atypical nevus      Flesh colored to evenly pigmented papule c/w intradermal nevus       Pink pearly papule/plaque c/w basal cell carcinoma      Erythematous hyperkeratotic cursted plaque c/w SCC      Surgical scar with no sign of skin cancer recurrence      Open and closed comedones      Inflammatory papules and pustules      Verrucoid papule consistent consistent with wart     Erythematous eczematous patches and plaques     Dystrophic onycholytic nail with subungual debris c/w onychomycosis     Umbilicated papule    Erythematous-base heme-crusted tan verrucoid plaque consistent with inflamed seborrheic keratosis     Erythematous Silvery Scaling Plaque c/w Psoriasis     See annotation    Assessment / Plan:      Contact dermatitis, unspecified contact dermatitis type, unspecified trigger (vs seb derm) - NP  -     alclomethasone (ACLOVATE) 0.05 % cream; Apply topically to face twice a day for 2 weeks.  Dispense: 60 g; Refill: 0    D/c shampoo with yarrow as ingredient.  D/c toner until condition resolves.    Pt instructed to contact me/ RTC if condition worsens or does not improve with the above tx regimen.         Follow up if symptoms worsen or fail to improve.

## 2019-11-20 ENCOUNTER — OFFICE VISIT (OUTPATIENT)
Dept: DERMATOLOGY | Facility: CLINIC | Age: 34
End: 2019-11-20
Payer: COMMERCIAL

## 2019-11-20 DIAGNOSIS — L25.9 CONTACT DERMATITIS, UNSPECIFIED CONTACT DERMATITIS TYPE, UNSPECIFIED TRIGGER: Primary | ICD-10-CM

## 2019-11-20 DIAGNOSIS — L70.9 ACNE, UNSPECIFIED ACNE TYPE: ICD-10-CM

## 2019-11-20 DIAGNOSIS — L81.0 POST-INFLAMMATORY HYPERPIGMENTATION: ICD-10-CM

## 2019-11-20 DIAGNOSIS — D22.9 MULTIPLE BENIGN NEVI: ICD-10-CM

## 2019-11-20 DIAGNOSIS — Z12.83 SCREENING EXAM FOR SKIN CANCER: ICD-10-CM

## 2019-11-20 DIAGNOSIS — L85.8 KERATOSIS PILARIS: ICD-10-CM

## 2019-11-20 PROCEDURE — 99999 PR PBB SHADOW E&M-EST. PATIENT-LVL II: CPT | Mod: PBBFAC,,, | Performed by: DERMATOLOGY

## 2019-11-20 PROCEDURE — 99214 PR OFFICE/OUTPT VISIT, EST, LEVL IV, 30-39 MIN: ICD-10-PCS | Mod: S$GLB,,, | Performed by: DERMATOLOGY

## 2019-11-20 PROCEDURE — 99999 PR PBB SHADOW E&M-EST. PATIENT-LVL II: ICD-10-PCS | Mod: PBBFAC,,, | Performed by: DERMATOLOGY

## 2019-11-20 PROCEDURE — 99214 OFFICE O/P EST MOD 30 MIN: CPT | Mod: S$GLB,,, | Performed by: DERMATOLOGY

## 2019-11-20 NOTE — PROGRESS NOTES
Subjective:       Patient ID:  Joan Wilde is a 34 y.o. female who presents for   Chief Complaint   Patient presents with    Dermatitis     face     34F here for follow-up of contact dermatitis, recently saw Mai Silva 10/30 when flaring. This was brought on by reuse of a shampoo that contains Yarrow, a known allergen that cross reacts to compositae mix that she is patch positive allergic to. She was advised to stop topical products and also given aclometasone which she used for 3 days and this resolved her rash. Today she says she is stable and is avoiding flares by not using any products with fragrance, including soaps, emollient, shampoos and deodorant.     She also notes that her tretinoin causes stinging and a raw sensation and has since stopped using that as well. Also uses salicylic acid topicals, tea tree oil gel, and a [employer] product containing tocopherol.     Patch test reading positive for  Nickel sulfate 2  Compositae mix 2      Review of Systems   Skin: Positive for itching, rash, dry skin and daily sunscreen use.   Hematologic/Lymphatic: Does not bruise/bleed easily.        Objective:    Physical Exam   Constitutional: She appears well-developed and well-nourished. No distress.   Neurological: She is alert and oriented to person, place, and time. She is not disoriented.   Psychiatric: She has a normal mood and affect.   Skin:   Areas Examined (abnormalities noted in diagram):   Scalp / Hair Palpated and Inspected  Head / Face Inspection Performed  Neck Inspection Performed  Chest / Axilla Inspection Performed  Abdomen Inspection Performed  Back Inspection Performed  RUE Inspected  LUE Inspection Performed                   Diagram Legend     Erythematous scaling macule/papule c/w actinic keratosis       Vascular papule c/w angioma      Pigmented verrucoid papule/plaque c/w seborrheic keratosis      Yellow umbilicated papule c/w sebaceous hyperplasia      Irregularly shaped tan  macule c/w lentigo     1-2 mm smooth white papules consistent with Milia      Movable subcutaneous cyst with punctum c/w epidermal inclusion cyst      Subcutaneous movable cyst c/w pilar cyst      Firm pink to brown papule c/w dermatofibroma      Pedunculated fleshy papule(s) c/w skin tag(s)      Evenly pigmented macule c/w junctional nevus     Mildly variegated pigmented, slightly irregular-bordered macule c/w mildly atypical nevus      Flesh colored to evenly pigmented papule c/w intradermal nevus       Pink pearly papule/plaque c/w basal cell carcinoma      Erythematous hyperkeratotic cursted plaque c/w SCC      Surgical scar with no sign of skin cancer recurrence      Open and closed comedones      Inflammatory papules and pustules      Verrucoid papule consistent consistent with wart     Erythematous eczematous patches and plaques     Dystrophic onycholytic nail with subungual debris c/w onychomycosis     Umbilicated papule    Erythematous-base heme-crusted tan verrucoid plaque consistent with inflamed seborrheic keratosis     Erythematous Silvery Scaling Plaque c/w Psoriasis     See annotation      Assessment / Plan:        Contact dermatitis, unspecified contact dermatitis type, unspecified trigger  Improved  Pt no longer using shampoo that has triggered several times in the past  Safe list given  No longer using topical steroid    Acne, unspecified acne type  Tretinoin 0.025 too stinging with use of other potentially irritating facial skin care products  Will try new sensitive skin tretinoin rx -   If not covered, change to OTC differin gel    Post-inflammatory hyperpigmentation  Daily spf 50    Screening exam for skin cancer    Upper body skin examination performed today including at least 6 points as noted in physical examination. No lesions suspicious for malignancy noted.    Multiple benign nevi  Discussed ABCDE's of nevi.  Monitor for new mole or moles that are becoming bigger, darker, irritated, or  developing irregular borders.     Keratosis pilaris  Emollients daily    Other orders  -     tretinoin (ALTRENO) 0.05 % Lotn; Apply One pea nightly to face  Dispense: 45 g; Refill: 2             Follow up if symptoms worsen or fail to improve.

## 2020-01-20 ENCOUNTER — TELEPHONE (OUTPATIENT)
Dept: OBSTETRICS AND GYNECOLOGY | Facility: CLINIC | Age: 35
End: 2020-01-20

## 2020-01-20 ENCOUNTER — PATIENT MESSAGE (OUTPATIENT)
Dept: OBSTETRICS AND GYNECOLOGY | Facility: CLINIC | Age: 35
End: 2020-01-20

## 2020-01-20 DIAGNOSIS — Z11.3 SCREEN FOR STD (SEXUALLY TRANSMITTED DISEASE): Primary | ICD-10-CM

## 2020-01-21 ENCOUNTER — LAB VISIT (OUTPATIENT)
Dept: LAB | Facility: HOSPITAL | Age: 35
End: 2020-01-21
Attending: OBSTETRICS & GYNECOLOGY
Payer: COMMERCIAL

## 2020-01-21 DIAGNOSIS — Z11.3 SCREEN FOR STD (SEXUALLY TRANSMITTED DISEASE): ICD-10-CM

## 2020-01-21 PROCEDURE — 87491 CHLMYD TRACH DNA AMP PROBE: CPT

## 2020-01-22 LAB
C TRACH DNA SPEC QL NAA+PROBE: NOT DETECTED
N GONORRHOEA DNA SPEC QL NAA+PROBE: NOT DETECTED

## 2020-01-31 ENCOUNTER — OFFICE VISIT (OUTPATIENT)
Dept: OBSTETRICS AND GYNECOLOGY | Facility: CLINIC | Age: 35
End: 2020-01-31
Payer: COMMERCIAL

## 2020-01-31 VITALS
WEIGHT: 224.88 LBS | DIASTOLIC BLOOD PRESSURE: 76 MMHG | HEIGHT: 67 IN | SYSTOLIC BLOOD PRESSURE: 120 MMHG | BODY MASS INDEX: 35.29 KG/M2

## 2020-01-31 DIAGNOSIS — N80.9 ENDOMETRIOSIS: ICD-10-CM

## 2020-01-31 DIAGNOSIS — Z31.69 INFERTILITY COUNSELING: Primary | ICD-10-CM

## 2020-01-31 PROCEDURE — 3008F PR BODY MASS INDEX (BMI) DOCUMENTED: ICD-10-PCS | Mod: CPTII,S$GLB,, | Performed by: OBSTETRICS & GYNECOLOGY

## 2020-01-31 PROCEDURE — 99213 OFFICE O/P EST LOW 20 MIN: CPT | Mod: S$GLB,,, | Performed by: OBSTETRICS & GYNECOLOGY

## 2020-01-31 PROCEDURE — 3008F BODY MASS INDEX DOCD: CPT | Mod: CPTII,S$GLB,, | Performed by: OBSTETRICS & GYNECOLOGY

## 2020-01-31 PROCEDURE — 99213 PR OFFICE/OUTPT VISIT, EST, LEVL III, 20-29 MIN: ICD-10-PCS | Mod: S$GLB,,, | Performed by: OBSTETRICS & GYNECOLOGY

## 2020-01-31 PROCEDURE — 99999 PR PBB SHADOW E&M-EST. PATIENT-LVL III: CPT | Mod: PBBFAC,,, | Performed by: OBSTETRICS & GYNECOLOGY

## 2020-01-31 PROCEDURE — 99999 PR PBB SHADOW E&M-EST. PATIENT-LVL III: ICD-10-PCS | Mod: PBBFAC,,, | Performed by: OBSTETRICS & GYNECOLOGY

## 2020-01-31 RX ORDER — ACETAMINOPHEN AND CODEINE PHOSPHATE 120; 12 MG/5ML; MG/5ML
1 SOLUTION ORAL DAILY
Qty: 28 TABLET | Refills: 6 | Status: SHIPPED | OUTPATIENT
Start: 2020-01-31 | End: 2020-07-24 | Stop reason: SDUPTHER

## 2020-01-31 RX ORDER — NORETHINDRONE 5 MG/1
5 TABLET ORAL DAILY
Qty: 30 TABLET | Refills: 6 | Status: SHIPPED | OUTPATIENT
Start: 2020-01-31 | End: 2020-07-24 | Stop reason: SDUPTHER

## 2020-04-02 ENCOUNTER — OFFICE VISIT (OUTPATIENT)
Dept: INTERNAL MEDICINE | Facility: CLINIC | Age: 35
End: 2020-04-02
Payer: COMMERCIAL

## 2020-04-02 ENCOUNTER — PATIENT MESSAGE (OUTPATIENT)
Dept: INTERNAL MEDICINE | Facility: CLINIC | Age: 35
End: 2020-04-02

## 2020-04-02 ENCOUNTER — CLINICAL SUPPORT (OUTPATIENT)
Dept: INTERNAL MEDICINE | Facility: CLINIC | Age: 35
End: 2020-04-02
Payer: COMMERCIAL

## 2020-04-02 DIAGNOSIS — J45.21 MILD INTERMITTENT ASTHMA WITH ACUTE EXACERBATION: Primary | ICD-10-CM

## 2020-04-02 DIAGNOSIS — Z20.822 SUSPECTED COVID-19 VIRUS INFECTION: ICD-10-CM

## 2020-04-02 DIAGNOSIS — R50.9 FEVER, UNSPECIFIED FEVER CAUSE: Primary | ICD-10-CM

## 2020-04-02 DIAGNOSIS — J45.20 MILD INTERMITTENT ASTHMA WITHOUT COMPLICATION: ICD-10-CM

## 2020-04-02 PROCEDURE — U0002 COVID-19 LAB TEST NON-CDC: HCPCS

## 2020-04-02 PROCEDURE — 99213 OFFICE O/P EST LOW 20 MIN: CPT | Mod: 95,,, | Performed by: INTERNAL MEDICINE

## 2020-04-02 PROCEDURE — 99213 PR OFFICE/OUTPT VISIT, EST, LEVL III, 20-29 MIN: ICD-10-PCS | Mod: 95,,, | Performed by: INTERNAL MEDICINE

## 2020-04-02 RX ORDER — MONTELUKAST SODIUM 10 MG/1
10 TABLET ORAL NIGHTLY
Qty: 90 TABLET | Refills: 3 | Status: SHIPPED | OUTPATIENT
Start: 2020-04-02 | End: 2020-09-24

## 2020-04-02 RX ORDER — ALBUTEROL SULFATE 90 UG/1
2 AEROSOL, METERED RESPIRATORY (INHALATION) EVERY 6 HOURS PRN
Qty: 18 G | Refills: 11 | Status: SHIPPED | OUTPATIENT
Start: 2020-04-02 | End: 2021-07-23

## 2020-04-02 NOTE — PROGRESS NOTES
INTERNAL MEDICINE CLINIC  TELEMEDICINE ENCOUNTER  Progress Note     PRESENTING HISTORY     PCP: Fadi Wild MD    Last Clinic Visit with me:  2019    Current Chief Complaint/Problem: Cough    The patient location is: Home  Visit type: Virtual visit with synchronous audio and video    History of Present Illness & ROS: Ms. Joan Wilde is a 35 y.o. female.    She is still working on the ground floor from [employer] at [employer].  Coworker tested positive.  Different cubicle.  Last week that person had mask.  Was sitting side by side during meeting. That person was quarantine 2 weeks.    Since Monday she started with asthma like symptoms.  She has been using inhalers more.  She has coughing and chest tightness. No malaise.  Normal smell.    Dry cough.  Positive allergic rhinitis.  No fever.     PAST HISTORY:     Past Medical History:   Diagnosis Date    Acne vulgaris 2018    Allergic rhinitis due to dust mite 2019    Endometriosis 2018    Fibroid     Migraine syndrome     associated with flu vaccines    Mild intermittent asthma without complication 2018       Past Surgical History:   Procedure Laterality Date    HERNIA REPAIR      umbilical       Family History   Problem Relation Age of Onset    Hypertension Mother     Diabetes Father     Hypertension Father     Stroke Father     No Known Problems Brother     No Known Problems Brother     Breast cancer Neg Hx     Colon cancer Neg Hx     Ovarian cancer Neg Hx     Melanoma Neg Hx     Blindness Neg Hx     Amblyopia Neg Hx     Cataracts Neg Hx     Glaucoma Neg Hx     Macular degeneration Neg Hx     Retinal detachment Neg Hx     Strabismus Neg Hx      labor Neg Hx     Cancer Neg Hx     Eclampsia Neg Hx        Social History     Socioeconomic History    Marital status: Single     Spouse name: Not on file    Number of children: Not on file    Years of education: Not on file    Highest  education level: Not on file   Occupational History    Not on file   Social Needs    Financial resource strain: Not hard at all    Food insecurity:     Worry: Never true     Inability: Never true    Transportation needs:     Medical: No     Non-medical: No   Tobacco Use    Smoking status: Never Smoker    Smokeless tobacco: Never Used   Substance and Sexual Activity    Alcohol use: No     Frequency: Never     Binge frequency: Never    Drug use: No    Sexual activity: Not Currently     Partners: Male   Lifestyle    Physical activity:     Days per week: 2 days     Minutes per session: 20 min    Stress: Rather much   Relationships    Social connections:     Talks on phone: Three times a week     Gets together: Patient refused     Attends Restorationism service: Not on file     Active member of club or organization: No     Attends meetings of clubs or organizations: 1 to 4 times per year     Relationship status: Never    Other Topics Concern    Are you pregnant or think you may be? Not Asked    Breast-feeding Not Asked   Social History Narrative    Performance improvement specialist at [employer]    She is single.        MEDICATIONS & ALLERGIES:     Current Outpatient Medications on File Prior to Visit   Medication Sig Dispense Refill    alclomethasone (ACLOVATE) 0.05 % cream Apply topically to face twice a day for 2 weeks. 60 g 0    ascorbic acid, vitamin C, (VITAMIN C) 1000 MG tablet Take 2 tablets (2,000 mg total) by mouth once daily.      azelastine (OPTIVAR) 0.05 % ophthalmic solution Place 1 drop into both eyes 2 (two) times daily. 6 mL 6    clindamycin (CLEOCIN T) 1 % lotion Use every night at bedtime on face 60 mL 3    clotrimazole-betamethasone 1-0.05% (LOTRISONE) cream Apply topically 2 (two) times daily to rash on neck x 2 weeks 45 g 2    conjugated estrogens (PREMARIN) vaginal cream Use dime size amount of estrogen cream in vagina nightly x 2 weeks, then twice a week thereafter. 30 g 2     DM/p-ephed/acetaminoph/doxylam (NYQUIL ORAL) Take by mouth.      norethindrone (AYGESTIN) 5 mg Tab Take 1 tablet (5 mg total) by mouth once daily. 30 tablet 6    norethindrone (ORTHO MICRONOR) 0.35 mg tablet Take 1 tablet (0.35 mg total) by mouth once daily. 28 tablet 6    tretinoin (ALTRENO) 0.05 % Lotn Apply pea-sized amount nightly to face (Patient not taking: Reported on 1/31/2020) 45 g 2    tretinoin (RETIN-A) 0.025 % cream Apply topically to affected area every evening. (Patient not taking: Reported on 1/31/2020) 45 g 2     albuterol (PROVENTIL/VENTOLIN HFA) 90 mcg/actuation inhaler Inhale 2 puffs into the lungs every 6 (six) hours as needed for Wheezing. 18 g 11     No current facility-administered medications on file prior to visit.         Review of patient's allergies indicates:   Allergen Reactions    Metoclopramide Other (See Comments)     Causes tongue to roll back in mouth       Medications Reconciliation:   I have reconciled the patient's home medications and discharge medications with the patient/family. I have updated all changes.  Refer to After-Visit Medication List.    OBJECTIVE:     Previous Weight and BMI:  Wt Readings from Last 1 Encounters:   01/31/20 1006 102 kg (224 lb 13.9 oz)     There is no height or weight on file to calculate BMI.     Physical Exam per Video:  General: Well developed, well nourished. No distress.  Eyes: Clear conjunctiva.  Lungs: Normal respiratory effort. Occasional dry cough noted during encounter.   Psychiatric: Normal affect. Alert.    Laboratory  Lab Results   Component Value Date    WBC 5.52 09/12/2018    HGB 12.4 09/12/2018    HCT 38.9 09/12/2018     09/12/2018    CHOL 129 09/12/2018    TRIG 42 09/12/2018    HDL 37 (L) 09/12/2018    ALT 19 09/12/2018    AST 17 09/12/2018     09/12/2018    K 4.8 09/12/2018     09/12/2018    CREATININE 1.0 09/12/2018    BUN 18 09/12/2018    CO2 24 09/12/2018    TSH 1.780 12/12/2017    HGBA1C 5.1 09/12/2018          ASSESSMENT & PLAN:     Mild intermittent asthma with acute exacerbation  -     montelukast (SINGULAIR) 10 mg tablet; Take 1 tablet (10 mg total) by mouth every evening.  Dispense: 90 tablet; Refill: 3  -     albuterol (PROVENTIL/VENTOLIN HFA) 90 mcg/actuation inhaler; Inhale 2 puffs into the lungs every 6 (six) hours as needed for Wheezing or Shortness of Breath.  Dispense: 18 g; Refill: 11    Suspected Covid-19 Virus Infection  - Employee with exposure, cough and SOB, complicating her asthma.  -     SARS- CoV-2 (COVID-19) QUALITATIVE PCR today.  Advised patient to wear mask.  Instructions for Patients Awaiting COVID-19 Test Results      Return to Clinic for Follow Up with me:   PRN    Total time spent with patient: 15 min  Each patient to whom he or she provides medical services by telemedicine is:  (1) informed of the relationship between the physician and patient and the respective role of any other health care provider with respect to management of the patient; and (2) notified that he or she may decline to receive medical services by telemedicine and may withdraw from such care at any time.      Scheduled Follow-up :  Future Appointments   Date Time Provider Department Center   4/2/2020  3:40 PM COVID TESTING, [facility]  Harley Santiago [facility]       After Visit Medication List :     Medication List           Accurate as of April 2, 2020  2:29 PM. If you have any questions, ask your nurse or doctor.               START taking these medications    montelukast 10 mg tablet  Commonly known as:  SINGULAIR  Take 1 tablet (10 mg total) by mouth every evening.  Started by:  Fadi Wild MD        CHANGE how you take these medications    albuterol 90 mcg/actuation inhaler  Commonly known as:  PROVENTIL/VENTOLIN HFA  Inhale 2 puffs into the lungs every 6 (six) hours as needed for Wheezing or Shortness of Breath.  What changed:  reasons to take this  Changed by:  Fadi Wild MD        CONTINUE taking these medications     alclomethasone 0.05 % cream  Commonly known as:  ACLOVATE  Apply topically to face twice a day for 2 weeks.     ascorbic acid (vitamin C) 1000 MG tablet  Commonly known as:  VITAMIN C  Take 2 tablets (2,000 mg total) by mouth once daily.     azelastine 0.05 % ophthalmic solution  Commonly known as:  OPTIVAR  Place 1 drop into both eyes 2 (two) times daily.     clindamycin 1 % lotion  Commonly known as:  CLEOCIN T  Use every night at bedtime on face     clotrimazole-betamethasone 1-0.05% cream  Commonly known as:  LOTRISONE  Apply topically 2 (two) times daily to rash on neck x 2 weeks     norethindrone 0.35 mg tablet  Commonly known as:  MICRONOR  Take 1 tablet (0.35 mg total) by mouth once daily.     norethindrone 5 mg Tab  Commonly known as:  AYGESTIN  Take 1 tablet (5 mg total) by mouth once daily.     NYQUIL ORAL     PREMARIN vaginal cream  Generic drug:  conjugated estrogens  Use dime size amount of estrogen cream in vagina nightly x 2 weeks, then twice a week thereafter.     * tretinoin 0.025 % cream  Commonly known as:  RETIN-A  Apply topically to affected area every evening.     * ALTRENO 0.05 % Lotn  Generic drug:  tretinoin  Apply pea-sized amount nightly to face         * This list has 2 medication(s) that are the same as other medications prescribed for you. Read the directions carefully, and ask your doctor or other care provider to review them with you.               Where to Get Your Medications      These medications were sent to [facility] [address], [place] [address]    Hours:  Mon-Fri 7a-7p, Sat 10a-4p, Sun 10a-4p Phone:  [phone]   · albuterol 90 mcg/actuation inhaler  · montelukast 10 mg tablet         Signing Physician:  Fadi Wild MD

## 2020-04-03 ENCOUNTER — TELEPHONE (OUTPATIENT)
Dept: INTERNAL MEDICINE | Facility: CLINIC | Age: 35
End: 2020-04-03

## 2020-04-03 ENCOUNTER — PATIENT MESSAGE (OUTPATIENT)
Dept: INTERNAL MEDICINE | Facility: CLINIC | Age: 35
End: 2020-04-03

## 2020-04-03 DIAGNOSIS — J45.21 MILD INTERMITTENT ASTHMA WITH ACUTE EXACERBATION: Primary | ICD-10-CM

## 2020-04-03 PROBLEM — Z20.822 SUSPECTED COVID-19 VIRUS INFECTION: Status: RESOLVED | Noted: 2020-04-02 | Resolved: 2020-04-03

## 2020-04-03 LAB — SARS-COV-2 RNA RESP QL NAA+PROBE: NOT DETECTED

## 2020-04-03 RX ORDER — PREDNISONE 20 MG/1
40 TABLET ORAL DAILY
Qty: 10 TABLET | Refills: 0 | Status: SHIPPED | OUTPATIENT
Start: 2020-04-03 | End: 2020-04-09

## 2020-04-03 RX ORDER — AZITHROMYCIN 250 MG/1
TABLET, FILM COATED ORAL
Qty: 6 TABLET | Refills: 0 | Status: SHIPPED | OUTPATIENT
Start: 2020-04-03 | End: 2020-04-20 | Stop reason: ALTCHOICE

## 2020-04-03 NOTE — TELEPHONE ENCOUNTER
----- Message from Lashell Peter sent at 4/3/2020  4:16 PM CDT -----  Contact: [phone]  Patient has tested negative for the COVID -19 .  Patient is requesting medication for the shortness of breath and dry cough.    [facility] [phone] (Phone)  [phone] (Fax)

## 2020-04-03 NOTE — TELEPHONE ENCOUNTER
COVID negative    For asthma exacerbation:    Take Z-pack as directly.  Take Prednisone 40 mg daily for 5 days.    predniSONE (DELTASONE) 20 MG tablet 10 tablet 0 4/3/2020 4/8/2020 --   Sig - Route: Take 2 tablets (40 mg total) by mouth once daily. for 5 days - Oral   Sent to pharmacy as: predniSONE (DELTASONE) 20 MG tablet   Class: Normal   Order: [id number]   Date/Time Signed: 4/3/2020 18:52       Pharmacy     [facility]          azithromycin (Z-LULU) 250 MG tablet 6 tablet 0 4/3/2020  --   Sig: Take 2 tablets by mouth on day 1; Take 1 tablet by mouth on days 2-5   Sent to pharmacy as: azithromycin (Z-LULU) 250 MG tablet   Class: Normal   Order: [id number]   Date/Time Signed: 4/3/2020 18:52       Pharmacy     [facility]

## 2020-04-06 ENCOUNTER — VITALS (OUTPATIENT)
Dept: INTERNAL MEDICINE | Facility: CLINIC | Age: 35
End: 2020-04-06
Payer: COMMERCIAL

## 2020-04-06 ENCOUNTER — CLINICAL SUPPORT (OUTPATIENT)
Dept: INTERNAL MEDICINE | Facility: CLINIC | Age: 35
End: 2020-04-06
Payer: COMMERCIAL

## 2020-04-06 ENCOUNTER — TELEPHONE (OUTPATIENT)
Dept: INTERNAL MEDICINE | Facility: CLINIC | Age: 35
End: 2020-04-06

## 2020-04-06 ENCOUNTER — PATIENT MESSAGE (OUTPATIENT)
Dept: INTERNAL MEDICINE | Facility: CLINIC | Age: 35
End: 2020-04-06

## 2020-04-06 ENCOUNTER — OFFICE VISIT (OUTPATIENT)
Dept: INTERNAL MEDICINE | Facility: CLINIC | Age: 35
End: 2020-04-06
Payer: COMMERCIAL

## 2020-04-06 VITALS — OXYGEN SATURATION: 99 % | HEART RATE: 89 BPM | RESPIRATION RATE: 16 BRPM

## 2020-04-06 DIAGNOSIS — Z20.822 SUSPECTED COVID-19 VIRUS INFECTION: ICD-10-CM

## 2020-04-06 DIAGNOSIS — Z20.822 SUSPECTED COVID-19 VIRUS INFECTION: Primary | ICD-10-CM

## 2020-04-06 PROCEDURE — 99999 PR PBB SHADOW E&M-EST. PATIENT-LVL II: CPT | Mod: PBBFAC,,,

## 2020-04-06 PROCEDURE — U0002 COVID-19 LAB TEST NON-CDC: HCPCS

## 2020-04-06 PROCEDURE — 99999 PR PBB SHADOW E&M-EST. PATIENT-LVL II: ICD-10-PCS | Mod: PBBFAC,,,

## 2020-04-06 PROCEDURE — 99213 OFFICE O/P EST LOW 20 MIN: CPT | Mod: 95,,, | Performed by: INTERNAL MEDICINE

## 2020-04-06 PROCEDURE — 99213 PR OFFICE/OUTPT VISIT, EST, LEVL III, 20-29 MIN: ICD-10-PCS | Mod: 95,,, | Performed by: INTERNAL MEDICINE

## 2020-04-06 RX ORDER — BENZONATATE 200 MG/1
200 CAPSULE ORAL 3 TIMES DAILY PRN
Qty: 20 CAPSULE | Refills: 0 | Status: SHIPPED | OUTPATIENT
Start: 2020-04-06 | End: 2020-04-16

## 2020-04-06 NOTE — PROGRESS NOTES
Subjective:       Patient ID: Joan Wilde is a 35 y.o. female.    Chief Complaint: No chief complaint on file.    The patient location is: home in [facility]  The chief complaint leading to consultation is: cough, covid symptoms  Visit type: Virtual visit with synchronous audio and video  Total time spent with patient: 15  Each patient to whom he or she provides medical services by telemedicine is:  (1) informed of the relationship between the physician and patient and the respective role of any other health care provider with respect to management of the patient; and (2) notified that he or she may decline to receive medical services by telemedicine and may withdraw from such care at any time.    Notes:   covid19 symptoms commenced 4/2.   Sob worse lying on R side, still low grade temps. She has asthma and is worried that her asthma is flaring as well. DARREN albuterol was last noc. Not severe coughing in middle of noc but clearly coughing when she wakes up.   No chest pains, some mild tightness.    Review of Systems   Constitutional: Positive for activity change, fatigue and fever.   HENT: Positive for congestion.    Respiratory: Positive for cough and shortness of breath.    Cardiovascular: Negative for chest pain, palpitations and leg swelling.   Gastrointestinal: Negative for abdominal distention, abdominal pain, nausea and vomiting.   Genitourinary: Negative for decreased urine volume.       Objective:      Physical Exam   Constitutional: She is oriented to person, place, and time. She appears well-developed and well-nourished. No distress.   Well appearing, breathing comfortably, speaking full sentences, rare coughing during telemed encounter     Pulmonary/Chest: Effort normal. She exhibits no tenderness.   Abdominal: Soft. Bowel sounds are normal. She exhibits no distension.   Neurological: She is alert and oriented to person, place, and time.   Skin: She is not diaphoretic.   Psychiatric: She has  a normal mood and affect. Her behavior is normal.       Assessment:       1. Suspected Covid-19 Virus Infection        Plan:       Diagnoses and all orders for this visit:    Suspected Covid-19 Virus Infection  -     benzonatate (TESSALON) 200 MG capsule; Take 1 capsule (200 mg total) by mouth 3 (three) times daily as needed.  -     SARS- CoV-2 (COVID-19) QUALITATIVE PCR; Future  -     COVID-19 Respiratory Check; Future  Remain out of work for the week.  I am not sure if she has wheezing, she appears well and no accessory muscle use on telemed, come in for resp check  Explained that if not better in 3-4 days, pt should rtc/call PCP

## 2020-04-06 NOTE — PROGRESS NOTES
[facility] INTERNAL MEDICINE  [facility] Respiratory Check    Date: 04/06/2020  Referred by: Dr. Kevin Alvarado    Narrative of symptoms: Patient states the symptoms started 5 days ago.  She has been taking Azithromycin but no cough syrup.  She has a fever in the evening and shortness of breath on exertion.     Vitals:    Pulse: 89    Pulse oximetry (on room air): 99 %.    Respirations: 16    Patient Assessment: stable,  cough syrup that was prescribed.  Call if symptoms worsen

## 2020-04-07 LAB — SARS-COV-2 RNA RESP QL NAA+PROBE: NOT DETECTED

## 2020-04-07 NOTE — TELEPHONE ENCOUNTER
Called patient to reschedule yesterday with Dr. Alvarado. Patient agreed to date and time of Virtual visit.

## 2020-04-09 ENCOUNTER — PATIENT MESSAGE (OUTPATIENT)
Dept: INTERNAL MEDICINE | Facility: CLINIC | Age: 35
End: 2020-04-09

## 2020-04-17 ENCOUNTER — PATIENT MESSAGE (OUTPATIENT)
Dept: INTERNAL MEDICINE | Facility: CLINIC | Age: 35
End: 2020-04-17

## 2020-04-17 DIAGNOSIS — J45.21 MILD INTERMITTENT ASTHMA WITH ACUTE EXACERBATION: Primary | ICD-10-CM

## 2020-04-17 RX ORDER — FLUTICASONE PROPIONATE 220 UG/1
1 AEROSOL, METERED RESPIRATORY (INHALATION) 2 TIMES DAILY
Qty: 12 G | Refills: 0 | Status: SHIPPED | OUTPATIENT
Start: 2020-04-17 | End: 2021-04-17

## 2020-04-17 NOTE — TELEPHONE ENCOUNTER
Pt wants to know if there is any other treatment for asthma other than what is he is currently on.    Jennifer

## 2020-04-20 ENCOUNTER — OFFICE VISIT (OUTPATIENT)
Dept: ALLERGY | Facility: CLINIC | Age: 35
End: 2020-04-20
Payer: COMMERCIAL

## 2020-04-20 VITALS — HEIGHT: 67 IN | WEIGHT: 218.25 LBS | BODY MASS INDEX: 34.26 KG/M2

## 2020-04-20 DIAGNOSIS — R09.89 CHRONIC THROAT CLEARING: ICD-10-CM

## 2020-04-20 DIAGNOSIS — R05.9 COUGH: ICD-10-CM

## 2020-04-20 DIAGNOSIS — J30.89 ALLERGIC RHINITIS DUE TO DUST MITE: ICD-10-CM

## 2020-04-20 DIAGNOSIS — J45.21 MILD INTERMITTENT ASTHMA WITH ACUTE EXACERBATION: Primary | ICD-10-CM

## 2020-04-20 DIAGNOSIS — K21.9 GASTROESOPHAGEAL REFLUX DISEASE, ESOPHAGITIS PRESENCE NOT SPECIFIED: ICD-10-CM

## 2020-04-20 PROCEDURE — 3008F PR BODY MASS INDEX (BMI) DOCUMENTED: ICD-10-PCS | Mod: CPTII,S$GLB,, | Performed by: ALLERGY & IMMUNOLOGY

## 2020-04-20 PROCEDURE — 99214 OFFICE O/P EST MOD 30 MIN: CPT | Mod: S$GLB,,, | Performed by: ALLERGY & IMMUNOLOGY

## 2020-04-20 PROCEDURE — 3008F BODY MASS INDEX DOCD: CPT | Mod: CPTII,S$GLB,, | Performed by: ALLERGY & IMMUNOLOGY

## 2020-04-20 PROCEDURE — 99999 PR PBB SHADOW E&M-EST. PATIENT-LVL III: CPT | Mod: PBBFAC,,, | Performed by: ALLERGY & IMMUNOLOGY

## 2020-04-20 PROCEDURE — 99214 PR OFFICE/OUTPT VISIT, EST, LEVL IV, 30-39 MIN: ICD-10-PCS | Mod: S$GLB,,, | Performed by: ALLERGY & IMMUNOLOGY

## 2020-04-20 PROCEDURE — 99999 PR PBB SHADOW E&M-EST. PATIENT-LVL III: ICD-10-PCS | Mod: PBBFAC,,, | Performed by: ALLERGY & IMMUNOLOGY

## 2020-04-20 RX ORDER — PREDNISONE 10 MG/1
TABLET ORAL
Qty: 21 TABLET | Refills: 0 | Status: SHIPPED | OUTPATIENT
Start: 2020-04-20 | End: 2020-04-24

## 2020-04-20 RX ORDER — OMEPRAZOLE 40 MG/1
40 CAPSULE, DELAYED RELEASE ORAL EVERY MORNING
Qty: 60 CAPSULE | Refills: 3 | Status: SHIPPED | OUTPATIENT
Start: 2020-04-20 | End: 2020-04-24

## 2020-04-20 NOTE — PROGRESS NOTES
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  She is here alone.  She was last seen October 29, 2019.    After her last visit, she did well until recently.  Her rhinitis and conjunctivitis were well controlled.  Her asthma was controlled.  She needed very little albuterol.    In January she was having a lot of heartburn.  She was taking Tums at least once a day.  She did get acid in her mouth.    She was having sore throats.    She started watching her diet and not eating after 8:00 p.m. before going to bed.  Her reflux has improved.  She is not having any indigestion or heartburn.    She has however developed increased throat clearing and the sensation that something is in the back of the throat.  She has also had a cough that is usually nonproductive.  The cough is worse first thing in the morning and at night usually before going to bed.    She has been exposed to COVID-19.  She has been tested twice with negative results.    She began to use her albuterol more.  She was seen by Dr. Wild who gave started her on montelukast.  She does not think this has helped.    She was also given a prednisone taper as well as Zithromax.  She does think that this helped while she was taking it.    She was later started on Flovent.  She does think that this helps a little.    She takes Claritin when needed.  This has not been often.  She also uses Optivar for her itchy eyes with improvement.    Since then her cough has returned.  She has been using her albuterol two to 3 times a day.  This does help.    She had an episode of substernal chest pain this past weekend that was severe.  She had difficulty breathing when it was occurring.  It lasted about 30 minutes before beginning to resolve.  She thought she was going to need to call 911.  She has not had since.    [facility] ALLERGY QUESTIONNAIRE SHORT 4/18/2020   Facial swelling? No   Sinus pain? No   Sinus  pressure? No   Ear discharge? No   Ear pain? No   Hearing loss? No   Nosebleeds? No   Postnasal drip? No   Sneezing? No   Runny nose? No   Congestion? No   Sore throat? No   Trouble swallowing? No   Voice change? No   Eye itching? No   Eye redness? No   Eye discharge? No   Eye pain?  No   Light sensitivity / light hurts the eyes? No   Cough? Yes   Wheezing? Yes   Shortness of breath? Yes   Apnea? No   Choking? No   Chest tightness? Yes   Rash? No   Color change of skin? No     Physical Examination:  General: Well-developed, well-nourished, no acute distress.  Clearing throat and coughing throughout interview.  Head: No sinus tenderness.  Eyes: Conjunctivae:  No bulbar or palpebral conjunctival injection.  Ears: EAC's clear.  TM's clear.  No pre-auricular nodes.  Nose: Nasal Mucosa:  Pink.  Septum: No apparent deviation.  Turbinates:  No significant edema.  Polyps/Mass:  None visible.  Teeth/Gums:  No bleeding noted.  Oropharynx: No exudates.  Neck: Supple without thyromegaly. No cervical lymphadenopathy.    Respiratory/Chest: Effort: Good.  Auscultation:  Clear bilaterally.  Skin: Good turgor.  No urticaria or angioedema.  Neuro/Psych: Oriented x 3.    Laboratory 04/15/2019:  IgE level:  Less than 35.  ImmunoCAP:  Negative.    Spirometry 04/16/2019:  Normal spirometry. Airflow not improved after bronchodilator.    Inhalant skin tests 05/13/2019:  3+ histamine control.  3+ dust mites.    Assessment:  1.  Allergic rhinitis.  2.  Allergic conjunctivitis.  3.  Chronic cough with wheezing, consider bronchospasm, resolved.  4.  Chronic dermatitis, improved.  5.  GERD.  6.  Consider LPR.  7.  Consider esophageal spasm.  8.  Snoring.    Recommendations:  1.  Continuehouse dust mite avoidance.    2.  Claritin as needed.  3.  Albuterol as needed.  4.  Continue Flovent for now.  5.  Start omeprazole 40 milligrams twice a day.  6.  Consider nebulizer for home use.  7.  Prednisone to have at home.  8.  Return to clinic in four  days.    Patient education:  April 20, 2020:  LPR and web site were reviewed.    Patient education:  October 29, 2019:  Allergic mechanisms and treatment options were reviewed in detail.    May 13, 2019:  House dust mite avoidance was reviewed.  Handouts were given.

## 2020-04-21 DIAGNOSIS — Z01.84 ANTIBODY RESPONSE EXAMINATION: ICD-10-CM

## 2020-04-24 ENCOUNTER — OFFICE VISIT (OUTPATIENT)
Dept: ALLERGY | Facility: CLINIC | Age: 35
End: 2020-04-24
Payer: COMMERCIAL

## 2020-04-24 ENCOUNTER — LAB VISIT (OUTPATIENT)
Dept: LAB | Facility: HOSPITAL | Age: 35
End: 2020-04-24
Attending: INTERNAL MEDICINE
Payer: COMMERCIAL

## 2020-04-24 VITALS — RESPIRATION RATE: 16 BRPM | HEIGHT: 67 IN | WEIGHT: 216.25 LBS | BODY MASS INDEX: 33.94 KG/M2

## 2020-04-24 DIAGNOSIS — J45.21 MILD INTERMITTENT ASTHMA WITH ACUTE EXACERBATION: Primary | ICD-10-CM

## 2020-04-24 DIAGNOSIS — J30.89 ALLERGIC RHINITIS DUE TO DUST MITE: ICD-10-CM

## 2020-04-24 DIAGNOSIS — Z01.84 ANTIBODY RESPONSE EXAMINATION: ICD-10-CM

## 2020-04-24 DIAGNOSIS — K21.9 GASTROESOPHAGEAL REFLUX DISEASE, ESOPHAGITIS PRESENCE NOT SPECIFIED: ICD-10-CM

## 2020-04-24 LAB — SARS-COV-2 IGG SERPL QL IA: NEGATIVE

## 2020-04-24 PROCEDURE — 3008F BODY MASS INDEX DOCD: CPT | Mod: CPTII,S$GLB,, | Performed by: ALLERGY & IMMUNOLOGY

## 2020-04-24 PROCEDURE — 86769 SARS-COV-2 COVID-19 ANTIBODY: CPT

## 2020-04-24 PROCEDURE — 36415 COLL VENOUS BLD VENIPUNCTURE: CPT

## 2020-04-24 PROCEDURE — 99213 PR OFFICE/OUTPT VISIT, EST, LEVL III, 20-29 MIN: ICD-10-PCS | Mod: S$GLB,,, | Performed by: ALLERGY & IMMUNOLOGY

## 2020-04-24 PROCEDURE — 99213 OFFICE O/P EST LOW 20 MIN: CPT | Mod: S$GLB,,, | Performed by: ALLERGY & IMMUNOLOGY

## 2020-04-24 PROCEDURE — 99999 PR PBB SHADOW E&M-EST. PATIENT-LVL III: CPT | Mod: PBBFAC,,, | Performed by: ALLERGY & IMMUNOLOGY

## 2020-04-24 PROCEDURE — 99999 PR PBB SHADOW E&M-EST. PATIENT-LVL III: ICD-10-PCS | Mod: PBBFAC,,, | Performed by: ALLERGY & IMMUNOLOGY

## 2020-04-24 PROCEDURE — 3008F PR BODY MASS INDEX (BMI) DOCUMENTED: ICD-10-PCS | Mod: CPTII,S$GLB,, | Performed by: ALLERGY & IMMUNOLOGY

## 2020-04-24 RX ORDER — OMEPRAZOLE 40 MG/1
40 CAPSULE, DELAYED RELEASE ORAL
Qty: 60 CAPSULE | Refills: 3 | Status: SHIPPED | OUTPATIENT
Start: 2020-04-24 | End: 2020-12-21 | Stop reason: SDUPTHER

## 2020-04-24 NOTE — PROGRESS NOTES
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  She is here.  She was last seen May 20, 2020.    After her last visit, she stop taking Singulair.  Afterwards her symptoms improved.    She is having less cough and shortness of breath.  She is having less chest pain.  She did have an episode last to see night when she was lying down.    She continues to clear her throat.    She has not needed albuterol.  She no longer think she would benefit from a nebulizer.    Her reflux has been improved.    She has not needed prednisone.    [facility] ALLERGY QUESTIONNAIRE SHORT 4/24/2020   Head or facial pain: No symptoms   Facial swelling? -   Sinus pain? -   Sinus pressure? -   Ears: No symptoms   Ear discharge? -   Ear pain? -   Hearing loss? -   Nosebleeds? No   Postnasal drip? No   Sneezing? No   Runny nose? No   Congestion? Yes   Throat: No symptoms   Sore throat? -   Trouble swallowing? -   Voice change? -   Eyes: No symptoms   Eye itching? -   Eye redness? -   Eye discharge? -   Eye pain?  -   Light sensitivity / light hurts the eyes? -   Cough? Yes   Wheezing? No   Shortness of breath? Yes   Apnea? No   Choking? No   Chest tightness? No   Skin: No symptoms   Rash? -   Color change of skin? -     Physical Examination:  General: Well-developed, well-nourished, no acute distress.  Not clearing throat or coughing.  Respiratory/Chest: Effort: Good.  Auscultation:  Clear bilaterally.  Skin: Good turgor.  No urticaria or angioedema.  Neuro/Psych: Oriented x 3.    Laboratory 04/15/2019:  IgE level:  Less than 35.  ImmunoCAP:  Negative.    Spirometry 04/16/2019:  Normal spirometry. Airflow not improved after bronchodilator.    Inhalant skin tests 05/13/2019:  3+ histamine control.  3+ dust mites.    Assessment:  1.  Allergic rhinitis.  2.  Allergic conjunctivitis.  3.  Chronic cough with wheezing, consider bronchospasm, improved.  4.  Chronic dermatitis, improved.  5.   GERD.  6.  Consider LPR.  7.  Consider esophageal spasm.  8.  Snoring.    Recommendations:  1.  Continuehouse dust mite avoidance.    2.  Claritin as needed.  3.  Albuterol as needed.  4.  Continue Flovent for now.  5.  Increase omeprazole 40 milligrams twice a day.  6.  Return to clinic in 4 to 8 weeks or sooner if needed.      Patient education:  April 20, 2020:  LPR and web site were reviewed.    Patient education:  October 29, 2019:  Allergic mechanisms and treatment options were reviewed in detail.    May 13, 2019:  House dust mite avoidance was reviewed.  Handouts were given.

## 2020-05-12 ENCOUNTER — PATIENT MESSAGE (OUTPATIENT)
Dept: ALLERGY | Facility: CLINIC | Age: 35
End: 2020-05-12

## 2020-05-13 ENCOUNTER — PATIENT MESSAGE (OUTPATIENT)
Dept: ALLERGY | Facility: CLINIC | Age: 35
End: 2020-05-13

## 2020-05-15 ENCOUNTER — OFFICE VISIT (OUTPATIENT)
Dept: ALLERGY | Facility: CLINIC | Age: 35
End: 2020-05-15
Payer: COMMERCIAL

## 2020-05-15 DIAGNOSIS — H10.13 ALLERGIC CONJUNCTIVITIS, BILATERAL: ICD-10-CM

## 2020-05-15 DIAGNOSIS — J30.89 ALLERGIC RHINITIS DUE TO DUST MITE: ICD-10-CM

## 2020-05-15 DIAGNOSIS — J45.21 MILD INTERMITTENT ASTHMA WITH ACUTE EXACERBATION: Primary | ICD-10-CM

## 2020-05-15 DIAGNOSIS — R09.89 CHRONIC THROAT CLEARING: ICD-10-CM

## 2020-05-15 DIAGNOSIS — R14.0 ABDOMINAL BLOATING: ICD-10-CM

## 2020-05-15 PROCEDURE — 99213 OFFICE O/P EST LOW 20 MIN: CPT | Mod: 95,,, | Performed by: ALLERGY & IMMUNOLOGY

## 2020-05-15 PROCEDURE — 99213 PR OFFICE/OUTPT VISIT, EST, LEVL III, 20-29 MIN: ICD-10-PCS | Mod: 95,,, | Performed by: ALLERGY & IMMUNOLOGY

## 2020-05-15 RX ORDER — FAMOTIDINE 40 MG/1
40 TABLET, FILM COATED ORAL DAILY
Qty: 30 TABLET | Refills: 11 | Status: SHIPPED | OUTPATIENT
Start: 2020-05-15 | End: 2020-09-24

## 2020-05-15 NOTE — PROGRESS NOTES
The patient location is: Office  The chief complaint leading to consultation is:  Abdominal bloating  Visit type:  Audiovisual  Total time spent with patient:  20 minutes  Each patient to whom he or she provides medical services by telemedicine is:  (1) informed of the relationship between the physician and patient and the respective role of any other health care provider with respect to management of the patient; and (2) notified that he or she may decline to receive medical services by telemedicine and may withdraw from such care at any time.    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  She was last seen April 24, 2020.    Since her last visit, she has been taking omeprazole 40 milligrams twice a day.  Her cough is much improved and has almost resolved.    She continues to have throat clearing and a sensation that something is in the back of her throat.    She has had about four episodes of chest pain that she thinks is esophageal spasm.    Soon after starting the PPI, she developed abdominal bloating.  This is not improving.  She has not had any abdominal pain.    In the past she has taken H2 blocker such as Zantac with no side effects and improvement in her reflux.  She is aware that Ranitidine is not available currently.    [facility] PEQ ALLERGY QUESTIONNAIRE SHORT 5/15/2020   Head or facial pain: -   Facial swelling? No   Sinus pain? No   Sinus pressure? No   Ears: -   Ear discharge? No   Ear pain? No   Hearing loss? No   Nosebleeds? No   Postnasal drip? Yes   Sneezing? No   Runny nose? No   Congestion? No   Throat: -   Sore throat? No   Trouble swallowing? No   Voice change? No   Eyes: -   Eye itching? No   Eye redness? No   Eye discharge? No   Eye pain?  No   Light sensitivity / light hurts the eyes? No   Cough? No   Wheezing? No   Shortness of breath? No   Apnea? No   Choking? No   Chest tightness? Yes   Skin: -   Rash? No   Color change of skin? No     Physical  Examination:  Televisit.    Laboratory 04/15/2019:  IgE level:  Less than 35.  ImmunoCAP:  Negative.    Spirometry 04/16/2019:  Normal spirometry. Airflow not improved after bronchodilator.    Inhalant skin tests 05/13/2019:  3+ histamine control.  3+ dust mites.    Assessment:  1.  Allergic rhinitis.  2.  Allergic conjunctivitis.  3.  Chronic cough with wheezing, consider bronchospasm, improved.  4.  Chronic dermatitis, improved.  5.  GERD.  6.  Consider LPR.  7.  Consider esophageal spasm.  8.  Snoring.  9.  Abdominal bloating probably secondary to PPI.    Recommendations:  1.  Continue house dust mite avoidance.    2.  Claritin as needed.  3.  Albuterol as needed.  4.  Continue Flovent for now.  5.  Decreased omeprazole to 40 milligrams once a day.  6.  Add Pepcid 40 milligrams at night.  7.  She will message any change in her symptoms in one week.    Patient education:  April 20, 2020:  LPR and web site were reviewed.    Patient education:  October 29, 2019:  Allergic mechanisms and treatment options were reviewed in detail.    May 13, 2019:  House dust mite avoidance was reviewed.  Handouts were given.

## 2020-05-20 ENCOUNTER — PATIENT MESSAGE (OUTPATIENT)
Dept: ALLERGY | Facility: CLINIC | Age: 35
End: 2020-05-20

## 2020-05-25 ENCOUNTER — OFFICE VISIT (OUTPATIENT)
Dept: URGENT CARE | Facility: CLINIC | Age: 35
End: 2020-05-25
Payer: COMMERCIAL

## 2020-05-25 ENCOUNTER — PATIENT MESSAGE (OUTPATIENT)
Dept: INTERNAL MEDICINE | Facility: CLINIC | Age: 35
End: 2020-05-25

## 2020-05-25 VITALS
RESPIRATION RATE: 16 BRPM | OXYGEN SATURATION: 97 % | SYSTOLIC BLOOD PRESSURE: 110 MMHG | BODY MASS INDEX: 33.75 KG/M2 | DIASTOLIC BLOOD PRESSURE: 70 MMHG | HEART RATE: 78 BPM | WEIGHT: 210 LBS | TEMPERATURE: 99 F | HEIGHT: 66 IN

## 2020-05-25 DIAGNOSIS — N89.8 VAGINAL ODOR: Primary | ICD-10-CM

## 2020-05-25 DIAGNOSIS — R82.90 ABNORMAL URINE ODOR: ICD-10-CM

## 2020-05-25 LAB
B-HCG UR QL: NEGATIVE
BILIRUB UR QL STRIP: NEGATIVE
CTP QC/QA: YES
GLUCOSE UR QL STRIP: NEGATIVE
KETONES UR QL STRIP: NEGATIVE
LEUKOCYTE ESTERASE UR QL STRIP: NEGATIVE
PH, POC UA: 5.5 (ref 5–8)
POC BLOOD, URINE: NEGATIVE
POC NITRATES, URINE: NEGATIVE
PROT UR QL STRIP: NEGATIVE
SP GR UR STRIP: <=1.005 (ref 1–1.03)
UROBILINOGEN UR STRIP-ACNC: NORMAL (ref 0.1–1.1)

## 2020-05-25 PROCEDURE — 99214 OFFICE O/P EST MOD 30 MIN: CPT | Mod: 25,S$GLB,, | Performed by: NURSE PRACTITIONER

## 2020-05-25 PROCEDURE — 87481 CANDIDA DNA AMP PROBE: CPT | Mod: 59

## 2020-05-25 PROCEDURE — 87086 URINE CULTURE/COLONY COUNT: CPT

## 2020-05-25 PROCEDURE — 87801 DETECT AGNT MULT DNA AMPLI: CPT

## 2020-05-25 PROCEDURE — 99214 PR OFFICE/OUTPT VISIT, EST, LEVL IV, 30-39 MIN: ICD-10-PCS | Mod: 25,S$GLB,, | Performed by: NURSE PRACTITIONER

## 2020-05-25 PROCEDURE — 81003 URINALYSIS AUTO W/O SCOPE: CPT | Mod: QW,S$GLB,, | Performed by: NURSE PRACTITIONER

## 2020-05-25 PROCEDURE — 81003 POCT URINALYSIS, DIPSTICK, AUTOMATED, W/O SCOPE: ICD-10-PCS | Mod: QW,S$GLB,, | Performed by: NURSE PRACTITIONER

## 2020-05-25 PROCEDURE — 81025 URINE PREGNANCY TEST: CPT | Mod: S$GLB,,, | Performed by: NURSE PRACTITIONER

## 2020-05-25 PROCEDURE — 81025 POCT URINE PREGNANCY: ICD-10-PCS | Mod: S$GLB,,, | Performed by: NURSE PRACTITIONER

## 2020-05-25 RX ORDER — METRONIDAZOLE 7.5 MG/G
5 GEL VAGINAL 2 TIMES DAILY
Qty: 70 G | Refills: 0 | Status: SHIPPED | OUTPATIENT
Start: 2020-05-25 | End: 2020-05-30

## 2020-05-25 NOTE — PATIENT INSTRUCTIONS

## 2020-05-25 NOTE — PROGRESS NOTES
"Subjective:       Patient ID: Joan Wilde is a 35 y.o. female.    Vitals:  height is 5' 6" (1.676 m) and weight is 95.3 kg (210 lb). Her temperature is 98.9 °F (37.2 °C). Her blood pressure is 110/70 and her pulse is 78. Her respiration is 16 and oxygen saturation is 97%.     Chief Complaint: Urine odor    Patient presents with c/o urine odor x 2 days. Denies dysuria, urinary urgency, or frequency. Denies hematuria. No fever.     Other   This is a new problem. Episode onset: 2 days. The problem occurs intermittently. The problem has been unchanged. Pertinent negatives include no abdominal pain, chills, fever, nausea, rash or vomiting. Treatments tried: increased fluids. The treatment provided no relief.       Constitution: Negative for chills and fever.   Neck: Negative for painful lymph nodes.   Gastrointestinal: Negative for abdominal pain, nausea and vomiting.   Genitourinary: Negative for dysuria, frequency, urgency, urine decreased, hematuria, history of kidney stones, painful menstruation, irregular menstruation, missed menses, heavy menstrual bleeding, ovarian cysts, genital trauma, vaginal pain, vaginal discharge, vaginal bleeding, vaginal odor, painful intercourse, genital sore, painful ejaculation and pelvic pain.        + urine odor   Musculoskeletal: Negative for back pain.   Skin: Negative for rash and lesion.   Hematologic/Lymphatic: Negative for swollen lymph nodes.       Objective:      Physical Exam   Constitutional: She is oriented to person, place, and time. She appears well-developed and well-nourished.   HENT:   Head: Normocephalic and atraumatic.   Right Ear: External ear normal.   Left Ear: External ear normal.   Nose: Nose normal. No nasal deformity. No epistaxis.   Mouth/Throat: Oropharynx is clear and moist and mucous membranes are normal.   Eyes: Lids are normal.   Neck: Trachea normal, normal range of motion and phonation normal. Neck supple.   Cardiovascular: Normal pulses. " "  Pulmonary/Chest: Effort normal.   Abdominal: Soft. Normal appearance and bowel sounds are normal. She exhibits no distension. There is no tenderness. There is no CVA tenderness.   Genitourinary:   Genitourinary Comments: "musty odor" described by patient    Vaginal exam deferred   Neurological: She is alert and oriented to person, place, and time.   Skin: Skin is warm, dry and intact.   Psychiatric: She has a normal mood and affect. Her speech is normal and behavior is normal. Cognition and memory are normal.   Nursing note and vitals reviewed.        Results for orders placed or performed in visit on 05/25/20   POCT Urinalysis, Dipstick, Automated, W/O Scope   Result Value Ref Range    POC Blood, Urine Negative Negative    POC Bilirubin, Urine Negative Negative    POC Urobilinogen, Urine norm 0.1 - 1.1    POC Ketones, Urine Negative Negative    POC Protein, Urine Negative Negative    POC Nitrates, Urine Negative Negative    POC Glucose, Urine Negative Negative    pH, UA 5.5 5 - 8    POC Specific Gravity, Urine <=1.005 1.003 - 1.029    POC Leukocytes, Urine Negative Negative   POCT urine pregnancy   Result Value Ref Range    POC Preg Test, Ur Negative Negative     Acceptable Yes      Assessment:       1. Vaginal odor    2. Abnormal urine odor        Plan:     Reviewed and discussed UA and POCT. Will treat for bacterial vaginosis. Will call with swab results.     Vaginal odor  -     metroNIDAZOLE (METROGEL) 0.75 % vaginal gel; Place 5 applicators vaginally 2 (two) times daily. for 5 days  Dispense: 70 g; Refill: 0  -     Culture, Urine  -     Bv, Trich, Candida by DNA Probe (Swab Only)    Abnormal urine odor  -     POCT Urinalysis, Dipstick, Automated, W/O Scope  -     POCT urine pregnancy         Patient Instructions   General Discharge Instructions   If you were prescribed a narcotic or controlled medication, do not drive or operate heavy equipment or machinery while taking these medications.  If " you were prescribed antibiotics, please take them to completion.  You must understand that you've received an Urgent Care treatment only and that you may be released before all your medical problems are known or treated. You, the patient, will arrange for follow up care as instructed.  Follow up with your PCP or specialty clinic as directed in the next 1-2 weeks if not improved or as needed.  You can call [phone] to schedule an appointment with the appropriate provider.  If your condition worsens we recommend that you receive another evaluation at the emergency room immediately or contact your primary medical clinics after hours call service to discuss your concerns.  Please return here or go to the Emergency Department for any concerns or worsening of condition.

## 2020-05-26 LAB
BACTERIAL VAGINOSIS DNA: NEGATIVE
CANDIDA GLABRATA DNA: NEGATIVE
CANDIDA KRUSEI DNA: NEGATIVE
CANDIDA RRNA VAG QL PROBE: NEGATIVE
T VAGINALIS RRNA GENITAL QL PROBE: NEGATIVE

## 2020-05-27 ENCOUNTER — TELEPHONE (OUTPATIENT)
Dept: URGENT CARE | Facility: CLINIC | Age: 35
End: 2020-05-27

## 2020-05-28 LAB — BACTERIA UR CULT: NORMAL

## 2020-06-01 ENCOUNTER — OFFICE VISIT (OUTPATIENT)
Dept: DERMATOLOGY | Facility: CLINIC | Age: 35
End: 2020-06-01
Payer: COMMERCIAL

## 2020-06-01 DIAGNOSIS — L70.0 ACNE VULGARIS: Primary | ICD-10-CM

## 2020-06-01 DIAGNOSIS — L81.0 POST-INFLAMMATORY HYPERPIGMENTATION: ICD-10-CM

## 2020-06-01 PROCEDURE — 99213 OFFICE O/P EST LOW 20 MIN: CPT | Mod: 95,,, | Performed by: DERMATOLOGY

## 2020-06-01 PROCEDURE — 99213 PR OFFICE/OUTPT VISIT, EST, LEVL III, 20-29 MIN: ICD-10-PCS | Mod: 95,,, | Performed by: DERMATOLOGY

## 2020-06-01 RX ORDER — CLINDAMYCIN PHOSPHATE 10 UG/ML
LOTION TOPICAL
Qty: 60 ML | Refills: 3 | Status: SHIPPED | OUTPATIENT
Start: 2020-06-01 | End: 2023-08-08

## 2020-06-01 NOTE — PROGRESS NOTES
[facility]  Tele-Consult    Consultation started: 2020 at 3:33 PM   The patient location is home  Consult requested by: No ref. provider found     Each patient to whom he or she provides medical services by telemedicine is: (1) informed of the relationship between the physician and patient and the respective role of any other health care provider with respect to management of the patient; and (2) notified that he or she may decline to receive medical services by telemedicine and may withdraw from such care at any time.        Subjective:       Patient ID:  Joan Wilde is a 35 y.o. female who presents for Acne.    HPI  36yo female here for acne f/u. Has been using Altreno (tretinoin 0.05 lotion) 4 nights/week (wed thru sat) since 2019.  She thinks it has helped w her acne and is calming to the skin.  Also using ELTA md uv clear spf 46.  She had tried some new products recently for evening out skin tone and blemishes, specifically:  The ordinary vitamin C product  The ordinary niacinamide/zinc product  Salicylic acid wash    She noticed that these products burned/irritated her skin and she had some cystic papules as well. These have improved after she stopped using those latter 3 products.  She also started using an [employer] foundation (typically uses powder based products) and thinks this may be contributing.    Right now pimples are better; still present on upper forehead and sides of neck.    Has  clindamycin lotion.    Review of Systems   Skin: Positive for itching and daily sunscreen use.        Objective:    Physical Exam   Constitutional: She appears well-developed and well-nourished. No distress.   Neurological: She is alert and oriented to person, place, and time. She is not disoriented.   Psychiatric: She has a normal mood and affect.   Skin:   Areas Examined (abnormalities noted in diagram):   Head / Face Inspection Performed              Diagram Legend      Erythematous scaling macule/papule c/w actinic keratosis       Vascular papule c/w angioma      Pigmented verrucoid papule/plaque c/w seborrheic keratosis      Yellow umbilicated papule c/w sebaceous hyperplasia      Irregularly shaped tan macule c/w lentigo     1-2 mm smooth white papules consistent with Milia      Movable subcutaneous cyst with punctum c/w epidermal inclusion cyst      Subcutaneous movable cyst c/w pilar cyst      Firm pink to brown papule c/w dermatofibroma      Pedunculated fleshy papule(s) c/w skin tag(s)      Evenly pigmented macule c/w junctional nevus     Mildly variegated pigmented, slightly irregular-bordered macule c/w mildly atypical nevus      Flesh colored to evenly pigmented papule c/w intradermal nevus       Pink pearly papule/plaque c/w basal cell carcinoma      Erythematous hyperkeratotic cursted plaque c/w SCC      Surgical scar with no sign of skin cancer recurrence      Open and closed comedones      Inflammatory papules and pustules      Verrucoid papule consistent consistent with wart     Erythematous eczematous patches and plaques     Dystrophic onycholytic nail with subungual debris c/w onychomycosis     Umbilicated papule    Erythematous-base heme-crusted tan verrucoid plaque consistent with inflamed seborrheic keratosis     Erythematous Silvery Scaling Plaque c/w Psoriasis     See annotation      Assessment / Plan:        Acne vulgaris - flared after new products  -     tretinoin (ALTRENO) 0.05 % Lotn; Apply pea-sized amount nightly to face  Dispense: 45 g; Refill: 2  -     clindamycin (CLEOCIN T) 1 % lotion; Use every night at bedtime on face for pimples  Dispense: 60 mL; Refill: 3    When using a topical retinoid her skin could not tolerate vit C/niacinamide products  Discussed that if she would like to further refresh her skin she could consider seeing [facility] for laser/peel options that would be better tolerated  Would need to hold retinoid prior  Continue  elta md uv 46 daily  Continue mild facewash  D/c foundation - mineral based makeup only    Post-inflammatory hyperpigmentation  Spf and retinoid           Follow up if symptoms worsen or fail to improve.  Patient provided medical information necessary for recommendation.   Consultation ended: 6/1/2020 at352 pm

## 2020-07-02 ENCOUNTER — OFFICE VISIT (OUTPATIENT)
Dept: INTERNAL MEDICINE | Facility: CLINIC | Age: 35
End: 2020-07-02
Payer: COMMERCIAL

## 2020-07-02 VITALS
WEIGHT: 215.81 LBS | BODY MASS INDEX: 34.68 KG/M2 | SYSTOLIC BLOOD PRESSURE: 112 MMHG | HEART RATE: 85 BPM | DIASTOLIC BLOOD PRESSURE: 68 MMHG | HEIGHT: 66 IN | TEMPERATURE: 99 F | OXYGEN SATURATION: 98 %

## 2020-07-02 DIAGNOSIS — Z00.00 ANNUAL PHYSICAL EXAM: Primary | ICD-10-CM

## 2020-07-02 LAB
BILIRUB UR QL STRIP: NEGATIVE
CLARITY UR REFRACT.AUTO: ABNORMAL
COLOR UR AUTO: ABNORMAL
GLUCOSE UR QL STRIP: NEGATIVE
HGB UR QL STRIP: NEGATIVE
KETONES UR QL STRIP: NEGATIVE
LEUKOCYTE ESTERASE UR QL STRIP: NEGATIVE
NITRITE UR QL STRIP: NEGATIVE
PH UR STRIP: 5 [PH] (ref 5–8)
PROT UR QL STRIP: NEGATIVE
SP GR UR STRIP: 1.01 (ref 1–1.03)
URN SPEC COLLECT METH UR: ABNORMAL

## 2020-07-02 PROCEDURE — 99395 PREV VISIT EST AGE 18-39: CPT | Mod: S$GLB,,, | Performed by: INTERNAL MEDICINE

## 2020-07-02 PROCEDURE — 99999 PR PBB SHADOW E&M-EST. PATIENT-LVL V: ICD-10-PCS | Mod: PBBFAC,,, | Performed by: INTERNAL MEDICINE

## 2020-07-02 PROCEDURE — 99999 PR PBB SHADOW E&M-EST. PATIENT-LVL V: CPT | Mod: PBBFAC,,, | Performed by: INTERNAL MEDICINE

## 2020-07-02 PROCEDURE — 99395 PR PREVENTIVE VISIT,EST,18-39: ICD-10-PCS | Mod: S$GLB,,, | Performed by: INTERNAL MEDICINE

## 2020-07-02 PROCEDURE — 81003 URINALYSIS AUTO W/O SCOPE: CPT

## 2020-07-04 NOTE — PROGRESS NOTES
CC:  Annual exam.    HPI:  Patient is a 35-year-old female with migraine headaches and mild asthma presents today for annual exam.    ROS:  Please see patient and review of system[employer]and for HPI/ROS submitted by the patient on 6/30/2020   activity change: No  unexpected weight change: No  neck pain: No  hearing loss: No  rhinorrhea: No  trouble swallowing: No  eye discharge: No  visual disturbance: No  chest tightness: No  wheezing: No  chest pain: No  palpitations: No  blood in stool: No  constipation: No  vomiting: No  diarrhea: No  polydipsia: No  polyuria: No  difficulty urinating: No  hematuria: No  menstrual problem: No  dysuria: No  joint swelling: No  arthralgias: No  headaches: No  weakness: No  confusion: No  dysphoric mood: No  In addition, the patient does report feeling down.  She studying for her [employer] exams.  She is not able to see her family because of travel restrictions.  She is not able to see her boyfriend for the same reason.  She feels like her life is on hold as result of the corona virus.    Physical exam:  General appearance:  No acute distress  HEENT:  Conjunctiva is clear.  Pupils equal.  TMs are clear.  Nasal septum is midline without discharge.  Oropharynx is without erythema.  Trachea is midline without JVD.  Without thyromegaly.  Pulmonary:  Good inspiratory, expiratory breath sounds are heard.  Her lungs were clear to auscultation.  Cardiovascular:  S1-S2, rhythm is normal.  Extremities without edema.  GI:  Abdomen was nontender, nondistended without hepatosplenomegaly  Comments:  Did discuss the patient about exercise and weight loss.  Was also discussed with the patient about counseling for situational anxiety.    Assessment:  1.  Annual exam    Plan:  1.  Will schedule a CBC, CMP, TSH, lipid panel and UA

## 2020-07-07 ENCOUNTER — LAB VISIT (OUTPATIENT)
Dept: LAB | Facility: HOSPITAL | Age: 35
End: 2020-07-07
Attending: INTERNAL MEDICINE
Payer: COMMERCIAL

## 2020-07-07 DIAGNOSIS — Z00.00 ANNUAL PHYSICAL EXAM: ICD-10-CM

## 2020-07-07 LAB
ALBUMIN SERPL BCP-MCNC: 3.8 G/DL (ref 3.5–5.2)
ALP SERPL-CCNC: 80 U/L (ref 55–135)
ALT SERPL W/O P-5'-P-CCNC: 26 U/L (ref 10–44)
ANION GAP SERPL CALC-SCNC: 7 MMOL/L (ref 8–16)
AST SERPL-CCNC: 20 U/L (ref 10–40)
BASOPHILS # BLD AUTO: 0.03 K/UL (ref 0–0.2)
BASOPHILS NFR BLD: 0.5 % (ref 0–1.9)
BILIRUB SERPL-MCNC: 0.3 MG/DL (ref 0.1–1)
BUN SERPL-MCNC: 17 MG/DL (ref 6–20)
CALCIUM SERPL-MCNC: 9.1 MG/DL (ref 8.7–10.5)
CHLORIDE SERPL-SCNC: 106 MMOL/L (ref 95–110)
CHOLEST SERPL-MCNC: 194 MG/DL (ref 120–199)
CHOLEST/HDLC SERPL: 5.2 {RATIO} (ref 2–5)
CO2 SERPL-SCNC: 24 MMOL/L (ref 23–29)
CREAT SERPL-MCNC: 1.1 MG/DL (ref 0.5–1.4)
DIFFERENTIAL METHOD: ABNORMAL
EOSINOPHIL # BLD AUTO: 0 K/UL (ref 0–0.5)
EOSINOPHIL NFR BLD: 0.5 % (ref 0–8)
ERYTHROCYTE [DISTWIDTH] IN BLOOD BY AUTOMATED COUNT: 13.8 % (ref 11.5–14.5)
EST. GFR  (AFRICAN AMERICAN): >60 ML/MIN/1.73 M^2
EST. GFR  (NON AFRICAN AMERICAN): >60 ML/MIN/1.73 M^2
ESTIMATED AVG GLUCOSE: 114 MG/DL (ref 68–131)
GLUCOSE SERPL-MCNC: 98 MG/DL (ref 70–110)
HBA1C MFR BLD HPLC: 5.6 % (ref 4–5.6)
HCT VFR BLD AUTO: 41.9 % (ref 37–48.5)
HDLC SERPL-MCNC: 37 MG/DL (ref 40–75)
HDLC SERPL: 19.1 % (ref 20–50)
HGB BLD-MCNC: 13.4 G/DL (ref 12–16)
IMM GRANULOCYTES # BLD AUTO: 0.02 K/UL (ref 0–0.04)
IMM GRANULOCYTES NFR BLD AUTO: 0.4 % (ref 0–0.5)
LDLC SERPL CALC-MCNC: 143 MG/DL (ref 63–159)
LYMPHOCYTES # BLD AUTO: 2.3 K/UL (ref 1–4.8)
LYMPHOCYTES NFR BLD: 40.2 % (ref 18–48)
MCH RBC QN AUTO: 26.5 PG (ref 27–31)
MCHC RBC AUTO-ENTMCNC: 32 G/DL (ref 32–36)
MCV RBC AUTO: 83 FL (ref 82–98)
MONOCYTES # BLD AUTO: 0.4 K/UL (ref 0.3–1)
MONOCYTES NFR BLD: 6.2 % (ref 4–15)
NEUTROPHILS # BLD AUTO: 3 K/UL (ref 1.8–7.7)
NEUTROPHILS NFR BLD: 52.2 % (ref 38–73)
NONHDLC SERPL-MCNC: 157 MG/DL
NRBC BLD-RTO: 0 /100 WBC
PLATELET # BLD AUTO: 341 K/UL (ref 150–350)
PMV BLD AUTO: 9.9 FL (ref 9.2–12.9)
POTASSIUM SERPL-SCNC: 4.3 MMOL/L (ref 3.5–5.1)
PROT SERPL-MCNC: 7.4 G/DL (ref 6–8.4)
RBC # BLD AUTO: 5.06 M/UL (ref 4–5.4)
SODIUM SERPL-SCNC: 137 MMOL/L (ref 136–145)
T4 SERPL-MCNC: 6.4 UG/DL (ref 4.5–11.5)
TRIGL SERPL-MCNC: 70 MG/DL (ref 30–150)
WBC # BLD AUTO: 5.65 K/UL (ref 3.9–12.7)

## 2020-07-07 PROCEDURE — 84436 ASSAY OF TOTAL THYROXINE: CPT

## 2020-07-07 PROCEDURE — 80053 COMPREHEN METABOLIC PANEL: CPT

## 2020-07-07 PROCEDURE — 83036 HEMOGLOBIN GLYCOSYLATED A1C: CPT

## 2020-07-07 PROCEDURE — 85025 COMPLETE CBC W/AUTO DIFF WBC: CPT

## 2020-07-07 PROCEDURE — 36415 COLL VENOUS BLD VENIPUNCTURE: CPT

## 2020-07-07 PROCEDURE — 80061 LIPID PANEL: CPT

## 2020-07-14 ENCOUNTER — TELEPHONE (OUTPATIENT)
Dept: INTERNAL MEDICINE | Facility: CLINIC | Age: 35
End: 2020-07-14

## 2020-07-14 NOTE — TELEPHONE ENCOUNTER
Patient referred by Dr. Phipps for Health coaching (weight loss, A1c 5.6, lifestyle changes).  Called patient and gave an explanation about Health Coaching program and invited participation.   Patient states she would like to participate.  Set appointment for 7.30.20   Gave direct contact number to call if she has any questions [phone].   Lashell Pedroza RN  Health

## 2020-07-14 NOTE — TELEPHONE ENCOUNTER
----- Message from Walter Phipps MD sent at 7/8/2020  4:40 PM CDT -----  Lashell,  Could you see this person for coaching. She is over weight and very worried about diabetes. Her A1C was right at 5.6.I sent her a message that you will be contacting her.

## 2020-07-24 ENCOUNTER — OFFICE VISIT (OUTPATIENT)
Dept: OBSTETRICS AND GYNECOLOGY | Facility: CLINIC | Age: 35
End: 2020-07-24
Payer: COMMERCIAL

## 2020-07-24 VITALS
WEIGHT: 215.63 LBS | DIASTOLIC BLOOD PRESSURE: 74 MMHG | BODY MASS INDEX: 34.65 KG/M2 | SYSTOLIC BLOOD PRESSURE: 114 MMHG | HEIGHT: 66 IN

## 2020-07-24 DIAGNOSIS — N80.9 ENDOMETRIOSIS: ICD-10-CM

## 2020-07-24 DIAGNOSIS — Z01.419 WELL WOMAN EXAM WITH ROUTINE GYNECOLOGICAL EXAM: Primary | ICD-10-CM

## 2020-07-24 DIAGNOSIS — N94.10 FEMALE DYSPAREUNIA: ICD-10-CM

## 2020-07-24 PROCEDURE — 99999 PR PBB SHADOW E&M-EST. PATIENT-LVL III: ICD-10-PCS | Mod: PBBFAC,,, | Performed by: OBSTETRICS & GYNECOLOGY

## 2020-07-24 PROCEDURE — 99999 PR PBB SHADOW E&M-EST. PATIENT-LVL III: CPT | Mod: PBBFAC,,, | Performed by: OBSTETRICS & GYNECOLOGY

## 2020-07-24 PROCEDURE — 99395 PREV VISIT EST AGE 18-39: CPT | Mod: S$GLB,,, | Performed by: OBSTETRICS & GYNECOLOGY

## 2020-07-24 PROCEDURE — 99395 PR PREVENTIVE VISIT,EST,18-39: ICD-10-PCS | Mod: S$GLB,,, | Performed by: OBSTETRICS & GYNECOLOGY

## 2020-07-24 RX ORDER — NORETHINDRONE 5 MG/1
5 TABLET ORAL DAILY
Qty: 30 TABLET | Refills: 11 | Status: SHIPPED | OUTPATIENT
Start: 2020-07-24 | End: 2021-07-23 | Stop reason: SDUPTHER

## 2020-07-24 RX ORDER — ACETAMINOPHEN AND CODEINE PHOSPHATE 120; 12 MG/5ML; MG/5ML
1 SOLUTION ORAL DAILY
Qty: 28 TABLET | Refills: 12 | Status: SHIPPED | OUTPATIENT
Start: 2020-07-24 | End: 2021-07-23 | Stop reason: SDUPTHER

## 2020-07-24 NOTE — PROGRESS NOTES
History & Physical  Gynecology      SUBJECTIVE:     Chief Complaint: Annual Exam       History of Present Illness:  Annual Exam-Premenopausal  Patient presents for annual exam. The patient has complaints today of pressure in the lower third of the vagina with use of the dildo.  Minimal pain, mostly pressure. Menstrual cycles are absent.  The patient is sexually active. GYN screening history: last pap: approximate date  paphpv and was normal. The patient participates in regular exercise: no.  Smoking status:  no    Contraception: ortho micronor, aygestin    FH:  Breast cancer: neg  Colon cancer: neg  Ovarian cancer: neg    Review of patient's allergies indicates:   Allergen Reactions    Metoclopramide Other (See Comments)     Causes tongue to roll back in mouth       Past Medical History:   Diagnosis Date    Acne vulgaris 2018    Allergic rhinitis due to dust mite 2019    Endometriosis 2018    Fibroid     Migraine syndrome     associated with flu vaccines    Mild intermittent asthma without complication 2018     Past Surgical History:   Procedure Laterality Date    HERNIA REPAIR  2014    umbilical     OB History        0    Para   0    Term   0       0    AB   0    Living   0       SAB   0    TAB   0    Ectopic   0    Multiple   0    Live Births   0               Family History   Problem Relation Age of Onset    Hypertension Mother     Diabetes Father     Hypertension Father     Stroke Father     No Known Problems Brother     No Known Problems Brother     Breast cancer Neg Hx     Colon cancer Neg Hx     Ovarian cancer Neg Hx     Melanoma Neg Hx     Blindness Neg Hx     Amblyopia Neg Hx     Cataracts Neg Hx     Glaucoma Neg Hx     Macular degeneration Neg Hx     Retinal detachment Neg Hx     Strabismus Neg Hx      labor Neg Hx     Cancer Neg Hx     Eclampsia Neg Hx      Social History     Tobacco Use    Smoking status: Never Smoker     Smokeless tobacco: Never Used   Substance Use Topics    Alcohol use: No     Frequency: Never     Binge frequency: Never    Drug use: No       Current Outpatient Medications   Medication Sig    albuterol (PROVENTIL/VENTOLIN HFA) 90 mcg/actuation inhaler Inhale 2 puffs into the lungs every 6 (six) hours as needed for Wheezing or Shortness of Breath.    alclomethasone (ACLOVATE) 0.05 % cream Apply topically to face twice a day for 2 weeks.    azelastine (OPTIVAR) 0.05 % ophthalmic solution Place 1 drop into both eyes 2 (two) times daily.    clindamycin (CLEOCIN T) 1 % lotion Use every night at bedtime on face for pimples    clotrimazole-betamethasone 1-0.05% (LOTRISONE) cream Apply topically 2 (two) times daily to rash on neck x 2 weeks    conjugated estrogens (PREMARIN) vaginal cream Use dime size amount of estrogen cream in vagina nightly for 2 weeks, then twice a week thereafter.    fluticasone propionate (FLOVENT HFA) 220 mcg/actuation inhaler Inhale 1 puff into the lungs 2 (two) times daily. Controller    norethindrone (AYGESTIN) 5 mg Tab Take 1 tablet (5 mg total) by mouth once daily.    norethindrone (ORTHO MICRONOR) 0.35 mg tablet Take 1 tablet (0.35 mg total) by mouth once daily.    omeprazole (PRILOSEC) 40 MG capsule Take 1 capsule (40 mg total) by mouth 2 (two) times daily before meals.    tretinoin (ALTRENO) 0.05 % Lotn Apply pea-sized amount nightly to face    tretinoin (RETIN-A) 0.025 % cream Apply topically to affected area every evening.    ascorbic acid, vitamin C, (VITAMIN C) 1000 MG tablet Take 2 tablets (2,000 mg total) by mouth once daily. (Patient not taking: Reported on 7/24/2020)    DM/p-ephed/acetaminoph/doxylam (NYQUIL ORAL) Take by mouth.    famotidine (PEPCID) 40 MG tablet Take 1 tablet (40 mg total) by mouth once daily. (Patient not taking: Reported on 7/24/2020)    montelukast (SINGULAIR) 10 mg tablet Take 1 tablet (10 mg total) by mouth every evening. (Patient not  taking: Reported on 7/24/2020)     No current facility-administered medications for this visit.          Review of Systems:  Review of Systems   Constitutional: Negative for activity change, appetite change and fever.   Respiratory: Negative for shortness of breath.    Cardiovascular: Negative for chest pain.   Gastrointestinal: Negative for abdominal pain, constipation, diarrhea, nausea and vomiting.   Genitourinary: Positive for vaginal pain. Negative for menorrhagia, menstrual problem, pelvic pain, vaginal bleeding, vaginal discharge and vaginal odor.   Integumentary:  Negative for nipple discharge.   Neurological: Negative for numbness and headaches.   Breast: Negative for mastodynia and nipple discharge       OBJECTIVE:     Physical Exam:  Physical Exam  Vitals signs and nursing note reviewed.   Constitutional:       Appearance: She is well-developed.   Neck:      Musculoskeletal: Normal range of motion and neck supple.      Thyroid: No thyromegaly.      Trachea: No tracheal deviation.   Cardiovascular:      Rate and Rhythm: Normal rate and regular rhythm.      Heart sounds: Normal heart sounds. No murmur. No friction rub. No gallop.    Pulmonary:      Effort: Pulmonary effort is normal. No respiratory distress.      Breath sounds: Normal breath sounds. No wheezing or rales.   Chest:      Breasts: Breasts are symmetrical.         Right: No inverted nipple, mass, nipple discharge, skin change or tenderness.         Left: No inverted nipple, mass, nipple discharge, skin change or tenderness.   Abdominal:      General: Bowel sounds are normal. There is no distension.      Palpations: Abdomen is soft.      Tenderness: There is no abdominal tenderness. There is no guarding or rebound.   Genitourinary:     Labia:         Right: No rash, tenderness, lesion or injury.         Left: No rash, tenderness, lesion or injury.       Vagina: Normal. No signs of injury and foreign body. No vaginal discharge, erythema,  tenderness or bleeding.      Adnexa:         Right: No mass, tenderness or fullness.          Left: No mass, tenderness or fullness.        Comments: Urethra: normal appearing urethra with no masses, tenderness or lesions  Urethral meatus: normal size, anterior vaginal wall with no prolapse, no lesions  Pain within the vagina with pressure on the pelvic floor muscles on the left  Neurological:      Mental Status: She is alert and oriented to person, place, and time.   Psychiatric:         Behavior: Behavior normal.         Thought Content: Thought content normal.         Judgment: Judgment normal.         Chaperoned by: French    ASSESSMENT:       ICD-10-CM ICD-9-CM    1. Well woman exam with routine gynecological exam  Z01.419 V72.31    2. Female dyspareunia  N94.10 625.0 conjugated estrogens (PREMARIN) vaginal cream   3. Endometriosis  N80.9 617.9 norethindrone (ORTHO MICRONOR) 0.35 mg tablet          Plan:      Joan was seen today for annual exam.    Diagnoses and all orders for this visit:    Well woman exam with routine gynecological exam    Female dyspareunia  -     conjugated estrogens (PREMARIN) vaginal cream; Use dime size amount of estrogen cream in vagina nightly for 2 weeks, then twice a week thereafter.    Endometriosis  -     norethindrone (ORTHO MICRONOR) 0.35 mg tablet; Take 1 tablet (0.35 mg total) by mouth once daily.    Other orders  -     norethindrone (AYGESTIN) 5 mg Tab; Take 1 tablet (5 mg total) by mouth once daily.        No orders of the defined types were placed in this encounter.      Well Woman:  - Pap smear up to date  - Birth control: medicines refilled  - GC/CT:n/a  - Mammogram: due age 38  - Smoking cessation: n/a  - Labs: none required   - Vaccines: none required  - Exercise counseling  - discussed pelvic floor pt if discomfort becomes a problem      Follow up in one year for annual or prn.    Rani Ambrocio

## 2020-07-30 ENCOUNTER — CLINICAL SUPPORT (OUTPATIENT)
Dept: INTERNAL MEDICINE | Facility: CLINIC | Age: 35
End: 2020-07-30
Payer: COMMERCIAL

## 2020-07-30 VITALS — BODY MASS INDEX: 34.52 KG/M2 | WEIGHT: 213.88 LBS

## 2020-07-30 NOTE — PROGRESS NOTES
Date:     7.30.20                 Time: 0900  Initial Health  Contact - [x]Clinic visit  []  Phone Visit  ASSEMENT: Information obtained through combination of chart review prior to seeing patient, patient self-report.   Primary Referral diagnosis/reason for referral:         Weight loss  (See physician progress note for complete list of diagnoses.)  PCP: Dr. Phipps        Referent :  [x] PCP       [] Transition Navigator    []  E.DAKOTA    []  APRN  []  Other:     Supports:      [employer]     Preferred Learning Style  (may be a combination)  [x]  Visual (pictures/ video)     [x] Auditory/ Listening   [x]  Reading    []  Hands-on/ Demonstration   []  1:1    []  Group        []  Alone       []  No preference   []  Combination  []  Other:         Presenting issues from patients point of view:  [x]  Improve nutrition/increase healthy choices.                    []  Improve /maintain current functioning.  []  Obtain and maintain healthy blood pressure.                  []  Stop smoking.  [x]  Improve weight management.                                       [x]  Lower cholesterol.  [x]  Improve blood glucose management.                            []  Improve breathing.  []  Increase energy level.                                                      []  Increase/maintain independence.   [x]  Improve sleep.                                                                []  Decrease stress.  []  Improve pain management.                                             []  Improve mood.  []  Other:                  Pt. Education Level:        Masters Degree  Disease specific education services attended:     [employer] to wellness Go 365 classes    Patient Employed:  [x]yes    [] No  Where _[facility]____________________  Days and Hours:            40 hours    Current Self-help Behaviors  []  Checks glucose level        times a day.  []  Tries to modify meals so more healthy.  [x]  Exercises by      Lifting  weights 4 x week for 15 min  []  Takes BP        times a       (insert frequency)  [x]  Weighs self daily         (how often)  [x]  Takes all medications as prescribed.  [x]  Rarely misses health care appointments.  []  Sleeps 8 hours without waking more than twice.  [x]  Consistently wears/uses functioning aids as recommended  (ie:  Contacts [] , glasses [x] , hearing  Aid [] , prosthesis [] ,  Walker [] ,  Wheelchair []  etc.)  [x]  Regularly engages in stress management activities I.e.: listens to music  []  Quit smoking   []  Purposefully educates self about health issues.  []  Pt did bring glucose log with him/her.  []  Other  (explain)           []  Comments:       []  Reported Blood Sugar Readings:          Health screenings   Last PCP visit:   7.2.20   GYN/Pap: 8.19.19                                                                DEXA:  Collette  2015                    Lipids: 7.7.20   CMP: 7.7.20   HgA1C: 7.7.20          Eye Exam:   10.23.19         Strengths:  [x]  Confident                       [x]  Determined/persistent           [x]  Enthusiastic         [x]  Good problem solving           [x]  Good self-control                   [x]  Hard working        []  Hopeful/optimistic                  [x]  Intelligent                                [x]  Self aware  [x]  Creative                                 [x]  Flexible          []  Sense of humor                     [x]  Open/willing          []  Spiritual                                  [x] Values health    [x]  Successful overcoming difficult challenges in the past.     []  Pos support network  [x]   Health literate (The degree to which individuals have the capacity to obtain, process, and    understand basic health information and services needed to make appropriate health decisions.- Dept. of Health and Human services.)  []   Other Comments:             Change Markers  Scale 0-10 with 10 representing most Ready 0 least ready, 10 most important  0 least important 10 most confident 0 least confident.     [] NA at this time.   Readiness:     10;  Importance:     10;  Confidence:    10    INTERVENTIONS:  [x] Given an explanation about health coaching program and invited participation.  [x] Listened reflectively; provided support, encouragement, and validation; respected  and maintained patient self-direction; explored pros/cons of change; personalized health risks of maintaining the status quo; and facilitated recognition of discrepancy between current behaviors and patients goals and values.  [x] Assessed stage of change and employed appropriate motivational interviewing strategies to facilitate movement toward the next stage of change and healthy behavior modification.  [x] Normalized occurrence of relapse, helped patient recognize outcome of new self-learning as a result of the relapse such as triggers, and helped focus on factors to reduce chances of returning to previous behaviors .  [x] Used readiness scale ratings to guide assessment of importance and confidence of successfully reaching goals.  [x] Assisted patient to develop goal, action plan, and address potential barriers to goal     achievement.  [] Patient was given a new (insert glucose meter or other supplies), taught to use, and      successfully demonstrated ability to carry out essential steps of process.    [] Rx for ( monitor/supplies, pen needles, etc.) was obtained.  [x] Provided educational review, written materials/handouts (Meal Planning Counting Carbs, Healthy Plate, 10 Rules for Eating Safely for Life, 10 Tips to Cutting Your Cravings).   [x] Topics discussed/provided:    GI of foods and how it affects your metabolism and helps you burn fat, carb counting    [x] Facilitated completion of needed exams and screenings by reviewing Diabetic/Health Maintenance Report Card with patient.  [x] Provided pt with my business card.  [x] Informed of/reviewed how to access health  and after  hours assistance.  [x] Discussed, planned, and scheduled future contacts.  [x]Challenges/Barriers identified discussed as identified by patient.  [x] Needs identified by this Health :    Education and support     [] Other:            For additional care management support patient was referred to:        []  Community resources for                        []  Medication assistance programs               []  Dietician              []  Diabetes  Boot Camp                                        []  Mental health services                           []                  []  Diabetes Elizabeth                                              []  Home health services                                []  Complex case management              []  PCP/covering provider due to                 []  Emergency Dept.                                  []  GYN              []  Optometrist/ Ophthalmologist                          []  Podiatrist                                                      [x]  N/A        []  Other:           RESPONSE/IMPRESSION  Behavior is consistent with the following stage of change:  []  Pre-contemplation  []  Contemplation  [x]  Preparation  []  Action  []  Maintenance  []  Relapse    Level of participation:  []  Refused to participate  []  Guarded / resistant  []  Passive participant  [x]  Active participant/interactive  [x]  Interested/receptive  [x]  Self-motivated  []  Other          Goal developed by patient today:  Stop my pre made green tea beverage 6/7  Stop putting honey in my greek yogurt 7/6  Replace yogurt with avocado for 30 days 9/10      Plan (needed actions to accomplish goal):          [x] Pt will work on action plan as stated above.        [x] Pt will follow up with Health  on 8.13.20.        [x] Health  will remain available.  [x] Health  will maintain regular contacts with patient to educate, support, encourage; help problem-solve; assist with development of  self-advocacy/increasing independent health management; and provide resources as needed.  [] Pt has declined Health  Program at this time. Provided pt with Health  Program information should they decide to participate at a later time.        [] Pt to facilitate contact with Health        [] Health  to facilitate contact with patient.        [] Other:          Notes:   Met with patient she is interested in weight loss.   She lost down to 187 lbs with optivia and she stopped in Nov 2019 and gained back.   She has tried Keto, modified keto (lost from 221 to 210 and stalled), sensible meals, 1600 michael /day diets and can not lose weight.   Her best reasons are to improve health, look good and fit in size 12 clothes.   Her family is in [place].   Goals set by patient and HC will continue to work with patient to assist to reach her goals.    Best Method of Contact:  [x] email:   [x] Phone:   [] Mail  [x] Office     Lashell Pedroza RN HC

## 2020-08-13 ENCOUNTER — CLINICAL SUPPORT (OUTPATIENT)
Dept: INTERNAL MEDICINE | Facility: CLINIC | Age: 35
End: 2020-08-13
Payer: COMMERCIAL

## 2020-08-13 VITALS — BODY MASS INDEX: 34.27 KG/M2 | WEIGHT: 212.31 LBS

## 2020-08-13 PROCEDURE — 99999 PR PBB SHADOW E&M-EST. PATIENT-LVL I: CPT | Mod: PBBFAC,,,

## 2020-08-13 PROCEDURE — 99999 PR PBB SHADOW E&M-EST. PATIENT-LVL I: ICD-10-PCS | Mod: PBBFAC,,,

## 2020-08-13 NOTE — PROGRESS NOTES
Health  Follow-Up Note   [x] Office  [] Phone  Notes from previous session reviewed.   [x] Previous Session Goals unchanged.   [] Patient/Caregiver Change in Goals.   Goals added or changed by Patient/Caregiver since program participation:  1. Continue same plan  2. Sensible meals paleo for 4- 5 months  3. Add more vegetables, almond butter and green apples      Additional/Changed support that patient/caregiver has experienced/sought?  (Indicate readiness, support from family, friends, others, community groups, etc)  1.  [employer]  (friend)    Additional/Changed obstacles that could prevent patient/caregiver from reaching their goals?  1.  Staying away from sugar (tea and matcha green tea latte)    Feedback provided:  1.  Praised for good job down 1.5 lbs    Diagnostic values/Desriptors for follow-up as needed for chronic condition(s)   Weight: 96.3 kg 212.3 lb    Interventions:   1. Health  listened reflectively, validated thoughts and feelings, offered support and encouragement.   2. Allowed patient to express themselves in a non-biased atmosphere.  3. Health  assisted pt to problem-solve obstacles such as being in a challenging environment and dealing with these challenges.   4. Motivational Interviewed interventions utilized (OARS).   5. Patient responded favorably to interventions and remained actively engaged in the session.   6. Health  will remain available and connected for patient by phone and/or office visits.   7. Positive reinforcement, emotional support and encouragement provided.   8. Focused Education: MI    Plan:  [x] Pt will work on goals as stated above.   [x] Pt will contact Health  for any questions, concerns or needs.  [x] Pt will follow up with Health  in office on  9.3.20.  [] Pt will follow up with Health  on phone in:        [x] Health  will remain available.   [] Health  will contact patient by phone in:        [] Health  will consult:         [] Health  will inform Provider via EPIC messaging.     Impression:  1. Behavior is consistent with Action Stage of Change.   2. Participation level:       [x] Receptive      [x] Interactive      [] Guarded and Resistant      [x] Self Motivated      [] Refused/Declined to participate   3. [x] Pt voiced understanding of all information presented.       [] Pt voiced needing further information/education. This will be arranged.       [x] Pt would benefit from further education/information as identified by this health . This will be arranged.     Lashell Pedroza RN HC

## 2020-09-02 ENCOUNTER — PATIENT OUTREACH (OUTPATIENT)
Dept: ADMINISTRATIVE | Facility: OTHER | Age: 35
End: 2020-09-02

## 2020-09-02 ENCOUNTER — OFFICE VISIT (OUTPATIENT)
Dept: DERMATOLOGY | Facility: CLINIC | Age: 35
End: 2020-09-02
Payer: COMMERCIAL

## 2020-09-02 DIAGNOSIS — L70.0 ACNE VULGARIS: Primary | ICD-10-CM

## 2020-09-02 PROCEDURE — 99999 PR PBB SHADOW E&M-EST. PATIENT-LVL III: ICD-10-PCS | Mod: PBBFAC,,, | Performed by: PHYSICIAN ASSISTANT

## 2020-09-02 PROCEDURE — 99212 PR OFFICE/OUTPT VISIT, EST, LEVL II, 10-19 MIN: ICD-10-PCS | Mod: S$GLB,,, | Performed by: PHYSICIAN ASSISTANT

## 2020-09-02 PROCEDURE — 99212 OFFICE O/P EST SF 10 MIN: CPT | Mod: S$GLB,,, | Performed by: PHYSICIAN ASSISTANT

## 2020-09-02 PROCEDURE — 99999 PR PBB SHADOW E&M-EST. PATIENT-LVL III: CPT | Mod: PBBFAC,,, | Performed by: PHYSICIAN ASSISTANT

## 2020-09-02 NOTE — PROGRESS NOTES
Subjective:       Patient ID:  Joan Wilde is a 35 y.o. female who presents for   Chief Complaint   Patient presents with    Dermatitis     Pt last seen for acne on 6/1/20 via virtual visit with Dr. Perea. She has been using Altreno (tret 0.05% ltn) qhs x 3 months now - was unable to tolerate other retinoids in the past. She initially noticed some improvement when only using it 3-4 times weekly but lately acne has not been improving and her face stings with application. She also uses clinda ltn prn. She has also noticed a dry, dark patch on the face (this AM and on one other occasion) - denies associated itching. States it is just there when she wakes up in the AM. Reports skin seemed to look better when she was using otc The Ordinary products.       Review of Systems   Genitourinary: Negative for irregular periods (on ocp).   Skin: Positive for daily sunscreen use. Negative for itching.        Objective:    Physical Exam   Constitutional: She appears well-developed and well-nourished. No distress.   Neurological: She is alert and oriented to person, place, and time. She is not disoriented.   Psychiatric: She has a normal mood and affect.   Skin:   Areas Examined (abnormalities noted in diagram):   Head / Face Inspection Performed              Diagram Legend     Erythematous scaling macule/papule c/w actinic keratosis       Vascular papule c/w angioma      Pigmented verrucoid papule/plaque c/w seborrheic keratosis      Yellow umbilicated papule c/w sebaceous hyperplasia      Irregularly shaped tan macule c/w lentigo     1-2 mm smooth white papules consistent with Milia      Movable subcutaneous cyst with punctum c/w epidermal inclusion cyst      Subcutaneous movable cyst c/w pilar cyst      Firm pink to brown papule c/w dermatofibroma      Pedunculated fleshy papule(s) c/w skin tag(s)      Evenly pigmented macule c/w junctional nevus     Mildly variegated pigmented, slightly irregular-bordered macule  "c/w mildly atypical nevus      Flesh colored to evenly pigmented papule c/w intradermal nevus       Pink pearly papule/plaque c/w basal cell carcinoma      Erythematous hyperkeratotic cursted plaque c/w SCC      Surgical scar with no sign of skin cancer recurrence      Open and closed comedones      Inflammatory papules and pustules      Verrucoid papule consistent consistent with wart     Erythematous eczematous patches and plaques     Dystrophic onycholytic nail with subungual debris c/w onychomycosis     Umbilicated papule    Erythematous-base heme-crusted tan verrucoid plaque consistent with inflamed seborrheic keratosis     Erythematous Silvery Scaling Plaque c/w Psoriasis     See annotation      Assessment / Plan:      Acne vulgaris  Recommend d/c prescription retinoid and restarting otc "The Ordinary" retinol again since they were better tolerated and pt reports skin being clearer then as well.  Also recommend "The Ordinary" azelaic acid qam.  Continue EltaMD qam.  Continue mild face cleanser.         Follow up if symptoms worsen or fail to improve.    "

## 2020-09-03 ENCOUNTER — CLINICAL SUPPORT (OUTPATIENT)
Dept: INTERNAL MEDICINE | Facility: CLINIC | Age: 35
End: 2020-09-03
Payer: COMMERCIAL

## 2020-09-03 VITALS — BODY MASS INDEX: 33.95 KG/M2 | WEIGHT: 210.31 LBS

## 2020-09-03 PROCEDURE — 99999 PR PBB SHADOW E&M-EST. PATIENT-LVL I: CPT | Mod: PBBFAC,,,

## 2020-09-03 PROCEDURE — 99999 PR PBB SHADOW E&M-EST. PATIENT-LVL I: ICD-10-PCS | Mod: PBBFAC,,,

## 2020-09-03 NOTE — PROGRESS NOTES
Health  Follow-Up Note   [x] Office  [] Phone  Notes from previous session reviewed.   [x] Previous Session Goals unchanged.   [] Patient/Caregiver Change in Goals.  Goals added or changed by Patient/Caregiver since program participation:  1. Weight lifting 4-5 per week  2. Continue same plan  3. Stay on track and get right back on if deter        Additional/Changed support that patient/caregiver has experienced/sought?  (Indicate readiness, support from family, friends, others, community groups, etc)  1.  Friend    Additional/Changed obstacles that could prevent patient/caregiver from reaching their goals?  1.  cravings for foods ate pizza    Feedback provided:  1.  Praised for good job down 2 lbs.    Diagnostic values/Desriptors for follow-up as needed for chronic condition(s)   Weight: 95.4 kg 210.32 lb    Interventions:   1. Health  listened reflectively, validated thoughts and feelings, offered support and encouragement.   2. Allowed patient to express themselves in a non-biased atmosphere.  3. Health  assisted pt to problem-solve obstacles such as being in a challenging environment and dealing with these challenges.   4. Motivational Interviewed interventions utilized (OARS).   5. Patient responded favorably to interventions and remained actively engaged in the session.   6. Health  will remain available and connected for patient by phone and/or office visits.   7. Positive reinforcement, emotional support and encouragement provided.   8. Focused Education: MI    Plan:  [x] Pt will work on goals as stated above.   [x] Pt will contact Health  for any questions, concerns or needs.  [x] Pt will follow up with Health  in office on  9.24.20.  [] Pt will follow up with Health  on phone in:        [x] Health  will remain available.   [] Health  will contact patient by phone in:        [] Health  will consult:        [] Health  will inform Provider via [employer]  messaging.     Impression:  1. Behavior is consistent with Action Stage of Change.   2. Participation level:       [x] Receptive      [x] Interactive      [] Guarded and Resistant      [x] Self Motivated      [] Refused/Declined to participate   3. [x] Pt voiced understanding of all information presented.       [] Pt voiced needing further information/education. This will be arranged.       [x] Pt would benefit from further education/information as identified by this health . This will be arranged.     Lashell Pedroza RN HC

## 2020-09-14 ENCOUNTER — IMMUNIZATION (OUTPATIENT)
Dept: PHARMACY | Facility: CLINIC | Age: 35
End: 2020-09-14
Payer: COMMERCIAL

## 2020-09-24 ENCOUNTER — CLINICAL SUPPORT (OUTPATIENT)
Dept: INTERNAL MEDICINE | Facility: CLINIC | Age: 35
End: 2020-09-24
Payer: COMMERCIAL

## 2020-09-24 ENCOUNTER — OFFICE VISIT (OUTPATIENT)
Dept: ALLERGY | Facility: CLINIC | Age: 35
End: 2020-09-24
Payer: COMMERCIAL

## 2020-09-24 VITALS — BODY MASS INDEX: 33.32 KG/M2 | OXYGEN SATURATION: 98 % | HEIGHT: 67 IN | WEIGHT: 212.31 LBS | HEART RATE: 87 BPM

## 2020-09-24 VITALS — WEIGHT: 209.19 LBS | BODY MASS INDEX: 33.77 KG/M2

## 2020-09-24 DIAGNOSIS — J45.21 MILD INTERMITTENT ASTHMA WITH ACUTE EXACERBATION: ICD-10-CM

## 2020-09-24 DIAGNOSIS — H10.13 ALLERGIC CONJUNCTIVITIS, BILATERAL: ICD-10-CM

## 2020-09-24 DIAGNOSIS — K21.9 GASTROESOPHAGEAL REFLUX DISEASE, ESOPHAGITIS PRESENCE NOT SPECIFIED: ICD-10-CM

## 2020-09-24 DIAGNOSIS — K21.9 LARYNGOPHARYNGEAL REFLUX: ICD-10-CM

## 2020-09-24 DIAGNOSIS — J30.89 ALLERGIC RHINITIS DUE TO DUST MITE: Primary | ICD-10-CM

## 2020-09-24 PROCEDURE — 3008F PR BODY MASS INDEX (BMI) DOCUMENTED: ICD-10-PCS | Mod: CPTII,S$GLB,, | Performed by: ALLERGY & IMMUNOLOGY

## 2020-09-24 PROCEDURE — 99999 PR PBB SHADOW E&M-EST. PATIENT-LVL IV: CPT | Mod: PBBFAC,,, | Performed by: ALLERGY & IMMUNOLOGY

## 2020-09-24 PROCEDURE — 3008F BODY MASS INDEX DOCD: CPT | Mod: CPTII,S$GLB,, | Performed by: ALLERGY & IMMUNOLOGY

## 2020-09-24 PROCEDURE — 99214 OFFICE O/P EST MOD 30 MIN: CPT | Mod: S$GLB,,, | Performed by: ALLERGY & IMMUNOLOGY

## 2020-09-24 PROCEDURE — 99999 PR PBB SHADOW E&M-EST. PATIENT-LVL IV: ICD-10-PCS | Mod: PBBFAC,,, | Performed by: ALLERGY & IMMUNOLOGY

## 2020-09-24 PROCEDURE — 99214 PR OFFICE/OUTPT VISIT, EST, LEVL IV, 30-39 MIN: ICD-10-PCS | Mod: S$GLB,,, | Performed by: ALLERGY & IMMUNOLOGY

## 2020-09-24 PROCEDURE — 99999 PR PBB SHADOW E&M-EST. PATIENT-LVL I: CPT | Mod: PBBFAC,,,

## 2020-09-24 PROCEDURE — 99999 PR PBB SHADOW E&M-EST. PATIENT-LVL I: ICD-10-PCS | Mod: PBBFAC,,,

## 2020-09-24 NOTE — PROGRESS NOTES
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  She is here alone.  She was last seen be a Virtual visit on May 15, 2020.    After her last visit, she had developed abdominal pain on famotidine and this was discontinued.  She started omeprazole 40 milligrams a day and has tolerated this without any abdominal bloating.    This had been controlling her LPR and esophageal spasm.  About 10 days ago she developed increased postnasal drip, throat clearing, a sensation that something was in the back of the throat, and sore throat.  These were her symptoms when she had uncontrolled LPR.    About three weeks ago she developed epigastric pain with burning.  She thought she was developing an ulcer.    She does have some mild difficulty swallowing even with water.    She has tried Zyrtec but this causes drowsiness and she is reluctant to take during the day.  This is also occurred after Claritin.  She has not tried Allegra.  She is not taking Flonase.    She has not needed any Flovent or albuterol.    She denies any wheezing or shortness of breath.    [facility] PEQ ALLERGY QUESTIONNAIRE SHORT 9/24/2020   Head or facial pain: -   Facial swelling? No   Sinus pain? No   Sinus pressure? No   Ears: No symptoms   Ear discharge? -   Ear pain? -   Hearing loss? -   Nosebleeds? No   Postnasal drip? Yes   Sneezing? No   Runny nose? Yes   Congestion? No   Throat: -   Sore throat? Yes   Trouble swallowing? No   Voice change? No   Eyes: -   Eye itching? Yes   Eye redness? Yes   Eye discharge? No   Eye pain?  No   Light sensitivity / light hurts the eyes? No   Cough? No   Wheezing? No   Shortness of breath? No   Apnea? No   Choking? No   Chest tightness? No   Skin: -   Rash? No   Color change of skin? Yes     The patient's last visit with me was on 5/15/2020.    [facility] PE[facility] ALLERGY QUESTIONNAIRE SHORT 5/15/2020   Head or facial pain: -   Facial swelling? No   Sinus pain? No   Sinus pressure? No    Ears: -   Ear discharge? No   Ear pain? No   Hearing loss? No   Nosebleeds? No   Postnasal drip? Yes   Sneezing? No   Runny nose? No   Congestion? No   Throat: -   Sore throat? No   Trouble swallowing? No   Voice change? No   Eyes: -   Eye itching? No   Eye redness? No   Eye discharge? No   Eye pain?  No   Light sensitivity / light hurts the eyes? No   Cough? No   Wheezing? No   Shortness of breath? No   Apnea? No   Choking? No   Chest tightness? Yes   Skin: -   Rash? No   Color change of skin? No     Physical Examination:  General: Well-developed, well-nourished, no acute distress.  Head: No sinus tenderness.  Eyes: Conjunctivae:  No bulbar or palpebral conjunctival injection.  Ears: EAC's clear.  TM's clear.  No pre-auricular nodes.  Nose: Nasal Mucosa:  Pink.  Septum: No apparent deviation.  Turbinates:  No significant edema.  Polyps/Mass:  None visible.  Teeth/Gums:  No bleeding noted.  Oropharynx: No exudates.  Neck: Supple without thyromegaly. No cervical lymphadenopathy.    Respiratory/Chest: Effort: Good.  Auscultation:  Clear bilaterally.  Skin: Good turgor.  No urticaria or angioedema.  Neuro/Psych: Oriented x 3.    Laboratory 04/15/2019:  IgE level:  Less than 35.  ImmunoCAP:  Negative.    Spirometry 04/16/2019:  Normal spirometry. Airflow not improved after bronchodilator.    Inhalant skin tests 05/13/2019:  3+ histamine control.  3+ dust mites.    Assessment:  1.  Allergic rhinitis.  2.  Allergic conjunctivitis.  3.  Chronic cough with wheezing, consider bronchospasm, resolved.  4.  Chronic dermatitis, improved.  5.  GERD.  6.  LPR.  7.  Probable esophageal spasm, improved.  8.  Snoring.  9.  Abdominal bloating probably secondary to PPI, resolved.    Recommendations:  1.  Continue house dust mite avoidance.    2.  Allegra as needed.  3.  Albuterol as needed.  4.  Continue omeprazole 40 milligrams a day.    5.  Add antacids at night and follow symptoms.  6.  She will message any change in her symptoms  in one week.  7.  Consider adding Flonase.  This was discussed.    Patient education:  April 20, 2020:  LPR and web site were reviewed.    Patient education:  October 29, 2019:  Allergic mechanisms and treatment options were reviewed in detail.    May 13, 2019:  House dust mite avoidance was reviewed.  Handouts were given.

## 2020-09-24 NOTE — PROGRESS NOTES
Health  Follow-Up Note   [x] Office  [] Phone  Notes from previous session reviewed.   [x] Previous Session Goals unchanged.   [] Patient/Caregiver Change in Goals.  Goals added or changed by Patient/Caregiver since program participation:  1. Started using monk fruit stopped the honey and sugar  2. Replace cravings with healthy options  3. Continue sensible meals       Additional/Changed support that patient/caregiver has experienced/sought?  (Indicate readiness, support from family, friends, others, community groups, etc)  1.  Health     Additional/Changed obstacles that could prevent patient/caregiver from reaching their goals?  1.  Cravings pasta osmani rice    Feedback provided:  1.  Praised for good job down 1 lbs total 4.63 lbs    Diagnostic values/Desriptors for follow-up as needed for chronic condition(s)   Weight: 94.9 kg 209.22 lb    Interventions:   1. Health  listened reflectively, validated thoughts and feelings, offered support and encouragement.   2. Allowed patient to express themselves in a non-biased atmosphere.  3. Health  assisted pt to problem-solve obstacles such as being in a challenging environment and dealing with these challenges.   4. Motivational Interviewed interventions utilized (OARS).   5. Patient responded favorably to interventions and remained actively engaged in the session.   6. Health  will remain available and connected for patient by phone and/or office visits.   7. Positive reinforcement, emotional support and encouragement provided.   8. Focused Education: MI    Plan:  [x] Pt will work on goals as stated above.   [x] Pt will contact Health  for any questions, concerns or needs.  [x] Pt will follow up with Health  in office on  10.15.20.  [] Pt will follow up with Health  on phone in:        [x] Health  will remain available.   [] Health  will contact patient by phone in:        [] Health  will consult:        [] Health   will inform Provider via EPIC messaging.     Impression:  1. Behavior is consistent with Action Stage of Change.   2. Participation level:       [x] Receptive      [x] Interactive      [] Guarded and Resistant      [x] Self Motivated      [] Refused/Declined to participate   3. [x] Pt voiced understanding of all information presented.       [] Pt voiced needing further information/education. This will be arranged.       [x] Pt would benefit from further education/information as identified by this health . This will be arranged.     Lashell Pedroza RN HC

## 2020-10-15 ENCOUNTER — PATIENT MESSAGE (OUTPATIENT)
Dept: DERMATOLOGY | Facility: CLINIC | Age: 35
End: 2020-10-15

## 2020-10-15 ENCOUNTER — CLINICAL SUPPORT (OUTPATIENT)
Dept: INTERNAL MEDICINE | Facility: CLINIC | Age: 35
End: 2020-10-15
Payer: COMMERCIAL

## 2020-10-15 VITALS — WEIGHT: 207.44 LBS | BODY MASS INDEX: 32.49 KG/M2

## 2020-10-15 DIAGNOSIS — L25.9 CONTACT DERMATITIS, UNSPECIFIED CONTACT DERMATITIS TYPE, UNSPECIFIED TRIGGER: ICD-10-CM

## 2020-10-15 PROCEDURE — 99999 PR PBB SHADOW E&M-EST. PATIENT-LVL I: ICD-10-PCS | Mod: PBBFAC,,,

## 2020-10-15 PROCEDURE — 99999 PR PBB SHADOW E&M-EST. PATIENT-LVL I: CPT | Mod: PBBFAC,,,

## 2020-10-15 RX ORDER — ALCLOMETASONE DIPROPIONATE 0.5 MG/G
CREAM TOPICAL
Qty: 60 G | Refills: 0 | Status: SHIPPED | OUTPATIENT
Start: 2020-10-15 | End: 2021-06-01 | Stop reason: SDUPTHER

## 2020-10-15 NOTE — PROGRESS NOTES
Health  Follow-Up Note   [x] Office  [] Phone  Notes from previous session reviewed.   [x] Previous Session Goals unchanged.   [] Patient/Caregiver Change in Goals.   Goals added or changed by Patient/Caregiver since program participation:  1. Start yoga  2. Increase walking   3. Try VANDOLAY timer angelika for meditation  4. Comfort food smoothie or something healthy       Additional/Changed support that patient/caregiver has experienced/sought?  (Indicate readiness, support from family, friends, others, community groups, etc)  1.  Health     Additional/Changed obstacles that could prevent patient/caregiver from reaching their goals?  1.  Stress from work  2. Eating pizza for comfort food    Feedback provided:   1.  Praised for good job down 1.77 lbs total loss 8.4 lb since 7.30.20 and 17.5 lb since 1.31.20.    Diagnostic values/Desriptors for follow-up as needed for chronic condition(s)   Weight: 94.1 kg 207.45 lb     Interventions:   1. Health  listened reflectively, validated thoughts and feelings, offered support and encouragement.   2. Allowed patient to express themselves in a non-biased atmosphere.  3. Health  assisted pt to problem-solve obstacles such as being in a challenging environment and dealing with these challenges.   4. Motivational Interviewed interventions utilized (OARS).   5. Patient responded favorably to interventions and remained actively engaged in the session.   6. Health  will remain available and connected for patient by phone and/or office visits.   7. Positive reinforcement, emotional support and encouragement provided.   8. Focused Education: MI    Plan:  [x] Pt will work on goals as stated above.   [x] Pt will contact Health  for any questions, concerns or needs.  [x] Pt will follow up with Health  in office on  11.5.20.  [] Pt will follow up with Health  on phone in:        [x] Health  will remain available.   [] Health  will contact  patient by phone in:        [] Health  will consult:        [] Health  will inform Provider via EPIC messaging.     Impression:  1. Behavior is consistent with Action Stage of Change.   2. Participation level:       [x] Receptive      [x] Interactive      [] Guarded and Resistant      [x] Self Motivated      [] Refused/Declined to participate   3. [x] Pt voiced understanding of all information presented.       [] Pt voiced needing further information/education. This will be arranged.       [x] Pt would benefit from further education/information as identified by this health . This will be arranged.     Lashell Pedroza RN HC

## 2020-11-05 ENCOUNTER — CLINICAL SUPPORT (OUTPATIENT)
Dept: INTERNAL MEDICINE | Facility: CLINIC | Age: 35
End: 2020-11-05
Payer: COMMERCIAL

## 2020-11-05 VITALS — BODY MASS INDEX: 31.8 KG/M2 | WEIGHT: 203.06 LBS

## 2020-11-05 PROCEDURE — 99999 PR PBB SHADOW E&M-EST. PATIENT-LVL I: CPT | Mod: PBBFAC,,,

## 2020-11-05 PROCEDURE — 99999 PR PBB SHADOW E&M-EST. PATIENT-LVL I: ICD-10-PCS | Mod: PBBFAC,,,

## 2020-11-05 NOTE — PROGRESS NOTES
Health  Follow-Up Note   [x] Office  [] Phone  Notes from previous session reviewed.   [x] Previous Session Goals unchanged.   [] Patient/Caregiver Change in Goals.   Goals added or changed by Patient/Caregiver since program participation:  1. Continue same plan        Additional/Changed support that patient/caregiver has experienced/sought?  (Indicate readiness, support from family, friends, others, community groups, etc)  1.  Health     Additional/Changed obstacles that could prevent patient/caregiver from reaching their goals?  1.  Out of town twice in last two weeks had to eat some not so healthy foods    Feedback provided:  1.  Praised for good job down 4.4 lbs total loss 13 lbs since 7.24.20 and 22 lbs since 1.31.20.    Diagnostic values/Desriptors for follow-up as needed for chronic condition(s)   Weight: 92.1 kg 203.04 lbs    Interventions:   1. Health  listened reflectively, validated thoughts and feelings, offered support and encouragement.   2. Allowed patient to express themselves in a non-biased atmosphere.  3. Health  assisted pt to problem-solve obstacles such as being in a challenging environment and dealing with these challenges.   4. Motivational Interviewed interventions utilized (OARS).   5. Patient responded favorably to interventions and remained actively engaged in the session.   6. Health  will remain available and connected for patient by phone and/or office visits.   7. Positive reinforcement, emotional support and encouragement provided.   8. Focused Education: MI    Plan:  [x] Pt will work on goals as stated above.   [x] Pt will contact Health  for any questions, concerns or needs.  [x] Pt will follow up with Health  in office on  11.25.20.  [] Pt will follow up with Health  on phone in:        [x] Health  will remain available.   [] Health  will contact patient by phone in:        [] Health  will consult:        [] Health  will  inform Provider via EPIC messaging.     Impression:  1. Behavior is consistent with Action Stage of Change.   2. Participation level:       [x] Receptive      [x] Interactive      [] Guarded and Resistant      [x] Self Motivated      [] Refused/Declined to participate   3. [x] Pt voiced understanding of all information presented.       [] Pt voiced needing further information/education. This will be arranged.       [x] Pt would benefit from further education/information as identified by this health . This will be arranged.     Lashell Pedroza RN HC

## 2020-11-11 ENCOUNTER — PATIENT MESSAGE (OUTPATIENT)
Dept: ALLERGY | Facility: CLINIC | Age: 35
End: 2020-11-11

## 2020-11-25 ENCOUNTER — CLINICAL SUPPORT (OUTPATIENT)
Dept: INTERNAL MEDICINE | Facility: CLINIC | Age: 35
End: 2020-11-25
Payer: COMMERCIAL

## 2020-11-25 VITALS — BODY MASS INDEX: 31.49 KG/M2 | WEIGHT: 201.06 LBS

## 2020-11-25 NOTE — PROGRESS NOTES
Health  Follow-Up Note   [x] Office  [] Phone  Notes from previous session reviewed.   [x] Previous Session Goals unchanged.   [] Patient/Caregiver Change in Goals.  Goals added or changed by Patient/Caregiver since program participation:  1. Continue same plan   2. Walk more  3. Eat more vegetables       Additional/Changed support that patient/caregiver has experienced/sought?  (Indicate readiness, support from family, friends, others, community groups, etc)  1.  Health     Additional/Changed obstacles that could prevent patient/caregiver from reaching their goals?  1.  Going to party ate cake and wild wings     Feedback provided:  1.  Praised for good job down 2 lbs total now 15 lbs since 7.24.20.    Diagnostic values/Desriptors for follow-up as needed for chronic condition(s)   Weight: 91.2 kg 201.06 lb    Interventions:   1. Health  listened reflectively, validated thoughts and feelings, offered support and encouragement.   2. Allowed patient to express themselves in a non-biased atmosphere.  3. Health  assisted pt to problem-solve obstacles such as being in a challenging environment and dealing with these challenges.   4. Motivational Interviewed interventions utilized (OARS).   5. Patient responded favorably to interventions and remained actively engaged in the session.   6. Health  will remain available and connected for patient by phone and/or office visits.   7. Positive reinforcement, emotional support and encouragement provided.   8. Focused Education: MI    Plan:  [x] Pt will work on goals as stated above.   [x] Pt will contact Health  for any questions, concerns or needs.  [x] Pt will follow up with Health  in office on  12.17.20.  [] Pt will follow up with Health  on phone in:        [x] Health  will remain available.   [] Health  will contact patient by phone in:        [] Health  will consult:        [] Health  will inform Provider via "[employer]  messaging.     Impression:  1. Behavior is consistent with Action Stage of Change.   2. Participation level:       [x] Receptive      [x] Interactive      [] Guarded and Resistant      [x] Self Motivated      [] Refused/Declined to participate   3. [x] Pt voiced understanding of all information presented.       [] Pt voiced needing further information/education. This will be arranged.       [x] Pt would benefit from further education/information as identified by this health . This will be arranged.     Lashell Pedroza RN HC

## 2020-11-30 ENCOUNTER — PATIENT MESSAGE (OUTPATIENT)
Dept: INTERNAL MEDICINE | Facility: CLINIC | Age: 35
End: 2020-11-30

## 2020-12-02 ENCOUNTER — PATIENT OUTREACH (OUTPATIENT)
Dept: ADMINISTRATIVE | Facility: OTHER | Age: 35
End: 2020-12-02

## 2020-12-02 ENCOUNTER — OFFICE VISIT (OUTPATIENT)
Dept: PSYCHIATRY | Facility: CLINIC | Age: 35
End: 2020-12-02
Payer: COMMERCIAL

## 2020-12-02 DIAGNOSIS — R69 PSYCHIATRIC DIAGNOSIS DEFERRED: Primary | ICD-10-CM

## 2020-12-02 PROCEDURE — 90834 PR PSYCHOTHERAPY W/PATIENT, 45 MIN: ICD-10-PCS | Mod: S$GLB,,, | Performed by: SOCIAL WORKER

## 2020-12-02 PROCEDURE — 99999 PR PBB SHADOW E&M-EST. PATIENT-LVL I: ICD-10-PCS | Mod: PBBFAC,,, | Performed by: SOCIAL WORKER

## 2020-12-02 PROCEDURE — 90834 PSYTX W PT 45 MINUTES: CPT | Mod: S$GLB,,, | Performed by: SOCIAL WORKER

## 2020-12-02 PROCEDURE — 99999 PR PBB SHADOW E&M-EST. PATIENT-LVL I: CPT | Mod: PBBFAC,,, | Performed by: SOCIAL WORKER

## 2020-12-04 ENCOUNTER — OFFICE VISIT (OUTPATIENT)
Dept: DERMATOLOGY | Facility: CLINIC | Age: 35
End: 2020-12-04
Payer: COMMERCIAL

## 2020-12-04 DIAGNOSIS — L81.9 DYSCHROMIA: Primary | ICD-10-CM

## 2020-12-04 PROCEDURE — 1126F PR PAIN SEVERITY QUANTIFIED, NO PAIN PRESENT: ICD-10-PCS | Mod: S$GLB,,, | Performed by: DERMATOLOGY

## 2020-12-04 PROCEDURE — 99213 PR OFFICE/OUTPT VISIT, EST, LEVL III, 20-29 MIN: ICD-10-PCS | Mod: S$GLB,,, | Performed by: DERMATOLOGY

## 2020-12-04 PROCEDURE — 99999 PR PBB SHADOW E&M-EST. PATIENT-LVL III: ICD-10-PCS | Mod: PBBFAC,,, | Performed by: DERMATOLOGY

## 2020-12-04 PROCEDURE — 99213 OFFICE O/P EST LOW 20 MIN: CPT | Mod: S$GLB,,, | Performed by: DERMATOLOGY

## 2020-12-04 PROCEDURE — 99999 PR PBB SHADOW E&M-EST. PATIENT-LVL III: CPT | Mod: PBBFAC,,, | Performed by: DERMATOLOGY

## 2020-12-04 PROCEDURE — 1126F AMNT PAIN NOTED NONE PRSNT: CPT | Mod: S$GLB,,, | Performed by: DERMATOLOGY

## 2020-12-04 NOTE — PROGRESS NOTES
Subjective:       Patient ID:  Joan Wilde is a 35 y.o. female who presents for   Chief Complaint   Patient presents with    Follow-up     allergy reaction to altereno     Follow-up - Follow-up  Symptom course: worsening  Signs / symptoms: cracking, dryness, burning and scaling  Severity: mild    Patient started alterno but flared worse with usage. Would still like to address dark areas of face    Review of Systems   Constitutional: Negative for fever, chills and fatigue.   Skin: Positive for daily sunscreen use. Negative for recent sunburn and wears hat.   Hematologic/Lymphatic: Does not bruise/bleed easily.        Objective:    Physical Exam   Constitutional: She appears well-developed and well-nourished.   Neurological: She is alert and oriented to person, place, and time.   Psychiatric: She has a normal mood and affect.   Skin:   Areas Examined (abnormalities noted in diagram):   Head / Face Inspection Performed  Neck Inspection Performed              Diagram Legend     Erythematous scaling macule/papule c/w actinic keratosis       Vascular papule c/w angioma      Pigmented verrucoid papule/plaque c/w seborrheic keratosis      Yellow umbilicated papule c/w sebaceous hyperplasia      Irregularly shaped tan macule c/w lentigo     1-2 mm smooth white papules consistent with Milia      Movable subcutaneous cyst with punctum c/w epidermal inclusion cyst      Subcutaneous movable cyst c/w pilar cyst      Firm pink to brown papule c/w dermatofibroma      Pedunculated fleshy papule(s) c/w skin tag(s)      Evenly pigmented macule c/w junctional nevus     Mildly variegated pigmented, slightly irregular-bordered macule c/w mildly atypical nevus      Flesh colored to evenly pigmented papule c/w intradermal nevus       Pink pearly papule/plaque c/w basal cell carcinoma      Erythematous hyperkeratotic cursted plaque c/w SCC      Surgical scar with no sign of skin cancer recurrence      Open and closed  comedones      Inflammatory papules and pustules      Verrucoid papule consistent consistent with wart     Erythematous eczematous patches and plaques     Dystrophic onycholytic nail with subungual debris c/w onychomycosis     Umbilicated papule    Erythematous-base heme-crusted tan verrucoid plaque consistent with inflamed seborrheic keratosis     Erythematous Silvery Scaling Plaque c/w Psoriasis     See annotation      Assessment / Plan:        Dyschromia    - counseled patient this is a long-term problem and multiple peels/creams will need to be used before we will see resolution and may only see some lightening affect but to continue to work on what she is doing. Sun protection is her best friend, and wearing hats, avoiding outdoors, even wearing hats in the car will help to prevent darkening.   - Every am: Wash face with glycolic acid wash (aqua glycolic) or other gentle cleanser,  apply SPF 50 or greater preferably with iron oxide (Australian Gold ), then apply cerave cream   - Every pm: Wash face with glycolic acid wash (aqua glycolic) or other gentle cleanser,  then apply prescription:  Skin medic[employer] compound of 0.025 % tretinoin, 2% niacinamide, 8% Azelic acid +HA   Disp 30 g tube with 5 refills,  Then apply cerave cream      Patient tends to break out with any retnoid, will try compound as has ceramides/tumeric, will consider adding azelaic acid in future, vitamin C will hold due to senstive skin  Also can consider transmexic acid in the future    F/u 3 mo               No follow-ups on file.

## 2020-12-15 ENCOUNTER — PATIENT MESSAGE (OUTPATIENT)
Dept: ALLERGY | Facility: CLINIC | Age: 35
End: 2020-12-15

## 2020-12-15 ENCOUNTER — PATIENT MESSAGE (OUTPATIENT)
Dept: INTERNAL MEDICINE | Facility: CLINIC | Age: 35
End: 2020-12-15

## 2020-12-15 DIAGNOSIS — Z20.822 EXPOSURE TO COVID-19 VIRUS: Primary | ICD-10-CM

## 2020-12-16 ENCOUNTER — OFFICE VISIT (OUTPATIENT)
Dept: OPTOMETRY | Facility: CLINIC | Age: 35
End: 2020-12-16
Payer: COMMERCIAL

## 2020-12-16 DIAGNOSIS — Z04.9 DISEASE RULED OUT AFTER EXAMINATION: ICD-10-CM

## 2020-12-16 DIAGNOSIS — H52.13 MYOPIA, BILATERAL: Primary | ICD-10-CM

## 2020-12-16 PROCEDURE — 92014 COMPRE OPH EXAM EST PT 1/>: CPT | Mod: S$GLB,,, | Performed by: OPTOMETRIST

## 2020-12-16 PROCEDURE — 99999 PR PBB SHADOW E&M-EST. PATIENT-LVL III: CPT | Mod: PBBFAC,,, | Performed by: OPTOMETRIST

## 2020-12-16 PROCEDURE — 92015 PR REFRACTION: ICD-10-PCS | Mod: S$GLB,,, | Performed by: OPTOMETRIST

## 2020-12-16 PROCEDURE — 92014 PR EYE EXAM, EST PATIENT,COMPREHESV: ICD-10-PCS | Mod: S$GLB,,, | Performed by: OPTOMETRIST

## 2020-12-16 PROCEDURE — 92015 DETERMINE REFRACTIVE STATE: CPT | Mod: S$GLB,,, | Performed by: OPTOMETRIST

## 2020-12-16 PROCEDURE — 99999 PR PBB SHADOW E&M-EST. PATIENT-LVL III: ICD-10-PCS | Mod: PBBFAC,,, | Performed by: OPTOMETRIST

## 2020-12-16 NOTE — PROGRESS NOTES
HPI     DLS: 10/23/19    Pt here for Routine Eye Exam;  Pt states she needs to use her eye glasses more often due to small print   on the tv. Pt denies any floaters, flashes or diplopia.     Meds;   Refresh prn ou    Last edited by Olga Sumner on 12/16/2020  1:55 PM. (History)            Assessment /Plan     For exam results, see Encounter Report.    Myopia, bilateral    Disease ruled out after examination      Rx specs  Good overall ocular health, monitor yearly

## 2020-12-17 ENCOUNTER — CLINICAL SUPPORT (OUTPATIENT)
Dept: INTERNAL MEDICINE | Facility: CLINIC | Age: 35
End: 2020-12-17
Payer: COMMERCIAL

## 2020-12-17 VITALS — BODY MASS INDEX: 31.39 KG/M2 | WEIGHT: 200.38 LBS

## 2020-12-17 NOTE — PROGRESS NOTES
Health  Follow-Up Note   [x] Office  [] Phone  Notes from previous session reviewed.   [x] Previous Session Goals unchanged.   [] Patient/Caregiver Change in Goals.  Goals added or changed by Patient/Caregiver since program participation:  1. Continue same plan  2. Try new recipes       Additional/Changed support that patient/caregiver has experienced/sought?  (Indicate readiness, support from family, friends, others, community groups, etc)  1.  Coworkers     Additional/Changed obstacles that could prevent patient/caregiver from reaching their goals?  1.  Coworkers bringing sweets and deserts   2. Learning what to eat    Feedback provided:  1.  Praised for good job down 1 lbs total now 15 lbs since July and 25 lbs since January.    Diagnostic values/Desriptors for follow-up as needed for chronic condition(s)   Weight: 90.9 kg 200.4 lb     Interventions:   1. Health  listened reflectively, validated thoughts and feelings, offered support and encouragement.   2. Allowed patient to express themselves in a non-biased atmosphere.  3. Health  assisted pt to problem-solve obstacles such as being in a challenging environment and dealing with these challenges.   4. Motivational Interviewed interventions utilized (OARS).   5. Patient responded favorably to interventions and remained actively engaged in the session.   6. Health  will remain available and connected for patient by phone and/or office visits.   7. Positive reinforcement, emotional support and encouragement provided.   8. Focused Education: MI    Plan:  [x] Pt will work on goals as stated above.   [x] Pt will contact Health  for any questions, concerns or needs.  [x] Pt will follow up with Health  in office on  1.14.21.  [] Pt will follow up with Health  on phone in:        [x] Health  will remain available.   [] Health  will contact patient by phone in:        [] Health  will consult:        [] Health  will  inform Provider via EPIC messaging.     Impression:  1. Behavior is consistent with Action Stage of Change.   2. Participation level:       [x] Receptive      [x] Interactive      [] Guarded and Resistant      [x] Self Motivated      [] Refused/Declined to participate   3. [x] Pt voiced understanding of all information presented.       [] Pt voiced needing further information/education. This will be arranged.       [x] Pt would benefit from further education/information as identified by this health . This will be arranged.     Lashell Pedroza RN HC

## 2020-12-18 ENCOUNTER — PATIENT MESSAGE (OUTPATIENT)
Dept: ALLERGY | Facility: CLINIC | Age: 35
End: 2020-12-18

## 2020-12-21 ENCOUNTER — CLINICAL SUPPORT (OUTPATIENT)
Dept: URGENT CARE | Facility: CLINIC | Age: 35
End: 2020-12-21

## 2020-12-21 ENCOUNTER — OFFICE VISIT (OUTPATIENT)
Dept: ALLERGY | Facility: CLINIC | Age: 35
End: 2020-12-21
Payer: COMMERCIAL

## 2020-12-21 ENCOUNTER — OFFICE VISIT (OUTPATIENT)
Dept: URGENT CARE | Facility: CLINIC | Age: 35
End: 2020-12-21
Payer: COMMERCIAL

## 2020-12-21 ENCOUNTER — TELEPHONE (OUTPATIENT)
Dept: PRIMARY CARE CLINIC | Facility: OTHER | Age: 35
End: 2020-12-21

## 2020-12-21 VITALS
HEART RATE: 84 BPM | SYSTOLIC BLOOD PRESSURE: 126 MMHG | WEIGHT: 197 LBS | HEIGHT: 67 IN | BODY MASS INDEX: 30.92 KG/M2 | DIASTOLIC BLOOD PRESSURE: 85 MMHG | TEMPERATURE: 99 F | OXYGEN SATURATION: 98 %

## 2020-12-21 DIAGNOSIS — K21.9 GASTROESOPHAGEAL REFLUX DISEASE, UNSPECIFIED WHETHER ESOPHAGITIS PRESENT: ICD-10-CM

## 2020-12-21 DIAGNOSIS — R52 BODY ACHES: Primary | ICD-10-CM

## 2020-12-21 DIAGNOSIS — H10.13 ALLERGIC CONJUNCTIVITIS, BILATERAL: ICD-10-CM

## 2020-12-21 DIAGNOSIS — R53.83 FATIGUE, UNSPECIFIED TYPE: ICD-10-CM

## 2020-12-21 DIAGNOSIS — R68.83 CHILLS (WITHOUT FEVER): Primary | ICD-10-CM

## 2020-12-21 DIAGNOSIS — J30.89 ALLERGIC RHINITIS DUE TO DUST MITE: Primary | ICD-10-CM

## 2020-12-21 DIAGNOSIS — K21.9 LARYNGOPHARYNGEAL REFLUX: ICD-10-CM

## 2020-12-21 DIAGNOSIS — R52 BODY ACHES: ICD-10-CM

## 2020-12-21 LAB
CTP QC/QA: YES
CTP QC/QA: YES
POC MOLECULAR INFLUENZA A AGN: NEGATIVE
POC MOLECULAR INFLUENZA B AGN: NEGATIVE
SARS-COV-2 RDRP RESP QL NAA+PROBE: NEGATIVE

## 2020-12-21 PROCEDURE — 99213 OFFICE O/P EST LOW 20 MIN: CPT | Mod: S$GLB,,, | Performed by: INTERNAL MEDICINE

## 2020-12-21 PROCEDURE — 87502 POCT INFLUENZA A/B MOLECULAR: ICD-10-PCS | Mod: QW,S$GLB,, | Performed by: INTERNAL MEDICINE

## 2020-12-21 PROCEDURE — 99214 PR OFFICE/OUTPT VISIT, EST, LEVL IV, 30-39 MIN: ICD-10-PCS | Mod: 95,,, | Performed by: ALLERGY & IMMUNOLOGY

## 2020-12-21 PROCEDURE — 99214 OFFICE O/P EST MOD 30 MIN: CPT | Mod: 95,,, | Performed by: ALLERGY & IMMUNOLOGY

## 2020-12-21 PROCEDURE — U0002 COVID-19 LAB TEST NON-CDC: HCPCS | Mod: QW,S$GLB,, | Performed by: INTERNAL MEDICINE

## 2020-12-21 PROCEDURE — 3008F BODY MASS INDEX DOCD: CPT | Mod: CPTII,S$GLB,, | Performed by: INTERNAL MEDICINE

## 2020-12-21 PROCEDURE — 87502 INFLUENZA DNA AMP PROBE: CPT | Mod: QW,S$GLB,, | Performed by: INTERNAL MEDICINE

## 2020-12-21 PROCEDURE — 3008F PR BODY MASS INDEX (BMI) DOCUMENTED: ICD-10-PCS | Mod: CPTII,S$GLB,, | Performed by: INTERNAL MEDICINE

## 2020-12-21 PROCEDURE — 99213 PR OFFICE/OUTPT VISIT, EST, LEVL III, 20-29 MIN: ICD-10-PCS | Mod: S$GLB,,, | Performed by: INTERNAL MEDICINE

## 2020-12-21 PROCEDURE — U0002: ICD-10-PCS | Mod: QW,S$GLB,, | Performed by: INTERNAL MEDICINE

## 2020-12-21 RX ORDER — OMEPRAZOLE 40 MG/1
40 CAPSULE, DELAYED RELEASE ORAL EVERY MORNING
Qty: 60 CAPSULE | Refills: 5 | Status: SHIPPED | OUTPATIENT
Start: 2020-12-21 | End: 2021-11-03 | Stop reason: SDUPTHER

## 2020-12-21 NOTE — PROGRESS NOTES
"Subjective:       Patient ID: Joan Wilde is a 35 y.o. female.    Vitals:  height is 5' 7" (1.702 m) and weight is 89.4 kg (197 lb). Her temperature is 98.6 °F (37 °C). Her blood pressure is 126/85 and her pulse is 84. Her oxygen saturation is 98%.     Chief Complaint: Fatigue    Fatigue  This is a new problem. The current episode started in the past 7 days (Saturday). The problem has been gradually worsening. Associated symptoms include fatigue, headaches and myalgias. Pertinent negatives include no abdominal pain, anorexia, arthralgias, change in bowel habit, chest pain, chills, congestion, coughing, diaphoresis, fever, joint swelling, nausea, neck pain, numbness, rash, sore throat, swollen glands, urinary symptoms, vertigo, visual change, vomiting or weakness. Nothing aggravates the symptoms. She has tried nothing for the symptoms.       Constitution: Positive for fatigue. Negative for chills, sweating and fever.   HENT: Negative for congestion and sore throat.    Neck: Negative for neck pain and painful lymph nodes.   Cardiovascular: Negative for chest pain and leg swelling.   Eyes: Negative for double vision and blurred vision.   Respiratory: Negative for cough and shortness of breath.    Gastrointestinal: Negative for abdominal pain, nausea, vomiting and diarrhea.   Genitourinary: Negative for dysuria, frequency, urgency and history of kidney stones.   Musculoskeletal: Positive for muscle ache. Negative for joint pain, joint swelling and muscle cramps.   Skin: Negative for color change, pale, rash and bruising.   Allergic/Immunologic: Negative for seasonal allergies.   Neurological: Positive for headaches. Negative for dizziness, history of vertigo, light-headedness, passing out and numbness.   Hematologic/Lymphatic: Negative for swollen lymph nodes.   Psychiatric/Behavioral: Negative for nervous/anxious, sleep disturbance and depression. The patient is not nervous/anxious.        Objective:    "   Physical Exam      Assessment:       1. Chills (without fever)        Plan:         Chills (without fever)  -     POCT Influenza A/B MOLECULAR    COVID negative. Placed by Employee health. No exposure to COVID. Afebrile. OK to return to work if afebrile.

## 2020-12-21 NOTE — PROGRESS NOTES
The patient location is: Work.  The chief complaint leading to consultation is: Chronic rhinitis    Visit type:  Audiovisual.    Face to Face time with patient: 25 minutes  40 minutes of total time spent on the encounter, which includes face to face time and non-face to face time preparing to see the patient (eg, review of tests), Obtaining and/or reviewing separately obtained history, Documenting clinical information in the electronic or other health record, Independently interpreting results (not separately reported) and communicating results to the patient/family/caregiver, or Care coordination (not separately reported).     Each patient to whom he or she provides medical services by telemedicine is:  (1) informed of the relationship between the physician and patient and the respective role of any other health care provider with respect to management of the patient; and (2) notified that he or she may decline to receive medical services by telemedicine and may withdraw from such care at any time.    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  She was last seen September 24, 2020.    Since that time she has continued to take omeprazole 40 milligrams twice a day.  Her Omeprazole was not refilled and she has been without this for her about a week.    She started taking Pepto-Bismol at night.  After starting this her postnasal drip and throat clearing resolved.    Before then she had continued to have intermittent symptoms with postnasal drip and throat clearing.  Her reflux and esophageal spasm were controlled.    Her rhinitis has been controlled.    She uses cromoglycic acid eyedrops from [place] with relief.  Other eyedrops that she has had from Dr. Goldberg in the [place] have burned her eyes.  She last used [employer].      She has not needed any albuterol.    [facility] PEQ ALLERGY QUESTIONNAIRE SHORT 12/18/2020   Head or facial pain: -   Facial swelling? No   Sinus pain? No   Sinus pressure? No    Ears: -   Ear discharge? No   Ear pain? No   Hearing loss? No   Nosebleeds? No   Postnasal drip? Yes   Sneezing? Yes   Runny nose? No   Congestion? No   Throat: -   Sore throat? No   Trouble swallowing? No   Voice change? No   Eyes: -   Eye itching? Yes   Eye redness? No   Eye discharge? No   Eye pain?  No   Light sensitivity / light hurts the eyes? No   Cough? No   Wheezing? No   Shortness of breath? Yes   Apnea? No   Choking? No   Chest tightness? Yes   Skin: -   Rash? Yes   Color change of skin? No     Physical Examination:  Tele visit.    Laboratory 04/15/2019:  IgE level:  Less than 35.  ImmunoCAP:  Negative.    Spirometry 04/16/2019:  Normal spirometry. Airflow not improved after bronchodilator.    Inhalant skin tests 05/13/2019:  3+ histamine control.  3+ dust mites.    Assessment:  1.  Allergic rhinitis, controlled.  2.  Allergic conjunctivitis, controlled.  3.  Chronic cough with wheezing, consider bronchospasm, resolved.  4.  Chronic dermatitis, improved.  5.  GERD.  6.  LPR.  7.  Probable esophageal spasm, resolved.  8.  Snoring.  9.  Abdominal bloating probably secondary to PPI, resolved.    Recommendations:  1.  Continue house dust mite avoidance.    2.  Allegra as needed.  3.  Albuterol as needed.  4.  Continue omeprazole 40 milligrams twice a day.  If her symptoms are not completely controlled on this, she may try taking Pepto-Bismol at night with omeprazole in the morning.  5.  GI evaluation.  6.  Return to clinic in 2 to 3 months.    Patient education:  April 20, 2020:  LPR and web site were reviewed.    Patient education:  October 29, 2019:  Allergic mechanisms and treatment options were reviewed in detail.    May 13, 2019:  House dust mite avoidance was reviewed.  Handouts were given.

## 2020-12-21 NOTE — PATIENT INSTRUCTIONS
"Your covid and flu test were negative. Stay hydrated, use ibuprofen tylenol for aches and pains. Stay home if you have a fever.     You were tested for COVID-19 and tested negative. You had no contact as defined by the CDC with a known positive individual, therefore you are OK to return to work if you have been afebrile for 24 hours. Please continue practicing good hand hygiene, social distancing, and mask guidelines to help prevent further spread of the Coronavirus.     NEGATIVE COVID TEST  You have tested negative for COVID-19 today.  If you did not have a close exposure (as defined below) you can return to your normal daily activities to include social distancing, wearing a mask and frequent handwashing.    A "close exposure" is defined as anyone who has had an exposure (masked or unmasked) to a known COVID -19 positive person within 6 ft for longer than 15 minutes. If your exposure meets this definition, you are required by CDC guidelines to quarantine for at least 7-10 days from time of exposure.    The CDC states that a test can be performed for an asymptomatic patient (someone who does not have any symptoms) after a close exposure, and that a test should be done if you develop symptoms after a close exposure as described above.    Specifically, you can test at day 5 or later if asymptomatic in order to get released from quarantine on day 7 or later.  If you develop symptoms sooner, you should test when your symptoms start.  If you developed symptoms since the exposure, and your test was negative today and less than 5 days from your exposure, you still have to quarantine for 7-10 days from the date of the exposure.    The 7-10 day quarantine begins from the day you were exposed, not the day of your test.  For example, if your exposure was on a Monday, and you waited until Friday of the same week to get tested and it was negative, your 7-10 day quarantine begins from that Monday, not the Friday you tested " negative.    Please note, if you decide to test as an asymptomatic during your quarantine and you are positive, you will be restarting your quarantine and moving from a possible 10 day quarantine (if you do not test), to a 11 day or greater quarantine.

## 2020-12-28 ENCOUNTER — IMMUNIZATION (OUTPATIENT)
Dept: INTERNAL MEDICINE | Facility: CLINIC | Age: 35
End: 2020-12-28
Payer: COMMERCIAL

## 2020-12-28 DIAGNOSIS — Z23 NEED FOR VACCINATION: ICD-10-CM

## 2020-12-28 PROCEDURE — 0001A COVID-19, MRNA, LNP-S, PF, 30 MCG/0.3 ML DOSE VACCINE: ICD-10-PCS | Mod: CV19,,, | Performed by: INTERNAL MEDICINE

## 2020-12-28 PROCEDURE — 0001A COVID-19, MRNA, LNP-S, PF, 30 MCG/0.3 ML DOSE VACCINE: CPT | Mod: CV19,,, | Performed by: INTERNAL MEDICINE

## 2020-12-28 PROCEDURE — 91300 COVID-19, MRNA, LNP-S, PF, 30 MCG/0.3 ML DOSE VACCINE: ICD-10-PCS | Mod: ,,, | Performed by: INTERNAL MEDICINE

## 2020-12-28 PROCEDURE — 91300 COVID-19, MRNA, LNP-S, PF, 30 MCG/0.3 ML DOSE VACCINE: CPT | Mod: ,,, | Performed by: INTERNAL MEDICINE

## 2021-01-04 ENCOUNTER — OFFICE VISIT (OUTPATIENT)
Dept: PSYCHIATRY | Facility: CLINIC | Age: 36
End: 2021-01-04
Payer: COMMERCIAL

## 2021-01-04 DIAGNOSIS — R69 PSYCHIATRIC DIAGNOSIS DEFERRED: Primary | ICD-10-CM

## 2021-01-04 PROCEDURE — 90834 PSYTX W PT 45 MINUTES: CPT | Mod: S$GLB,,, | Performed by: SOCIAL WORKER

## 2021-01-04 PROCEDURE — 90834 PR PSYCHOTHERAPY W/PATIENT, 45 MIN: ICD-10-PCS | Mod: S$GLB,,, | Performed by: SOCIAL WORKER

## 2021-01-07 ENCOUNTER — PATIENT MESSAGE (OUTPATIENT)
Dept: OPTOMETRY | Facility: CLINIC | Age: 36
End: 2021-01-07

## 2021-01-14 ENCOUNTER — CLINICAL SUPPORT (OUTPATIENT)
Dept: INTERNAL MEDICINE | Facility: CLINIC | Age: 36
End: 2021-01-14
Payer: COMMERCIAL

## 2021-01-14 VITALS — BODY MASS INDEX: 31.97 KG/M2 | WEIGHT: 204.13 LBS

## 2021-01-18 ENCOUNTER — IMMUNIZATION (OUTPATIENT)
Dept: INTERNAL MEDICINE | Facility: CLINIC | Age: 36
End: 2021-01-18
Payer: COMMERCIAL

## 2021-01-18 DIAGNOSIS — Z23 NEED FOR VACCINATION: Primary | ICD-10-CM

## 2021-01-18 PROCEDURE — 0002A COVID-19, MRNA, LNP-S, PF, 30 MCG/0.3 ML DOSE VACCINE: CPT | Mod: PBBFAC | Performed by: INTERNAL MEDICINE

## 2021-01-18 PROCEDURE — 91300 COVID-19, MRNA, LNP-S, PF, 30 MCG/0.3 ML DOSE VACCINE: CPT | Mod: PBBFAC | Performed by: INTERNAL MEDICINE

## 2021-01-26 ENCOUNTER — PATIENT OUTREACH (OUTPATIENT)
Dept: ADMINISTRATIVE | Facility: OTHER | Age: 36
End: 2021-01-26

## 2021-01-26 ENCOUNTER — OFFICE VISIT (OUTPATIENT)
Dept: UROGYNECOLOGY | Facility: CLINIC | Age: 36
End: 2021-01-26
Payer: COMMERCIAL

## 2021-01-26 VITALS
BODY MASS INDEX: 31.91 KG/M2 | WEIGHT: 203.31 LBS | DIASTOLIC BLOOD PRESSURE: 70 MMHG | SYSTOLIC BLOOD PRESSURE: 110 MMHG | HEIGHT: 67 IN

## 2021-01-26 DIAGNOSIS — R10.2 PELVIC PAIN: ICD-10-CM

## 2021-01-26 DIAGNOSIS — N94.12 DEEP DYSPAREUNIA: Primary | ICD-10-CM

## 2021-01-26 DIAGNOSIS — N80.9 ENDOMETRIOSIS: ICD-10-CM

## 2021-01-26 PROCEDURE — 3008F BODY MASS INDEX DOCD: CPT | Mod: CPTII,S$GLB,, | Performed by: NURSE PRACTITIONER

## 2021-01-26 PROCEDURE — 99213 OFFICE O/P EST LOW 20 MIN: CPT | Mod: S$GLB,,, | Performed by: NURSE PRACTITIONER

## 2021-01-26 PROCEDURE — 1125F AMNT PAIN NOTED PAIN PRSNT: CPT | Mod: S$GLB,,, | Performed by: NURSE PRACTITIONER

## 2021-01-26 PROCEDURE — 1125F PR PAIN SEVERITY QUANTIFIED, PAIN PRESENT: ICD-10-PCS | Mod: S$GLB,,, | Performed by: NURSE PRACTITIONER

## 2021-01-26 PROCEDURE — 99999 PR PBB SHADOW E&M-EST. PATIENT-LVL IV: CPT | Mod: PBBFAC,,, | Performed by: NURSE PRACTITIONER

## 2021-01-26 PROCEDURE — 99999 PR PBB SHADOW E&M-EST. PATIENT-LVL IV: ICD-10-PCS | Mod: PBBFAC,,, | Performed by: NURSE PRACTITIONER

## 2021-01-26 PROCEDURE — 99213 PR OFFICE/OUTPT VISIT, EST, LEVL III, 20-29 MIN: ICD-10-PCS | Mod: S$GLB,,, | Performed by: NURSE PRACTITIONER

## 2021-01-26 PROCEDURE — 3008F PR BODY MASS INDEX (BMI) DOCUMENTED: ICD-10-PCS | Mod: CPTII,S$GLB,, | Performed by: NURSE PRACTITIONER

## 2021-01-28 ENCOUNTER — HOSPITAL ENCOUNTER (OUTPATIENT)
Dept: RADIOLOGY | Facility: HOSPITAL | Age: 36
Discharge: HOME | End: 2021-01-28
Attending: NURSE PRACTITIONER
Payer: COMMERCIAL

## 2021-01-28 DIAGNOSIS — R10.2 PELVIC PAIN: ICD-10-CM

## 2021-01-28 PROCEDURE — 76856 US EXAM PELVIC COMPLETE: CPT | Mod: 26,,, | Performed by: RADIOLOGY

## 2021-01-28 PROCEDURE — 76856 US EXAM PELVIC COMPLETE: CPT | Mod: TC

## 2021-01-28 PROCEDURE — 76856 US PELVIS COMP WITH TRANSVAG NON-OB (XPD): ICD-10-PCS | Mod: 26,,, | Performed by: RADIOLOGY

## 2021-01-28 PROCEDURE — 76830 US PELVIS COMP WITH TRANSVAG NON-OB (XPD): ICD-10-PCS | Mod: 26,,, | Performed by: RADIOLOGY

## 2021-01-28 PROCEDURE — 76830 TRANSVAGINAL US NON-OB: CPT | Mod: 26,,, | Performed by: RADIOLOGY

## 2021-02-09 ENCOUNTER — OFFICE VISIT (OUTPATIENT)
Dept: PSYCHIATRY | Facility: CLINIC | Age: 36
End: 2021-02-09
Payer: COMMERCIAL

## 2021-02-09 DIAGNOSIS — R69 PSYCHIATRIC DIAGNOSIS DEFERRED: Primary | ICD-10-CM

## 2021-02-09 PROCEDURE — 90834 PSYTX W PT 45 MINUTES: CPT | Mod: 95,,, | Performed by: SOCIAL WORKER

## 2021-02-09 PROCEDURE — 90834 PR PSYCHOTHERAPY W/PATIENT, 45 MIN: ICD-10-PCS | Mod: 95,,, | Performed by: SOCIAL WORKER

## 2021-02-11 ENCOUNTER — CLINICAL SUPPORT (OUTPATIENT)
Dept: INTERNAL MEDICINE | Facility: CLINIC | Age: 36
End: 2021-02-11
Payer: COMMERCIAL

## 2021-02-11 VITALS — BODY MASS INDEX: 32.15 KG/M2 | WEIGHT: 205.25 LBS

## 2021-03-09 ENCOUNTER — OFFICE VISIT (OUTPATIENT)
Dept: PSYCHIATRY | Facility: CLINIC | Age: 36
End: 2021-03-09
Payer: COMMERCIAL

## 2021-03-09 DIAGNOSIS — R69 PSYCHIATRIC DIAGNOSIS DEFERRED: Primary | ICD-10-CM

## 2021-03-09 PROCEDURE — 90834 PSYTX W PT 45 MINUTES: CPT | Mod: S$GLB,,, | Performed by: SOCIAL WORKER

## 2021-03-09 PROCEDURE — 90834 PR PSYCHOTHERAPY W/PATIENT, 45 MIN: ICD-10-PCS | Mod: S$GLB,,, | Performed by: SOCIAL WORKER

## 2021-03-26 ENCOUNTER — CLINICAL SUPPORT (OUTPATIENT)
Dept: INTERNAL MEDICINE | Facility: CLINIC | Age: 36
End: 2021-03-26
Payer: COMMERCIAL

## 2021-03-26 VITALS — WEIGHT: 197.31 LBS | BODY MASS INDEX: 30.9 KG/M2

## 2021-04-09 ENCOUNTER — OFFICE VISIT (OUTPATIENT)
Dept: PSYCHIATRY | Facility: CLINIC | Age: 36
End: 2021-04-09
Payer: COMMERCIAL

## 2021-04-09 DIAGNOSIS — R69 PSYCHIATRIC DIAGNOSIS DEFERRED: Primary | ICD-10-CM

## 2021-04-09 PROCEDURE — 90834 PR PSYCHOTHERAPY W/PATIENT, 45 MIN: ICD-10-PCS | Mod: S$GLB,,, | Performed by: SOCIAL WORKER

## 2021-04-09 PROCEDURE — 90834 PSYTX W PT 45 MINUTES: CPT | Mod: S$GLB,,, | Performed by: SOCIAL WORKER

## 2021-04-19 ENCOUNTER — CLINICAL SUPPORT (OUTPATIENT)
Dept: INTERNAL MEDICINE | Facility: CLINIC | Age: 36
End: 2021-04-19
Payer: COMMERCIAL

## 2021-04-19 VITALS — BODY MASS INDEX: 30.39 KG/M2 | WEIGHT: 194 LBS

## 2021-04-21 ENCOUNTER — ANESTHESIA (OUTPATIENT)
Dept: SURGERY | Facility: HOSPITAL | Age: 36
End: 2021-04-21
Payer: COMMERCIAL

## 2021-04-21 ENCOUNTER — ANESTHESIA EVENT (OUTPATIENT)
Dept: SURGERY | Facility: HOSPITAL | Age: 36
End: 2021-04-21
Payer: COMMERCIAL

## 2021-04-21 ENCOUNTER — HOSPITAL ENCOUNTER (OUTPATIENT)
Facility: HOSPITAL | Age: 36
Discharge: HOME | End: 2021-04-22
Attending: EMERGENCY MEDICINE | Admitting: SURGERY
Payer: COMMERCIAL

## 2021-04-21 DIAGNOSIS — Z01.818 PRE-OP EXAM: ICD-10-CM

## 2021-04-21 DIAGNOSIS — K35.80 ACUTE APPENDICITIS, UNSPECIFIED ACUTE APPENDICITIS TYPE: Primary | ICD-10-CM

## 2021-04-21 LAB
ALBUMIN SERPL BCP-MCNC: 3.8 G/DL (ref 3.5–5.2)
ALP SERPL-CCNC: 88 U/L (ref 55–135)
ALT SERPL W/O P-5'-P-CCNC: 24 U/L (ref 10–44)
ANION GAP SERPL CALC-SCNC: 9 MMOL/L (ref 8–16)
AST SERPL-CCNC: 20 U/L (ref 10–40)
B-HCG UR QL: NEGATIVE
BASOPHILS # BLD AUTO: 0.03 K/UL (ref 0–0.2)
BASOPHILS NFR BLD: 0.3 % (ref 0–1.9)
BILIRUB SERPL-MCNC: 0.3 MG/DL (ref 0.1–1)
BILIRUB UR QL STRIP: NEGATIVE
BUN SERPL-MCNC: 14 MG/DL (ref 6–20)
BUN SERPL-MCNC: 15 MG/DL (ref 6–30)
CALCIUM SERPL-MCNC: 8.9 MG/DL (ref 8.7–10.5)
CHLORIDE SERPL-SCNC: 104 MMOL/L (ref 95–110)
CHLORIDE SERPL-SCNC: 108 MMOL/L (ref 95–110)
CLARITY UR REFRACT.AUTO: CLEAR
CO2 SERPL-SCNC: 21 MMOL/L (ref 23–29)
COLOR UR AUTO: ABNORMAL
CREAT SERPL-MCNC: 0.9 MG/DL (ref 0.5–1.4)
CREAT SERPL-MCNC: 1 MG/DL (ref 0.5–1.4)
CTP QC/QA: YES
CTP QC/QA: YES
DIFFERENTIAL METHOD: ABNORMAL
EOSINOPHIL # BLD AUTO: 0 K/UL (ref 0–0.5)
EOSINOPHIL NFR BLD: 0.1 % (ref 0–8)
ERYTHROCYTE [DISTWIDTH] IN BLOOD BY AUTOMATED COUNT: 13.8 % (ref 11.5–14.5)
EST. GFR  (AFRICAN AMERICAN): >60 ML/MIN/1.73 M^2
EST. GFR  (NON AFRICAN AMERICAN): >60 ML/MIN/1.73 M^2
GLUCOSE SERPL-MCNC: 100 MG/DL (ref 70–110)
GLUCOSE SERPL-MCNC: 104 MG/DL (ref 70–110)
GLUCOSE UR QL STRIP: NEGATIVE
HCT VFR BLD AUTO: 35.9 % (ref 37–48.5)
HCT VFR BLD CALC: 37 %PCV (ref 36–54)
HCV AB SERPL QL IA: NEGATIVE
HGB BLD-MCNC: 12 G/DL (ref 12–16)
HGB UR QL STRIP: NEGATIVE
HIV 1+2 AB+HIV1 P24 AG SERPL QL IA: NEGATIVE
IMM GRANULOCYTES # BLD AUTO: 0.03 K/UL (ref 0–0.04)
IMM GRANULOCYTES NFR BLD AUTO: 0.3 % (ref 0–0.5)
KETONES UR QL STRIP: ABNORMAL
LEUKOCYTE ESTERASE UR QL STRIP: NEGATIVE
LIPASE SERPL-CCNC: 39 U/L (ref 4–60)
LYMPHOCYTES # BLD AUTO: 1.7 K/UL (ref 1–4.8)
LYMPHOCYTES NFR BLD: 15 % (ref 18–48)
MCH RBC QN AUTO: 25.6 PG (ref 27–31)
MCHC RBC AUTO-ENTMCNC: 33.4 G/DL (ref 32–36)
MCV RBC AUTO: 77 FL (ref 82–98)
MICROSCOPIC COMMENT: NORMAL
MONOCYTES # BLD AUTO: 0.5 K/UL (ref 0.3–1)
MONOCYTES NFR BLD: 4.2 % (ref 4–15)
NEUTROPHILS # BLD AUTO: 8.9 K/UL (ref 1.8–7.7)
NEUTROPHILS NFR BLD: 80.1 % (ref 38–73)
NITRITE UR QL STRIP: NEGATIVE
NRBC BLD-RTO: 0 /100 WBC
PH UR STRIP: 6 [PH] (ref 5–8)
PLATELET # BLD AUTO: 377 K/UL (ref 150–450)
PMV BLD AUTO: 9.8 FL (ref 9.2–12.9)
POC IONIZED CALCIUM: 1.08 MMOL/L (ref 1.06–1.42)
POC TCO2 (MEASURED): 20 MMOL/L (ref 23–29)
POTASSIUM BLD-SCNC: 4 MMOL/L (ref 3.5–5.1)
POTASSIUM SERPL-SCNC: 4.2 MMOL/L (ref 3.5–5.1)
PROT SERPL-MCNC: 7.2 G/DL (ref 6–8.4)
PROT UR QL STRIP: NEGATIVE
RBC # BLD AUTO: 4.68 M/UL (ref 4–5.4)
RBC #/AREA URNS AUTO: 1 /HPF (ref 0–4)
SAMPLE: ABNORMAL
SARS-COV-2 RDRP RESP QL NAA+PROBE: NEGATIVE
SODIUM BLD-SCNC: 138 MMOL/L (ref 136–145)
SODIUM SERPL-SCNC: 138 MMOL/L (ref 136–145)
SP GR UR STRIP: 1.01 (ref 1–1.03)
SQUAMOUS #/AREA URNS AUTO: 0 /HPF
URN SPEC COLLECT METH UR: ABNORMAL
WBC # BLD AUTO: 11.03 K/UL (ref 3.9–12.7)
WBC #/AREA URNS AUTO: 0 /HPF (ref 0–5)

## 2021-04-21 PROCEDURE — 63600175 PHARM REV CODE 636 W HCPCS: Performed by: STUDENT IN AN ORGANIZED HEALTH CARE EDUCATION/TRAINING PROGRAM

## 2021-04-21 PROCEDURE — 81025 URINE PREGNANCY TEST: CPT | Performed by: PHYSICIAN ASSISTANT

## 2021-04-21 PROCEDURE — 96366 THER/PROPH/DIAG IV INF ADDON: CPT

## 2021-04-21 PROCEDURE — 88304 PR  SURG PATH,LEVEL III: ICD-10-PCS | Mod: 26,,, | Performed by: PATHOLOGY

## 2021-04-21 PROCEDURE — 96361 HYDRATE IV INFUSION ADD-ON: CPT

## 2021-04-21 PROCEDURE — 25000003 PHARM REV CODE 250: Performed by: STUDENT IN AN ORGANIZED HEALTH CARE EDUCATION/TRAINING PROGRAM

## 2021-04-21 PROCEDURE — G0378 HOSPITAL OBSERVATION PER HR: HCPCS

## 2021-04-21 PROCEDURE — D9220A PRA ANESTHESIA: ICD-10-PCS | Mod: CRNA,,, | Performed by: NURSE ANESTHETIST, CERTIFIED REGISTERED

## 2021-04-21 PROCEDURE — 99285 EMERGENCY DEPT VISIT HI MDM: CPT | Mod: CS,,, | Performed by: EMERGENCY MEDICINE

## 2021-04-21 PROCEDURE — 27201423 OPTIME MED/SURG SUP & DEVICES STERILE SUPPLY: Performed by: SURGERY

## 2021-04-21 PROCEDURE — 96375 TX/PRO/DX INJ NEW DRUG ADDON: CPT

## 2021-04-21 PROCEDURE — 63600175 PHARM REV CODE 636 W HCPCS: Performed by: NURSE ANESTHETIST, CERTIFIED REGISTERED

## 2021-04-21 PROCEDURE — 96376 TX/PRO/DX INJ SAME DRUG ADON: CPT

## 2021-04-21 PROCEDURE — 93010 ELECTROCARDIOGRAM REPORT: CPT | Mod: ,,, | Performed by: INTERNAL MEDICINE

## 2021-04-21 PROCEDURE — 80047 BASIC METABLC PNL IONIZED CA: CPT

## 2021-04-21 PROCEDURE — 36000708 HC OR TIME LEV III 1ST 15 MIN: Performed by: SURGERY

## 2021-04-21 PROCEDURE — 44970 PR LAP,APPENDECTOMY: ICD-10-PCS | Mod: ,,, | Performed by: SURGERY

## 2021-04-21 PROCEDURE — 86703 HIV-1/HIV-2 1 RESULT ANTBDY: CPT | Performed by: EMERGENCY MEDICINE

## 2021-04-21 PROCEDURE — 93010 EKG 12-LEAD: ICD-10-PCS | Mod: ,,, | Performed by: INTERNAL MEDICINE

## 2021-04-21 PROCEDURE — 36000709 HC OR TIME LEV III EA ADD 15 MIN: Performed by: SURGERY

## 2021-04-21 PROCEDURE — D9220A PRA ANESTHESIA: Mod: CRNA,,, | Performed by: NURSE ANESTHETIST, CERTIFIED REGISTERED

## 2021-04-21 PROCEDURE — 71000033 HC RECOVERY, INTIAL HOUR: Performed by: SURGERY

## 2021-04-21 PROCEDURE — 25000003 PHARM REV CODE 250: Performed by: NURSE ANESTHETIST, CERTIFIED REGISTERED

## 2021-04-21 PROCEDURE — 25500020 PHARM REV CODE 255: Performed by: EMERGENCY MEDICINE

## 2021-04-21 PROCEDURE — 93005 ELECTROCARDIOGRAM TRACING: CPT

## 2021-04-21 PROCEDURE — 80053 COMPREHEN METABOLIC PANEL: CPT | Performed by: PHYSICIAN ASSISTANT

## 2021-04-21 PROCEDURE — 37000009 HC ANESTHESIA EA ADD 15 MINS: Performed by: SURGERY

## 2021-04-21 PROCEDURE — 88304 TISSUE EXAM BY PATHOLOGIST: CPT | Mod: 26,,, | Performed by: PATHOLOGY

## 2021-04-21 PROCEDURE — U0002 COVID-19 LAB TEST NON-CDC: HCPCS | Performed by: PHYSICIAN ASSISTANT

## 2021-04-21 PROCEDURE — 81001 URINALYSIS AUTO W/SCOPE: CPT | Performed by: PHYSICIAN ASSISTANT

## 2021-04-21 PROCEDURE — 63600175 PHARM REV CODE 636 W HCPCS

## 2021-04-21 PROCEDURE — 83690 ASSAY OF LIPASE: CPT | Performed by: PHYSICIAN ASSISTANT

## 2021-04-21 PROCEDURE — 88304 TISSUE EXAM BY PATHOLOGIST: CPT | Performed by: PATHOLOGY

## 2021-04-21 PROCEDURE — 44970 LAPAROSCOPY APPENDECTOMY: CPT | Mod: ,,, | Performed by: SURGERY

## 2021-04-21 PROCEDURE — 86803 HEPATITIS C AB TEST: CPT | Performed by: EMERGENCY MEDICINE

## 2021-04-21 PROCEDURE — 63600175 PHARM REV CODE 636 W HCPCS: Performed by: PHYSICIAN ASSISTANT

## 2021-04-21 PROCEDURE — 96365 THER/PROPH/DIAG IV INF INIT: CPT

## 2021-04-21 PROCEDURE — 37000008 HC ANESTHESIA 1ST 15 MINUTES: Performed by: SURGERY

## 2021-04-21 PROCEDURE — 85025 COMPLETE CBC W/AUTO DIFF WBC: CPT | Performed by: PHYSICIAN ASSISTANT

## 2021-04-21 PROCEDURE — 25000003 PHARM REV CODE 250: Performed by: PHYSICIAN ASSISTANT

## 2021-04-21 PROCEDURE — 25000003 PHARM REV CODE 250: Performed by: SURGERY

## 2021-04-21 PROCEDURE — 71000015 HC POSTOP RECOV 1ST HR: Performed by: SURGERY

## 2021-04-21 PROCEDURE — D9220A PRA ANESTHESIA: Mod: ANES,,, | Performed by: ANESTHESIOLOGY

## 2021-04-21 PROCEDURE — 63600175 PHARM REV CODE 636 W HCPCS: Performed by: ANESTHESIOLOGY

## 2021-04-21 PROCEDURE — 94761 N-INVAS EAR/PLS OXIMETRY MLT: CPT

## 2021-04-21 PROCEDURE — 99219 PR INITIAL OBSERVATION CARE,LEVL II: ICD-10-PCS | Mod: 57,,, | Performed by: SURGERY

## 2021-04-21 PROCEDURE — D9220A PRA ANESTHESIA: ICD-10-PCS | Mod: ANES,,, | Performed by: ANESTHESIOLOGY

## 2021-04-21 PROCEDURE — 99285 PR EMERGENCY DEPT VISIT,LEVEL V: ICD-10-PCS | Mod: CS,,, | Performed by: EMERGENCY MEDICINE

## 2021-04-21 PROCEDURE — 99285 EMERGENCY DEPT VISIT HI MDM: CPT | Mod: 25

## 2021-04-21 PROCEDURE — 99219 PR INITIAL OBSERVATION CARE,LEVL II: CPT | Mod: 57,,, | Performed by: SURGERY

## 2021-04-21 RX ORDER — ONDANSETRON 8 MG/1
8 TABLET, ORALLY DISINTEGRATING ORAL EVERY 8 HOURS PRN
Status: DISCONTINUED | OUTPATIENT
Start: 2021-04-21 | End: 2021-04-22 | Stop reason: HOSPADM

## 2021-04-21 RX ORDER — NEOSTIGMINE METHYLSULFATE 0.5 MG/ML
INJECTION, SOLUTION INTRAVENOUS
Status: DISCONTINUED | OUTPATIENT
Start: 2021-04-21 | End: 2021-04-21

## 2021-04-21 RX ORDER — SODIUM CHLORIDE, SODIUM LACTATE, POTASSIUM CHLORIDE, CALCIUM CHLORIDE 600; 310; 30; 20 MG/100ML; MG/100ML; MG/100ML; MG/100ML
INJECTION, SOLUTION INTRAVENOUS CONTINUOUS
Status: DISCONTINUED | OUTPATIENT
Start: 2021-04-21 | End: 2021-04-22

## 2021-04-21 RX ORDER — LIDOCAINE HYDROCHLORIDE 20 MG/ML
INJECTION, SOLUTION EPIDURAL; INFILTRATION; INTRACAUDAL; PERINEURAL
Status: DISCONTINUED | OUTPATIENT
Start: 2021-04-21 | End: 2021-04-21

## 2021-04-21 RX ORDER — HYDROMORPHONE HYDROCHLORIDE 1 MG/ML
0.2 INJECTION, SOLUTION INTRAMUSCULAR; INTRAVENOUS; SUBCUTANEOUS ONCE
Status: COMPLETED | OUTPATIENT
Start: 2021-04-21 | End: 2021-04-21

## 2021-04-21 RX ORDER — PROCHLORPERAZINE EDISYLATE 5 MG/ML
5 INJECTION INTRAMUSCULAR; INTRAVENOUS EVERY 30 MIN PRN
Status: DISCONTINUED | OUTPATIENT
Start: 2021-04-21 | End: 2021-04-21 | Stop reason: HOSPADM

## 2021-04-21 RX ORDER — FENTANYL CITRATE 50 UG/ML
25 INJECTION, SOLUTION INTRAMUSCULAR; INTRAVENOUS EVERY 5 MIN PRN
Status: COMPLETED | OUTPATIENT
Start: 2021-04-21 | End: 2021-04-21

## 2021-04-21 RX ORDER — MORPHINE SULFATE 2 MG/ML
2 INJECTION, SOLUTION INTRAMUSCULAR; INTRAVENOUS EVERY 4 HOURS PRN
Status: DISCONTINUED | OUTPATIENT
Start: 2021-04-21 | End: 2021-04-21

## 2021-04-21 RX ORDER — SODIUM CHLORIDE 0.9 % (FLUSH) 0.9 %
10 SYRINGE (ML) INJECTION
Status: DISCONTINUED | OUTPATIENT
Start: 2021-04-21 | End: 2021-04-22 | Stop reason: HOSPADM

## 2021-04-21 RX ORDER — MORPHINE SULFATE 4 MG/ML
4 INJECTION, SOLUTION INTRAMUSCULAR; INTRAVENOUS EVERY 4 HOURS PRN
Status: DISCONTINUED | OUTPATIENT
Start: 2021-04-21 | End: 2021-04-21

## 2021-04-21 RX ORDER — ONDANSETRON 2 MG/ML
4 INJECTION INTRAMUSCULAR; INTRAVENOUS EVERY 12 HOURS PRN
Status: DISCONTINUED | OUTPATIENT
Start: 2021-04-21 | End: 2021-04-22

## 2021-04-21 RX ORDER — ONDANSETRON 2 MG/ML
INJECTION INTRAMUSCULAR; INTRAVENOUS
Status: DISCONTINUED | OUTPATIENT
Start: 2021-04-21 | End: 2021-04-21

## 2021-04-21 RX ORDER — ROCURONIUM BROMIDE 10 MG/ML
INJECTION, SOLUTION INTRAVENOUS
Status: DISCONTINUED | OUTPATIENT
Start: 2021-04-21 | End: 2021-04-21

## 2021-04-21 RX ORDER — ENOXAPARIN SODIUM 100 MG/ML
40 INJECTION SUBCUTANEOUS EVERY 24 HOURS
Status: DISCONTINUED | OUTPATIENT
Start: 2021-04-22 | End: 2021-04-22 | Stop reason: HOSPADM

## 2021-04-21 RX ORDER — PROPOFOL 10 MG/ML
VIAL (ML) INTRAVENOUS
Status: DISCONTINUED | OUTPATIENT
Start: 2021-04-21 | End: 2021-04-21

## 2021-04-21 RX ORDER — ENOXAPARIN SODIUM 100 MG/ML
40 INJECTION SUBCUTANEOUS EVERY 24 HOURS
Status: DISCONTINUED | OUTPATIENT
Start: 2021-04-21 | End: 2021-04-21

## 2021-04-21 RX ORDER — MORPHINE SULFATE 4 MG/ML
4 INJECTION, SOLUTION INTRAMUSCULAR; INTRAVENOUS
Status: COMPLETED | OUTPATIENT
Start: 2021-04-21 | End: 2021-04-21

## 2021-04-21 RX ORDER — SUCCINYLCHOLINE CHLORIDE 20 MG/ML
INJECTION INTRAMUSCULAR; INTRAVENOUS
Status: DISCONTINUED | OUTPATIENT
Start: 2021-04-21 | End: 2021-04-21

## 2021-04-21 RX ORDER — PHENYLEPHRINE HCL IN 0.9% NACL 1 MG/10 ML
SYRINGE (ML) INTRAVENOUS
Status: DISCONTINUED | OUTPATIENT
Start: 2021-04-21 | End: 2021-04-21

## 2021-04-21 RX ORDER — MIDAZOLAM HYDROCHLORIDE 1 MG/ML
INJECTION, SOLUTION INTRAMUSCULAR; INTRAVENOUS
Status: DISCONTINUED | OUTPATIENT
Start: 2021-04-21 | End: 2021-04-21

## 2021-04-21 RX ORDER — BUPIVACAINE HYDROCHLORIDE 5 MG/ML
INJECTION, SOLUTION EPIDURAL; INTRACAUDAL
Status: DISCONTINUED | OUTPATIENT
Start: 2021-04-21 | End: 2021-04-21 | Stop reason: HOSPADM

## 2021-04-21 RX ORDER — HYDROCODONE BITARTRATE AND ACETAMINOPHEN 10; 325 MG/1; MG/1
1 TABLET ORAL EVERY 6 HOURS PRN
Status: DISCONTINUED | OUTPATIENT
Start: 2021-04-21 | End: 2021-04-22 | Stop reason: HOSPADM

## 2021-04-21 RX ORDER — HYDROMORPHONE HYDROCHLORIDE 1 MG/ML
0.2 INJECTION, SOLUTION INTRAMUSCULAR; INTRAVENOUS; SUBCUTANEOUS EVERY 5 MIN PRN
Status: DISCONTINUED | OUTPATIENT
Start: 2021-04-21 | End: 2021-04-21 | Stop reason: HOSPADM

## 2021-04-21 RX ORDER — MAG HYDROX/ALUMINUM HYD/SIMETH 200-200-20
30 SUSPENSION, ORAL (FINAL DOSE FORM) ORAL
Status: COMPLETED | OUTPATIENT
Start: 2021-04-21 | End: 2021-04-21

## 2021-04-21 RX ORDER — OXYCODONE HYDROCHLORIDE 5 MG/1
5 TABLET ORAL
Status: DISCONTINUED | OUTPATIENT
Start: 2021-04-21 | End: 2021-04-21 | Stop reason: HOSPADM

## 2021-04-21 RX ORDER — ONDANSETRON 2 MG/ML
4 INJECTION INTRAMUSCULAR; INTRAVENOUS ONCE AS NEEDED
Status: DISCONTINUED | OUTPATIENT
Start: 2021-04-21 | End: 2021-04-21 | Stop reason: HOSPADM

## 2021-04-21 RX ORDER — FENTANYL CITRATE 50 UG/ML
INJECTION, SOLUTION INTRAMUSCULAR; INTRAVENOUS
Status: DISCONTINUED | OUTPATIENT
Start: 2021-04-21 | End: 2021-04-21

## 2021-04-21 RX ORDER — HYDROCODONE BITARTRATE AND ACETAMINOPHEN 5; 325 MG/1; MG/1
1 TABLET ORAL EVERY 6 HOURS PRN
Status: DISCONTINUED | OUTPATIENT
Start: 2021-04-21 | End: 2021-04-22 | Stop reason: HOSPADM

## 2021-04-21 RX ORDER — LIDOCAINE HYDROCHLORIDE 20 MG/ML
10 SOLUTION OROPHARYNGEAL
Status: COMPLETED | OUTPATIENT
Start: 2021-04-21 | End: 2021-04-21

## 2021-04-21 RX ORDER — DEXAMETHASONE SODIUM PHOSPHATE 4 MG/ML
INJECTION, SOLUTION INTRA-ARTICULAR; INTRALESIONAL; INTRAMUSCULAR; INTRAVENOUS; SOFT TISSUE
Status: DISCONTINUED | OUTPATIENT
Start: 2021-04-21 | End: 2021-04-21

## 2021-04-21 RX ADMIN — FENTANYL CITRATE 25 MCG: 50 INJECTION INTRAMUSCULAR; INTRAVENOUS at 04:04

## 2021-04-21 RX ADMIN — SUCCINYLCHOLINE CHLORIDE 128 MG: 20 INJECTION, SOLUTION INTRAMUSCULAR; INTRAVENOUS; PARENTERAL at 03:04

## 2021-04-21 RX ADMIN — PIPERACILLIN AND TAZOBACTAM 4.5 G: 4; .5 INJECTION, POWDER, LYOPHILIZED, FOR SOLUTION INTRAVENOUS; PARENTERAL at 11:04

## 2021-04-21 RX ADMIN — HYDROCODONE BITARTRATE AND ACETAMINOPHEN 1 TABLET: 10; 325 TABLET ORAL at 04:04

## 2021-04-21 RX ADMIN — FENTANYL CITRATE 25 MCG: 50 INJECTION INTRAMUSCULAR; INTRAVENOUS at 05:04

## 2021-04-21 RX ADMIN — SODIUM CHLORIDE, SODIUM LACTATE, POTASSIUM CHLORIDE, AND CALCIUM CHLORIDE 125 ML/HR: .6; .31; .03; .02 INJECTION, SOLUTION INTRAVENOUS at 09:04

## 2021-04-21 RX ADMIN — HYDROMORPHONE HYDROCHLORIDE 0.2 MG: 1 INJECTION, SOLUTION INTRAMUSCULAR; INTRAVENOUS; SUBCUTANEOUS at 08:04

## 2021-04-21 RX ADMIN — ONDANSETRON 4 MG: 2 INJECTION INTRAMUSCULAR; INTRAVENOUS at 02:04

## 2021-04-21 RX ADMIN — Medication 200 MCG: at 03:04

## 2021-04-21 RX ADMIN — LIDOCAINE HYDROCHLORIDE 100 MG: 20 INJECTION, SOLUTION EPIDURAL; INFILTRATION; INTRACAUDAL at 03:04

## 2021-04-21 RX ADMIN — MIDAZOLAM 2 MG: 1 INJECTION INTRAMUSCULAR; INTRAVENOUS at 02:04

## 2021-04-21 RX ADMIN — ONDANSETRON 4 MG: 2 INJECTION INTRAMUSCULAR; INTRAVENOUS at 08:04

## 2021-04-21 RX ADMIN — FENTANYL CITRATE 100 MCG: 50 INJECTION INTRAMUSCULAR; INTRAVENOUS at 03:04

## 2021-04-21 RX ADMIN — PIPERACILLIN AND TAZOBACTAM 4.5 G: 4; .5 INJECTION, POWDER, LYOPHILIZED, FOR SOLUTION INTRAVENOUS; PARENTERAL at 03:04

## 2021-04-21 RX ADMIN — HYDROCODONE BITARTRATE AND ACETAMINOPHEN 1 TABLET: 10; 325 TABLET ORAL at 11:04

## 2021-04-21 RX ADMIN — PIPERACILLIN AND TAZOBACTAM 4.5 G: 4; .5 INJECTION, POWDER, LYOPHILIZED, FOR SOLUTION INTRAVENOUS; PARENTERAL at 06:04

## 2021-04-21 RX ADMIN — IOHEXOL 75 ML: 350 INJECTION, SOLUTION INTRAVENOUS at 05:04

## 2021-04-21 RX ADMIN — NEOSTIGMINE METHYLSULFATE 4 MG: 0.5 INJECTION INTRAVENOUS at 04:04

## 2021-04-21 RX ADMIN — PROPOFOL 200 MG: 10 INJECTION, EMULSION INTRAVENOUS at 03:04

## 2021-04-21 RX ADMIN — LIDOCAINE HYDROCHLORIDE 10 ML: 20 SOLUTION ORAL; TOPICAL at 04:04

## 2021-04-21 RX ADMIN — DEXAMETHASONE SODIUM PHOSPHATE 4 MG: 4 INJECTION INTRA-ARTICULAR; INTRALESIONAL; INTRAMUSCULAR; INTRAVENOUS; SOFT TISSUE at 03:04

## 2021-04-21 RX ADMIN — MORPHINE SULFATE 4 MG: 4 INJECTION INTRAVENOUS at 05:04

## 2021-04-21 RX ADMIN — SODIUM CHLORIDE: 0.9 INJECTION, SOLUTION INTRAVENOUS at 02:04

## 2021-04-21 RX ADMIN — SODIUM CHLORIDE, SODIUM LACTATE, POTASSIUM CHLORIDE, AND CALCIUM CHLORIDE: .6; .31; .03; .02 INJECTION, SOLUTION INTRAVENOUS at 05:04

## 2021-04-21 RX ADMIN — ROCURONIUM BROMIDE 5 MG: 10 INJECTION, SOLUTION INTRAVENOUS at 03:04

## 2021-04-21 RX ADMIN — ROCURONIUM BROMIDE 35 MG: 10 INJECTION, SOLUTION INTRAVENOUS at 03:04

## 2021-04-21 RX ADMIN — ALUMINUM HYDROXIDE, MAGNESIUM HYDROXIDE, AND SIMETHICONE 30 ML: 200; 200; 20 SUSPENSION ORAL at 04:04

## 2021-04-21 RX ADMIN — GLYCOPYRROLATE 0.4 MCG: 0.2 INJECTION, SOLUTION INTRAMUSCULAR; INTRAVITREAL at 04:04

## 2021-04-21 RX ADMIN — SODIUM CHLORIDE 1000 ML: 0.9 INJECTION, SOLUTION INTRAVENOUS at 04:04

## 2021-04-22 VITALS
BODY MASS INDEX: 29.97 KG/M2 | DIASTOLIC BLOOD PRESSURE: 82 MMHG | HEART RATE: 76 BPM | SYSTOLIC BLOOD PRESSURE: 136 MMHG | RESPIRATION RATE: 20 BRPM | OXYGEN SATURATION: 100 % | HEIGHT: 67 IN | WEIGHT: 190.94 LBS | TEMPERATURE: 96 F

## 2021-04-22 PROBLEM — K35.80 ACUTE APPENDICITIS: Status: RESOLVED | Noted: 2021-04-21 | Resolved: 2021-04-22

## 2021-04-22 LAB
ANION GAP SERPL CALC-SCNC: 5 MMOL/L (ref 8–16)
BASOPHILS # BLD AUTO: 0.01 K/UL (ref 0–0.2)
BASOPHILS NFR BLD: 0.1 % (ref 0–1.9)
BUN SERPL-MCNC: 7 MG/DL (ref 6–20)
CALCIUM SERPL-MCNC: 8.7 MG/DL (ref 8.7–10.5)
CHLORIDE SERPL-SCNC: 109 MMOL/L (ref 95–110)
CO2 SERPL-SCNC: 25 MMOL/L (ref 23–29)
CREAT SERPL-MCNC: 1 MG/DL (ref 0.5–1.4)
DIFFERENTIAL METHOD: ABNORMAL
EOSINOPHIL # BLD AUTO: 0 K/UL (ref 0–0.5)
EOSINOPHIL NFR BLD: 0 % (ref 0–8)
ERYTHROCYTE [DISTWIDTH] IN BLOOD BY AUTOMATED COUNT: 14.2 % (ref 11.5–14.5)
EST. GFR  (AFRICAN AMERICAN): >60 ML/MIN/1.73 M^2
EST. GFR  (NON AFRICAN AMERICAN): >60 ML/MIN/1.73 M^2
GLUCOSE SERPL-MCNC: 113 MG/DL (ref 70–110)
HCT VFR BLD AUTO: 32.9 % (ref 37–48.5)
HGB BLD-MCNC: 10.9 G/DL (ref 12–16)
IMM GRANULOCYTES # BLD AUTO: 0.02 K/UL (ref 0–0.04)
IMM GRANULOCYTES NFR BLD AUTO: 0.2 % (ref 0–0.5)
LYMPHOCYTES # BLD AUTO: 1.2 K/UL (ref 1–4.8)
LYMPHOCYTES NFR BLD: 12.8 % (ref 18–48)
MCH RBC QN AUTO: 25.8 PG (ref 27–31)
MCHC RBC AUTO-ENTMCNC: 33.1 G/DL (ref 32–36)
MCV RBC AUTO: 78 FL (ref 82–98)
MONOCYTES # BLD AUTO: 0.5 K/UL (ref 0.3–1)
MONOCYTES NFR BLD: 4.8 % (ref 4–15)
NEUTROPHILS # BLD AUTO: 7.8 K/UL (ref 1.8–7.7)
NEUTROPHILS NFR BLD: 82.1 % (ref 38–73)
NRBC BLD-RTO: 0 /100 WBC
PLATELET # BLD AUTO: 341 K/UL (ref 150–450)
PMV BLD AUTO: 10.1 FL (ref 9.2–12.9)
POTASSIUM SERPL-SCNC: 4.6 MMOL/L (ref 3.5–5.1)
RBC # BLD AUTO: 4.23 M/UL (ref 4–5.4)
SODIUM SERPL-SCNC: 139 MMOL/L (ref 136–145)
WBC # BLD AUTO: 9.5 K/UL (ref 3.9–12.7)

## 2021-04-22 PROCEDURE — 25000003 PHARM REV CODE 250: Performed by: STUDENT IN AN ORGANIZED HEALTH CARE EDUCATION/TRAINING PROGRAM

## 2021-04-22 PROCEDURE — 36415 COLL VENOUS BLD VENIPUNCTURE: CPT | Performed by: STUDENT IN AN ORGANIZED HEALTH CARE EDUCATION/TRAINING PROGRAM

## 2021-04-22 PROCEDURE — 63600175 PHARM REV CODE 636 W HCPCS: Performed by: STUDENT IN AN ORGANIZED HEALTH CARE EDUCATION/TRAINING PROGRAM

## 2021-04-22 PROCEDURE — G0378 HOSPITAL OBSERVATION PER HR: HCPCS

## 2021-04-22 PROCEDURE — 80048 BASIC METABOLIC PNL TOTAL CA: CPT | Performed by: STUDENT IN AN ORGANIZED HEALTH CARE EDUCATION/TRAINING PROGRAM

## 2021-04-22 PROCEDURE — 85025 COMPLETE CBC W/AUTO DIFF WBC: CPT | Performed by: STUDENT IN AN ORGANIZED HEALTH CARE EDUCATION/TRAINING PROGRAM

## 2021-04-22 PROCEDURE — 96372 THER/PROPH/DIAG INJ SC/IM: CPT

## 2021-04-22 PROCEDURE — 96376 TX/PRO/DX INJ SAME DRUG ADON: CPT

## 2021-04-22 RX ORDER — HYDROCODONE BITARTRATE AND ACETAMINOPHEN 5; 325 MG/1; MG/1
1 TABLET ORAL EVERY 6 HOURS PRN
Qty: 20 TABLET | Refills: 0 | Status: SHIPPED | OUTPATIENT
Start: 2021-04-22 | End: 2021-07-23

## 2021-04-22 RX ADMIN — HYDROCODONE BITARTRATE AND ACETAMINOPHEN 1 TABLET: 10; 325 TABLET ORAL at 06:04

## 2021-04-22 RX ADMIN — PIPERACILLIN AND TAZOBACTAM 4.5 G: 4; .5 INJECTION, POWDER, LYOPHILIZED, FOR SOLUTION INTRAVENOUS; PARENTERAL at 08:04

## 2021-04-22 RX ADMIN — ENOXAPARIN SODIUM 40 MG: 40 INJECTION SUBCUTANEOUS at 08:04

## 2021-04-27 LAB
FINAL PATHOLOGIC DIAGNOSIS: NORMAL
Lab: NORMAL

## 2021-05-04 ENCOUNTER — OFFICE VISIT (OUTPATIENT)
Dept: SURGERY | Facility: CLINIC | Age: 36
End: 2021-05-04
Payer: COMMERCIAL

## 2021-05-04 VITALS
HEIGHT: 67 IN | TEMPERATURE: 99 F | SYSTOLIC BLOOD PRESSURE: 124 MMHG | WEIGHT: 198.31 LBS | DIASTOLIC BLOOD PRESSURE: 84 MMHG | BODY MASS INDEX: 31.12 KG/M2 | HEART RATE: 78 BPM

## 2021-05-04 DIAGNOSIS — Z48.89 POSTOPERATIVE VISIT: Primary | ICD-10-CM

## 2021-05-04 PROCEDURE — 1125F AMNT PAIN NOTED PAIN PRSNT: CPT | Mod: S$GLB,,, | Performed by: SURGERY

## 2021-05-04 PROCEDURE — 3008F BODY MASS INDEX DOCD: CPT | Mod: CPTII,S$GLB,, | Performed by: SURGERY

## 2021-05-04 PROCEDURE — 99999 PR PBB SHADOW E&M-EST. PATIENT-LVL III: ICD-10-PCS | Mod: PBBFAC,,, | Performed by: SURGERY

## 2021-05-04 PROCEDURE — 1125F PR PAIN SEVERITY QUANTIFIED, PAIN PRESENT: ICD-10-PCS | Mod: S$GLB,,, | Performed by: SURGERY

## 2021-05-04 PROCEDURE — 3008F PR BODY MASS INDEX (BMI) DOCUMENTED: ICD-10-PCS | Mod: CPTII,S$GLB,, | Performed by: SURGERY

## 2021-05-04 PROCEDURE — 99999 PR PBB SHADOW E&M-EST. PATIENT-LVL III: CPT | Mod: PBBFAC,,, | Performed by: SURGERY

## 2021-05-04 PROCEDURE — 99024 PR POST-OP FOLLOW-UP VISIT: ICD-10-PCS | Mod: S$GLB,,, | Performed by: SURGERY

## 2021-05-04 PROCEDURE — 99024 POSTOP FOLLOW-UP VISIT: CPT | Mod: S$GLB,,, | Performed by: SURGERY

## 2021-05-07 ENCOUNTER — PATIENT MESSAGE (OUTPATIENT)
Dept: SURGERY | Facility: CLINIC | Age: 36
End: 2021-05-07

## 2021-05-13 ENCOUNTER — CLINICAL SUPPORT (OUTPATIENT)
Dept: OTHER | Facility: CLINIC | Age: 36
End: 2021-05-13
Payer: COMMERCIAL

## 2021-05-13 DIAGNOSIS — Z00.8 ENCOUNTER FOR OTHER GENERAL EXAMINATION: ICD-10-CM

## 2021-05-14 VITALS — BODY MASS INDEX: 31.06 KG/M2 | HEIGHT: 67 IN

## 2021-05-14 LAB
HDLC SERPL-MCNC: 27 MG/DL
POC CHOLESTEROL, LDL (DOCK): 136 MG/DL
POC CHOLESTEROL, TOTAL: 178 MG/DL
POC GLUCOSE, FASTING: 85 MG/DL (ref 60–110)
TRIGL SERPL-MCNC: 77 MG/DL

## 2021-05-17 ENCOUNTER — PATIENT MESSAGE (OUTPATIENT)
Dept: SURGERY | Facility: CLINIC | Age: 36
End: 2021-05-17

## 2021-05-18 ENCOUNTER — CLINICAL SUPPORT (OUTPATIENT)
Dept: INTERNAL MEDICINE | Facility: CLINIC | Age: 36
End: 2021-05-18
Payer: COMMERCIAL

## 2021-05-18 VITALS — WEIGHT: 195.56 LBS | BODY MASS INDEX: 30.63 KG/M2

## 2021-05-19 DIAGNOSIS — Z90.49 S/P APPENDECTOMY: ICD-10-CM

## 2021-05-19 DIAGNOSIS — R10.9 ABDOMINAL PAIN, UNSPECIFIED ABDOMINAL LOCATION: Primary | ICD-10-CM

## 2021-05-20 ENCOUNTER — OFFICE VISIT (OUTPATIENT)
Dept: PSYCHIATRY | Facility: CLINIC | Age: 36
End: 2021-05-20
Payer: COMMERCIAL

## 2021-05-20 DIAGNOSIS — F43.23 ADJUSTMENT DISORDER WITH MIXED ANXIETY AND DEPRESSED MOOD: Primary | ICD-10-CM

## 2021-05-20 PROCEDURE — 90834 PSYTX W PT 45 MINUTES: CPT | Mod: S$GLB,,, | Performed by: SOCIAL WORKER

## 2021-05-20 PROCEDURE — 90834 PR PSYCHOTHERAPY W/PATIENT, 45 MIN: ICD-10-PCS | Mod: S$GLB,,, | Performed by: SOCIAL WORKER

## 2021-05-22 ENCOUNTER — HOSPITAL ENCOUNTER (OUTPATIENT)
Dept: RADIOLOGY | Facility: HOSPITAL | Age: 36
Discharge: HOME | End: 2021-05-22
Attending: SURGERY
Payer: COMMERCIAL

## 2021-05-22 DIAGNOSIS — R10.9 ABDOMINAL PAIN, UNSPECIFIED ABDOMINAL LOCATION: ICD-10-CM

## 2021-05-22 DIAGNOSIS — Z90.49 S/P APPENDECTOMY: ICD-10-CM

## 2021-05-22 PROCEDURE — 74177 CT ABDOMEN PELVIS WITH CONTRAST: ICD-10-PCS | Mod: 26,,, | Performed by: RADIOLOGY

## 2021-05-22 PROCEDURE — 25500020 PHARM REV CODE 255: Performed by: SURGERY

## 2021-05-22 PROCEDURE — 74177 CT ABD & PELVIS W/CONTRAST: CPT | Mod: TC

## 2021-05-22 PROCEDURE — 74177 CT ABD & PELVIS W/CONTRAST: CPT | Mod: 26,,, | Performed by: RADIOLOGY

## 2021-05-22 RX ADMIN — IOHEXOL 100 ML: 350 INJECTION, SOLUTION INTRAVENOUS at 12:05

## 2021-05-22 RX ADMIN — IOHEXOL 15 ML: 350 INJECTION, SOLUTION INTRAVENOUS at 12:05

## 2021-05-22 RX ADMIN — IOHEXOL 15 ML: 350 INJECTION, SOLUTION INTRAVENOUS at 11:05

## 2021-05-27 ENCOUNTER — PATIENT OUTREACH (OUTPATIENT)
Dept: ADMINISTRATIVE | Facility: OTHER | Age: 36
End: 2021-05-27

## 2021-06-01 ENCOUNTER — OFFICE VISIT (OUTPATIENT)
Dept: DERMATOLOGY | Facility: CLINIC | Age: 36
End: 2021-06-01
Payer: COMMERCIAL

## 2021-06-01 ENCOUNTER — OFFICE VISIT (OUTPATIENT)
Dept: CARDIOLOGY | Facility: CLINIC | Age: 36
End: 2021-06-01
Payer: COMMERCIAL

## 2021-06-01 ENCOUNTER — TELEPHONE (OUTPATIENT)
Dept: CARDIOLOGY | Facility: CLINIC | Age: 36
End: 2021-06-01

## 2021-06-01 VITALS
HEIGHT: 67 IN | SYSTOLIC BLOOD PRESSURE: 128 MMHG | BODY MASS INDEX: 30.69 KG/M2 | WEIGHT: 195.56 LBS | DIASTOLIC BLOOD PRESSURE: 73 MMHG | HEART RATE: 83 BPM

## 2021-06-01 DIAGNOSIS — R00.2 PALPITATIONS: Primary | ICD-10-CM

## 2021-06-01 DIAGNOSIS — I47.19 ATRIAL TACHYCARDIA: Primary | ICD-10-CM

## 2021-06-01 DIAGNOSIS — L25.9 CONTACT DERMATITIS, UNSPECIFIED CONTACT DERMATITIS TYPE, UNSPECIFIED TRIGGER: ICD-10-CM

## 2021-06-01 DIAGNOSIS — R00.2 PALPITATIONS: ICD-10-CM

## 2021-06-01 DIAGNOSIS — L70.0 ACNE VULGARIS: ICD-10-CM

## 2021-06-01 DIAGNOSIS — L81.9 DYSCHROMIA: Primary | ICD-10-CM

## 2021-06-01 PROCEDURE — 99999 PR PBB SHADOW E&M-EST. PATIENT-LVL III: ICD-10-PCS | Mod: PBBFAC,,, | Performed by: DERMATOLOGY

## 2021-06-01 PROCEDURE — 1126F AMNT PAIN NOTED NONE PRSNT: CPT | Mod: S$GLB,,, | Performed by: INTERNAL MEDICINE

## 2021-06-01 PROCEDURE — 3008F BODY MASS INDEX DOCD: CPT | Mod: CPTII,S$GLB,, | Performed by: INTERNAL MEDICINE

## 2021-06-01 PROCEDURE — 1126F PR PAIN SEVERITY QUANTIFIED, NO PAIN PRESENT: ICD-10-PCS | Mod: S$GLB,,, | Performed by: DERMATOLOGY

## 2021-06-01 PROCEDURE — 3008F PR BODY MASS INDEX (BMI) DOCUMENTED: ICD-10-PCS | Mod: CPTII,S$GLB,, | Performed by: INTERNAL MEDICINE

## 2021-06-01 PROCEDURE — 1126F AMNT PAIN NOTED NONE PRSNT: CPT | Mod: S$GLB,,, | Performed by: DERMATOLOGY

## 2021-06-01 PROCEDURE — 99999 PR PBB SHADOW E&M-EST. PATIENT-LVL V: CPT | Mod: PBBFAC,,, | Performed by: INTERNAL MEDICINE

## 2021-06-01 PROCEDURE — 99213 PR OFFICE/OUTPT VISIT, EST, LEVL III, 20-29 MIN: ICD-10-PCS | Mod: S$GLB,,, | Performed by: DERMATOLOGY

## 2021-06-01 PROCEDURE — 99213 OFFICE O/P EST LOW 20 MIN: CPT | Mod: S$GLB,,, | Performed by: DERMATOLOGY

## 2021-06-01 PROCEDURE — 93000 EKG 12-LEAD: ICD-10-PCS | Mod: S$GLB,,, | Performed by: INTERNAL MEDICINE

## 2021-06-01 PROCEDURE — 93000 ELECTROCARDIOGRAM COMPLETE: CPT | Mod: S$GLB,,, | Performed by: INTERNAL MEDICINE

## 2021-06-01 PROCEDURE — 99999 PR PBB SHADOW E&M-EST. PATIENT-LVL V: ICD-10-PCS | Mod: PBBFAC,,, | Performed by: INTERNAL MEDICINE

## 2021-06-01 PROCEDURE — 99999 PR PBB SHADOW E&M-EST. PATIENT-LVL III: CPT | Mod: PBBFAC,,, | Performed by: DERMATOLOGY

## 2021-06-01 PROCEDURE — 1126F PR PAIN SEVERITY QUANTIFIED, NO PAIN PRESENT: ICD-10-PCS | Mod: S$GLB,,, | Performed by: INTERNAL MEDICINE

## 2021-06-01 PROCEDURE — 99203 PR OFFICE/OUTPT VISIT, NEW, LEVL III, 30-44 MIN: ICD-10-PCS | Mod: 25,S$GLB,, | Performed by: INTERNAL MEDICINE

## 2021-06-01 PROCEDURE — 99203 OFFICE O/P NEW LOW 30 MIN: CPT | Mod: 25,S$GLB,, | Performed by: INTERNAL MEDICINE

## 2021-06-01 RX ORDER — ALCLOMETASONE DIPROPIONATE 0.5 MG/G
CREAM TOPICAL
Qty: 60 G | Refills: 2 | Status: SHIPPED | OUTPATIENT
Start: 2021-06-01 | End: 2022-06-21 | Stop reason: SDUPTHER

## 2021-06-15 ENCOUNTER — CLINICAL SUPPORT (OUTPATIENT)
Dept: INTERNAL MEDICINE | Facility: CLINIC | Age: 36
End: 2021-06-15
Payer: COMMERCIAL

## 2021-06-15 VITALS — WEIGHT: 190.25 LBS | BODY MASS INDEX: 29.8 KG/M2

## 2021-06-17 ENCOUNTER — PATIENT MESSAGE (OUTPATIENT)
Dept: DERMATOLOGY | Facility: CLINIC | Age: 36
End: 2021-06-17

## 2021-07-15 ENCOUNTER — OFFICE VISIT (OUTPATIENT)
Dept: URGENT CARE | Facility: CLINIC | Age: 36
End: 2021-07-15
Payer: COMMERCIAL

## 2021-07-15 ENCOUNTER — CLINICAL SUPPORT (OUTPATIENT)
Dept: INTERNAL MEDICINE | Facility: CLINIC | Age: 36
End: 2021-07-15
Payer: COMMERCIAL

## 2021-07-15 ENCOUNTER — LAB VISIT (OUTPATIENT)
Dept: URGENT CARE | Facility: CLINIC | Age: 36
End: 2021-07-15

## 2021-07-15 VITALS
OXYGEN SATURATION: 98 % | BODY MASS INDEX: 29.1 KG/M2 | DIASTOLIC BLOOD PRESSURE: 84 MMHG | HEIGHT: 67 IN | WEIGHT: 185.38 LBS | HEART RATE: 76 BPM | TEMPERATURE: 100 F | RESPIRATION RATE: 15 BRPM | SYSTOLIC BLOOD PRESSURE: 127 MMHG

## 2021-07-15 VITALS — BODY MASS INDEX: 29.35 KG/M2 | WEIGHT: 187.38 LBS

## 2021-07-15 DIAGNOSIS — B34.9 VIRAL SYNDROME: Primary | ICD-10-CM

## 2021-07-15 DIAGNOSIS — R53.83 FATIGUE, UNSPECIFIED TYPE: Primary | ICD-10-CM

## 2021-07-15 DIAGNOSIS — R53.83 FATIGUE, UNSPECIFIED TYPE: ICD-10-CM

## 2021-07-15 LAB
CTP QC/QA: YES
SARS-COV-2 RDRP RESP QL NAA+PROBE: NEGATIVE

## 2021-07-15 PROCEDURE — U0002: ICD-10-PCS | Mod: QW,S$GLB,, | Performed by: PREVENTIVE MEDICINE

## 2021-07-15 PROCEDURE — 99213 OFFICE O/P EST LOW 20 MIN: CPT | Mod: S$GLB,,, | Performed by: NURSE PRACTITIONER

## 2021-07-15 PROCEDURE — 3008F PR BODY MASS INDEX (BMI) DOCUMENTED: ICD-10-PCS | Mod: CPTII,S$GLB,, | Performed by: NURSE PRACTITIONER

## 2021-07-15 PROCEDURE — 3008F BODY MASS INDEX DOCD: CPT | Mod: CPTII,S$GLB,, | Performed by: NURSE PRACTITIONER

## 2021-07-15 PROCEDURE — 99213 PR OFFICE/OUTPT VISIT, EST, LEVL III, 20-29 MIN: ICD-10-PCS | Mod: S$GLB,,, | Performed by: NURSE PRACTITIONER

## 2021-07-15 PROCEDURE — U0002 COVID-19 LAB TEST NON-CDC: HCPCS | Mod: QW,S$GLB,, | Performed by: PREVENTIVE MEDICINE

## 2021-07-21 ENCOUNTER — PATIENT OUTREACH (OUTPATIENT)
Dept: ADMINISTRATIVE | Facility: OTHER | Age: 36
End: 2021-07-21

## 2021-07-23 ENCOUNTER — OFFICE VISIT (OUTPATIENT)
Dept: OBSTETRICS AND GYNECOLOGY | Facility: CLINIC | Age: 36
End: 2021-07-23
Payer: COMMERCIAL

## 2021-07-23 VITALS
DIASTOLIC BLOOD PRESSURE: 84 MMHG | SYSTOLIC BLOOD PRESSURE: 122 MMHG | HEIGHT: 67 IN | WEIGHT: 190.06 LBS | BODY MASS INDEX: 29.83 KG/M2

## 2021-07-23 DIAGNOSIS — N94.10 FEMALE DYSPAREUNIA: ICD-10-CM

## 2021-07-23 DIAGNOSIS — Z01.419 WELL WOMAN EXAM WITH ROUTINE GYNECOLOGICAL EXAM: Primary | ICD-10-CM

## 2021-07-23 DIAGNOSIS — N80.9 ENDOMETRIOSIS: ICD-10-CM

## 2021-07-23 PROCEDURE — 3008F BODY MASS INDEX DOCD: CPT | Mod: CPTII,S$GLB,, | Performed by: OBSTETRICS & GYNECOLOGY

## 2021-07-23 PROCEDURE — 99999 PR PBB SHADOW E&M-EST. PATIENT-LVL III: ICD-10-PCS | Mod: PBBFAC,,, | Performed by: OBSTETRICS & GYNECOLOGY

## 2021-07-23 PROCEDURE — 1126F AMNT PAIN NOTED NONE PRSNT: CPT | Mod: CPTII,S$GLB,, | Performed by: OBSTETRICS & GYNECOLOGY

## 2021-07-23 PROCEDURE — 99395 PR PREVENTIVE VISIT,EST,18-39: ICD-10-PCS | Mod: S$GLB,,, | Performed by: OBSTETRICS & GYNECOLOGY

## 2021-07-23 PROCEDURE — 1126F PR PAIN SEVERITY QUANTIFIED, NO PAIN PRESENT: ICD-10-PCS | Mod: CPTII,S$GLB,, | Performed by: OBSTETRICS & GYNECOLOGY

## 2021-07-23 PROCEDURE — 99395 PREV VISIT EST AGE 18-39: CPT | Mod: S$GLB,,, | Performed by: OBSTETRICS & GYNECOLOGY

## 2021-07-23 PROCEDURE — 3008F PR BODY MASS INDEX (BMI) DOCUMENTED: ICD-10-PCS | Mod: CPTII,S$GLB,, | Performed by: OBSTETRICS & GYNECOLOGY

## 2021-07-23 PROCEDURE — 99999 PR PBB SHADOW E&M-EST. PATIENT-LVL III: CPT | Mod: PBBFAC,,, | Performed by: OBSTETRICS & GYNECOLOGY

## 2021-07-23 RX ORDER — NORETHINDRONE 5 MG/1
5 TABLET ORAL DAILY
Qty: 30 TABLET | Refills: 12 | Status: SHIPPED | OUTPATIENT
Start: 2021-07-23 | End: 2022-05-13 | Stop reason: SDUPTHER

## 2021-07-23 RX ORDER — ACETAMINOPHEN AND CODEINE PHOSPHATE 120; 12 MG/5ML; MG/5ML
1 SOLUTION ORAL DAILY
Qty: 28 TABLET | Refills: 12 | Status: SHIPPED | OUTPATIENT
Start: 2021-07-23 | End: 2022-05-13 | Stop reason: SDUPTHER

## 2021-07-27 ENCOUNTER — OFFICE VISIT (OUTPATIENT)
Dept: INTERNAL MEDICINE | Facility: CLINIC | Age: 36
End: 2021-07-27
Payer: COMMERCIAL

## 2021-07-27 ENCOUNTER — LAB VISIT (OUTPATIENT)
Dept: LAB | Facility: HOSPITAL | Age: 36
End: 2021-07-27
Attending: INTERNAL MEDICINE
Payer: COMMERCIAL

## 2021-07-27 VITALS
HEIGHT: 67 IN | WEIGHT: 189.13 LBS | BODY MASS INDEX: 29.69 KG/M2 | SYSTOLIC BLOOD PRESSURE: 118 MMHG | HEART RATE: 80 BPM | OXYGEN SATURATION: 98 % | DIASTOLIC BLOOD PRESSURE: 70 MMHG

## 2021-07-27 DIAGNOSIS — Z00.00 ANNUAL PHYSICAL EXAM: Primary | ICD-10-CM

## 2021-07-27 DIAGNOSIS — Z00.00 ANNUAL PHYSICAL EXAM: ICD-10-CM

## 2021-07-27 LAB
ALBUMIN SERPL BCP-MCNC: 3.8 G/DL (ref 3.5–5.2)
ALP SERPL-CCNC: 72 U/L (ref 55–135)
ALT SERPL W/O P-5'-P-CCNC: 23 U/L (ref 10–44)
ANION GAP SERPL CALC-SCNC: 7 MMOL/L (ref 8–16)
AST SERPL-CCNC: 22 U/L (ref 10–40)
BASOPHILS # BLD AUTO: 0.04 K/UL (ref 0–0.2)
BASOPHILS NFR BLD: 1 % (ref 0–1.9)
BILIRUB SERPL-MCNC: 0.2 MG/DL (ref 0.1–1)
BILIRUB UR QL STRIP: NEGATIVE
BUN SERPL-MCNC: 19 MG/DL (ref 6–20)
CALCIUM SERPL-MCNC: 9.4 MG/DL (ref 8.7–10.5)
CHLORIDE SERPL-SCNC: 110 MMOL/L (ref 95–110)
CHOLEST SERPL-MCNC: 167 MG/DL (ref 120–199)
CHOLEST/HDLC SERPL: 4.5 {RATIO} (ref 2–5)
CLARITY UR REFRACT.AUTO: CLEAR
CO2 SERPL-SCNC: 21 MMOL/L (ref 23–29)
COLOR UR AUTO: NORMAL
CREAT SERPL-MCNC: 1.1 MG/DL (ref 0.5–1.4)
DIFFERENTIAL METHOD: ABNORMAL
EOSINOPHIL # BLD AUTO: 0 K/UL (ref 0–0.5)
EOSINOPHIL NFR BLD: 0.8 % (ref 0–8)
ERYTHROCYTE [DISTWIDTH] IN BLOOD BY AUTOMATED COUNT: 15.6 % (ref 11.5–14.5)
EST. GFR  (AFRICAN AMERICAN): >60 ML/MIN/1.73 M^2
EST. GFR  (NON AFRICAN AMERICAN): >60 ML/MIN/1.73 M^2
GLUCOSE SERPL-MCNC: 85 MG/DL (ref 70–110)
GLUCOSE UR QL STRIP: NEGATIVE
HCT VFR BLD AUTO: 39.6 % (ref 37–48.5)
HDLC SERPL-MCNC: 37 MG/DL (ref 40–75)
HDLC SERPL: 22.2 % (ref 20–50)
HGB BLD-MCNC: 12.8 G/DL (ref 12–16)
HGB UR QL STRIP: NEGATIVE
IMM GRANULOCYTES # BLD AUTO: 0 K/UL (ref 0–0.04)
IMM GRANULOCYTES NFR BLD AUTO: 0 % (ref 0–0.5)
KETONES UR QL STRIP: NEGATIVE
LDLC SERPL CALC-MCNC: 121.6 MG/DL (ref 63–159)
LEUKOCYTE ESTERASE UR QL STRIP: NEGATIVE
LYMPHOCYTES # BLD AUTO: 1.9 K/UL (ref 1–4.8)
LYMPHOCYTES NFR BLD: 46.5 % (ref 18–48)
MCH RBC QN AUTO: 25.1 PG (ref 27–31)
MCHC RBC AUTO-ENTMCNC: 32.3 G/DL (ref 32–36)
MCV RBC AUTO: 78 FL (ref 82–98)
MICROSCOPIC COMMENT: NORMAL
MONOCYTES # BLD AUTO: 0.3 K/UL (ref 0.3–1)
MONOCYTES NFR BLD: 7 % (ref 4–15)
NEUTROPHILS # BLD AUTO: 1.8 K/UL (ref 1.8–7.7)
NEUTROPHILS NFR BLD: 44.7 % (ref 38–73)
NITRITE UR QL STRIP: NEGATIVE
NONHDLC SERPL-MCNC: 130 MG/DL
NRBC BLD-RTO: 0 /100 WBC
PH UR STRIP: 5 [PH] (ref 5–8)
PLATELET # BLD AUTO: 352 K/UL (ref 150–450)
PMV BLD AUTO: 10.9 FL (ref 9.2–12.9)
POTASSIUM SERPL-SCNC: 4.9 MMOL/L (ref 3.5–5.1)
PROT SERPL-MCNC: 7.1 G/DL (ref 6–8.4)
PROT UR QL STRIP: NEGATIVE
RBC # BLD AUTO: 5.09 M/UL (ref 4–5.4)
SODIUM SERPL-SCNC: 138 MMOL/L (ref 136–145)
SP GR UR STRIP: 1.01 (ref 1–1.03)
SQUAMOUS #/AREA URNS AUTO: 2 /HPF
TRIGL SERPL-MCNC: 42 MG/DL (ref 30–150)
URN SPEC COLLECT METH UR: NORMAL
WBC # BLD AUTO: 4 K/UL (ref 3.9–12.7)

## 2021-07-27 PROCEDURE — 3008F PR BODY MASS INDEX (BMI) DOCUMENTED: ICD-10-PCS | Mod: CPTII,S$GLB,, | Performed by: INTERNAL MEDICINE

## 2021-07-27 PROCEDURE — 1159F PR MEDICATION LIST DOCUMENTED IN MEDICAL RECORD: ICD-10-PCS | Mod: CPTII,S$GLB,, | Performed by: INTERNAL MEDICINE

## 2021-07-27 PROCEDURE — 36415 COLL VENOUS BLD VENIPUNCTURE: CPT | Performed by: INTERNAL MEDICINE

## 2021-07-27 PROCEDURE — 1126F AMNT PAIN NOTED NONE PRSNT: CPT | Mod: CPTII,S$GLB,, | Performed by: INTERNAL MEDICINE

## 2021-07-27 PROCEDURE — 85025 COMPLETE CBC W/AUTO DIFF WBC: CPT | Performed by: INTERNAL MEDICINE

## 2021-07-27 PROCEDURE — 99999 PR PBB SHADOW E&M-EST. PATIENT-LVL IV: CPT | Mod: PBBFAC,,, | Performed by: INTERNAL MEDICINE

## 2021-07-27 PROCEDURE — 81001 URINALYSIS AUTO W/SCOPE: CPT | Performed by: INTERNAL MEDICINE

## 2021-07-27 PROCEDURE — 99999 PR PBB SHADOW E&M-EST. PATIENT-LVL IV: ICD-10-PCS | Mod: PBBFAC,,, | Performed by: INTERNAL MEDICINE

## 2021-07-27 PROCEDURE — 99395 PREV VISIT EST AGE 18-39: CPT | Mod: S$GLB,,, | Performed by: INTERNAL MEDICINE

## 2021-07-27 PROCEDURE — 1159F MED LIST DOCD IN RCRD: CPT | Mod: CPTII,S$GLB,, | Performed by: INTERNAL MEDICINE

## 2021-07-27 PROCEDURE — 99395 PR PREVENTIVE VISIT,EST,18-39: ICD-10-PCS | Mod: S$GLB,,, | Performed by: INTERNAL MEDICINE

## 2021-07-27 PROCEDURE — 1126F PR PAIN SEVERITY QUANTIFIED, NO PAIN PRESENT: ICD-10-PCS | Mod: CPTII,S$GLB,, | Performed by: INTERNAL MEDICINE

## 2021-07-27 PROCEDURE — 80053 COMPREHEN METABOLIC PANEL: CPT | Performed by: INTERNAL MEDICINE

## 2021-07-27 PROCEDURE — 3008F BODY MASS INDEX DOCD: CPT | Mod: CPTII,S$GLB,, | Performed by: INTERNAL MEDICINE

## 2021-07-27 PROCEDURE — 80061 LIPID PANEL: CPT | Performed by: INTERNAL MEDICINE

## 2021-08-12 ENCOUNTER — OFFICE VISIT (OUTPATIENT)
Dept: PSYCHIATRY | Facility: CLINIC | Age: 36
End: 2021-08-12
Payer: COMMERCIAL

## 2021-08-12 DIAGNOSIS — R69 PSYCHIATRIC DIAGNOSIS DEFERRED: Primary | ICD-10-CM

## 2021-08-12 PROCEDURE — 90834 PSYTX W PT 45 MINUTES: CPT | Mod: S$GLB,,, | Performed by: SOCIAL WORKER

## 2021-08-12 PROCEDURE — 90834 PR PSYCHOTHERAPY W/PATIENT, 45 MIN: ICD-10-PCS | Mod: S$GLB,,, | Performed by: SOCIAL WORKER

## 2021-08-17 ENCOUNTER — CLINICAL SUPPORT (OUTPATIENT)
Dept: INTERNAL MEDICINE | Facility: CLINIC | Age: 36
End: 2021-08-17
Payer: COMMERCIAL

## 2021-08-17 VITALS — BODY MASS INDEX: 29.83 KG/M2 | WEIGHT: 190.5 LBS

## 2021-08-17 PROCEDURE — 99999 PR PBB SHADOW E&M-EST. PATIENT-LVL I: ICD-10-PCS | Mod: PBBFAC,,,

## 2021-08-17 PROCEDURE — 99999 PR PBB SHADOW E&M-EST. PATIENT-LVL I: CPT | Mod: PBBFAC,,,

## 2021-08-27 ENCOUNTER — PATIENT MESSAGE (OUTPATIENT)
Dept: PSYCHIATRY | Facility: CLINIC | Age: 36
End: 2021-08-27

## 2021-09-07 NOTE — PROGRESS NOTES
Gynecology    SUBJECTIVE:     Chief Complaint: Consult       History of Present Illness:  34 year old who presents for a second opinion regarding fertility.  Recently saw ABBY where an AMH was drawn and was very low.  She was encouraged to freeze her eggs sooner rather than later.  She is presenting to me wondering if this is the best strategy.  She is not interested in donor eggs and is not interested in adoption.  Her partner lives away for the next year.  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  She is tearful discussing egg retrieval, IVF and the process.  However, she is coping well.      Review of Systems:  Review of Systems   Constitutional: Negative for fever.   Gastrointestinal: Negative for nausea and vomiting.   Genitourinary: Positive for dysmenorrhea (with cycles) and pelvic pain. Negative for menorrhagia, menstrual problem, vaginal bleeding, vaginal discharge, vaginal pain and vaginal odor.        OBJECTIVE:     Physical Exam:  Physical Exam   Constitutional: She is oriented to person, place, and time. She appears well-developed and well-nourished.   Pulmonary/Chest: Effort normal.   Neurological: She is oriented to person, place, and time.   Skin: No pallor.   Psychiatric: She has a normal mood and affect. Her behavior is normal. Judgment and thought content normal.   Nursing note and vitals reviewed.        ASSESSMENT:       ICD-10-CM ICD-9-CM    1. Infertility counseling Z31.69 V26.49    2. Endometriosis N80.9 617.9 norethindrone (ORTHO MICRONOR) 0.35 mg tablet          Plan:      Joan was seen today for consult.    Diagnoses and all orders for this visit:    Infertility counseling    Endometriosis  -     norethindrone (ORTHO MICRONOR) 0.35 mg tablet; Take 1 tablet (0.35 mg total) by mouth once daily.    Other orders  -     norethindrone (AYGESTIN) 5 mg Tab; Take 1 tablet (5 mg total) by mouth once daily.    A total of 15  How Did The Hair Loss Occur?: sudden in onset How Severe Is Your Hair Loss?: moderate minutes was spent face to face with the patient during this encounter.  Over 100 % the time was spent on counseling and coordination of care.  We discussed:  - I discussed with her treatment with low amh and agreed with the recommendations by [facility]BB[facility]; I encouraged her to have a second opinion with [facility]  - I recommended counselors given her discouragement with the diagnosis  - we discussed her age and the affect that that would have on pregnancy; we discussed timing, etc.   - questions answered, refills supplied      No orders of the defined types were placed in this encounter.      Follow up if symptoms worsen or fail to improve.    Rani Ambrocio

## 2021-10-10 DIAGNOSIS — E55.9 VITAMIN D DEFICIENCY: ICD-10-CM

## 2021-10-10 DIAGNOSIS — R73.09 ELEVATED GLUCOSE: Primary | ICD-10-CM

## 2021-10-11 ENCOUNTER — TELEPHONE (OUTPATIENT)
Dept: INTERNAL MEDICINE | Facility: CLINIC | Age: 36
End: 2021-10-11

## 2021-10-15 ENCOUNTER — LAB VISIT (OUTPATIENT)
Dept: LAB | Facility: HOSPITAL | Age: 36
End: 2021-10-15
Attending: INTERNAL MEDICINE
Payer: COMMERCIAL

## 2021-10-15 ENCOUNTER — IMMUNIZATION (OUTPATIENT)
Dept: INTERNAL MEDICINE | Facility: CLINIC | Age: 36
End: 2021-10-15
Payer: COMMERCIAL

## 2021-10-15 DIAGNOSIS — E55.9 VITAMIN D DEFICIENCY: ICD-10-CM

## 2021-10-15 DIAGNOSIS — Z23 NEED FOR VACCINATION: Primary | ICD-10-CM

## 2021-10-15 DIAGNOSIS — R73.09 ELEVATED GLUCOSE: ICD-10-CM

## 2021-10-15 LAB
25(OH)D3+25(OH)D2 SERPL-MCNC: 39 NG/ML (ref 30–96)
ESTIMATED AVG GLUCOSE: 100 MG/DL (ref 68–131)
HBA1C MFR BLD: 5.1 % (ref 4–5.6)

## 2021-10-15 PROCEDURE — 91300 COVID-19, MRNA, LNP-S, PF, 30 MCG/0.3 ML DOSE VACCINE: CPT | Mod: PBBFAC | Performed by: INTERNAL MEDICINE

## 2021-10-15 PROCEDURE — 36415 COLL VENOUS BLD VENIPUNCTURE: CPT | Performed by: INTERNAL MEDICINE

## 2021-10-15 PROCEDURE — 0003A COVID-19, MRNA, LNP-S, PF, 30 MCG/0.3 ML DOSE VACCINE: CPT | Mod: CV19,PBBFAC | Performed by: INTERNAL MEDICINE

## 2021-10-15 PROCEDURE — 83036 HEMOGLOBIN GLYCOSYLATED A1C: CPT | Performed by: INTERNAL MEDICINE

## 2021-10-15 PROCEDURE — 82306 VITAMIN D 25 HYDROXY: CPT | Performed by: INTERNAL MEDICINE

## 2021-10-22 ENCOUNTER — PATIENT OUTREACH (OUTPATIENT)
Dept: ADMINISTRATIVE | Facility: OTHER | Age: 36
End: 2021-10-22

## 2021-10-25 ENCOUNTER — OFFICE VISIT (OUTPATIENT)
Dept: ALLERGY | Facility: CLINIC | Age: 36
End: 2021-10-25
Payer: COMMERCIAL

## 2021-10-25 ENCOUNTER — PATIENT MESSAGE (OUTPATIENT)
Dept: ALLERGY | Facility: CLINIC | Age: 36
End: 2021-10-25

## 2021-10-25 VITALS — HEIGHT: 67 IN | BODY MASS INDEX: 30.24 KG/M2 | WEIGHT: 192.69 LBS

## 2021-10-25 DIAGNOSIS — H10.13 ALLERGIC CONJUNCTIVITIS, BILATERAL: ICD-10-CM

## 2021-10-25 DIAGNOSIS — K21.9 LARYNGOPHARYNGEAL REFLUX: ICD-10-CM

## 2021-10-25 DIAGNOSIS — K21.9 GASTROESOPHAGEAL REFLUX DISEASE, UNSPECIFIED WHETHER ESOPHAGITIS PRESENT: ICD-10-CM

## 2021-10-25 DIAGNOSIS — J30.89 ALLERGIC RHINITIS DUE TO DUST MITE: ICD-10-CM

## 2021-10-25 DIAGNOSIS — J31.0 CHRONIC RHINITIS: Primary | ICD-10-CM

## 2021-10-25 PROCEDURE — 3044F HG A1C LEVEL LT 7.0%: CPT | Mod: CPTII,S$GLB,, | Performed by: ALLERGY & IMMUNOLOGY

## 2021-10-25 PROCEDURE — 99999 PR PBB SHADOW E&M-EST. PATIENT-LVL III: ICD-10-PCS | Mod: PBBFAC,,, | Performed by: ALLERGY & IMMUNOLOGY

## 2021-10-25 PROCEDURE — 1160F PR REVIEW ALL MEDS BY PRESCRIBER/CLIN PHARMACIST DOCUMENTED: ICD-10-PCS | Mod: CPTII,S$GLB,, | Performed by: ALLERGY & IMMUNOLOGY

## 2021-10-25 PROCEDURE — 3044F PR MOST RECENT HEMOGLOBIN A1C LEVEL <7.0%: ICD-10-PCS | Mod: CPTII,S$GLB,, | Performed by: ALLERGY & IMMUNOLOGY

## 2021-10-25 PROCEDURE — 1160F RVW MEDS BY RX/DR IN RCRD: CPT | Mod: CPTII,S$GLB,, | Performed by: ALLERGY & IMMUNOLOGY

## 2021-10-25 PROCEDURE — 1159F PR MEDICATION LIST DOCUMENTED IN MEDICAL RECORD: ICD-10-PCS | Mod: CPTII,S$GLB,, | Performed by: ALLERGY & IMMUNOLOGY

## 2021-10-25 PROCEDURE — 99999 PR PBB SHADOW E&M-EST. PATIENT-LVL III: CPT | Mod: PBBFAC,,, | Performed by: ALLERGY & IMMUNOLOGY

## 2021-10-25 PROCEDURE — 99214 PR OFFICE/OUTPT VISIT, EST, LEVL IV, 30-39 MIN: ICD-10-PCS | Mod: S$GLB,,, | Performed by: ALLERGY & IMMUNOLOGY

## 2021-10-25 PROCEDURE — 3008F BODY MASS INDEX DOCD: CPT | Mod: CPTII,S$GLB,, | Performed by: ALLERGY & IMMUNOLOGY

## 2021-10-25 PROCEDURE — 99214 OFFICE O/P EST MOD 30 MIN: CPT | Mod: S$GLB,,, | Performed by: ALLERGY & IMMUNOLOGY

## 2021-10-25 PROCEDURE — 1159F MED LIST DOCD IN RCRD: CPT | Mod: CPTII,S$GLB,, | Performed by: ALLERGY & IMMUNOLOGY

## 2021-10-25 PROCEDURE — 3008F PR BODY MASS INDEX (BMI) DOCUMENTED: ICD-10-PCS | Mod: CPTII,S$GLB,, | Performed by: ALLERGY & IMMUNOLOGY

## 2021-11-03 ENCOUNTER — OFFICE VISIT (OUTPATIENT)
Dept: OTOLARYNGOLOGY | Facility: CLINIC | Age: 36
End: 2021-11-03
Payer: COMMERCIAL

## 2021-11-03 VITALS
SYSTOLIC BLOOD PRESSURE: 132 MMHG | HEIGHT: 67 IN | WEIGHT: 192.13 LBS | DIASTOLIC BLOOD PRESSURE: 82 MMHG | TEMPERATURE: 99 F | BODY MASS INDEX: 30.16 KG/M2 | HEART RATE: 88 BPM

## 2021-11-03 DIAGNOSIS — K21.9 LARYNGOPHARYNGEAL REFLUX (LPR): ICD-10-CM

## 2021-11-03 DIAGNOSIS — J30.9 CHRONIC ALLERGIC RHINITIS: ICD-10-CM

## 2021-11-03 DIAGNOSIS — R49.0 MUSCLE TENSION DYSPHONIA: Primary | ICD-10-CM

## 2021-11-03 PROCEDURE — 3075F PR MOST RECENT SYSTOLIC BLOOD PRESS GE 130-139MM HG: ICD-10-PCS | Mod: CPTII,S$GLB,, | Performed by: OTOLARYNGOLOGY

## 2021-11-03 PROCEDURE — 3008F PR BODY MASS INDEX (BMI) DOCUMENTED: ICD-10-PCS | Mod: CPTII,S$GLB,, | Performed by: OTOLARYNGOLOGY

## 2021-11-03 PROCEDURE — 99244 PR OFFICE CONSULTATION,LEVEL IV: ICD-10-PCS | Mod: 25,S$GLB,, | Performed by: OTOLARYNGOLOGY

## 2021-11-03 PROCEDURE — 1160F RVW MEDS BY RX/DR IN RCRD: CPT | Mod: CPTII,S$GLB,, | Performed by: OTOLARYNGOLOGY

## 2021-11-03 PROCEDURE — 99999 PR PBB SHADOW E&M-EST. PATIENT-LVL V: CPT | Mod: PBBFAC,,, | Performed by: OTOLARYNGOLOGY

## 2021-11-03 PROCEDURE — 99999 PR PBB SHADOW E&M-EST. PATIENT-LVL V: ICD-10-PCS | Mod: PBBFAC,,, | Performed by: OTOLARYNGOLOGY

## 2021-11-03 PROCEDURE — 31575 DIAGNOSTIC LARYNGOSCOPY: CPT | Mod: S$GLB,,, | Performed by: OTOLARYNGOLOGY

## 2021-11-03 PROCEDURE — 3044F HG A1C LEVEL LT 7.0%: CPT | Mod: CPTII,S$GLB,, | Performed by: OTOLARYNGOLOGY

## 2021-11-03 PROCEDURE — 3075F SYST BP GE 130 - 139MM HG: CPT | Mod: CPTII,S$GLB,, | Performed by: OTOLARYNGOLOGY

## 2021-11-03 PROCEDURE — 31575 LARYNGOSCOPY: ICD-10-PCS | Mod: S$GLB,,, | Performed by: OTOLARYNGOLOGY

## 2021-11-03 PROCEDURE — 3079F DIAST BP 80-89 MM HG: CPT | Mod: CPTII,S$GLB,, | Performed by: OTOLARYNGOLOGY

## 2021-11-03 PROCEDURE — 3044F PR MOST RECENT HEMOGLOBIN A1C LEVEL <7.0%: ICD-10-PCS | Mod: CPTII,S$GLB,, | Performed by: OTOLARYNGOLOGY

## 2021-11-03 PROCEDURE — 3079F PR MOST RECENT DIASTOLIC BLOOD PRESSURE 80-89 MM HG: ICD-10-PCS | Mod: CPTII,S$GLB,, | Performed by: OTOLARYNGOLOGY

## 2021-11-03 PROCEDURE — 1159F MED LIST DOCD IN RCRD: CPT | Mod: CPTII,S$GLB,, | Performed by: OTOLARYNGOLOGY

## 2021-11-03 PROCEDURE — 1159F PR MEDICATION LIST DOCUMENTED IN MEDICAL RECORD: ICD-10-PCS | Mod: CPTII,S$GLB,, | Performed by: OTOLARYNGOLOGY

## 2021-11-03 PROCEDURE — 99244 OFF/OP CNSLTJ NEW/EST MOD 40: CPT | Mod: 25,S$GLB,, | Performed by: OTOLARYNGOLOGY

## 2021-11-03 PROCEDURE — 1160F PR REVIEW ALL MEDS BY PRESCRIBER/CLIN PHARMACIST DOCUMENTED: ICD-10-PCS | Mod: CPTII,S$GLB,, | Performed by: OTOLARYNGOLOGY

## 2021-11-03 PROCEDURE — 3008F BODY MASS INDEX DOCD: CPT | Mod: CPTII,S$GLB,, | Performed by: OTOLARYNGOLOGY

## 2021-11-03 RX ORDER — FLUTICASONE PROPIONATE 50 MCG
2 SPRAY, SUSPENSION (ML) NASAL DAILY
Qty: 16 G | Refills: 11 | Status: SHIPPED | OUTPATIENT
Start: 2021-11-03 | End: 2022-05-09 | Stop reason: SDUPTHER

## 2021-11-03 RX ORDER — OMEPRAZOLE 40 MG/1
40 CAPSULE, DELAYED RELEASE ORAL
Qty: 60 CAPSULE | Refills: 1 | Status: SHIPPED | OUTPATIENT
Start: 2021-11-03 | End: 2022-02-01

## 2021-11-08 ENCOUNTER — TELEPHONE (OUTPATIENT)
Dept: SPEECH THERAPY | Facility: HOSPITAL | Age: 36
End: 2021-11-08
Payer: COMMERCIAL

## 2021-11-11 ENCOUNTER — TELEPHONE (OUTPATIENT)
Dept: SPEECH THERAPY | Facility: HOSPITAL | Age: 36
End: 2021-11-11
Payer: COMMERCIAL

## 2021-11-12 ENCOUNTER — CLINICAL SUPPORT (OUTPATIENT)
Dept: INTERNAL MEDICINE | Facility: CLINIC | Age: 36
End: 2021-11-12
Payer: COMMERCIAL

## 2021-11-12 VITALS — WEIGHT: 186.94 LBS | BODY MASS INDEX: 29.28 KG/M2

## 2021-11-16 ENCOUNTER — PATIENT MESSAGE (OUTPATIENT)
Dept: OTOLARYNGOLOGY | Facility: CLINIC | Age: 36
End: 2021-11-16
Payer: COMMERCIAL

## 2021-11-23 ENCOUNTER — OFFICE VISIT (OUTPATIENT)
Dept: PSYCHIATRY | Facility: CLINIC | Age: 36
End: 2021-11-23
Payer: COMMERCIAL

## 2021-11-23 DIAGNOSIS — R69 PSYCHIATRIC DIAGNOSIS DEFERRED: Primary | ICD-10-CM

## 2021-11-23 PROCEDURE — 90834 PSYTX W PT 45 MINUTES: CPT | Mod: S$GLB,,, | Performed by: SOCIAL WORKER

## 2021-11-23 PROCEDURE — 90834 PR PSYCHOTHERAPY W/PATIENT, 45 MIN: ICD-10-PCS | Mod: S$GLB,,, | Performed by: SOCIAL WORKER

## 2021-12-17 ENCOUNTER — CLINICAL SUPPORT (OUTPATIENT)
Dept: INTERNAL MEDICINE | Facility: CLINIC | Age: 36
End: 2021-12-17
Payer: COMMERCIAL

## 2021-12-17 VITALS — WEIGHT: 187.19 LBS | BODY MASS INDEX: 29.32 KG/M2

## 2021-12-21 ENCOUNTER — OFFICE VISIT (OUTPATIENT)
Dept: PSYCHIATRY | Facility: CLINIC | Age: 36
End: 2021-12-21
Payer: COMMERCIAL

## 2021-12-21 DIAGNOSIS — R69 PSYCHIATRIC DIAGNOSIS DEFERRED: Primary | ICD-10-CM

## 2021-12-21 PROCEDURE — 90834 PSYTX W PT 45 MINUTES: CPT | Mod: S$GLB,,, | Performed by: SOCIAL WORKER

## 2021-12-21 PROCEDURE — 90834 PR PSYCHOTHERAPY W/PATIENT, 45 MIN: ICD-10-PCS | Mod: S$GLB,,, | Performed by: SOCIAL WORKER

## 2022-01-06 ENCOUNTER — PATIENT OUTREACH (OUTPATIENT)
Dept: ADMINISTRATIVE | Facility: OTHER | Age: 37
End: 2022-01-06
Payer: COMMERCIAL

## 2022-01-06 NOTE — PROGRESS NOTES
Health Maintenance Due   Topic Date Due    Pneumococcal Vaccines (Age 0-64) (1 of 2 - PPSV23) Never done     Updates were requested from care everywhere.  Chart was reviewed for overdue Proactive [facility] Encounters (DEDE) topics (CRS, Breast Cancer Screening, Eye exam)  Health Maintenance has been updated.  LINKS immunization registry triggered.  Immunizations were reconciled.

## 2022-01-09 ENCOUNTER — OFFICE VISIT (OUTPATIENT)
Dept: URGENT CARE | Facility: CLINIC | Age: 37
End: 2022-01-09
Payer: COMMERCIAL

## 2022-01-09 VITALS
RESPIRATION RATE: 18 BRPM | HEART RATE: 83 BPM | SYSTOLIC BLOOD PRESSURE: 129 MMHG | DIASTOLIC BLOOD PRESSURE: 86 MMHG | OXYGEN SATURATION: 99 % | WEIGHT: 187 LBS | TEMPERATURE: 99 F | BODY MASS INDEX: 29.35 KG/M2 | HEIGHT: 67 IN

## 2022-01-09 DIAGNOSIS — J10.1 INFLUENZA B: ICD-10-CM

## 2022-01-09 DIAGNOSIS — R53.83 FATIGUE, UNSPECIFIED TYPE: ICD-10-CM

## 2022-01-09 DIAGNOSIS — J10.1 INFLUENZA A: ICD-10-CM

## 2022-01-09 DIAGNOSIS — R11.0 NAUSEA: ICD-10-CM

## 2022-01-09 DIAGNOSIS — R52 BODY ACHES: ICD-10-CM

## 2022-01-09 DIAGNOSIS — Z11.59 SCREENING FOR VIRAL DISEASE: Primary | ICD-10-CM

## 2022-01-09 LAB
B-HCG UR QL: NEGATIVE
BILIRUB UR QL STRIP: NEGATIVE
CTP QC/QA: YES
FLUAV AG NPH QL: POSITIVE
FLUBV AG NPH QL: POSITIVE
GLUCOSE UR QL STRIP: NEGATIVE
KETONES UR QL STRIP: NEGATIVE
LEUKOCYTE ESTERASE UR QL STRIP: NEGATIVE
PH, POC UA: 8 (ref 5–8)
POC BLOOD, URINE: NEGATIVE
POC NITRATES, URINE: NEGATIVE
PROT UR QL STRIP: NEGATIVE
SARS-COV-2 RDRP RESP QL NAA+PROBE: NEGATIVE
SP GR UR STRIP: 1 (ref 1–1.03)
UROBILINOGEN UR STRIP-ACNC: NORMAL (ref 0.1–1.1)

## 2022-01-09 PROCEDURE — 81025 POCT URINE PREGNANCY: ICD-10-PCS | Mod: S$GLB,,, | Performed by: FAMILY MEDICINE

## 2022-01-09 PROCEDURE — 1159F MED LIST DOCD IN RCRD: CPT | Mod: CPTII,S$GLB,, | Performed by: FAMILY MEDICINE

## 2022-01-09 PROCEDURE — 1160F PR REVIEW ALL MEDS BY PRESCRIBER/CLIN PHARMACIST DOCUMENTED: ICD-10-PCS | Mod: CPTII,S$GLB,, | Performed by: FAMILY MEDICINE

## 2022-01-09 PROCEDURE — 3008F BODY MASS INDEX DOCD: CPT | Mod: CPTII,S$GLB,, | Performed by: FAMILY MEDICINE

## 2022-01-09 PROCEDURE — U0002 COVID-19 LAB TEST NON-CDC: HCPCS | Mod: QW,S$GLB,, | Performed by: FAMILY MEDICINE

## 2022-01-09 PROCEDURE — 3079F PR MOST RECENT DIASTOLIC BLOOD PRESSURE 80-89 MM HG: ICD-10-PCS | Mod: CPTII,S$GLB,, | Performed by: FAMILY MEDICINE

## 2022-01-09 PROCEDURE — 1159F PR MEDICATION LIST DOCUMENTED IN MEDICAL RECORD: ICD-10-PCS | Mod: CPTII,S$GLB,, | Performed by: FAMILY MEDICINE

## 2022-01-09 PROCEDURE — 99214 PR OFFICE/OUTPT VISIT, EST, LEVL IV, 30-39 MIN: ICD-10-PCS | Mod: 25,S$GLB,CS, | Performed by: FAMILY MEDICINE

## 2022-01-09 PROCEDURE — 3079F DIAST BP 80-89 MM HG: CPT | Mod: CPTII,S$GLB,, | Performed by: FAMILY MEDICINE

## 2022-01-09 PROCEDURE — 87804 POCT INFLUENZA A/B: ICD-10-PCS | Mod: 59,QW,S$GLB, | Performed by: FAMILY MEDICINE

## 2022-01-09 PROCEDURE — 81003 URINALYSIS AUTO W/O SCOPE: CPT | Mod: QW,S$GLB,, | Performed by: FAMILY MEDICINE

## 2022-01-09 PROCEDURE — 1160F RVW MEDS BY RX/DR IN RCRD: CPT | Mod: CPTII,S$GLB,, | Performed by: FAMILY MEDICINE

## 2022-01-09 PROCEDURE — 99214 OFFICE O/P EST MOD 30 MIN: CPT | Mod: 25,S$GLB,CS, | Performed by: FAMILY MEDICINE

## 2022-01-09 PROCEDURE — 81025 URINE PREGNANCY TEST: CPT | Mod: S$GLB,,, | Performed by: FAMILY MEDICINE

## 2022-01-09 PROCEDURE — 3008F PR BODY MASS INDEX (BMI) DOCUMENTED: ICD-10-PCS | Mod: CPTII,S$GLB,, | Performed by: FAMILY MEDICINE

## 2022-01-09 PROCEDURE — U0002: ICD-10-PCS | Mod: QW,S$GLB,, | Performed by: FAMILY MEDICINE

## 2022-01-09 PROCEDURE — 87804 INFLUENZA ASSAY W/OPTIC: CPT | Mod: QW,S$GLB,, | Performed by: FAMILY MEDICINE

## 2022-01-09 PROCEDURE — 81003 POCT URINALYSIS, DIPSTICK, AUTOMATED, W/O SCOPE: ICD-10-PCS | Mod: QW,S$GLB,, | Performed by: FAMILY MEDICINE

## 2022-01-09 PROCEDURE — 3074F PR MOST RECENT SYSTOLIC BLOOD PRESSURE < 130 MM HG: ICD-10-PCS | Mod: CPTII,S$GLB,, | Performed by: FAMILY MEDICINE

## 2022-01-09 PROCEDURE — 3074F SYST BP LT 130 MM HG: CPT | Mod: CPTII,S$GLB,, | Performed by: FAMILY MEDICINE

## 2022-01-09 RX ORDER — OSELTAMIVIR PHOSPHATE 75 MG/1
75 CAPSULE ORAL 2 TIMES DAILY
Qty: 10 CAPSULE | Refills: 0 | Status: SHIPPED | OUTPATIENT
Start: 2022-01-09 | End: 2022-01-09

## 2022-01-09 RX ORDER — OSELTAMIVIR PHOSPHATE 75 MG/1
75 CAPSULE ORAL 2 TIMES DAILY
Qty: 10 CAPSULE | Refills: 0 | Status: SHIPPED | OUTPATIENT
Start: 2022-01-09 | End: 2022-01-14

## 2022-01-09 RX ORDER — ONDANSETRON 4 MG/1
4 TABLET, ORALLY DISINTEGRATING ORAL EVERY 8 HOURS PRN
Qty: 9 TABLET | Refills: 0 | Status: SHIPPED | OUTPATIENT
Start: 2022-01-09 | End: 2022-01-09

## 2022-01-09 RX ORDER — ONDANSETRON 4 MG/1
4 TABLET, ORALLY DISINTEGRATING ORAL EVERY 8 HOURS PRN
Qty: 9 TABLET | Refills: 0 | Status: SHIPPED | OUTPATIENT
Start: 2022-01-09 | End: 2022-01-12

## 2022-01-09 NOTE — PATIENT INSTRUCTIONS
Patient Education       Flu Discharge Instructions, Adult   About this topic   Influenza, or flu, is an infection caused by the influenza virus. It affects your throat, breathing tube, and lungs (the respiratory system). It spreads from a person who is sick to some other person from close contact. Flu may cause:  · Fever over 100.4°F (38°C)  · Chills  · Body aches  · Headache  · Cough  · Runny or stuffy nose  · Sore throat  · Tiredness  · Throwing up  What care is needed at home?   · Ask your doctor what you need to do when you go home. Make sure you ask questions if you do not understand what the doctor says. This way you will know what you need to do.  · Drink lots of fluids, such as water, broth, sports drinks, and tea. This will keep your fluid levels up.  · You need to rest while you are getting better.  · Get enough sleep.  · Use a machine that makes steam like a vaporizer or humidifier. It may help open up a clogged nose so you can breathe easier.  What follow-up care is needed?   Your doctor may ask you to make visits to the office to check on your progress. Be sure to keep these visits.  What drugs may be needed?   The doctor may order drugs to:  · Treat the flu  · Lower fever  · Help with pain  · Relieve body aches  · Control coughing  Will physical activity be limited?   You need to rest while you are getting better. This means you may need to limit your activity until you feel well.  What changes to diet are needed?   Eat food that will not upset your stomach such as:  · Chicken soup  · Bananas  · Rice  · Apples  · Toast  What problems could happen?   · Pneumonia  · Too much fluid loss. This is called dehydration.  · Infection  · Worsening of heart and lung problems  What can be done to prevent this health problem?   · Keep your hands away from your nose, eyes, and mouth. The virus most often enters the body through these parts.  · Wash your hands often with soap and water for at least 20 seconds,  especially after you cough or sneeze. Use alcohol-based hand sanitizers if soap and water are not available.       · Do not get close to, hug, or kiss people who are sick.  · Avoid sharing your towels, tissues, food, or drink with anyone who is sick.  · Avoid going to crowded places.  · Get a flu shot each year.  To keep from spreading germs in the house or other places:  · If you are sick, stay at home. Stay in a separate room if possible. You may spread the flu from the day before you have signs and up to 7 days after you get sick. You may return to work after the fever is gone for 24 hours without the use of drugs to lower your fever.  · Cover your mouth and nose with tissue when you cough or sneeze. You can also cough into your elbow. Throw away tissues in the trash and wash your hands after touching used tissues.  · Keep your house clean by wiping down counters, sinks, faucets, doorknobs, telephones, remotes, and light switches with a  with bleach. Wash dishes in the  or with hot soapy water. The flu virus can live on solid surfaces for 24 hours.     When do I need to call the doctor?   · Fever or cough returns or gets worse  · Chest pain with deep breathing  · Confusion or sudden dizziness  · Very bad throwing up or throwing up that does not stop  · Trouble breathing  · Passing less urine       · You are not feeling better in 2 to 3 days or you are feeling worse  Teach Back: Helping You Understand   The Teach Back Method helps you understand the information we are giving you. The idea is simple. After talking with the staff, tell them in your own words what you were just told. This helps to make sure the staff has covered each thing clearly. It also helps to explain things that may have been a bit confusing. Before going home, make sure you are able to do these:  · I can tell you about my condition.  · I can tell you what I can do to help keep my fluid levels up.  · I can tell you what I will  do to keep others from getting sick.  · I can tell you what I will do if I have chest pain with deep breathing, trouble breathing, passing less urine, or very bad throwing up.  Where can I learn more?   [place] [employer]  https://www.lung.ca/lung-health/lung-disease/influenza   Centers for Disease Control and Prevention  https://www.cdc.gov/flu/highrisk/65over.htm   Centers for Disease Control and Prevention  http://www.cdc.gov/flu/   Last Reviewed Date   2020-02-28  Consumer Information Use and Disclaimer   This information is not specific medical advice and does not replace information you receive from your health care provider. This is only a brief summary of general information. It does NOT include all information about conditions, illnesses, injuries, tests, procedures, treatments, therapies, discharge instructions or life-style choices that may apply to you. You must talk with your health care provider for complete information about your health and treatment options. This information should not be used to decide whether or not to accept your health care providers advice, instructions or recommendations. Only your health care provider has the knowledge and training to provide advice that is right for you.  Copyright   Copyright © 2021 [employer] and its affiliates and/or licensors. All rights reserved.  Patient Education       Nausea and Vomiting Discharge Instructions, Adult   About this topic   When you feel sick to your stomach, this is nausea. When you throw up, this is vomiting. You may have only one of these signs or both at the same time. Most often, they will stop on their own. You may not feel like eating or drinking. You may also have pain in your belly or lose weight. Often, nausea and vomiting are caused by a virus. But they can also be caused by more serious things like an infection around the brain.  If a virus is causing your nausea and vomiting, it is easy to spread from person to  person. You can lower this risk by washing your hands often. Most of the time, your symptoms will go away without treatment in a few days. If these signs last a while, you may need testing to find out the cause.       What care is needed at home?   · Ask your doctor what you need to do when you go home. Make sure you ask questions if you do not understand what the doctor says. This way you will know what you need to do.  · Drink small amounts of fluid every 15 to 30 minutes. Good fluids to drink are water, broth, and oral electrolyte solutions. Sugar-free or very low sugar sports drinks are also OK.  · Once you feel you can eat, start with crackers, toast, or cereal. Then eat small amounts of food more often. Avoid greasy, processed foods until you no longer have an upset stomach. Avoid beer, wine, and mixed drinks (alcohol) and caffeine.  · Wash your hands often. Avoid sharing your food and drinks. Stay away from others until your throwing up has stopped. This will help keep others healthy.  · If you have diabetes, check your blood sugar more often than usual. Being sick can affect your blood sugar levels.  · Suck on hard candy. Use sugar free if you have high blood sugar.  What follow-up care is needed?   Your doctor may ask you to make visits to the office to check on your progress. Be sure to keep these visits.  What drugs may be needed?   The doctor may order drugs to:  · Stop the vomiting  · Lower fever  · Help an upset stomach  Will physical activity be limited?   You may need to rest for a while. You may not be able to travel or go to work until the loose stools and throwing up have stopped for 24 hours.  What problems could happen?   · Too much fluid loss. This is dehydration.  · Weight loss  When do I need to call the doctor?   · You have vomiting along with a fever and severe headache or stiff neck.  · You have vomiting along with severe chest or belly pain or trouble breathing.  · You are vomiting large  amounts of blood (more than 1 teaspoon or 5 mL).  · You have signs of severe fluid loss, such as:  ? No urine for more than 8 hours.  ? Feel very light-headed or like you are going to pass out.  ? Feel weak like you are going to fall.  · You feel extremely weak, like you are going to pass out.  · You are vomiting multiple times every hour.  · You develop early signs of fluid loss, such as:  ? Dark-colored urine.  ? Dry mouth.  ? Muscle cramps.  ? Lack of energy.  ? Feeling light-headed when you get up.  · You have a small amount of blood (less than 1 teaspoon or 5 mL) in your vomit or bowel movements.  · You throw up something that looks like coffee grounds.  · You have a bowel movement that is black and looks like tar.  · You are not able to keep fluids down.  · You have a fever of 100.4ºF (38°C) or higher or chills that do not go away after a day.  Helpful tips   · Avoid odors such as those from cooking or perfumes.  · Put a cool, wet towel on your forehead.  · Get some fresh air.  · Wear loose-fitting, lightweight clothing.  · If you get motion sickness, try an over-the-counter (OTC) drug or talk with your doctor about a skin patch for longer trips.  · Watch TV or a movie or read a book to take your mind off your upset stomach.  Teach Back: Helping You Understand   The Teach Back Method helps you understand the information we are giving you. After you talk with the staff, tell them in your own words what you learned. This helps to make sure the staff has described each thing clearly. It also helps to explain things that may have been confusing. Before going home, make sure you can do these:  · I can tell you about my condition.  · I can tell you how often I should try to drink fluids and good kinds of fluids to drink.  · I can tell you what I will do if I have trouble keeping fluids down.  Last Reviewed Date   2021-06-07  Consumer Information Use and Disclaimer   This information is not specific medical advice  and does not replace information you receive from your health care provider. This is only a brief summary of general information. It does NOT include all information about conditions, illnesses, injuries, tests, procedures, treatments, therapies, discharge instructions or life-style choices that may apply to you. You must talk with your health care provider for complete information about your health and treatment options. This information should not be used to decide whether or not to accept your health care providers advice, instructions or recommendations. Only your health care provider has the knowledge and training to provide advice that is right for you.  Copyright   Copyright © 2021 [employer], [employer] and its affiliates and/or licensors. All rights reserved.

## 2022-01-09 NOTE — LETTER
January 9, 2022      [facility] [place]  [address]  [place] [address]  Phone: [phone]  Fax: [phone]       Patient: Joan Wilde   YOB: 1985  Date of Visit: 01/09/2022    To Whom It May Concern:    Amna Wilde  was at [facility] on 01/09/2022. The patient may return to work/school on 1/13/2022 with no restrictions. If you have any questions or concerns, or if I can be of further assistance, please do not hesitate to contact me.    Sincerely,    Randy Oconnor MD

## 2022-01-09 NOTE — PROGRESS NOTES
"Subjective:       Patient ID: Joan Wilde is a 36 y.o. female.    Vitals:  height is 5' 7" (1.702 m) and weight is 84.8 kg (187 lb). Her temperature is 99.1 °F (37.3 °C). Her blood pressure is 129/86 and her pulse is 83. Her respiration is 18 and oxygen saturation is 99%.     Chief Complaint: Fatigue    Patient presents with c/o nausea, body aches and fatigue for 2 days. Deneis fever, chills, CP, SOB,  dizziness, vomiting, diarrhea, abdominal pain, dysuria, loss of smell or taste.        Fatigue  This is a new problem. The current episode started today (Other symptoms began yesterday). The problem has been unchanged. Associated symptoms include chills, fatigue, a fever, headaches and nausea. Pertinent negatives include no abdominal pain, anorexia, arthralgias, change in bowel habit, chest pain, congestion, coughing, diaphoresis, joint swelling, myalgias, numbness, rash, sore throat, swollen glands, urinary symptoms, vertigo, visual change, vomiting or weakness. Associated symptoms comments: Loss of appetite. . She has tried acetaminophen for the symptoms. The treatment provided mild relief.       Constitution: Positive for chills, fatigue and fever. Negative for sweating.   HENT: Negative for congestion and sore throat.    Cardiovascular: Negative for chest pain.   Respiratory: Negative for cough.    Gastrointestinal: Positive for nausea. Negative for abdominal pain and vomiting.   Musculoskeletal: Negative for joint pain, joint swelling and muscle ache.   Skin: Negative for rash.   Neurological: Positive for headaches. Negative for history of vertigo and numbness.       Objective:      Physical Exam   Constitutional: She is oriented to person, place, and time. She appears well-developed and well-nourished. She is cooperative.  Non-toxic appearance. She does not appear ill. No distress.   HENT:   Head: Normocephalic and atraumatic.   Ears:   Right Ear: Hearing, tympanic membrane, external ear and ear canal " normal. impacted cerumen  Left Ear: Hearing, tympanic membrane, external ear and ear canal normal. impacted cerumen  Nose: Nose normal. No mucosal edema, rhinorrhea, nasal deformity or congestion. No epistaxis. Right sinus exhibits no maxillary sinus tenderness and no frontal sinus tenderness. Left sinus exhibits no maxillary sinus tenderness and no frontal sinus tenderness.   Mouth/Throat: Uvula is midline, oropharynx is clear and moist and mucous membranes are normal. No trismus in the jaw. Normal dentition. No uvula swelling. No posterior oropharyngeal erythema.   Eyes: Conjunctivae and lids are normal. Right eye exhibits no discharge. Left eye exhibits no discharge. No scleral icterus.   Neck: Trachea normal and phonation normal. Neck supple. No neck rigidity present.   Cardiovascular: Normal rate, regular rhythm, normal heart sounds, intact distal pulses and normal pulses.   No murmur heard.  Pulmonary/Chest: Effort normal and breath sounds normal. No stridor. No respiratory distress. She has no wheezes. She has no rhonchi. She has no rales.   Abdominal: Normal appearance and bowel sounds are normal. She exhibits no distension and no mass. Soft. There is no abdominal tenderness. There is no rebound, no guarding, no left CVA tenderness and no right CVA tenderness.   Musculoskeletal: Normal range of motion.         General: No deformity or edema. Normal range of motion.      Cervical back: She exhibits no tenderness.   Lymphadenopathy:     She has no cervical adenopathy.   Neurological: no focal deficit. She is alert, oriented to person, place, and time and at baseline. She displays no weakness. No cranial nerve deficit or sensory deficit. She exhibits normal muscle tone. Coordination normal.   Skin: Skin is warm, dry, intact, not diaphoretic and not pale.   Psychiatric: She has a normal mood and affect. Her speech is normal and behavior is normal. Mood, judgment and thought content normal.   Nursing note and  vitals reviewed.        Assessment:       1. Screening for viral disease    2. Nausea    3. Body aches    4. Fatigue, unspecified type    5. Influenza A    6. Influenza B          Plan:         Screening for viral disease  -     POCT COVID-19 Rapid Screening  -     POCT Influenza A/B    Nausea  -     POCT Influenza A/B  -     POCT Urinalysis, Dipstick, Automated, W/O Scope  -     POCT urine pregnancy    Body aches  -     POCT Influenza A/B  -     POCT Urinalysis, Dipstick, Automated, W/O Scope    Fatigue, unspecified type    Influenza A    Influenza B    Other orders  -     Discontinue: oseltamivir (TAMIFLU) 75 MG capsule; Take 1 capsule (75 mg total) by mouth 2 (two) times daily. for 5 days  Dispense: 10 capsule; Refill: 0  -     Discontinue: ondansetron (ZOFRAN-ODT) 4 MG TbDL; Dissolve 1 tablet (4 mg total) by mouth every 8 (eight) hours as needed (nausea).  Dispense: 9 tablet; Refill: 0  -     oseltamivir (TAMIFLU) 75 MG capsule; Take 1 capsule (75 mg total) by mouth 2 (two) times daily. for 5 days  Dispense: 10 capsule; Refill: 0  -     ondansetron (ZOFRAN-ODT) 4 MG TbDL; Take 1 tablet (4 mg total) by mouth every 8 (eight) hours as needed (nausea).  Dispense: 9 tablet; Refill: 0         Patient Education       Flu Discharge Instructions, Adult   About this topic   Influenza, or flu, is an infection caused by the influenza virus. It affects your throat, breathing tube, and lungs (the respiratory system). It spreads from a person who is sick to some other person from close contact. Flu may cause:  · Fever over 100.4°F (38°C)  · Chills  · Body aches  · Headache  · Cough  · Runny or stuffy nose  · Sore throat  · Tiredness  · Throwing up  What care is needed at home?   · Ask your doctor what you need to do when you go home. Make sure you ask questions if you do not understand what the doctor says. This way you will know what you need to do.  · Drink lots of fluids, such as water, broth, sports drinks, and tea. This  will keep your fluid levels up.  · You need to rest while you are getting better.  · Get enough sleep.  · Use a machine that makes steam like a vaporizer or humidifier. It may help open up a clogged nose so you can breathe easier.  What follow-up care is needed?   Your doctor may ask you to make visits to the office to check on your progress. Be sure to keep these visits.  What drugs may be needed?   The doctor may order drugs to:  · Treat the flu  · Lower fever  · Help with pain  · Relieve body aches  · Control coughing  Will physical activity be limited?   You need to rest while you are getting better. This means you may need to limit your activity until you feel well.  What changes to diet are needed?   Eat food that will not upset your stomach such as:  · Chicken soup  · Bananas  · Rice  · Apples  · Toast  What problems could happen?   · Pneumonia  · Too much fluid loss. This is called dehydration.  · Infection  · Worsening of heart and lung problems  What can be done to prevent this health problem?   · Keep your hands away from your nose, eyes, and mouth. The virus most often enters the body through these parts.  · Wash your hands often with soap and water for at least 20 seconds, especially after you cough or sneeze. Use alcohol-based hand sanitizers if soap and water are not available.       · Do not get close to, hug, or kiss people who are sick.  · Avoid sharing your towels, tissues, food, or drink with anyone who is sick.  · Avoid going to crowded places.  · Get a flu shot each year.  To keep from spreading germs in the house or other places:  · If you are sick, stay at home. Stay in a separate room if possible. You may spread the flu from the day before you have signs and up to 7 days after you get sick. You may return to work after the fever is gone for 24 hours without the use of drugs to lower your fever.  · Cover your mouth and nose with tissue when you cough or sneeze. You can also cough into your  elbow. Throw away tissues in the trash and wash your hands after touching used tissues.  · Keep your house clean by wiping down counters, sinks, faucets, doorknobs, telephones, remotes, and light switches with a  with bleach. Wash dishes in the  or with hot soapy water. The flu virus can live on solid surfaces for 24 hours.     When do I need to call the doctor?   · Fever or cough returns or gets worse  · Chest pain with deep breathing  · Confusion or sudden dizziness  · Very bad throwing up or throwing up that does not stop  · Trouble breathing  · Passing less urine       · You are not feeling better in 2 to 3 days or you are feeling worse  Teach Back: Helping You Understand   The Teach Back Method helps you understand the information we are giving you. The idea is simple. After talking with the staff, tell them in your own words what you were just told. This helps to make sure the staff has covered each thing clearly. It also helps to explain things that may have been a bit confusing. Before going home, make sure you are able to do these:  · I can tell you about my condition.  · I can tell you what I can do to help keep my fluid levels up.  · I can tell you what I will do to keep others from getting sick.  · I can tell you what I will do if I have chest pain with deep breathing, trouble breathing, passing less urine, or very bad throwing up.  Where can I learn more?   [place] [employer]  https://www.lung.ca/lung-health/lung-disease/influenza   Centers for Disease Control and Prevention  https://www.cdc.gov/flu/highrisk/65over.htm   Centers for Disease Control and Prevention  http://www.cdc.gov/flu/   Last Reviewed Date   2020-02-28  Consumer Information Use and Disclaimer   This information is not specific medical advice and does not replace information you receive from your health care provider. This is only a brief summary of general information. It does NOT include all information about  conditions, illnesses, injuries, tests, procedures, treatments, therapies, discharge instructions or life-style choices that may apply to you. You must talk with your health care provider for complete information about your health and treatment options. This information should not be used to decide whether or not to accept your health care providers advice, instructions or recommendations. Only your health care provider has the knowledge and training to provide advice that is right for you.  Copyright   Copyright © 2021 [employer] and its affiliates and/or licensors. All rights reserved.  Patient Education       Nausea and Vomiting Discharge Instructions, Adult   About this topic   When you feel sick to your stomach, this is nausea. When you throw up, this is vomiting. You may have only one of these signs or both at the same time. Most often, they will stop on their own. You may not feel like eating or drinking. You may also have pain in your belly or lose weight. Often, nausea and vomiting are caused by a virus. But they can also be caused by more serious things like an infection around the brain.  If a virus is causing your nausea and vomiting, it is easy to spread from person to person. You can lower this risk by washing your hands often. Most of the time, your symptoms will go away without treatment in a few days. If these signs last a while, you may need testing to find out the cause.       What care is needed at home?   · Ask your doctor what you need to do when you go home. Make sure you ask questions if you do not understand what the doctor says. This way you will know what you need to do.  · Drink small amounts of fluid every 15 to 30 minutes. Good fluids to drink are water, broth, and oral electrolyte solutions. Sugar-free or very low sugar sports drinks are also OK.  · Once you feel you can eat, start with crackers, toast, or cereal. Then eat small amounts of food more often. Avoid greasy, processed  foods until you no longer have an upset stomach. Avoid beer, wine, and mixed drinks (alcohol) and caffeine.  · Wash your hands often. Avoid sharing your food and drinks. Stay away from others until your throwing up has stopped. This will help keep others healthy.  · If you have diabetes, check your blood sugar more often than usual. Being sick can affect your blood sugar levels.  · Suck on hard candy. Use sugar free if you have high blood sugar.  What follow-up care is needed?   Your doctor may ask you to make visits to the office to check on your progress. Be sure to keep these visits.  What drugs may be needed?   The doctor may order drugs to:  · Stop the vomiting  · Lower fever  · Help an upset stomach  Will physical activity be limited?   You may need to rest for a while. You may not be able to travel or go to work until the loose stools and throwing up have stopped for 24 hours.  What problems could happen?   · Too much fluid loss. This is dehydration.  · Weight loss  When do I need to call the doctor?   · You have vomiting along with a fever and severe headache or stiff neck.  · You have vomiting along with severe chest or belly pain or trouble breathing.  · You are vomiting large amounts of blood (more than 1 teaspoon or 5 mL).  · You have signs of severe fluid loss, such as:  ? No urine for more than 8 hours.  ? Feel very light-headed or like you are going to pass out.  ? Feel weak like you are going to fall.  · You feel extremely weak, like you are going to pass out.  · You are vomiting multiple times every hour.  · You develop early signs of fluid loss, such as:  ? Dark-colored urine.  ? Dry mouth.  ? Muscle cramps.  ? Lack of energy.  ? Feeling light-headed when you get up.  · You have a small amount of blood (less than 1 teaspoon or 5 mL) in your vomit or bowel movements.  · You throw up something that looks like coffee grounds.  · You have a bowel movement that is black and looks like tar.  · You are  not able to keep fluids down.  · You have a fever of 100.4ºF (38°C) or higher or chills that do not go away after a day.  Helpful tips   · Avoid odors such as those from cooking or perfumes.  · Put a cool, wet towel on your forehead.  · Get some fresh air.  · Wear loose-fitting, lightweight clothing.  · If you get motion sickness, try an over-the-counter (OTC) drug or talk with your doctor about a skin patch for longer trips.  · Watch TV or a movie or read a book to take your mind off your upset stomach.  Teach Back: Helping You Understand   The Teach Back Method helps you understand the information we are giving you. After you talk with the staff, tell them in your own words what you learned. This helps to make sure the staff has described each thing clearly. It also helps to explain things that may have been confusing. Before going home, make sure you can do these:  · I can tell you about my condition.  · I can tell you how often I should try to drink fluids and good kinds of fluids to drink.  · I can tell you what I will do if I have trouble keeping fluids down.  Last Reviewed Date   2021-06-07  Consumer Information Use and Disclaimer   This information is not specific medical advice and does not replace information you receive from your health care provider. This is only a brief summary of general information. It does NOT include all information about conditions, illnesses, injuries, tests, procedures, treatments, therapies, discharge instructions or life-style choices that may apply to you. You must talk with your health care provider for complete information about your health and treatment options. This information should not be used to decide whether or not to accept your health care providers advice, instructions or recommendations. Only your health care provider has the knowledge and training to provide advice that is right for you.  Copyright   Copyright © 2021 [employer] and its affiliates and/or  licensors. All rights reserved.

## 2022-01-14 ENCOUNTER — PATIENT MESSAGE (OUTPATIENT)
Dept: INTERNAL MEDICINE | Facility: CLINIC | Age: 37
End: 2022-01-14
Payer: COMMERCIAL

## 2022-01-14 NOTE — TELEPHONE ENCOUNTER
Spoke with pt she will go to one of our urgent care clinics when she get off work today. Also scheduled f/u appt with pt to see pcp per the pt request.

## 2022-01-14 NOTE — TELEPHONE ENCOUNTER
----- Message from Sandy Richards sent at 2022  2:35 PM CST -----  Regardinnd message  Contact: [phone] or by [employer]  Patient called, stated that she is concern that has not receive a call back from PCP, patient stated that she called today early, and also send a message through [employer] and has not receive an answer. and is almost the weekend. Please call and advise. Thank you

## 2022-01-22 ENCOUNTER — PATIENT MESSAGE (OUTPATIENT)
Dept: INTERNAL MEDICINE | Facility: CLINIC | Age: 37
End: 2022-01-22
Payer: COMMERCIAL

## 2022-01-25 ENCOUNTER — PATIENT MESSAGE (OUTPATIENT)
Dept: OTOLARYNGOLOGY | Facility: CLINIC | Age: 37
End: 2022-01-25
Payer: COMMERCIAL

## 2022-01-26 ENCOUNTER — OFFICE VISIT (OUTPATIENT)
Dept: OPTOMETRY | Facility: CLINIC | Age: 37
End: 2022-01-26
Payer: COMMERCIAL

## 2022-01-26 DIAGNOSIS — H52.13 MYOPIA, BILATERAL: ICD-10-CM

## 2022-01-26 DIAGNOSIS — H10.13 ALLERGIC CONJUNCTIVITIS, BILATERAL: Primary | ICD-10-CM

## 2022-01-26 DIAGNOSIS — Z01.818 PRE-PROCEDURAL EXAMINATION: ICD-10-CM

## 2022-01-26 PROCEDURE — 99999 PR PBB SHADOW E&M-EST. PATIENT-LVL II: CPT | Mod: PBBFAC,,,

## 2022-01-26 PROCEDURE — 92004 COMPRE OPH EXAM NEW PT 1/>: CPT | Mod: S$GLB,,,

## 2022-01-26 PROCEDURE — 92015 DETERMINE REFRACTIVE STATE: CPT | Mod: S$GLB,,,

## 2022-01-26 PROCEDURE — 92015 PR REFRACTION: ICD-10-PCS | Mod: S$GLB,,,

## 2022-01-26 PROCEDURE — 1160F PR REVIEW ALL MEDS BY PRESCRIBER/CLIN PHARMACIST DOCUMENTED: ICD-10-PCS | Mod: CPTII,S$GLB,,

## 2022-01-26 PROCEDURE — 92004 PR EYE EXAM, NEW PATIENT,COMPREHESV: ICD-10-PCS | Mod: S$GLB,,,

## 2022-01-26 PROCEDURE — 99999 PR PBB SHADOW E&M-EST. PATIENT-LVL II: ICD-10-PCS | Mod: PBBFAC,,,

## 2022-01-26 PROCEDURE — 1159F MED LIST DOCD IN RCRD: CPT | Mod: CPTII,S$GLB,,

## 2022-01-26 PROCEDURE — 1160F RVW MEDS BY RX/DR IN RCRD: CPT | Mod: CPTII,S$GLB,,

## 2022-01-26 PROCEDURE — 1159F PR MEDICATION LIST DOCUMENTED IN MEDICAL RECORD: ICD-10-PCS | Mod: CPTII,S$GLB,,

## 2022-01-26 NOTE — PROGRESS NOTES
HPI     Patient reports blurred vision at distance. No complaints at near. Onset   a few months ago. Blur worse OD>OS. Habitual Srx is about 2 years old.    Patient reports her allergic conjunctivitis has been acting up sooner than   normal. Has been using Pataday BID OU. Reports relief of symptoms with   drops. Refresh liquigel PRN OU (when she feels gritty sensation in eyes).    No other ocular or visual complaints.     Last edited by Escobar Ruth, OD on 1/26/2022  2:34 PM. (History)            Assessment /Plan     For exam results, see Encounter Report.    Allergic conjunctivitis, bilateral  -D/c Pataday twice a day formulation  -Start Pataday once in the morning  -Start Refresh liquigel twice a day, may place drops in fridge to add cooling effect  -Recommended starting allergy drops at least 2 weeks before allergy season begins    Myopia, bilateral  Glasses Prescription (1/26/2022)        [employer] [employer] [facility]    Right -0.75 Sphere 20/20    Left -1.00 Sphere 20/20    Type: SVL    Expiration Date: 1/27/2023        -Spectacle Rx given, discussed different options for glasses.   -Patient would like to hold off on reading glasses for now and is comfortable with taking off her glasses to read.    RTC 1 year routine eye exam.

## 2022-01-29 ENCOUNTER — LAB VISIT (OUTPATIENT)
Dept: PRIMARY CARE CLINIC | Facility: CLINIC | Age: 37
End: 2022-01-29
Payer: COMMERCIAL

## 2022-01-29 DIAGNOSIS — Z01.818 PRE-PROCEDURAL EXAMINATION: ICD-10-CM

## 2022-01-29 LAB
SARS-COV-2 RNA RESP QL NAA+PROBE: NOT DETECTED
SARS-COV-2- CYCLE NUMBER: NORMAL

## 2022-01-29 PROCEDURE — U0003 INFECTIOUS AGENT DETECTION BY NUCLEIC ACID (DNA OR RNA); SEVERE ACUTE RESPIRATORY SYNDROME CORONAVIRUS 2 (SARS-COV-2) (CORONAVIRUS DISEASE [COVID-19]), AMPLIFIED PROBE TECHNIQUE, MAKING USE OF HIGH THROUGHPUT TECHNOLOGIES AS DESCRIBED BY CMS-2020-01-R: HCPCS | Performed by: OTOLARYNGOLOGY

## 2022-01-29 PROCEDURE — U0005 INFEC AGEN DETEC AMPLI PROBE: HCPCS | Performed by: OTOLARYNGOLOGY

## 2022-02-01 ENCOUNTER — OFFICE VISIT (OUTPATIENT)
Dept: PSYCHIATRY | Facility: CLINIC | Age: 37
End: 2022-02-01
Payer: COMMERCIAL

## 2022-02-01 ENCOUNTER — OFFICE VISIT (OUTPATIENT)
Dept: OTOLARYNGOLOGY | Facility: CLINIC | Age: 37
End: 2022-02-01
Payer: COMMERCIAL

## 2022-02-01 VITALS
WEIGHT: 194.13 LBS | BODY MASS INDEX: 30.47 KG/M2 | SYSTOLIC BLOOD PRESSURE: 124 MMHG | TEMPERATURE: 99 F | DIASTOLIC BLOOD PRESSURE: 74 MMHG | HEIGHT: 67 IN | HEART RATE: 84 BPM

## 2022-02-01 DIAGNOSIS — R69 PSYCHIATRIC DIAGNOSIS DEFERRED: Primary | ICD-10-CM

## 2022-02-01 DIAGNOSIS — J30.9 CHRONIC ALLERGIC RHINITIS: Primary | ICD-10-CM

## 2022-02-01 DIAGNOSIS — R49.0 MUSCLE TENSION DYSPHONIA: ICD-10-CM

## 2022-02-01 DIAGNOSIS — K21.9 LARYNGOPHARYNGEAL REFLUX (LPR): ICD-10-CM

## 2022-02-01 PROCEDURE — 1159F MED LIST DOCD IN RCRD: CPT | Mod: CPTII,S$GLB,, | Performed by: OTOLARYNGOLOGY

## 2022-02-01 PROCEDURE — 90834 PSYTX W PT 45 MINUTES: CPT | Mod: S$GLB,,, | Performed by: SOCIAL WORKER

## 2022-02-01 PROCEDURE — 1160F PR REVIEW ALL MEDS BY PRESCRIBER/CLIN PHARMACIST DOCUMENTED: ICD-10-PCS | Mod: CPTII,S$GLB,, | Performed by: OTOLARYNGOLOGY

## 2022-02-01 PROCEDURE — 3008F BODY MASS INDEX DOCD: CPT | Mod: CPTII,S$GLB,, | Performed by: OTOLARYNGOLOGY

## 2022-02-01 PROCEDURE — 1159F PR MEDICATION LIST DOCUMENTED IN MEDICAL RECORD: ICD-10-PCS | Mod: CPTII,S$GLB,, | Performed by: OTOLARYNGOLOGY

## 2022-02-01 PROCEDURE — 99214 PR OFFICE/OUTPT VISIT, EST, LEVL IV, 30-39 MIN: ICD-10-PCS | Mod: 25,S$GLB,, | Performed by: OTOLARYNGOLOGY

## 2022-02-01 PROCEDURE — 3008F PR BODY MASS INDEX (BMI) DOCUMENTED: ICD-10-PCS | Mod: CPTII,S$GLB,, | Performed by: OTOLARYNGOLOGY

## 2022-02-01 PROCEDURE — 3074F PR MOST RECENT SYSTOLIC BLOOD PRESSURE < 130 MM HG: ICD-10-PCS | Mod: CPTII,S$GLB,, | Performed by: OTOLARYNGOLOGY

## 2022-02-01 PROCEDURE — 99999 PR PBB SHADOW E&M-EST. PATIENT-LVL IV: ICD-10-PCS | Mod: PBBFAC,,, | Performed by: OTOLARYNGOLOGY

## 2022-02-01 PROCEDURE — 1160F RVW MEDS BY RX/DR IN RCRD: CPT | Mod: CPTII,S$GLB,, | Performed by: OTOLARYNGOLOGY

## 2022-02-01 PROCEDURE — 3074F SYST BP LT 130 MM HG: CPT | Mod: CPTII,S$GLB,, | Performed by: OTOLARYNGOLOGY

## 2022-02-01 PROCEDURE — 31575 LARYNGOSCOPY: ICD-10-PCS | Mod: S$GLB,,, | Performed by: OTOLARYNGOLOGY

## 2022-02-01 PROCEDURE — 99214 OFFICE O/P EST MOD 30 MIN: CPT | Mod: 25,S$GLB,, | Performed by: OTOLARYNGOLOGY

## 2022-02-01 PROCEDURE — 90834 PR PSYCHOTHERAPY W/PATIENT, 45 MIN: ICD-10-PCS | Mod: S$GLB,,, | Performed by: SOCIAL WORKER

## 2022-02-01 PROCEDURE — 3078F PR MOST RECENT DIASTOLIC BLOOD PRESSURE < 80 MM HG: ICD-10-PCS | Mod: CPTII,S$GLB,, | Performed by: OTOLARYNGOLOGY

## 2022-02-01 PROCEDURE — 31575 DIAGNOSTIC LARYNGOSCOPY: CPT | Mod: S$GLB,,, | Performed by: OTOLARYNGOLOGY

## 2022-02-01 PROCEDURE — 3078F DIAST BP <80 MM HG: CPT | Mod: CPTII,S$GLB,, | Performed by: OTOLARYNGOLOGY

## 2022-02-01 PROCEDURE — 99999 PR PBB SHADOW E&M-EST. PATIENT-LVL IV: CPT | Mod: PBBFAC,,, | Performed by: OTOLARYNGOLOGY

## 2022-02-01 RX ORDER — OMEPRAZOLE 20 MG/1
20 CAPSULE, DELAYED RELEASE ORAL DAILY
Qty: 30 CAPSULE | Refills: 3 | Status: SHIPPED | OUTPATIENT
Start: 2022-02-01 | End: 2022-05-09 | Stop reason: SDUPTHER

## 2022-02-01 RX ORDER — OLOPATADINE HYDROCHLORIDE 1 MG/ML
1 SOLUTION/ DROPS OPHTHALMIC 2 TIMES DAILY
COMMUNITY

## 2022-02-01 NOTE — PROGRESS NOTES
Chief Complaint   Patient presents with    Follow-up     Throat clearing is still present but greatly improved, pain in throat has gone away, unable to take 80mg of omeprazole due to bloating now taking 40mg   .     HPI:Joan Wilde is a 36 y.o. female who has been referred by Dr. ROSSY Mendoza MD for evaluation of throat clearing. She reports that it has been present for 3+ years.  She states that she has feeling of mucus in her throat. She notes that her throat clearing is worse after eating philip seeds or avocado.  She notes that warm water seems to sooth her throat. She has been on Prilosec 40 mg daily for 2 years. She does have intermittent hoarseness. Notes change in her singing voice. Has pain when singing or hitting certain notes. She sings recreationally. She does not soreness when she has more vocal demand at her job.   Her voice is not progressively worsening over this time. There are pitch breaks or cracks. There is vocal fatigue. She reports more effortful swallowing only in the mornings when she wakes up.  There is no hemoptysis or hematemesis. She is breathing well.     She admits throat clearing and cough. She has occasional heartburn and reflux. She does feel this has improved with sleeping elevated and using Protonix. She has 3+ dust mite allergens. She takes Claritin as needed. She was on Flonase but it was discontinued.      Interval HPI 2/1/2022:  Follow up visit. Overall doing better. Feels throat clearing is still present but greatly improved.  Throat pain is decreased as well.  She was unable to tolerate BID prilosec due to bloating but has been taking 40mg per day. Feels this helps. She has been on Flonase, allegra, and nasal saline as well. This has been beneficial.  She did not attend speech therapy. She says she was not contacted by the Speech department. She does feel that the voice is improved and is no longer experiencing vocal strain.     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      Social Determinants of Health     Financial Resource Strain: Low Risk     Difficulty of Paying Living Expenses: Not hard at all   Food Insecurity: No Food Insecurity    Worried About Running Out of Food in the Last Year: Never true    Ran Out of Food in the Last Year: Never true   Transportation Needs: No Transportation Needs    Lack of Transportation (Medical): No    Lack of Transportation (Non-Medical): No   Physical Activity: Insufficiently Active    Days of Exercise per Week: 2 days    Minutes of Exercise per Session: 30 min   Stress: Stress Concern Present    Feeling of Stress : Rather much   Social Connections: Unknown    Frequency of Communication with Friends and Family: More than three times a week    Frequency of Social Gatherings with Friends and Family: Never    Active Member of Clubs or Organizations: No    Attends Club or Organization Meetings: Patient refused    Marital Status: Patient refused     Past Surgical History:   Procedure Laterality Date    HERNIA REPAIR  2014    umbilical    LAPAROSCOPIC APPENDECTOMY N/A 4/21/2021    Procedure: APPENDECTOMY, LAPAROSCOPIC WITH LYSIS OF ADHESIONS.;  Surgeon: Trent Cannon MD;  Location: [facility] OR [address] [place] [address];  Service: General;  Laterality: N/A;     Family History   Problem Relation Age of Onset    Hypertension Mother     Diabetes Father      Hypertension Father     Stroke Father     Hyperlipidemia Father     No Known Problems Brother     No Known Problems Brother     Breast cancer Neg Hx     Colon cancer Neg Hx     Ovarian cancer Neg Hx     Melanoma Neg Hx     Blindness Neg Hx     Amblyopia Neg Hx     Cataracts Neg Hx     Glaucoma Neg Hx     Macular degeneration Neg Hx     Retinal detachment Neg Hx     Strabismus Neg Hx      labor Neg Hx     Cancer Neg Hx     Eclampsia Neg Hx            Review of Systems  General: negative for chills, fever or weight loss  Psychological: negative for mood changes or depression  Ophthalmic: negative for blurry vision, photophobia or eye pain  ENT: see HPI  Respiratory: no cough, shortness of breath, or wheezing  Cardiovascular: no chest pain or dyspnea on exertion  Gastrointestinal: no abdominal pain, change in bowel habits, or black/ bloody stools  Musculoskeletal: negative for gait disturbance or muscular weakness  Neurological: no syncope or seizures; no ataxia  Dermatological: negative for puritis,  rash and jaundice  Hematologic/lymphatic: no easy bruising, no new lumps or bumps      Physical Exam:    Vitals:    22 1521   BP: 124/74   Pulse: 84   Temp: 99.1 °F (37.3 °C)       Constitutional: Well appearing / communicating without difficutly.  NAD.  Eyes: EOM I Bilaterally  Head/Face: Normocephalic.  Negative paranasal sinus pressure/tenderness.  Salivary glands WNL.  House Brackmann I Bilaterally.    Right Ear: Auricle normal appearance. External Auditory Canal within normal limits no lesions or masses,TM w/o masses/lesions/perforations. TM mobility noted.   Left Ear: Auricle normal appearance. External Auditory Canal within normal limits no lesions or masses,TM w/o masses/lesions/perforations. TM mobility noted.  Nose: No gross nasal septal deviation. Inferior Turbinates 3+ bilaterally. No septal perforation. No masses/lesions. External nasal skin appears normal without  masses/lesions.  Oral Cavity: Gingiva/lips within normal limits.  Dentition/gingiva healthy appearing. Mucus membranes moist. Floor of mouth soft, no masses palpated. Oral Tongue mobile. Hard Palate appears normal.    Oropharynx: Base of tongue appears normal. No masses/lesions noted. Tonsillar fossa/pharyngeal wall without lesions. Posterior oropharynx WNL.  Soft palate without masses. Midline uvula.   Neck/Lymphatic: No LAD I-VI bilaterally.  No thyromegaly.  No masses noted on exam.      See separate procedure note for FFL.    Diagnostic studies reviewed:   Laboratory 04/15/2019:  IgE level:  Less than 35.  ImmunoCAP:  Negative.     Spirometry 04/16/2019:  Normal spirometry. Airflow not improved after bronchodilator.     Inhalant skin tests 05/13/2019:  3+ histamine control.  3+ dust mites.    Assessment:    ICD-10-CM ICD-9-CM    1. Chronic allergic rhinitis  J30.9 477.9    2. Laryngopharyngeal reflux (LPR)  K21.9 478.79    3. Muscle tension dysphonia  R49.0 784.42      The primary encounter diagnosis was Chronic allergic rhinitis. Diagnoses of Laryngopharyngeal reflux (LPR) and Muscle tension dysphonia were also pertinent to this visit.      Plan:  No orders of the defined types were placed in this encounter.    Continue  nasal saline rinses BID  Continue Flonase 2 sprays per nostril daily  Continue Allegra daily     Decrease Prilosec  to 20 mg PO daily.      I also recommended that the patient refrain from eating within 3 hours of going to bed, to elevate the head of bed very subtly and optimize the impact of gravity on the potential reflux, and to avoid alcohol, caffeine, tobacco, tomato sauce, spicy foods, fried food, and chocolate.      Follow up in 3-4 months to reassess progress with treatment regimen.     Paige Grant MD

## 2022-02-01 NOTE — PROCEDURES
Laryngoscopy    Date/Time: 2/1/2022 3:00 PM  Performed by: Paige Grant MD  Authorized by: Paige Grant MD     Consent Done?:  Yes (Verbal)  Anesthesia:     Local anesthetic:  Lidocaine 2% and Nikita-Synephrine 1/2%  Laryngoscopy:     Areas examined:  Nasal cavities, nasopharynx, oropharynx, hypopharynx, larynx and vocal cords  Nose External:      No external nasal deformity  Nose Intranasal:      Mucosa no polyps     Mucosa ulcers not present     No mucosa lesions present     No septum gross deformity     Turbinates not enlarged  Nasopharynx:      No mucosa lesions     Adenoids not present     Posterior choanae patent     Eustachian tube patent  Larynx/hypopharynx:      No epiglottis lesions     No epiglottis edema     No AE folds lesions     No vocal cord polyps     Equal and normal bilateral     No hypopharynx lesions     No piriform sinus pooling     No piriform sinus lesions     Post cricoid edema (mild)     Post cricoid erythema (mild)

## 2022-02-11 ENCOUNTER — OFFICE VISIT (OUTPATIENT)
Dept: PSYCHIATRY | Facility: CLINIC | Age: 37
End: 2022-02-11
Payer: COMMERCIAL

## 2022-02-11 DIAGNOSIS — F43.23 ADJUSTMENT DISORDER WITH MIXED ANXIETY AND DEPRESSED MOOD: Primary | ICD-10-CM

## 2022-02-11 PROCEDURE — 90792 PR PSYCHIATRIC DIAGNOSTIC EVALUATION W/MEDICAL SERVICES: ICD-10-PCS | Mod: 95,,, | Performed by: NURSE PRACTITIONER

## 2022-02-11 PROCEDURE — 1160F PR REVIEW ALL MEDS BY PRESCRIBER/CLIN PHARMACIST DOCUMENTED: ICD-10-PCS | Mod: CPTII,95,, | Performed by: NURSE PRACTITIONER

## 2022-02-11 PROCEDURE — 90792 PSYCH DIAG EVAL W/MED SRVCS: CPT | Mod: 95,,, | Performed by: NURSE PRACTITIONER

## 2022-02-11 PROCEDURE — 1159F MED LIST DOCD IN RCRD: CPT | Mod: CPTII,95,, | Performed by: NURSE PRACTITIONER

## 2022-02-11 PROCEDURE — 1160F RVW MEDS BY RX/DR IN RCRD: CPT | Mod: CPTII,95,, | Performed by: NURSE PRACTITIONER

## 2022-02-11 PROCEDURE — 1159F PR MEDICATION LIST DOCUMENTED IN MEDICAL RECORD: ICD-10-PCS | Mod: CPTII,95,, | Performed by: NURSE PRACTITIONER

## 2022-02-11 NOTE — PROGRESS NOTES
Outpatient Psychiatry Initial Visit (MD/NP)    2/11/2022    The patient location is: [place]   The chief complaint leading to consultation is: adjustment d/o    Visit type: audiovisual    Face to Face time with patient: 43 minutes  44 minutes of total time spent on the encounter, which includes face to face time and non-face to face time preparing to see the patient (eg, review of tests), Obtaining and/or reviewing separately obtained history, Documenting clinical information in the electronic or other health record, Independently interpreting results (not separately reported) and communicating results to the patient/family/caregiver, or Care coordination (not separately reported).       Each patient to whom he or she provides medical services by telemedicine is:  (1) informed of the relationship between the physician and patient and the respective role of any other health care provider with respect to management of the patient; and (2) notified that he or she may decline to receive medical services by telemedicine and may withdraw from such care at any time.    Joan Wilde, a 36 y.o. female, presenting for initial evaluation visit. Met with patient.    Reason for Encounter: self-referral. Patient complains of adjustment    History of Present Illness:   Joan Wilde checked in on time for her appointment. She notes having a toxic work place as well as person issues ( being away/long distance relationship). Also studying for [employer] and working full time. She has been doing therapy with EMILIE Vargas LCSW for sometime and was referred by him for assessment of clinical depression. She has started yoga, trying meditation, journaling. She would like to move back out of the [place] but her  is resistant. She is from [place]. Denies issues with anxiety or depression prior to coming to [place] and working here. She notes increased motivation and energy once hearing she can transfer to [employer] if she passes her boards  (May).    Anxiety  Patient is here for evaluation of anxiety.  She has the following anxiety symptoms: chest pain, difficulty concentrating, fatigue, feelings of losing control, insomnia, irritable, psychomotor agitation, racing thoughts. Onset of symptoms was approximately 3 years ago.  Symptoms have been gradually worsening since that time. She denies current suicidal and homicidal ideation. Family history significant for no psychiatric illness.Possible organic causes contributing are: none. Risk factors: none Previous treatment includes individual therapy.  She complains of the following medication side effects: none.    Depression  Patient complains of depression. She complains of anhedonia, depressed mood, difficulty concentrating, fatigue, insomnia, psychomotor agitation and weight gain. Onset was approximately 2 years ago. Symptoms have been gradually improving since that time. Patient denies feelings of worthlessness/guilt, hopelessness, hypersomnia, impaired memory, psychomotor retardation, recurrent thoughts of death, suicidal attempt, suicidal thoughts with specific plan, suicidal thoughts without plan and weight loss.      Stressors: relationship, school, work    History:     Past Psychiatric History:   Previous therapy: yes  Previous psychiatric treatment and medication trials: no  Previous psychiatric hospitalizations: no  Previous diagnoses: no  Previous suicide attempts: no  History of violence: no  Currently in treatment with EMILIE Vargas LCSW.  Education: post college graduate work or degree  Other pertinent history: Trauma- toxic work environment    Standardized Screenings tools:   PHQ9: 2 normal  CARINE- 7:  6 mild anxiety    Substance Abuse History:  Recreational drugs: denies  Use of alcohol: occasional, social use  Use of caffeine: tea 2 /day  Tobacco use: no  Legal consequences of chemical use: no  Patient feels she ought to cut down on drinking and/or drug use: no  Patient has been annoyed by  others criticizing her drinking or drug use: no  Patient has felt bad or guilty about drinking or drug use:no  Patient has had a drink or used drugs as an eye opener first thing in the morning to steady nerves, get rid of a hangover or get the day started: no  Use of OTC medications: allegra, Zyxal, claritin    Additional historical information includes:   Seizure: denies  Head trauma/TBI: denies    The following portions of the patient's history were reviewed and updated as appropriate: allergies, current medications, past family history, past medical history, past social history, past surgical history and problem list.      Review Of Systems:     Medical Review Of Systems:  Constitutional: negative  Respiratory: negative  Cardiovascular: negative  Gastrointestinal: negative  Musculoskeletal:negative  Neurological: negative  Behavioral/Psych: see above    Psychiatric Review Of Systems:  Sleep: yes, trouble sleeping when exams approaching  Appetite changes: no  Weight changes: no  Energy: yes, mental fatigue  Interest/pleasure/anhedonia: yes  Somatic symptoms: no  Libido: no  Anxiety/panic: yes  Guilty/hopeless: no  Self-injurious behavior/risky behavior: no  Any drugs: no  Alcohol: no       Current Evaluation:     Musculoskeletal  Muscle Strength/Tone:  no tremor, no tic per patient   Gait & Station:  SHERRIE due to video visit       Relevant Elements of Neurological Exam:SHERRIE due to video visit     Nutritional Screening: Considering the patient's height and weight, medications, medical history and preferences, should a referral be made to the dietitian?SHERRIE due to video visit     Mental Status Evaluation:  Appearance:  unremarkable, age appropriate    Behavior:  normal, cooperative   Speech:  no latency; no press   Mood:  anxious, dysthymic   Affect:  congruent and appropriate   Thought Process:  normal and logical   Thought Content:  normal, no suicidality, no homicidality, delusions, or paranoia   Sensorium:  grossly  intact   Cognition:  grossly intact   Insight:  intact   Judgment:  behavior is adequate to circumstances     Physical/Somatic Complaints   The patient lists: GI upset    Functioning in Relationships:  Spouse/partner:  - long distance  Peers: fair  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     General:  unremarkable, age appropriate       Laboratory Data  Lab Visit on 01/29/2022   Component Date Value Ref Range Status    SARS-CoV2 (COVID-19) Qualitative P* 01/29/2022 Not Detected  Not Detected Final    SARS-COV-2- Cycle Number 01/29/2022 N/A   Final         Medications  Outpatient Encounter Medications as of 2/11/2022   Medication Sig Dispense Refill    alclometasone (ACLOVATE) 0.05 % cream Apply as needed to affected area of neck rash up to twice daily (Patient taking differently: Apply as needed to affected area of neck rash up to twice daily) 60 g 2    ascorbic acid, vitamin C, (VITAMIN C) 1000 MG tablet Take 2 tablets (2,000 mg total) by mouth once daily.      azelastine (OPTIVAR) 0.05 % ophthalmic solution Place 1 drop into both eyes 2 (two) times daily. 6 mL 6    clindamycin (CLEOCIN T) 1 % lotion Use every night at bedtime on face for pimples 60 mL 3    clotrimazole-betamethasone 1-0.05% (LOTRISONE) cream Apply topically 2 (two) times daily to rash on neck x 2 weeks 45 g 2    conjugated estrogens (PREMARIN) vaginal cream Use dime size amount of estrogen cream in vagina nightly for 2 weeks, then twice a week thereafter. (Patient taking differently: Use dime size amount of estrogen cream in vagina nightly for 2 weeks, then twice a week thereafter.) 30 g 2    fluticasone propionate (FLONASE) 50 mcg/actuation nasal spray 2 sprays (100 mcg total) by Each Nostril route once daily. 16 g 11    norethindrone (AYGESTIN) 5 mg Tab Take 1 tablet (5 mg total) by mouth once daily. 30 tablet 12    norethindrone (ORTHO MICRONOR) 0.35 mg tablet Take 1 tablet  (0.35 mg total) by mouth once daily. 28 tablet 12    olopatadine (PATANOL) 0.1 % ophthalmic solution 1 drop 2 (two) times daily.      omeprazole (PRILOSEC) 20 MG capsule Take 1 capsule (20 mg total) by mouth once daily. 30 capsule 3    tretinoin (ALTRENO) 0.05 % Lotn Apply pea-sized amount nightly to face (Patient not taking: No sig reported) 45 g 2     No facility-administered encounter medications on file as of 2/11/2022.           Assessment - Diagnosis - Goals:     Impression: Joan Wilde, a 36 y.o. female, presenting for initial evaluation visit. Presents for evaluation related to continued life stressors.      ICD-10-CM ICD-9-CM   1. Adjustment disorder with mixed anxiety and depressed mood  F43.23 309.28       Strengths and Liabilities: Strength: Patient accepts guidance/feedback, Strength: Patient is expressive/articulate., Strength: Patient is intelligent., Strength: Patient is motivated for change., Liability: Patient has no suport network., Liability: Patient lacks coping skills.    Treatment Goals:    Anxiety: acquiring relapse prevention skills, reducing negative automatic thoughts, reducing physical symptoms of anxiety and reducing time spent worrying (<30 minutes/day)  Depression: acquiring relapse prevention skills, increasing energy, increasing interest in usual activities, increasing motivation, reducing excessive guilt, reducing fatigue and reducing negative automatic thoughts    Treatment Plan/Recommendations:   · Medication Management:   · No mediation at this time.   · The risks and benefits of medication were discussed with the patient.  · Labs reviewed from 10/15/21: Vit D, HgbA1c - WNL  · The treatment plan and follow up plan were reviewed with the patient.  · Discussed diagnosis, risks and benefits of proposed treatment above vs alternative treatments vs no treatment, and potential side effects of these treatments. The patient expresses understanding of the above and displays the  capacity to agree with this treatment given said understanding. Patient also agrees that, currently, the benefits outweigh the risks and would like to pursue treatment at this time.  · Discussed inherent unpredictability of medications in each individual.   · Encouraged Patient to keep future appointments.   · Take medications as prescribed and abstain from substance abuse.   · In the event of an emergency patient was advised to go to the emergency room      Continue psychotherapy with: EMILIE Vargas LCSW    Return to Clinic: 3 months    Total time: 44 minutes with more than 50% of time spent counseling and/or coordinating care.  (which included pts differential diagnosis and prognosis for psychiatric conditions, risks, benefits of treatments, instructions and adherence to treatment plan, risk reduction, reviewing current psychiatric medication regimen, medical problems and social stressors. In addtion to possible discussion with other healthcare provider/s)    Leslie Harding  DNP-BC PMHNP  [facility]   '

## 2022-02-14 ENCOUNTER — PATIENT MESSAGE (OUTPATIENT)
Dept: RESEARCH | Facility: HOSPITAL | Age: 37
End: 2022-02-14
Payer: COMMERCIAL

## 2022-03-10 ENCOUNTER — PATIENT MESSAGE (OUTPATIENT)
Dept: OPTOMETRY | Facility: CLINIC | Age: 37
End: 2022-03-10
Payer: COMMERCIAL

## 2022-03-14 ENCOUNTER — OFFICE VISIT (OUTPATIENT)
Dept: PSYCHIATRY | Facility: CLINIC | Age: 37
End: 2022-03-14
Payer: COMMERCIAL

## 2022-03-14 DIAGNOSIS — R69 PSYCHIATRIC DIAGNOSIS DEFERRED: Primary | ICD-10-CM

## 2022-03-14 PROCEDURE — 90834 PR PSYCHOTHERAPY W/PATIENT, 45 MIN: ICD-10-PCS | Mod: S$GLB,,, | Performed by: SOCIAL WORKER

## 2022-03-14 PROCEDURE — 90834 PSYTX W PT 45 MINUTES: CPT | Mod: S$GLB,,, | Performed by: SOCIAL WORKER

## 2022-03-16 ENCOUNTER — TELEPHONE (OUTPATIENT)
Dept: OPHTHALMOLOGY | Facility: CLINIC | Age: 37
End: 2022-03-16
Payer: COMMERCIAL

## 2022-03-16 NOTE — TELEPHONE ENCOUNTER
Called patient regarding her concerns to scheduled her an appointment to see Dr.Augustine martinez

## 2022-03-19 ENCOUNTER — PATIENT MESSAGE (OUTPATIENT)
Dept: UROGYNECOLOGY | Facility: CLINIC | Age: 37
End: 2022-03-19
Payer: COMMERCIAL

## 2022-03-21 ENCOUNTER — HOSPITAL ENCOUNTER (OUTPATIENT)
Dept: RADIOLOGY | Facility: OTHER | Age: 37
Discharge: HOME | End: 2022-03-21
Attending: NURSE PRACTITIONER
Payer: COMMERCIAL

## 2022-03-21 ENCOUNTER — TELEPHONE (OUTPATIENT)
Dept: UROGYNECOLOGY | Facility: CLINIC | Age: 37
End: 2022-03-21
Payer: COMMERCIAL

## 2022-03-21 ENCOUNTER — OFFICE VISIT (OUTPATIENT)
Dept: UROGYNECOLOGY | Facility: CLINIC | Age: 37
End: 2022-03-21
Payer: COMMERCIAL

## 2022-03-21 ENCOUNTER — PATIENT MESSAGE (OUTPATIENT)
Dept: UROGYNECOLOGY | Facility: CLINIC | Age: 37
End: 2022-03-21
Payer: COMMERCIAL

## 2022-03-21 VITALS
SYSTOLIC BLOOD PRESSURE: 118 MMHG | WEIGHT: 198.19 LBS | DIASTOLIC BLOOD PRESSURE: 70 MMHG | BODY MASS INDEX: 31.11 KG/M2 | HEIGHT: 67 IN

## 2022-03-21 DIAGNOSIS — N80.9 ENDOMETRIOSIS: ICD-10-CM

## 2022-03-21 DIAGNOSIS — R10.32 LLQ PAIN: ICD-10-CM

## 2022-03-21 DIAGNOSIS — R10.32 LLQ PAIN: Primary | ICD-10-CM

## 2022-03-21 LAB
B-HCG UR QL: NEGATIVE
CTP QC/QA: YES

## 2022-03-21 PROCEDURE — 99999 PR PBB SHADOW E&M-EST. PATIENT-LVL IV: CPT | Mod: PBBFAC,,, | Performed by: NURSE PRACTITIONER

## 2022-03-21 PROCEDURE — 1159F MED LIST DOCD IN RCRD: CPT | Mod: CPTII,S$GLB,, | Performed by: NURSE PRACTITIONER

## 2022-03-21 PROCEDURE — 3074F PR MOST RECENT SYSTOLIC BLOOD PRESSURE < 130 MM HG: ICD-10-PCS | Mod: CPTII,S$GLB,, | Performed by: NURSE PRACTITIONER

## 2022-03-21 PROCEDURE — 99213 OFFICE O/P EST LOW 20 MIN: CPT | Mod: S$GLB,,, | Performed by: NURSE PRACTITIONER

## 2022-03-21 PROCEDURE — 99213 PR OFFICE/OUTPT VISIT, EST, LEVL III, 20-29 MIN: ICD-10-PCS | Mod: S$GLB,,, | Performed by: NURSE PRACTITIONER

## 2022-03-21 PROCEDURE — 76830 TRANSVAGINAL US NON-OB: CPT | Mod: TC

## 2022-03-21 PROCEDURE — 76830 US PELVIS COMP WITH TRANSVAG NON-OB (XPD): ICD-10-PCS | Mod: 26,,, | Performed by: RADIOLOGY

## 2022-03-21 PROCEDURE — 99999 PR PBB SHADOW E&M-EST. PATIENT-LVL IV: ICD-10-PCS | Mod: PBBFAC,,, | Performed by: NURSE PRACTITIONER

## 2022-03-21 PROCEDURE — 1160F PR REVIEW ALL MEDS BY PRESCRIBER/CLIN PHARMACIST DOCUMENTED: ICD-10-PCS | Mod: CPTII,S$GLB,, | Performed by: NURSE PRACTITIONER

## 2022-03-21 PROCEDURE — 76856 US PELVIS COMP WITH TRANSVAG NON-OB (XPD): ICD-10-PCS | Mod: 26,,, | Performed by: RADIOLOGY

## 2022-03-21 PROCEDURE — 3078F DIAST BP <80 MM HG: CPT | Mod: CPTII,S$GLB,, | Performed by: NURSE PRACTITIONER

## 2022-03-21 PROCEDURE — 76830 TRANSVAGINAL US NON-OB: CPT | Mod: 26,,, | Performed by: RADIOLOGY

## 2022-03-21 PROCEDURE — 76856 US EXAM PELVIC COMPLETE: CPT | Mod: 26,,, | Performed by: RADIOLOGY

## 2022-03-21 PROCEDURE — 3078F PR MOST RECENT DIASTOLIC BLOOD PRESSURE < 80 MM HG: ICD-10-PCS | Mod: CPTII,S$GLB,, | Performed by: NURSE PRACTITIONER

## 2022-03-21 PROCEDURE — 3074F SYST BP LT 130 MM HG: CPT | Mod: CPTII,S$GLB,, | Performed by: NURSE PRACTITIONER

## 2022-03-21 PROCEDURE — 3008F BODY MASS INDEX DOCD: CPT | Mod: CPTII,S$GLB,, | Performed by: NURSE PRACTITIONER

## 2022-03-21 PROCEDURE — 3008F PR BODY MASS INDEX (BMI) DOCUMENTED: ICD-10-PCS | Mod: CPTII,S$GLB,, | Performed by: NURSE PRACTITIONER

## 2022-03-21 PROCEDURE — 1160F RVW MEDS BY RX/DR IN RCRD: CPT | Mod: CPTII,S$GLB,, | Performed by: NURSE PRACTITIONER

## 2022-03-21 PROCEDURE — 1159F PR MEDICATION LIST DOCUMENTED IN MEDICAL RECORD: ICD-10-PCS | Mod: CPTII,S$GLB,, | Performed by: NURSE PRACTITIONER

## 2022-03-21 NOTE — PATIENT INSTRUCTIONS
1. Dyspareunia  --resolved    2. Endometriosis  --taking aygestin 5 and micronor continuously    3. LLQ pain  --pelvic ultrasound ordered    4. RTC to gyn for annual exam

## 2022-03-21 NOTE — TELEPHONE ENCOUNTER
lvm for patient to call back. Stated anytime after 1:45 today would be perfect for her to come in

## 2022-03-21 NOTE — TELEPHONE ENCOUNTER
----- Message from Marie Cuellar sent at 3/21/2022 11:04 AM CDT -----   Type:  Patient Returning Call    Who Called:RO CORBETT     Who Left Message for Patient:Yvonne Houser MA    Does the patient know what this is regarding?:    Best Call Back Number:[phone]    Additional Information:

## 2022-03-21 NOTE — TELEPHONE ENCOUNTER
----- Message from Susy Patton sent at 3/21/2022 11:51 AM CDT -----  Regarding: missed call       Who Called: Joan         Who Left Message for Patient:Charlene         Does the patient know what this is regarding? Yes         Best Call Back Number:[phone]         Additional Information:

## 2022-03-21 NOTE — PROGRESS NOTES
2021    SUBJECTIVE:   37 y.o. female here for aching pain in LLQ when sitting or standing x 4 days. Relived with rest and aleve.  Reminds her of the pain she felt with ovarian torsion in the past. (R ovary removed )      Past Medical History:   Diagnosis Date    Acne vulgaris 2018    Allergic rhinitis due to dust mite 2019    Endometriosis 2018    Fibroid     Migraine syndrome     associated with flu vaccines    Mild intermittent asthma without complication 2018       Past Surgical History:   Procedure Laterality Date    HERNIA REPAIR      umbilical    LAPAROSCOPIC APPENDECTOMY N/A 2021    Procedure: APPENDECTOMY, LAPAROSCOPIC WITH LYSIS OF ADHESIONS.;  Surgeon: Trent Cannon MD;  Location: [facility] OR [address] [place] [address];  Service: General;  Laterality: N/A;       Family History   Problem Relation Age of Onset    Hypertension Mother     Diabetes Father     Hypertension Father     Stroke Father     Hyperlipidemia Father     No Known Problems Brother     No Known Problems Brother     Breast cancer Neg Hx     Colon cancer Neg Hx     Ovarian cancer Neg Hx     Melanoma Neg Hx     Blindness Neg Hx     Amblyopia Neg Hx     Cataracts Neg Hx     Glaucoma Neg Hx     Macular degeneration Neg Hx     Retinal detachment Neg Hx     Strabismus Neg Hx      labor Neg Hx     Cancer Neg Hx     Eclampsia Neg Hx        Social History     Socioeconomic History    Marital status:    Tobacco Use    Smoking status: Never Smoker    Smokeless tobacco: Never Used   Substance and Sexual Activity    Alcohol use: No    Drug use: No    Sexual activity: Not Currently     Partners: Male   Social History Narrative    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      Social Determinants of Health     Financial Resource Strain: Low Risk     Difficulty of Paying Living Expenses: Not hard at all   Food Insecurity: No Food Insecurity    Worried About  Running Out of Food in the Last Year: Never true    Ran Out of Food in the Last Year: Never true   Transportation Needs: No Transportation Needs    Lack of Transportation (Medical): No    Lack of Transportation (Non-Medical): No   Physical Activity: Insufficiently Active    Days of Exercise per Week: 2 days    Minutes of Exercise per Session: 30 min   Stress: Stress Concern Present    Feeling of Stress : Rather much   Social Connections: Unknown    Frequency of Communication with Friends and Family: More than three times a week    Frequency of Social Gatherings with Friends and Family: Never    Active Member of Clubs or Organizations: No    Attends Club or Organization Meetings: Patient refused    Marital Status: Patient refused       Current Outpatient Medications   Medication Sig Dispense Refill    alclometasone (ACLOVATE) 0.05 % cream Apply as needed to affected area of neck rash up to twice daily (Patient taking differently: Apply as needed to affected area of neck rash up to twice daily) 60 g 2    ascorbic acid, vitamin C, (VITAMIN C) 1000 MG tablet Take 2 tablets (2,000 mg total) by mouth once daily.      clindamycin (CLEOCIN T) 1 % lotion Use every night at bedtime on face for pimples 60 mL 3    conjugated estrogens (PREMARIN) vaginal cream Use dime size amount of estrogen cream in vagina nightly for 2 weeks, then twice a week thereafter. (Patient taking differently: Use dime size amount of estrogen cream in vagina nightly for 2 weeks, then twice a week thereafter.) 30 g 2    fluticasone propionate (FLONASE) 50 mcg/actuation nasal spray 2 sprays (100 mcg total) by Each Nostril route once daily. 16 g 11    norethindrone (AYGESTIN) 5 mg Tab Take 1 tablet (5 mg total) by mouth once daily. 30 tablet 12    norethindrone (ORTHO MICRONOR) 0.35 mg tablet Take 1 tablet (0.35 mg total) by mouth once daily. 28 tablet 12    olopatadine (PATANOL) 0.1 % ophthalmic solution 1 drop 2 (two) times daily.    "   omeprazole (PRILOSEC) 20 MG capsule Take 1 capsule (20 mg total) by mouth once daily. 30 capsule 3    azelastine (OPTIVAR) 0.05 % ophthalmic solution Place 1 drop into both eyes 2 (two) times daily. 6 mL 6    clotrimazole-betamethasone 1-0.05% (LOTRISONE) cream Apply topically 2 (two) times daily to rash on neck x 2 weeks (Patient not taking: Reported on 3/21/2022) 45 g 2    tretinoin (ALTRENO) 0.05 % Lotn Apply pea-sized amount nightly to face (Patient not taking: No sig reported) 45 g 2     No current facility-administered medications for this visit.       Review of patient's allergies indicates:   Allergen Reactions    Metoclopramide Other (See Comments)     Causes tongue to roll back in mouth       ROS:  Feeling well.   No dyspnea or chest pain on exertion.    No abdominal pain, change in bowel habits, black or bloody stools.    No urinary tract symptoms.   GYN ROS: she complains of LLQ x 4 days. Using vaginal estrogen cream twice weekly  No neurological complaints.    OBJECTIVE:   The patient appears well, alert, oriented x 3, in no distress.  /70 (BP Location: Left arm, Patient Position: Sitting, BP Method: Large (Manual))   Ht 5' 7" (1.702 m)   Wt 89.9 kg (198 lb 3.1 oz)   BMI 31.04 kg/m²   Abdomen soft without tenderness, guarding, mass or organomegaly.   Extremities show no edema, normal peripheral pulses.   Neurological is normal, no focal findings.    PELVIC EXAM:   VULVA: normal appearing vulva with no masses, tenderness or lesions,   VAGINA: atrophic--mild, no lesions or discharge.  NO TTP in levator ani    CERVIX: normal appearing cervix without discharge or lesions, enlongated; no CMT  UTERUS: uterus is normal size, shape, consistency and nontender,   ADNEXA: no masses,   RECTAL: deferred    ASSESSMENT:   1. LLQ pain  US Pelvis Comp with Transvag NON-OB (xpd   2. Endometriosis         PLAN:     1. Dyspareunia  --resolved    2. Endometriosis  --taking aygestin 5 and micronor " continuously    3. LLQ pain  --pelvic ultrasound ordered    4. RTC to gyn for annual exam    I spent a total of 20 minutes on the day of the visit.This includes face to face time and non-face to face time preparing to see the patient (eg, review of tests), Obtaining and/or reviewing separately obtained history, Documenting clinical information in the electronic or other health record, Independently interpreting resultsand communicating results to the patient/family/caregiver, or Care coordination.      Eleanor SANCHEZ Poncho  [facility]  Division of Female Pelvic Medicine and Reconstructive Surgery  Department of Obstetrics & Gynecology

## 2022-03-23 ENCOUNTER — CLINICAL SUPPORT (OUTPATIENT)
Dept: OTHER | Facility: CLINIC | Age: 37
End: 2022-03-23
Payer: COMMERCIAL

## 2022-03-23 DIAGNOSIS — Z00.8 ENCOUNTER FOR OTHER GENERAL EXAMINATION: ICD-10-CM

## 2022-03-24 ENCOUNTER — PATIENT MESSAGE (OUTPATIENT)
Dept: UROGYNECOLOGY | Facility: CLINIC | Age: 37
End: 2022-03-24
Payer: COMMERCIAL

## 2022-03-24 VITALS — HEIGHT: 67 IN | BODY MASS INDEX: 31.04 KG/M2

## 2022-03-24 LAB
HDLC SERPL-MCNC: 34 MG/DL
POC CHOLESTEROL, LDL (DOCK): 113.6 MG/DL
POC CHOLESTEROL, TOTAL: 158 MG/DL
POC GLUCOSE, FASTING: 82 MG/DL (ref 60–110)
TRIGL SERPL-MCNC: 52 MG/DL

## 2022-03-24 NOTE — TELEPHONE ENCOUNTER
Reviewed pelvic ultrasound with patient. Taking aygestin 5mg and norethindrone daily to prevent menses due to history of endometriosis. Recommend one withdrawal bleed when ready to clear cervix.  Would consider repeat pelvic ultrasound after that. Patient verbalized understanding. Eleanor Isaac, CONG-BC

## 2022-03-30 ENCOUNTER — OFFICE VISIT (OUTPATIENT)
Dept: OPTOMETRY | Facility: CLINIC | Age: 37
End: 2022-03-30
Payer: COMMERCIAL

## 2022-03-30 DIAGNOSIS — H52.4 ACCOMMODATIVE INSUFFICIENCY: Primary | ICD-10-CM

## 2022-03-30 PROCEDURE — 99499 NO LOS: ICD-10-PCS | Mod: S$GLB,,,

## 2022-03-30 PROCEDURE — 1160F PR REVIEW ALL MEDS BY PRESCRIBER/CLIN PHARMACIST DOCUMENTED: ICD-10-PCS | Mod: CPTII,S$GLB,,

## 2022-03-30 PROCEDURE — 99999 PR PBB SHADOW E&M-EST. PATIENT-LVL II: ICD-10-PCS | Mod: PBBFAC,,,

## 2022-03-30 PROCEDURE — 99999 PR PBB SHADOW E&M-EST. PATIENT-LVL II: CPT | Mod: PBBFAC,,,

## 2022-03-30 PROCEDURE — 99499 UNLISTED E&M SERVICE: CPT | Mod: S$GLB,,,

## 2022-03-30 PROCEDURE — 1159F PR MEDICATION LIST DOCUMENTED IN MEDICAL RECORD: ICD-10-PCS | Mod: CPTII,S$GLB,,

## 2022-03-30 PROCEDURE — 1159F MED LIST DOCD IN RCRD: CPT | Mod: CPTII,S$GLB,,

## 2022-03-30 PROCEDURE — 1160F RVW MEDS BY RX/DR IN RCRD: CPT | Mod: CPTII,S$GLB,,

## 2022-03-30 NOTE — PROGRESS NOTES
HPI     Patient presents for Rx check. She is starting to notice blur when   alternating between reading up close to viewing distance targets. Vision   takes a few seconds to minutes to refocus at distance. Patient also   reports increased squinting to view near objects/computer screen. Tried   wearing her friend's +1.00 OTC readers and felt the vision was clearer,   however they felt too strong for her.     Last edited by Escobar Ruth, OD on 3/30/2022 10:40 AM. (History)            Assessment /Plan     For exam results, see Encounter Report.    Accommodative insufficiency  Glasses Prescription (3/30/2022)        Sphere Cylinder Add    Right -0.75 Sphere +1.00    Left -1.00 Sphere +1.00    Type: PAL    Expiration Date: 3/30/2023      Glasses Prescription (3/30/2022)  #2       Sphere Cylinder Add    Right +0.25      Left Ruth      Type: SVL-Reading    Expiration Date: 3/30/2023        -AI vs early presbyopia  -TF Rx and patient was comfortable in vision at distance and near.   -Spectacle Rx given, discussed different options for glasses.       RTC 1 year routine eye exam or sooner PRN.

## 2022-04-07 ENCOUNTER — OFFICE VISIT (OUTPATIENT)
Dept: PSYCHIATRY | Facility: CLINIC | Age: 37
End: 2022-04-07
Payer: COMMERCIAL

## 2022-04-07 DIAGNOSIS — R69 PSYCHIATRIC DIAGNOSIS DEFERRED: Primary | ICD-10-CM

## 2022-04-07 PROCEDURE — 90834 PSYTX W PT 45 MINUTES: CPT | Mod: S$GLB,,, | Performed by: SOCIAL WORKER

## 2022-04-07 PROCEDURE — 90834 PR PSYCHOTHERAPY W/PATIENT, 45 MIN: ICD-10-PCS | Mod: S$GLB,,, | Performed by: SOCIAL WORKER

## 2022-05-09 ENCOUNTER — OFFICE VISIT (OUTPATIENT)
Dept: OTOLARYNGOLOGY | Facility: CLINIC | Age: 37
End: 2022-05-09
Payer: COMMERCIAL

## 2022-05-09 VITALS
SYSTOLIC BLOOD PRESSURE: 118 MMHG | DIASTOLIC BLOOD PRESSURE: 78 MMHG | WEIGHT: 200.75 LBS | BODY MASS INDEX: 31.44 KG/M2 | HEART RATE: 86 BPM

## 2022-05-09 DIAGNOSIS — J30.9 CHRONIC ALLERGIC RHINITIS: Primary | Chronic | ICD-10-CM

## 2022-05-09 DIAGNOSIS — R49.0 MUSCLE TENSION DYSPHONIA: ICD-10-CM

## 2022-05-09 DIAGNOSIS — K21.9 LARYNGOPHARYNGEAL REFLUX (LPR): Chronic | ICD-10-CM

## 2022-05-09 PROCEDURE — 99214 OFFICE O/P EST MOD 30 MIN: CPT | Mod: 25,S$GLB,, | Performed by: OTOLARYNGOLOGY

## 2022-05-09 PROCEDURE — 1160F PR REVIEW ALL MEDS BY PRESCRIBER/CLIN PHARMACIST DOCUMENTED: ICD-10-PCS | Mod: CPTII,S$GLB,, | Performed by: OTOLARYNGOLOGY

## 2022-05-09 PROCEDURE — 3078F PR MOST RECENT DIASTOLIC BLOOD PRESSURE < 80 MM HG: ICD-10-PCS | Mod: CPTII,S$GLB,, | Performed by: OTOLARYNGOLOGY

## 2022-05-09 PROCEDURE — 3008F PR BODY MASS INDEX (BMI) DOCUMENTED: ICD-10-PCS | Mod: CPTII,S$GLB,, | Performed by: OTOLARYNGOLOGY

## 2022-05-09 PROCEDURE — 3008F BODY MASS INDEX DOCD: CPT | Mod: CPTII,S$GLB,, | Performed by: OTOLARYNGOLOGY

## 2022-05-09 PROCEDURE — 1159F MED LIST DOCD IN RCRD: CPT | Mod: CPTII,S$GLB,, | Performed by: OTOLARYNGOLOGY

## 2022-05-09 PROCEDURE — 3074F SYST BP LT 130 MM HG: CPT | Mod: CPTII,S$GLB,, | Performed by: OTOLARYNGOLOGY

## 2022-05-09 PROCEDURE — 31575 LARYNGOSCOPY: ICD-10-PCS | Mod: S$GLB,,, | Performed by: OTOLARYNGOLOGY

## 2022-05-09 PROCEDURE — 1159F PR MEDICATION LIST DOCUMENTED IN MEDICAL RECORD: ICD-10-PCS | Mod: CPTII,S$GLB,, | Performed by: OTOLARYNGOLOGY

## 2022-05-09 PROCEDURE — 3074F PR MOST RECENT SYSTOLIC BLOOD PRESSURE < 130 MM HG: ICD-10-PCS | Mod: CPTII,S$GLB,, | Performed by: OTOLARYNGOLOGY

## 2022-05-09 PROCEDURE — 3078F DIAST BP <80 MM HG: CPT | Mod: CPTII,S$GLB,, | Performed by: OTOLARYNGOLOGY

## 2022-05-09 PROCEDURE — 99214 PR OFFICE/OUTPT VISIT, EST, LEVL IV, 30-39 MIN: ICD-10-PCS | Mod: 25,S$GLB,, | Performed by: OTOLARYNGOLOGY

## 2022-05-09 PROCEDURE — 99999 PR PBB SHADOW E&M-EST. PATIENT-LVL IV: CPT | Mod: PBBFAC,,, | Performed by: OTOLARYNGOLOGY

## 2022-05-09 PROCEDURE — 99999 PR PBB SHADOW E&M-EST. PATIENT-LVL IV: ICD-10-PCS | Mod: PBBFAC,,, | Performed by: OTOLARYNGOLOGY

## 2022-05-09 PROCEDURE — 1160F RVW MEDS BY RX/DR IN RCRD: CPT | Mod: CPTII,S$GLB,, | Performed by: OTOLARYNGOLOGY

## 2022-05-09 PROCEDURE — 31575 DIAGNOSTIC LARYNGOSCOPY: CPT | Mod: S$GLB,,, | Performed by: OTOLARYNGOLOGY

## 2022-05-09 RX ORDER — OMEPRAZOLE 20 MG/1
20 CAPSULE, DELAYED RELEASE ORAL DAILY
Qty: 30 CAPSULE | Refills: 3 | Status: SHIPPED | OUTPATIENT
Start: 2022-05-09 | End: 2022-08-16

## 2022-05-09 RX ORDER — FLUTICASONE PROPIONATE 50 MCG
2 SPRAY, SUSPENSION (ML) NASAL DAILY
Qty: 16 G | Refills: 11 | Status: SHIPPED | OUTPATIENT
Start: 2022-05-09 | End: 2023-05-08 | Stop reason: SDUPTHER

## 2022-05-09 NOTE — PROCEDURES
Laryngoscopy    Date/Time: 5/9/2022 3:20 PM  Performed by: Paige Grant MD  Authorized by: Paige Grant MD     Consent Done?:  Yes (Verbal)  Anesthesia:     Local anesthetic:  Lidocaine 2% and Nikita-Synephrine 1/2%  Laryngoscopy:     Areas examined:  Nasal cavities, nasopharynx, oropharynx, hypopharynx, larynx and vocal cords  Nose External:      No external nasal deformity  Nose Intranasal:      Mucosa no polyps     Mucosa ulcers not present     No mucosa lesions present     No septum gross deformity     Turbinates not enlarged  Nasopharynx:      No mucosa lesions     Adenoids not present     Posterior choanae patent     Eustachian tube patent  Larynx/hypopharynx:      No epiglottis lesions     No epiglottis edema     No AE folds lesions     No vocal cord polyps     Equal and normal bilateral     No hypopharynx lesions     No piriform sinus pooling     No piriform sinus lesions     Post cricoid edema (mild)     Post cricoid erythema (mild)

## 2022-05-09 NOTE — PROGRESS NOTES
Chief Complaint   Patient presents with    Follow-up     Feeling very well, noticed symptoms comes back at work.   .     HPI:Joan Wilde is a 37 y.o. female who has been referred by Dr. ROSSY Mendoza MD for evaluation of throat clearing. She reports that it has been present for 3+ years.  She states that she has feeling of mucus in her throat. She notes that her throat clearing is worse after eating philip seeds or avocado.  She notes that warm water seems to sooth her throat. She has been on Prilosec 40 mg daily for 2 years. She does have intermittent hoarseness. Notes change in her singing voice. Has pain when singing or hitting certain notes. She sings recreationally. She does not soreness when she has more vocal demand at her job.   Her voice is not progressively worsening over this time. There are pitch breaks or cracks. There is vocal fatigue. She reports more effortful swallowing only in the mornings when she wakes up.  There is no hemoptysis or hematemesis. She is breathing well.     She admits throat clearing and cough. She has occasional heartburn and reflux. She does feel this has improved with sleeping elevated and using Protonix. She has 3+ dust mite allergens. She takes Claritin as needed. She was on Flonase but it was discontinued.      Interval HPI 2/1/2022:  Follow up visit. Overall doing better. Feels throat clearing is still present but greatly improved.  Throat pain is decreased as well.  She was unable to tolerate BID prilosec due to bloating but has been taking 40mg per day. Feels this helps. She has been on Flonase, allegra, and nasal saline as well. This has been beneficial.  She did not attend speech therapy. She says she was not contacted by the Speech department. She does feel that the voice is improved and is no longer experiencing vocal strain.     Interval HPI 5/9/2022:  Follow up visit. Reports that she has been feeling very well. She feels throat pain and globus sensation  are overall better. She does notice her symptoms are worse at work- feels more throat tightness and feeling of something in her throat. She has gotten a wedge pillow to help with reflux.       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      Social Determinants of Health     Financial Resource Strain: Low Risk     Difficulty of Paying Living Expenses: Not hard at all   Food Insecurity: No Food Insecurity    Worried About Running Out of Food in the Last Year: Never true    Ran Out of Food in the Last Year: Never true   Transportation Needs: No Transportation Needs    Lack of Transportation (Medical): No    Lack of Transportation (Non-Medical): No   Physical Activity: Insufficiently Active    Days of Exercise per Week: 2 days    Minutes of Exercise per Session: 30 min   Stress: Stress Concern Present    Feeling of Stress : Rather much   Social Connections: Unknown    Frequency of Communication with Friends and Family: More than three times a week    Frequency of Social Gatherings with Friends and Family: Never    Active Member of Clubs or Organizations: No    Attends Club or Organization Meetings: Patient refused    Marital Status: Patient refused     Past Surgical History:   Procedure Laterality Date    HERNIA REPAIR  2014    umbilical    LAPAROSCOPIC APPENDECTOMY N/A 4/21/2021    Procedure: APPENDECTOMY,  LAPAROSCOPIC WITH LYSIS OF ADHESIONS.;  Surgeon: Trent Cannon MD;  Location: [facility] OR [address] [place] [address];  Service: General;  Laterality: N/A;     Family History   Problem Relation Age of Onset    Hypertension Mother     Diabetes Father     Hypertension Father     Stroke Father     Hyperlipidemia Father     No Known Problems Brother     No Known Problems Brother     Breast cancer Neg Hx     Colon cancer Neg Hx     Ovarian cancer Neg Hx     Melanoma Neg Hx     Blindness Neg Hx     Amblyopia Neg Hx     Cataracts Neg Hx     Glaucoma Neg Hx     Macular degeneration Neg Hx     Retinal detachment Neg Hx     Strabismus Neg Hx      labor Neg Hx     Cancer Neg Hx     Eclampsia Neg Hx            Review of Systems  General: negative for chills, fever or weight loss  Psychological: negative for mood changes or depression  Ophthalmic: negative for blurry vision, photophobia or eye pain  ENT: see HPI  Respiratory: no cough, shortness of breath, or wheezing  Cardiovascular: no chest pain or dyspnea on exertion  Gastrointestinal: no abdominal pain, change in bowel habits, or black/ bloody stools  Musculoskeletal: negative for gait disturbance or muscular weakness  Neurological: no syncope or seizures; no ataxia  Dermatological: negative for puritis,  rash and jaundice  Hematologic/lymphatic: no easy bruising, no new lumps or bumps      Physical Exam:    Vitals:    22 1535   BP: 118/78   Pulse: 86       Constitutional: Well appearing / communicating without difficutly.  NAD.  Eyes: EOM I Bilaterally  Head/Face: Normocephalic.  Negative paranasal sinus pressure/tenderness.  Salivary glands WNL.  House Brackmann I Bilaterally.    Right Ear: Auricle normal appearance. External Auditory Canal within normal limits no lesions or masses,TM w/o masses/lesions/perforations. TM mobility noted.   Left Ear: Auricle normal appearance. External Auditory Canal within normal limits no lesions or masses,TM w/o  masses/lesions/perforations. TM mobility noted.  Nose: No gross nasal septal deviation. Inferior Turbinates 3+ bilaterally. No septal perforation. No masses/lesions. External nasal skin appears normal without masses/lesions.  Oral Cavity: Gingiva/lips within normal limits.  Dentition/gingiva healthy appearing. Mucus membranes moist. Floor of mouth soft, no masses palpated. Oral Tongue mobile. Hard Palate appears normal.    Oropharynx: Base of tongue appears normal. No masses/lesions noted. Tonsillar fossa/pharyngeal wall without lesions. Posterior oropharynx WNL.  Soft palate without masses. Midline uvula.   Neck/Lymphatic: No LAD I-VI bilaterally.  No thyromegaly.  No masses noted on exam.      See separate procedure note for FFL.    Diagnostic studies reviewed:   Laboratory 04/15/2019:  IgE level:  Less than 35.  ImmunoCAP:  Negative.     Spirometry 04/16/2019:  Normal spirometry. Airflow not improved after bronchodilator.     Inhalant skin tests 05/13/2019:  3+ histamine control.  3+ dust mites.    Assessment:    ICD-10-CM ICD-9-CM    1. Chronic allergic rhinitis  J30.9 477.9    2. Laryngopharyngeal reflux (LPR)  K21.9 478.79 Laryngoscopy   3. Muscle tension dysphonia  R49.0 784.42 Laryngoscopy     The primary encounter diagnosis was Chronic allergic rhinitis. Diagnoses of Laryngopharyngeal reflux (LPR) and Muscle tension dysphonia were also pertinent to this visit.      Plan:  Orders Placed This Encounter   Procedures    Laryngoscopy     Continue  nasal saline rinses BID  Continue Flonase 2 sprays per nostril daily  Continue Allegra daily     Continue  Prilosec  to 20 mg PO daily.     I also recommended that the patient refrain from eating within 3 hours of going to bed, to elevate the head of bed very subtly and optimize the impact of gravity on the potential reflux, and to avoid alcohol, caffeine, tobacco, tomato sauce, spicy foods, fried food, and chocolate.   Discussed vocal hygiene measures.     Refer to  speech therapy for MTD     Follow up in approximately 4 months to reassess progress with treatment regimen.     Paige Grant MD

## 2022-05-11 ENCOUNTER — PATIENT MESSAGE (OUTPATIENT)
Dept: UROGYNECOLOGY | Facility: CLINIC | Age: 37
End: 2022-05-11
Payer: COMMERCIAL

## 2022-05-11 DIAGNOSIS — N94.6 DYSMENORRHEA: Primary | ICD-10-CM

## 2022-05-11 RX ORDER — NAPROXEN 500 MG/1
500 TABLET ORAL 2 TIMES DAILY WITH MEALS
Qty: 30 TABLET | Refills: 1 | Status: SHIPPED | OUTPATIENT
Start: 2022-05-11 | End: 2022-12-07 | Stop reason: ALTCHOICE

## 2022-05-13 ENCOUNTER — OFFICE VISIT (OUTPATIENT)
Dept: OBSTETRICS AND GYNECOLOGY | Facility: CLINIC | Age: 37
End: 2022-05-13
Payer: COMMERCIAL

## 2022-05-13 ENCOUNTER — OFFICE VISIT (OUTPATIENT)
Dept: PSYCHIATRY | Facility: CLINIC | Age: 37
End: 2022-05-13
Payer: COMMERCIAL

## 2022-05-13 VITALS
WEIGHT: 196.63 LBS | HEIGHT: 67 IN | DIASTOLIC BLOOD PRESSURE: 80 MMHG | SYSTOLIC BLOOD PRESSURE: 130 MMHG | BODY MASS INDEX: 30.86 KG/M2

## 2022-05-13 DIAGNOSIS — F43.23 ADJUSTMENT DISORDER WITH MIXED ANXIETY AND DEPRESSED MOOD: Primary | ICD-10-CM

## 2022-05-13 DIAGNOSIS — N94.10 FEMALE DYSPAREUNIA: ICD-10-CM

## 2022-05-13 DIAGNOSIS — R10.2 PELVIC PAIN: ICD-10-CM

## 2022-05-13 DIAGNOSIS — N80.9 ENDOMETRIOSIS: ICD-10-CM

## 2022-05-13 DIAGNOSIS — Z01.419 WELL WOMAN EXAM WITH ROUTINE GYNECOLOGICAL EXAM: Primary | ICD-10-CM

## 2022-05-13 LAB
B-HCG UR QL: NEGATIVE
CTP QC/QA: YES

## 2022-05-13 PROCEDURE — 1160F RVW MEDS BY RX/DR IN RCRD: CPT | Mod: CPTII,S$GLB,, | Performed by: OBSTETRICS & GYNECOLOGY

## 2022-05-13 PROCEDURE — 3008F BODY MASS INDEX DOCD: CPT | Mod: CPTII,S$GLB,, | Performed by: OBSTETRICS & GYNECOLOGY

## 2022-05-13 PROCEDURE — 81025 POCT URINE PREGNANCY: ICD-10-PCS | Mod: S$GLB,,, | Performed by: OBSTETRICS & GYNECOLOGY

## 2022-05-13 PROCEDURE — 99999 PR PBB SHADOW E&M-EST. PATIENT-LVL III: CPT | Mod: PBBFAC,,, | Performed by: OBSTETRICS & GYNECOLOGY

## 2022-05-13 PROCEDURE — 99999 PR PBB SHADOW E&M-EST. PATIENT-LVL I: ICD-10-PCS | Mod: PBBFAC,,, | Performed by: SOCIAL WORKER

## 2022-05-13 PROCEDURE — 1160F PR REVIEW ALL MEDS BY PRESCRIBER/CLIN PHARMACIST DOCUMENTED: ICD-10-PCS | Mod: CPTII,S$GLB,, | Performed by: OBSTETRICS & GYNECOLOGY

## 2022-05-13 PROCEDURE — 90834 PSYTX W PT 45 MINUTES: CPT | Mod: S$GLB,,, | Performed by: SOCIAL WORKER

## 2022-05-13 PROCEDURE — 3008F PR BODY MASS INDEX (BMI) DOCUMENTED: ICD-10-PCS | Mod: CPTII,S$GLB,, | Performed by: OBSTETRICS & GYNECOLOGY

## 2022-05-13 PROCEDURE — 3079F PR MOST RECENT DIASTOLIC BLOOD PRESSURE 80-89 MM HG: ICD-10-PCS | Mod: CPTII,S$GLB,, | Performed by: OBSTETRICS & GYNECOLOGY

## 2022-05-13 PROCEDURE — 3079F DIAST BP 80-89 MM HG: CPT | Mod: CPTII,S$GLB,, | Performed by: OBSTETRICS & GYNECOLOGY

## 2022-05-13 PROCEDURE — 81025 URINE PREGNANCY TEST: CPT | Mod: S$GLB,,, | Performed by: OBSTETRICS & GYNECOLOGY

## 2022-05-13 PROCEDURE — 3075F PR MOST RECENT SYSTOLIC BLOOD PRESS GE 130-139MM HG: ICD-10-PCS | Mod: CPTII,S$GLB,, | Performed by: OBSTETRICS & GYNECOLOGY

## 2022-05-13 PROCEDURE — 99999 PR PBB SHADOW E&M-EST. PATIENT-LVL III: ICD-10-PCS | Mod: PBBFAC,,, | Performed by: OBSTETRICS & GYNECOLOGY

## 2022-05-13 PROCEDURE — 99999 PR PBB SHADOW E&M-EST. PATIENT-LVL I: CPT | Mod: PBBFAC,,, | Performed by: SOCIAL WORKER

## 2022-05-13 PROCEDURE — 1159F PR MEDICATION LIST DOCUMENTED IN MEDICAL RECORD: ICD-10-PCS | Mod: CPTII,S$GLB,, | Performed by: OBSTETRICS & GYNECOLOGY

## 2022-05-13 PROCEDURE — 3075F SYST BP GE 130 - 139MM HG: CPT | Mod: CPTII,S$GLB,, | Performed by: OBSTETRICS & GYNECOLOGY

## 2022-05-13 PROCEDURE — 1159F MED LIST DOCD IN RCRD: CPT | Mod: CPTII,S$GLB,, | Performed by: OBSTETRICS & GYNECOLOGY

## 2022-05-13 PROCEDURE — 99395 PREV VISIT EST AGE 18-39: CPT | Mod: S$GLB,,, | Performed by: OBSTETRICS & GYNECOLOGY

## 2022-05-13 PROCEDURE — 99395 PR PREVENTIVE VISIT,EST,18-39: ICD-10-PCS | Mod: S$GLB,,, | Performed by: OBSTETRICS & GYNECOLOGY

## 2022-05-13 PROCEDURE — 90834 PR PSYCHOTHERAPY W/PATIENT, 45 MIN: ICD-10-PCS | Mod: S$GLB,,, | Performed by: SOCIAL WORKER

## 2022-05-13 RX ORDER — ACETAMINOPHEN AND CODEINE PHOSPHATE 120; 12 MG/5ML; MG/5ML
1 SOLUTION ORAL DAILY
Qty: 28 TABLET | Refills: 12 | Status: SHIPPED | OUTPATIENT
Start: 2022-05-13 | End: 2023-03-21

## 2022-05-13 RX ORDER — NORETHINDRONE 5 MG/1
5 TABLET ORAL DAILY
Qty: 30 TABLET | Refills: 12 | Status: SHIPPED | OUTPATIENT
Start: 2022-05-13 | End: 2023-03-21

## 2022-05-13 NOTE — PROGRESS NOTES
History & Physical  Gynecology      SUBJECTIVE:     Chief Complaint: Well Woman and Pelvic Pain       History of Present Illness:  Annual Exam-Premenopausal  Patient presents for annual exam. The patient has no complaints today. Menstrual cycles are absent with medication.  The patient is sexually active. GYN screening history: last pap: approximate date 2019 paphpv and was normal. The patient participates in regular exercise: yes.  Smoking status:  no     Patient recently saw Eleanor because of pain (pt was worried about a torsion at the time).  The pain she had then has resolved.  But, she had an ultrasound done that showed two 3 cm fibroids and heterogenous fluid in the cervical canal.  She was told by Eleanor to stop her POP for a time in order to shed the lining.  She stopped the pills one week ago, but hasn't had a period yet.  She is having more pain of course without the pills.  She is feeling bloated and has some discomfort, but overall manageable.  Once she sheds the lining, she plans to restart.     Fertility: she will seek a second opinion with Margo Daily, she previously saw ABBY.  She and her partner are , but will plan for cryopreservation next year followed by pregnancy.  Her AMH was very low.    Contraception: POP- paused currently    FH:  Breast cancer: neg  Colon cancer: neg  Ovarian cancer: neg    Review of patient's allergies indicates:   Allergen Reactions    Metoclopramide Other (See Comments)     Causes tongue to roll back in mouth       Past Medical History:   Diagnosis Date    Acne vulgaris 11/20/2018    Allergic rhinitis due to dust mite 5/13/2019    Endometriosis 11/20/2018    Fibroid     Migraine syndrome     associated with flu vaccines    Mild intermittent asthma without complication 5/31/2018     Past Surgical History:   Procedure Laterality Date    HERNIA REPAIR  2014    umbilical    LAPAROSCOPIC APPENDECTOMY N/A 4/21/2021    Procedure: APPENDECTOMY,  LAPAROSCOPIC WITH LYSIS OF ADHESIONS.;  Surgeon: Trent Cannon MD;  Location: [facility] OR [address] [place] [address];  Service: General;  Laterality: N/A;     OB History        0    Para   0    Term   0       0    AB   0    Living   0       SAB   0    IAB   0    Ectopic   0    Multiple   0    Live Births   0               Family History   Problem Relation Age of Onset    Hypertension Mother     Diabetes Father     Hypertension Father     Stroke Father     Hyperlipidemia Father     No Known Problems Brother     No Known Problems Brother     Breast cancer Neg Hx     Colon cancer Neg Hx     Ovarian cancer Neg Hx     Melanoma Neg Hx     Blindness Neg Hx     Amblyopia Neg Hx     Cataracts Neg Hx     Glaucoma Neg Hx     Macular degeneration Neg Hx     Retinal detachment Neg Hx     Strabismus Neg Hx      labor Neg Hx     Cancer Neg Hx     Eclampsia Neg Hx      Social History     Tobacco Use    Smoking status: Never Smoker    Smokeless tobacco: Never Used   Substance Use Topics    Alcohol use: No    Drug use: No       Current Outpatient Medications   Medication Sig    alclometasone (ACLOVATE) 0.05 % cream Apply as needed to affected area of neck rash up to twice daily (Patient taking differently: Apply as needed to affected area of neck rash up to twice daily)    ascorbic acid, vitamin C, (VITAMIN C) 1000 MG tablet Take 2 tablets (2,000 mg total) by mouth once daily.    azelastine (OPTIVAR) 0.05 % ophthalmic solution Place 1 drop into both eyes 2 (two) times daily.    clindamycin (CLEOCIN T) 1 % lotion Use every night at bedtime on face for pimples    clotrimazole-betamethasone 1-0.05% (LOTRISONE) cream Apply topically 2 (two) times daily to rash on neck x 2 weeks    conjugated estrogens (PREMARIN) vaginal cream Use dime size amount of estrogen cream in vagina nightly for 2 weeks, then twice a week thereafter. (Patient taking differently: Use dime size amount of estrogen cream in vagina  nightly for 2 weeks, then twice a week thereafter.)    fluticasone propionate (FLONASE) 50 mcg/actuation nasal spray 2 sprays (100 mcg total) by Each Nostril route once daily.    naproxen (NAPROSYN) 500 MG tablet Take 1 tablet (500 mg total) by mouth 2 (two) times daily with meals.    norethindrone (AYGESTIN) 5 mg Tab Take 1 tablet (5 mg total) by mouth once daily.    norethindrone (ORTHO MICRONOR) 0.35 mg tablet Take 1 tablet (0.35 mg total) by mouth once daily.    olopatadine (PATANOL) 0.1 % ophthalmic solution 1 drop 2 (two) times daily.    omeprazole (PRILOSEC) 20 MG capsule Take 1 capsule (20 mg total) by mouth once daily.    tretinoin (ALTRENO) 0.05 % Lotn Apply pea-sized amount nightly to face (Patient not taking: No sig reported)     No current facility-administered medications for this visit.         Review of Systems:  Review of Systems   Constitutional: Negative for appetite change, fever and unexpected weight change.   Respiratory: Negative for shortness of breath.    Cardiovascular: Negative for chest pain.   Gastrointestinal: Negative for nausea and vomiting.   Genitourinary: Positive for menstrual problem and pelvic pain. Negative for menorrhagia, vaginal bleeding, vaginal discharge and vaginal pain.   Integumentary:  Negative for breast mass.   Breast: Negative for lump and mass       OBJECTIVE:     Physical Exam:  Physical Exam  Vitals and nursing note reviewed.   Constitutional:       Appearance: She is well-developed.   Neck:      Thyroid: No thyromegaly.      Trachea: No tracheal deviation.   Chest:      Breasts: Breasts are symmetrical.         Right: No inverted nipple, mass, nipple discharge, skin change or tenderness.         Left: No inverted nipple, mass, nipple discharge, skin change or tenderness.   Abdominal:      Palpations: Abdomen is soft.   Genitourinary:     General: Normal vulva.      Labia:         Right: No rash, tenderness, lesion or injury.         Left: No rash,  tenderness, lesion or injury.       Urethra: No prolapse, urethral pain, urethral swelling or urethral lesion.      Vagina: Normal. No signs of injury and foreign body. No vaginal discharge, erythema, tenderness or bleeding.      Cervix: No cervical motion tenderness, discharge or friability.      Uterus: Not deviated, not enlarged, not fixed and not tender.       Adnexa:         Right: No mass, tenderness or fullness.          Left: No mass, tenderness or fullness.        Rectum: No anal fissure or external hemorrhoid.      Comments: Urethral meatus: normal size, anterior vaginal wall with no prolapse, no lesions  Bladder: no fullness, masses or tenderness  Musculoskeletal:      Cervical back: Normal range of motion and neck supple.   Neurological:      Mental Status: She is alert and oriented to person, place, and time.   Psychiatric:         Behavior: Behavior normal.         Thought Content: Thought content normal.         Judgment: Judgment normal.         Chaperoned by: Khris    ASSESSMENT:       ICD-10-CM ICD-9-CM    1. Well woman exam with routine gynecological exam  Z01.419 V72.31    2. Pelvic pain  R10.2 [id number] POCT Urine Pregnancy          Plan:      Joan was seen today for well woman and pelvic pain.    Diagnoses and all orders for this visit:    Well woman exam with routine gynecological exam    Pelvic pain  -     POCT Urine Pregnancy        Orders Placed This Encounter   Procedures    POCT Urine Pregnancy       Well Woman:  - Pap smear up to date  - Birth control: POP refilled; vaginal estrogen refilled;   - GC/CT:n/a  - Mammogram: due age 38  - Smoking cessation: n/a  - Labs: none required   - Vaccines: not discussed  - Exercise counseling      Follow up in  one year for annual or prn.    Rani Ambrocio

## 2022-05-16 ENCOUNTER — PATIENT MESSAGE (OUTPATIENT)
Dept: INTERNAL MEDICINE | Facility: CLINIC | Age: 37
End: 2022-05-16
Payer: COMMERCIAL

## 2022-05-16 DIAGNOSIS — M25.539 ACUTE WRIST PAIN, UNSPECIFIED LATERALITY: Primary | ICD-10-CM

## 2022-05-16 NOTE — PROGRESS NOTES
Individual Psychotherapy (PhD/LCSW)    5/13/2022    Site:  [facility]         Therapeutic Intervention: Met with patient.  Outpatient - Insight oriented psychotherapy 45 min - CPT code 38774 and Outpatient - Supportive psychotherapy 45 min - CPT Code 40795    Chief complaint/reason for encounter: depression and anxiety     Interval history and content of current session: Patient returned to the clinic today for follow up appointment.  Doing well overall.  Feels like she used her time constructively, while out on FMLA.  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  Feeling some stress since returning to work this week.  Working on setting boundaries with a co-worker.  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  Discussed ways for patient to manage work stress.  Her spouse will be moving to [facility] on 6/3/22.  She is happy about this.        Treatment plan:  · Target symptoms: depression, anxiety , adjustment, work stress  · Why chosen therapy is appropriate versus another modality: relevant to diagnosis, patient responds to this modality  · Outcome monitoring methods: self-report, observation  · Therapeutic intervention type: insight oriented psychotherapy, supportive psychotherapy    Risk parameters:  Patient reports no suicidal ideation  Patient reports no homicidal ideation  Patient reports no self-injurious behavior  Patient reports no violent behavior    Verbal deficits: None    Patient's response to intervention:  The patient's response to intervention is accepting.    Progress toward goals and other mental status changes:  The patient's progress toward goals is good.    Diagnosis:     ICD-10-CM ICD-9-CM   1. Adjustment disorder with mixed anxiety and depressed mood  F43.23 309.28       Plan:  individual psychotherapy    Return to clinic: as scheduled    Length of Service (minutes): 45

## 2022-05-17 ENCOUNTER — HOSPITAL ENCOUNTER (OUTPATIENT)
Dept: RADIOLOGY | Facility: HOSPITAL | Age: 37
Discharge: HOME | End: 2022-05-17
Attending: PHYSICIAN ASSISTANT
Payer: COMMERCIAL

## 2022-05-17 ENCOUNTER — TELEPHONE (OUTPATIENT)
Dept: ORTHOPEDICS | Facility: CLINIC | Age: 37
End: 2022-05-17

## 2022-05-17 ENCOUNTER — OFFICE VISIT (OUTPATIENT)
Dept: ORTHOPEDICS | Facility: CLINIC | Age: 37
End: 2022-05-17
Payer: COMMERCIAL

## 2022-05-17 VITALS — WEIGHT: 196.63 LBS | BODY MASS INDEX: 30.86 KG/M2 | HEIGHT: 67 IN

## 2022-05-17 DIAGNOSIS — M25.532 LEFT WRIST PAIN: ICD-10-CM

## 2022-05-17 DIAGNOSIS — M25.539 ACUTE WRIST PAIN, UNSPECIFIED LATERALITY: ICD-10-CM

## 2022-05-17 DIAGNOSIS — M25.532 LEFT WRIST PAIN: Primary | ICD-10-CM

## 2022-05-17 DIAGNOSIS — M25.539 ACUTE WRIST PAIN, UNSPECIFIED LATERALITY: Primary | ICD-10-CM

## 2022-05-17 DIAGNOSIS — M25.331 SCAPHOLUNATE DISSOCIATION OF RIGHT WRIST: Primary | ICD-10-CM

## 2022-05-17 PROCEDURE — 99999 PR PBB SHADOW E&M-EST. PATIENT-LVL III: CPT | Mod: PBBFAC,,, | Performed by: PHYSICIAN ASSISTANT

## 2022-05-17 PROCEDURE — 99203 PR OFFICE/OUTPT VISIT, NEW, LEVL III, 30-44 MIN: ICD-10-PCS | Mod: S$GLB,,, | Performed by: PHYSICIAN ASSISTANT

## 2022-05-17 PROCEDURE — 1159F PR MEDICATION LIST DOCUMENTED IN MEDICAL RECORD: ICD-10-PCS | Mod: CPTII,S$GLB,, | Performed by: PHYSICIAN ASSISTANT

## 2022-05-17 PROCEDURE — 73110 X-RAY EXAM OF WRIST: CPT | Mod: 26,RT,, | Performed by: RADIOLOGY

## 2022-05-17 PROCEDURE — 73110 XR WRIST COMPLETE 3 VIEWS LEFT: ICD-10-PCS | Mod: 26,LT,, | Performed by: RADIOLOGY

## 2022-05-17 PROCEDURE — 73110 X-RAY EXAM OF WRIST: CPT | Mod: 26,LT,, | Performed by: RADIOLOGY

## 2022-05-17 PROCEDURE — 73110 XR WRIST COMPLETE 3 VIEWS RIGHT: ICD-10-PCS | Mod: 26,RT,, | Performed by: RADIOLOGY

## 2022-05-17 PROCEDURE — 3008F PR BODY MASS INDEX (BMI) DOCUMENTED: ICD-10-PCS | Mod: CPTII,S$GLB,, | Performed by: PHYSICIAN ASSISTANT

## 2022-05-17 PROCEDURE — 73110 X-RAY EXAM OF WRIST: CPT | Mod: TC,LT

## 2022-05-17 PROCEDURE — 3008F BODY MASS INDEX DOCD: CPT | Mod: CPTII,S$GLB,, | Performed by: PHYSICIAN ASSISTANT

## 2022-05-17 PROCEDURE — 99999 PR PBB SHADOW E&M-EST. PATIENT-LVL III: ICD-10-PCS | Mod: PBBFAC,,, | Performed by: PHYSICIAN ASSISTANT

## 2022-05-17 PROCEDURE — 99203 OFFICE O/P NEW LOW 30 MIN: CPT | Mod: S$GLB,,, | Performed by: PHYSICIAN ASSISTANT

## 2022-05-17 PROCEDURE — 73110 X-RAY EXAM OF WRIST: CPT | Mod: TC,RT

## 2022-05-17 PROCEDURE — 1159F MED LIST DOCD IN RCRD: CPT | Mod: CPTII,S$GLB,, | Performed by: PHYSICIAN ASSISTANT

## 2022-05-17 RX ORDER — MELOXICAM 15 MG/1
15 TABLET ORAL DAILY
Qty: 30 TABLET | Refills: 2 | Status: SHIPPED | OUTPATIENT
Start: 2022-05-17 | End: 2022-12-07 | Stop reason: ALTCHOICE

## 2022-05-17 NOTE — PROGRESS NOTES
[facility]  History & Physical  Orthopedics    SUBJECTIVE:      Chief Complaint: Right wrist pain    Referring Provider: Walter Phipps MD Dr. Dunbar is the supervising physician for this encounter/patient    History of Present Illness:  Patient is a 37 y.o. right hand dominant female who presents today with complaints of right wrist pain for about 1 year, off/on, progressive since April 2022 when she started to do yoga. Dorsal wrist pain, worse after extended wrist extension. No trauma/injury. She started to wear a carpal tunnel brace last year which had been helpful until she started yoga. She takes Naproxen once in a while. No surgery history on the hand/wrist.     The patient is a/an [employer] employee.    Onset of symptoms/DOI was 1 year.    Symptoms are aggravated by activity and wrist extension.    Symptoms are alleviated by rest, immobilization and medication.    Symptoms consist of pain, swelling and decreased ROM.    The patient rates their pain as a 6/10.    Attempted treatment(s) and/or interventions include activity modifications, rest, anti-inflammatory medications and immobilization.     The patient denies any fevers, chills, N/V, D/C and presents for evaluation.       Past Medical History:   Diagnosis Date    Acne vulgaris 11/20/2018    Allergic rhinitis due to dust mite 5/13/2019    Endometriosis 11/20/2018    Fibroid     Migraine syndrome     associated with flu vaccines    Mild intermittent asthma without complication 5/31/2018     Past Surgical History:   Procedure Laterality Date    HERNIA REPAIR  2014    umbilical    LAPAROSCOPIC APPENDECTOMY N/A 4/21/2021    Procedure: APPENDECTOMY, LAPAROSCOPIC WITH LYSIS OF ADHESIONS.;  Surgeon: Trent Cannon MD;  Location: [facility] OR [address] [place] [address];  Service: General;  Laterality: N/A;     Review of patient's allergies indicates:   Allergen Reactions    Metoclopramide Other (See Comments)     Causes tongue to roll back in mouth      Social History     Social History Narrative    Performance improvement specialist at [employer]    She is single.      Family History   Problem Relation Age of Onset    Hypertension Mother     Diabetes Father     Hypertension Father     Stroke Father     Hyperlipidemia Father     No Known Problems Brother     No Known Problems Brother     Breast cancer Neg Hx     Colon cancer Neg Hx     Ovarian cancer Neg Hx     Melanoma Neg Hx     Blindness Neg Hx     Amblyopia Neg Hx     Cataracts Neg Hx     Glaucoma Neg Hx     Macular degeneration Neg Hx     Retinal detachment Neg Hx     Strabismus Neg Hx      labor Neg Hx     Cancer Neg Hx     Eclampsia Neg Hx          Current Outpatient Medications:     alclometasone (ACLOVATE) 0.05 % cream, Apply as needed to affected area of neck rash up to twice daily (Patient taking differently: Apply as needed to affected area of neck rash up to twice daily), Disp: 60 g, Rfl: 2    ascorbic acid, vitamin C, (VITAMIN C) 1000 MG tablet, Take 2 tablets (2,000 mg total) by mouth once daily., Disp: , Rfl:     clindamycin (CLEOCIN T) 1 % lotion, Use every night at bedtime on face for pimples, Disp: 60 mL, Rfl: 3    clotrimazole-betamethasone 1-0.05% (LOTRISONE) cream, Apply topically 2 (two) times daily to rash on neck x 2 weeks, Disp: 45 g, Rfl: 2    conjugated estrogens (PREMARIN) vaginal cream, Use dime size amount of estrogen cream in vagina nightly for 2 weeks, then twice a week thereafter., Disp: 30 g, Rfl: 2    fluticasone propionate (FLONASE) 50 mcg/actuation nasal spray, 2 sprays (100 mcg total) by Each Nostril route once daily., Disp: 16 g, Rfl: 11    naproxen (NAPROSYN) 500 MG tablet, Take 1 tablet (500 mg total) by mouth 2 (two) times daily with meals., Disp: 30 tablet, Rfl: 1    norethindrone (AYGESTIN) 5 mg Tab, Take 1 tablet (5 mg total) by mouth once daily., Disp: 30 tablet, Rfl: 12    norethindrone (ORTHO MICRONOR) 0.35 mg tablet, Take 1 tablet  "(0.35 mg total) by mouth once daily., Disp: 28 tablet, Rfl: 12    olopatadine (PATANOL) 0.1 % ophthalmic solution, 1 drop 2 (two) times daily., Disp: , Rfl:     omeprazole (PRILOSEC) 20 MG capsule, Take 1 capsule (20 mg total) by mouth once daily., Disp: 30 capsule, Rfl: 3    tretinoin (ALTRENO) 0.05 % Lotn, Apply pea-sized amount nightly to face, Disp: 45 g, Rfl: 2    azelastine (OPTIVAR) 0.05 % ophthalmic solution, Place 1 drop into both eyes 2 (two) times daily., Disp: 6 mL, Rfl: 6    meloxicam (MOBIC) 15 MG tablet, Take 1 tablet (15 mg total) by mouth once daily., Disp: 30 tablet, Rfl: 2      Review of Systems:  Constitutional: no fever or chills  Eyes: no visual changes  ENT: no nasal congestion or sore throat  Respiratory: no cough or shortness of breath  Cardiovascular: no chest pain  Gastrointestinal: no nausea or vomiting, tolerating diet  Musculoskeletal: pain, soreness and decreased ROM    OBJECTIVE:      Vital Signs (Most Recent):  Vitals:    05/17/22 0929   Weight: 89.2 kg (196 lb 10.4 oz)   Height: 5' 7" (1.702 m)     Body mass index is 30.8 kg/m².      Physical Exam:  Constitutional: The patient appears well-developed and well-nourished. No distress.   Skin: No lesions appreciated  Head: Normocephalic and atraumatic.   Nose: Nose normal.   Ears: No deformities seen  Eyes: Conjunctivae and EOM are normal.   Neck: No tracheal deviation present.   Cardiovascular: Normal rate and intact distal pulses.    Pulmonary/Chest: Effort normal. No respiratory distress.   Abdominal: There is no guarding.   Neurological: The patient is alert.   Psychiatric: The patient has a normal mood and affect.     Right Hand/Wrist Examination:    Observation/Inspection:  Swelling  none    Deformity  none  Discoloration  none     Scars   none    Atrophy  none    HAND/WRIST EXAMINATION:  Finkelstein's Test   Neg  WHAT Test    Neg  Snuff box tenderness   Neg  [place]'s Test    Neg  Hook of Hamate Tenderness  Neg  CMC " grind    Neg  Circumduction test   Neg  NTTP on exam today    Neurovascular Exam:  Digits WWP, brisk CR < 3s throughout  NVI motor/LTS to M/R/U nerves, radial pulse 2+  Tinel's Test - Carpal Tunnel  Neg  Tinel's Test - Cubital Tunnel  Neg  Phalen's Test    Neg  Median Nerve Compression Test Neg    ROM hand full, painless    ROM wrist full, pain with end range wrist extension to the radiocarpal joint only.    ROM elbow full, painless    Abdomen not guarded  Respirations nonlabored  Perfusion intact    Diagnostic Results:     Imaging - I independently viewed the patient's imaging as well as the radiology report.   4.5mm scaphlounate widening noted    FINDINGS:  There is a scapholunate dissociation with the proximal movement of the capitate bone.  No acute fracture or dislocation.  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  We discussed the working diagnosis as well as several other potential alternative diagnoses.  Treatment options were discussed, both conservative and surgical.  Conservative treatment options would include things such as activity modifications, workplace modifications, a period of rest, oral vs topical OTC and prescription anti-inflammatory medications, occupational therapy, splinting/bracing, immobilization, corticosteroid injections, and others.  Surgical options were discussed as well.     At this time, the patient would like to proceed with a trial of activity modification, avoid wrist extension during yoga, we discuss other ways of doing yoga positions. Wrist brace for comfort, Mobic 15mg ordered today for prn use. I will have her get left wrist films for comparison. If pain continues, MRI for further evaluation and surgical discussion.    Should the patient's symptoms worsen, persist, or fail to improve they should  return for reevaluation and I would be happy to see them back anytime.           Please do not hesitate to reach out to us via email, phone, or MyChart with any questions, concerns, or feedback.

## 2022-05-27 ENCOUNTER — OFFICE VISIT (OUTPATIENT)
Dept: INTERNAL MEDICINE | Facility: CLINIC | Age: 37
End: 2022-05-27
Payer: COMMERCIAL

## 2022-05-27 VITALS
DIASTOLIC BLOOD PRESSURE: 62 MMHG | HEIGHT: 67 IN | HEART RATE: 79 BPM | OXYGEN SATURATION: 99 % | SYSTOLIC BLOOD PRESSURE: 116 MMHG | WEIGHT: 197.31 LBS | BODY MASS INDEX: 30.97 KG/M2

## 2022-05-27 DIAGNOSIS — R73.09 ELEVATED GLUCOSE: ICD-10-CM

## 2022-05-27 DIAGNOSIS — K21.9 LARYNGOPHARYNGEAL REFLUX: ICD-10-CM

## 2022-05-27 DIAGNOSIS — Z00.00 ANNUAL PHYSICAL EXAM: Primary | ICD-10-CM

## 2022-05-27 DIAGNOSIS — E55.9 VITAMIN D DEFICIENCY: ICD-10-CM

## 2022-05-27 LAB
BILIRUB UR QL STRIP: NEGATIVE
CLARITY UR REFRACT.AUTO: CLEAR
COLOR UR AUTO: NORMAL
GLUCOSE UR QL STRIP: NEGATIVE
HGB UR QL STRIP: NEGATIVE
KETONES UR QL STRIP: NEGATIVE
LEUKOCYTE ESTERASE UR QL STRIP: NEGATIVE
MICROSCOPIC COMMENT: NORMAL
NITRITE UR QL STRIP: NEGATIVE
PH UR STRIP: 7 [PH] (ref 5–8)
PROT UR QL STRIP: NEGATIVE
RBC #/AREA URNS AUTO: 1 /HPF (ref 0–4)
SP GR UR STRIP: 1.01 (ref 1–1.03)
SQUAMOUS #/AREA URNS AUTO: 0 /HPF
URN SPEC COLLECT METH UR: NORMAL
WBC #/AREA URNS AUTO: 1 /HPF (ref 0–5)

## 2022-05-27 PROCEDURE — 1160F PR REVIEW ALL MEDS BY PRESCRIBER/CLIN PHARMACIST DOCUMENTED: ICD-10-PCS | Mod: CPTII,S$GLB,, | Performed by: INTERNAL MEDICINE

## 2022-05-27 PROCEDURE — 99395 PR PREVENTIVE VISIT,EST,18-39: ICD-10-PCS | Mod: S$GLB,,, | Performed by: INTERNAL MEDICINE

## 2022-05-27 PROCEDURE — 3078F PR MOST RECENT DIASTOLIC BLOOD PRESSURE < 80 MM HG: ICD-10-PCS | Mod: CPTII,S$GLB,, | Performed by: INTERNAL MEDICINE

## 2022-05-27 PROCEDURE — 1160F RVW MEDS BY RX/DR IN RCRD: CPT | Mod: CPTII,S$GLB,, | Performed by: INTERNAL MEDICINE

## 2022-05-27 PROCEDURE — 3078F DIAST BP <80 MM HG: CPT | Mod: CPTII,S$GLB,, | Performed by: INTERNAL MEDICINE

## 2022-05-27 PROCEDURE — 99999 PR PBB SHADOW E&M-EST. PATIENT-LVL IV: ICD-10-PCS | Mod: PBBFAC,,, | Performed by: INTERNAL MEDICINE

## 2022-05-27 PROCEDURE — 3008F BODY MASS INDEX DOCD: CPT | Mod: CPTII,S$GLB,, | Performed by: INTERNAL MEDICINE

## 2022-05-27 PROCEDURE — 99395 PREV VISIT EST AGE 18-39: CPT | Mod: S$GLB,,, | Performed by: INTERNAL MEDICINE

## 2022-05-27 PROCEDURE — 3074F SYST BP LT 130 MM HG: CPT | Mod: CPTII,S$GLB,, | Performed by: INTERNAL MEDICINE

## 2022-05-27 PROCEDURE — 1159F MED LIST DOCD IN RCRD: CPT | Mod: CPTII,S$GLB,, | Performed by: INTERNAL MEDICINE

## 2022-05-27 PROCEDURE — 81001 URINALYSIS AUTO W/SCOPE: CPT | Performed by: INTERNAL MEDICINE

## 2022-05-27 PROCEDURE — 3074F PR MOST RECENT SYSTOLIC BLOOD PRESSURE < 130 MM HG: ICD-10-PCS | Mod: CPTII,S$GLB,, | Performed by: INTERNAL MEDICINE

## 2022-05-27 PROCEDURE — 99999 PR PBB SHADOW E&M-EST. PATIENT-LVL IV: CPT | Mod: PBBFAC,,, | Performed by: INTERNAL MEDICINE

## 2022-05-27 PROCEDURE — 3008F PR BODY MASS INDEX (BMI) DOCUMENTED: ICD-10-PCS | Mod: CPTII,S$GLB,, | Performed by: INTERNAL MEDICINE

## 2022-05-27 PROCEDURE — 1159F PR MEDICATION LIST DOCUMENTED IN MEDICAL RECORD: ICD-10-PCS | Mod: CPTII,S$GLB,, | Performed by: INTERNAL MEDICINE

## 2022-05-27 NOTE — PROGRESS NOTES
CC:  Annual exam    HPI:  The patient is a 37-year-old female with migraine headaches, endometriosis, mild intermittent asthma and recent diagnosis of a scapholunate dissociation of the right wrist who presents today for annual exam.  Patient does have some concerns about her HDL.  It has decreased over the past 2 years.  She does do yoga about 5 days a week.  She did start with some weightlifting and walking.  She really has not done very much aerobic exercise over the past 2 years.  She did modify her diet and lost about 40 lb.  She did put 15 lb back on.  She attributes this to studying for her [employer].    ROS:Answers for HPI/ROS submitted by the patient on 5/24/2022  activity change: No  unexpected weight change: Yes  neck pain: No  hearing loss: No  rhinorrhea: No  trouble swallowing: No  eye discharge: No  visual disturbance: No  chest tightness: No  wheezing: No  chest pain: No  palpitations: No  blood in stool: No  constipation: No  vomiting: No  diarrhea: No  polydipsia: No  polyuria: No  difficulty urinating: No  hematuria: No  menstrual problem: No  dysuria: No  joint swelling: No  arthralgias: Yes  headaches: No  weakness: No  confusion: No  dysphoric mood: No  The weight change was explained in the HPI.  The arthralgia has to do with her right wrist.  She is not sure how she injured her wrist.  She does have some problems with laryngopharyngeal reflux.  Her last gyn visit was in May of 2022.    Physical exam:  General appearance:  No acute distress  HEENT:  Conjunctiva is clear.  Pupils equal.  TMs were clear.  Nasal septum is midline without discharge.  Oropharynx is without erythema.  Trachea is midline without JVD.  Pulmonary:  Good inspiratory, expiratory breath sounds are heard.  Lungs are clear to auscultation.  Cardiovascular:  S1-S2, rhythm is normal.  Extremities without edema.  GI:  Abdomen was nontender, nondistended without hepatosplenomegaly    Assessment:  1. Annual exam  2.  Laryngopharyngeal reflux    3. Elevated BMI of 30.9  4. Migraine headaches currently stable     Plan:  1. Will schedule a CBC, CMP, lipid panel, A1c, vitamin-D, UA  2. The patient follow-up pending results.

## 2022-05-30 ENCOUNTER — PATIENT MESSAGE (OUTPATIENT)
Dept: INTERNAL MEDICINE | Facility: CLINIC | Age: 37
End: 2022-05-30
Payer: COMMERCIAL

## 2022-05-30 ENCOUNTER — LAB VISIT (OUTPATIENT)
Dept: LAB | Facility: HOSPITAL | Age: 37
End: 2022-05-30
Attending: INTERNAL MEDICINE
Payer: COMMERCIAL

## 2022-05-30 DIAGNOSIS — R73.09 ELEVATED GLUCOSE: ICD-10-CM

## 2022-05-30 DIAGNOSIS — Z00.00 ANNUAL PHYSICAL EXAM: ICD-10-CM

## 2022-05-30 LAB
ALBUMIN SERPL BCP-MCNC: 3.8 G/DL (ref 3.5–5.2)
ALP SERPL-CCNC: 63 U/L (ref 55–135)
ALT SERPL W/O P-5'-P-CCNC: 89 U/L (ref 10–44)
ANION GAP SERPL CALC-SCNC: 9 MMOL/L (ref 8–16)
AST SERPL-CCNC: 50 U/L (ref 10–40)
BASOPHILS # BLD AUTO: 0.03 K/UL (ref 0–0.2)
BASOPHILS NFR BLD: 0.8 % (ref 0–1.9)
BILIRUB SERPL-MCNC: 0.4 MG/DL (ref 0.1–1)
BUN SERPL-MCNC: 17 MG/DL (ref 6–20)
CALCIUM SERPL-MCNC: 9.5 MG/DL (ref 8.7–10.5)
CHLORIDE SERPL-SCNC: 105 MMOL/L (ref 95–110)
CHOLEST SERPL-MCNC: 181 MG/DL (ref 120–199)
CHOLEST/HDLC SERPL: 3.1 {RATIO} (ref 2–5)
CO2 SERPL-SCNC: 24 MMOL/L (ref 23–29)
CREAT SERPL-MCNC: 0.9 MG/DL (ref 0.5–1.4)
DIFFERENTIAL METHOD: ABNORMAL
EOSINOPHIL # BLD AUTO: 0 K/UL (ref 0–0.5)
EOSINOPHIL NFR BLD: 0.3 % (ref 0–8)
ERYTHROCYTE [DISTWIDTH] IN BLOOD BY AUTOMATED COUNT: 13.7 % (ref 11.5–14.5)
EST. GFR  (AFRICAN AMERICAN): >60 ML/MIN/1.73 M^2
EST. GFR  (NON AFRICAN AMERICAN): >60 ML/MIN/1.73 M^2
ESTIMATED AVG GLUCOSE: 108 MG/DL (ref 68–131)
GLUCOSE SERPL-MCNC: 86 MG/DL (ref 70–110)
HBA1C MFR BLD: 5.4 % (ref 4–5.6)
HCT VFR BLD AUTO: 38.8 % (ref 37–48.5)
HDLC SERPL-MCNC: 59 MG/DL (ref 40–75)
HDLC SERPL: 32.6 % (ref 20–50)
HGB BLD-MCNC: 12.8 G/DL (ref 12–16)
IMM GRANULOCYTES # BLD AUTO: 0.01 K/UL (ref 0–0.04)
IMM GRANULOCYTES NFR BLD AUTO: 0.3 % (ref 0–0.5)
LDLC SERPL CALC-MCNC: 115 MG/DL (ref 63–159)
LYMPHOCYTES # BLD AUTO: 1.7 K/UL (ref 1–4.8)
LYMPHOCYTES NFR BLD: 42.6 % (ref 18–48)
MCH RBC QN AUTO: 25.5 PG (ref 27–31)
MCHC RBC AUTO-ENTMCNC: 33 G/DL (ref 32–36)
MCV RBC AUTO: 77 FL (ref 82–98)
MONOCYTES # BLD AUTO: 0.3 K/UL (ref 0.3–1)
MONOCYTES NFR BLD: 6.6 % (ref 4–15)
NEUTROPHILS # BLD AUTO: 2 K/UL (ref 1.8–7.7)
NEUTROPHILS NFR BLD: 49.4 % (ref 38–73)
NONHDLC SERPL-MCNC: 122 MG/DL
NRBC BLD-RTO: 0 /100 WBC
PLATELET # BLD AUTO: 259 K/UL (ref 150–450)
PMV BLD AUTO: 11.7 FL (ref 9.2–12.9)
POTASSIUM SERPL-SCNC: 4.8 MMOL/L (ref 3.5–5.1)
PROT SERPL-MCNC: 7.2 G/DL (ref 6–8.4)
RBC # BLD AUTO: 5.02 M/UL (ref 4–5.4)
SODIUM SERPL-SCNC: 138 MMOL/L (ref 136–145)
TRIGL SERPL-MCNC: 35 MG/DL (ref 30–150)
WBC # BLD AUTO: 3.94 K/UL (ref 3.9–12.7)

## 2022-05-30 PROCEDURE — 85025 COMPLETE CBC W/AUTO DIFF WBC: CPT | Performed by: INTERNAL MEDICINE

## 2022-05-30 PROCEDURE — 36415 COLL VENOUS BLD VENIPUNCTURE: CPT | Performed by: INTERNAL MEDICINE

## 2022-05-30 PROCEDURE — 83036 HEMOGLOBIN GLYCOSYLATED A1C: CPT | Performed by: INTERNAL MEDICINE

## 2022-05-30 PROCEDURE — 80053 COMPREHEN METABOLIC PANEL: CPT | Performed by: INTERNAL MEDICINE

## 2022-05-30 PROCEDURE — 80061 LIPID PANEL: CPT | Performed by: INTERNAL MEDICINE

## 2022-05-31 ENCOUNTER — TELEPHONE (OUTPATIENT)
Dept: INTERNAL MEDICINE | Facility: CLINIC | Age: 37
End: 2022-05-31
Payer: COMMERCIAL

## 2022-05-31 DIAGNOSIS — R74.01 ELEVATED TRANSAMINASE LEVEL: Primary | ICD-10-CM

## 2022-05-31 NOTE — TELEPHONE ENCOUNTER
----- Message from Walter Phipps MD sent at 5/31/2022  2:20 PM CDT -----  Please contact and schedule a repeat LFT for this week.

## 2022-06-01 ENCOUNTER — TELEPHONE (OUTPATIENT)
Dept: SPEECH THERAPY | Facility: HOSPITAL | Age: 37
End: 2022-06-01
Payer: COMMERCIAL

## 2022-06-02 ENCOUNTER — LAB VISIT (OUTPATIENT)
Dept: LAB | Facility: HOSPITAL | Age: 37
End: 2022-06-02
Attending: INTERNAL MEDICINE
Payer: COMMERCIAL

## 2022-06-02 DIAGNOSIS — R74.01 ELEVATED TRANSAMINASE LEVEL: ICD-10-CM

## 2022-06-02 DIAGNOSIS — R53.83 FATIGUE, UNSPECIFIED TYPE: Primary | ICD-10-CM

## 2022-06-02 LAB
ALBUMIN SERPL BCP-MCNC: 3.8 G/DL (ref 3.5–5.2)
ALP SERPL-CCNC: 59 U/L (ref 55–135)
ALT SERPL W/O P-5'-P-CCNC: 103 U/L (ref 10–44)
AST SERPL-CCNC: 52 U/L (ref 10–40)
BILIRUB DIRECT SERPL-MCNC: 0.2 MG/DL (ref 0.1–0.3)
BILIRUB SERPL-MCNC: 0.4 MG/DL (ref 0.1–1)
CTP QC/QA: YES
PROT SERPL-MCNC: 6.9 G/DL (ref 6–8.4)
SARS-COV-2 AG RESP QL IA.RAPID: NEGATIVE

## 2022-06-02 PROCEDURE — 80076 HEPATIC FUNCTION PANEL: CPT | Performed by: INTERNAL MEDICINE

## 2022-06-02 PROCEDURE — 36415 COLL VENOUS BLD VENIPUNCTURE: CPT | Performed by: INTERNAL MEDICINE

## 2022-06-03 ENCOUNTER — HOSPITAL ENCOUNTER (OUTPATIENT)
Dept: RADIOLOGY | Facility: HOSPITAL | Age: 37
Discharge: HOME | End: 2022-06-03
Attending: INTERNAL MEDICINE
Payer: COMMERCIAL

## 2022-06-03 ENCOUNTER — PATIENT MESSAGE (OUTPATIENT)
Dept: INTERNAL MEDICINE | Facility: CLINIC | Age: 37
End: 2022-06-03

## 2022-06-03 ENCOUNTER — PATIENT MESSAGE (OUTPATIENT)
Dept: INTERNAL MEDICINE | Facility: CLINIC | Age: 37
End: 2022-06-03
Payer: COMMERCIAL

## 2022-06-03 ENCOUNTER — CLINICAL SUPPORT (OUTPATIENT)
Dept: INTERNAL MEDICINE | Facility: CLINIC | Age: 37
End: 2022-06-03
Payer: COMMERCIAL

## 2022-06-03 VITALS — BODY MASS INDEX: 30.66 KG/M2 | WEIGHT: 195.75 LBS

## 2022-06-03 DIAGNOSIS — R74.01 ELEVATED TRANSAMINASE LEVEL: ICD-10-CM

## 2022-06-03 DIAGNOSIS — R74.01 ELEVATED TRANSAMINASE LEVEL: Primary | ICD-10-CM

## 2022-06-03 PROCEDURE — 76705 US ABDOMEN LIMITED: ICD-10-PCS | Mod: 26,,, | Performed by: RADIOLOGY

## 2022-06-03 PROCEDURE — 99999 PR PBB SHADOW E&M-EST. PATIENT-LVL I: ICD-10-PCS | Mod: PBBFAC,,,

## 2022-06-03 PROCEDURE — 76705 ECHO EXAM OF ABDOMEN: CPT | Mod: TC

## 2022-06-03 PROCEDURE — 76705 ECHO EXAM OF ABDOMEN: CPT | Mod: 26,,, | Performed by: RADIOLOGY

## 2022-06-03 PROCEDURE — 99999 PR PBB SHADOW E&M-EST. PATIENT-LVL I: CPT | Mod: PBBFAC,,,

## 2022-06-03 NOTE — PROGRESS NOTES
Health  Follow-Up Note   [x] Office  [] Phone  Notes from previous session reviewed.   [x] Previous Session Goals unchanged.   [x] Patient/Caregiver Change in Goals.  Goals added or changed by Patient/Caregiver since program participation:  1. Eat healthy deserts no sweets  2. Watch what eating (my fitness pal)  3. Watch liver enzymes ultrasound today  4. Watch video  5. HIIT workout/ continue walking and Yoga.       Additional/Changed support that patient/caregiver has experienced/sought?  (Indicate readiness, support from family, friends, others, community groups, etc)  1.  Coworker    Additional/Changed obstacles that could prevent patient/caregiver from reaching their goals?  1.  Stress from having to take test    Feedback provided:  1.  Praised for continued effort and determination.    Diagnostic values/Desriptors for follow-up as needed for chronic condition(s)   Weight: 88.8 kg 195.77 lb     Interventions:   1. Health  listened reflectively, validated thoughts and feelings, offered support and encouragement.   2. Allowed patient to express themselves in a non-biased atmosphere.  3. Health  assisted pt to problem-solve obstacles such as being in a challenging environment and dealing with these challenges.   4. Motivational Interviewed interventions utilized (OARS).   5. Patient responded favorably to interventions and remained actively engaged in the session.   6. Health  will remain available and connected for patient by phone and/or office visits.   7. Positive reinforcement, emotional support and encouragement provided.   8. Focused Education: MI    Plan:  [x] Pt will work on goals as stated above.   [x] Pt will contact Health  for any questions, concerns or needs.  [x] Pt will follow up with Health  in office on  6.30.22.  [] Pt will follow up with Health  on phone in:        [x] Health  will remain available.   [] Health  will contact patient by phone in:         [] Health  will consult:        [] Health  will inform Provider via EPIC messaging.     Impression:  1. Behavior is consistent with Action Stage of Change.   2. Participation level:       [x] Receptive      [x] Interactive      [] Guarded and Resistant      [x] Self Motivated      [] Refused/Declined to participate   3. [x] Pt voiced understanding of all information presented.       [] Pt voiced needing further information/education. This will be arranged.       [x] Pt would benefit from further education/information as identified by this health . This will be arranged.     Lashell Pedroza RN HC

## 2022-06-06 ENCOUNTER — CLINICAL SUPPORT (OUTPATIENT)
Dept: SPEECH THERAPY | Facility: HOSPITAL | Age: 37
End: 2022-06-06
Attending: OTOLARYNGOLOGY
Payer: COMMERCIAL

## 2022-06-06 DIAGNOSIS — J38.7 IRRITABLE LARYNX: ICD-10-CM

## 2022-06-06 DIAGNOSIS — R49.0 DYSPHONIA: Primary | ICD-10-CM

## 2022-06-06 DIAGNOSIS — R74.01 TRANSAMINITIS: Primary | ICD-10-CM

## 2022-06-06 DIAGNOSIS — K21.9 LARYNGOPHARYNGEAL REFLUX (LPR): Chronic | ICD-10-CM

## 2022-06-06 PROCEDURE — 92524 BEHAVRAL QUALIT ANALYS VOICE: CPT

## 2022-06-06 NOTE — PROGRESS NOTES
"Referring provider: Dr. Paige Frye*  Reason for visit:  Behavioral and qualitative analysis of voice and resonance (CPT 84591)    Subjective/History   Joan Wilde is a 37 y.o. female referred for voice evaluation (CPT 35467) by Dr. Sandra Grant.  She presents with complaints of persistent throat clearing and "tightness" in throat. Describes several episodes of throat clearing per hour; triggers include: arriving at work, stress, eating certain foods such as: philip seeds, hemp seeds, protein powder and avocado. Alleviating factors include: vacation. Recent multispecialty evaluations include: Allergist (Normal PFT's in 2020), ENT. Has not seen GI but allergist put her on PPI which resolved coughing and symptoms of reflux. Has a surgical hx of appendectomy in 2021. Denies changes to speaking voice but describes some fatigue with use, intermittent hoarseness and decreased access to upper register for singing. Enjoy's singing recreationally. Otherwise pleased with speaking voice. Social history: lives at home with . 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     Swallowing: throat tightness in morning; first few sips of water in the morning present a challenge  Breathing: allergies, takes Allegra  Smoking: no  Caffeine: green tea  Reflux: Prilosec 40 mg daily for 2 years and wedge pillow have been helpful for symptoms of reflux but not throat clearing  Water: 120-140 oz/day     Laryngeal endoscopy findings per   on 5/9/2022:  Larynx/hypopharynx:      No epiglottis lesions     No epiglottis edema     No AE folds lesions     No vocal cord polyps     Equal and normal bilateral     No hypopharynx lesions     No piriform sinus pooling     No piriform sinus lesions     Post cricoid edema (mild)     Post cricoid erythema (mild)     Past Medical History:   Diagnosis Date    Acne vulgaris 11/20/2018    Allergic rhinitis due to dust mite 5/13/2019    Endometriosis " 11/20/2018    Fibroid     Migraine syndrome     associated with flu vaccines    Mild intermittent asthma without complication 5/31/2018     Current Outpatient Medications on File Prior to Visit   Medication Sig Dispense Refill    alclometasone (ACLOVATE) 0.05 % cream Apply as needed to affected area of neck rash up to twice daily (Patient taking differently: Apply as needed to affected area of neck rash up to twice daily) 60 g 2    ascorbic acid, vitamin C, (VITAMIN C) 1000 MG tablet Take 2 tablets (2,000 mg total) by mouth once daily.      azelastine (OPTIVAR) 0.05 % ophthalmic solution Place 1 drop into both eyes 2 (two) times daily. 6 mL 6    clindamycin (CLEOCIN T) 1 % lotion Use every night at bedtime on face for pimples 60 mL 3    clotrimazole-betamethasone 1-0.05% (LOTRISONE) cream Apply topically 2 (two) times daily to rash on neck x 2 weeks 45 g 2    conjugated estrogens (PREMARIN) vaginal cream Use dime size amount of estrogen cream in vagina nightly for 2 weeks, then twice a week thereafter. 30 g 2    fluticasone propionate (FLONASE) 50 mcg/actuation nasal spray 2 sprays (100 mcg total) by Each Nostril route once daily. 16 g 11    meloxicam (MOBIC) 15 MG tablet Take 1 tablet (15 mg total) by mouth once daily. 30 tablet 2    naproxen (NAPROSYN) 500 MG tablet Take 1 tablet (500 mg total) by mouth 2 (two) times daily with meals. 30 tablet 1    norethindrone (AYGESTIN) 5 mg Tab Take 1 tablet (5 mg total) by mouth once daily. 30 tablet 12    norethindrone (ORTHO MICRONOR) 0.35 mg tablet Take 1 tablet (0.35 mg total) by mouth once daily. 28 tablet 12    olopatadine (PATANOL) 0.1 % ophthalmic solution 1 drop 2 (two) times daily.      omeprazole (PRILOSEC) 20 MG capsule Take 1 capsule (20 mg total) by mouth once daily. 30 capsule 3    tretinoin (ALTRENO) 0.05 % Lotn Apply pea-sized amount nightly to face 45 g 2     No current facility-administered medications on file prior to visit.     Objective    Perceptual assessment:    -CAPE-V Overall Score: 12  -Quality: WNL  -Volume: appropriate for age and gender  -Pitch: appropriate for age and gender  -Flexibility: appropriate for age and gender  -Habitual respiratory pattern: chest/clavicular.    The Reflux Symptom Index (RSI)   Within the last MONTH, how did the following problem affect you?    0 = No problem to  5 = Severe problem   1.  Hoarseness or a problem with your voice 0 1 2 3 4 5   2.  Clearing your throat 0 1 2 3 4 5   3.  Excess throat mucous or postnasal drip 0 1 2 3 4 5   4.  Difficulty swallowing food, liquids, or pills 0 1 2 3 4 5, trouble swallowing first thing in the morning  5.  Coughing after you ate or after lying down 0 1 2 3 4 5, philip seeds  6.  Breathing difficulties or choking episodes 0 1 2 3 4 5, buffalo wild wings   7.  Troublesome or annoying cough 0 1 2 3 4 5, philip seeds    8.  Sensations of something sticking in your throat or a lump in your throat 0 1 2 3 4 5   9.  Heartburn, chest pain, indigestion, or stomach acid coming up 0 1 2 3 4 5 TOTAL  Score: 21 (6/6/2022)  If you have an RSI score of 15 or greater, you have a 90% chance of having reflux, even if you have never had heartburn or indigestion. This is called silent reflux, and it's very common.  Jose Cruz PC, Belia GN, and Chioma JA.  Validity and reliability of the reflux symptom index (RSI).  Journal of Voice.   2002.  16(2): 274-277.       The [place] Chronic Cough Index (KCCI)    Answer yes or no Reflux (R) Neurogenic (N)   Do you wake from a sound sleep coughing violently, with or without trouble breathing?  YES, formerly NO   Do you have choking episodes where you cannot get enough air, gasping for air? YES NO   Do you usually cough when you just lie down in the bed, or when you just lie down to rest? YES, formerly  NO   Do you usually cough after meals or after eating? YES NO   Do you cough when (or after) you bend over? YES NO     Do you more or less cough all day  long?  NO YES   Does change of temperature make you cough? NO YES   Does laughing or chuckling cause you to cough? NO YES   Do fumes (perfume, automobile fumes, burned toast, etc.) cause you to cough? NO YES   Does speaking, singing, talking on the phone cause you to cough? NO YES    R= 6 N= 5       The Glottal Closure Index (GCI)   Certain voice symptoms suggest a possible diagnosis of partial paralysis of the vocal folds (paresis) or of possible vocal cord pathology, e.g. nodules, polyps. Aspiration due to glottal insufficiency is another common cause of chronic cough, which this quiz helps identify.    How do the following affect you?  Speaking takes extra effort 0 1 2 3 4 5   Throat discomfort or pain after you use your voice 0 1 2 3 4 5   Vocal fatigue, voice weakness as you talk 0 1 2 3 4 5   Voice cracks, or sounds different 0 1 2 3 4 5   GCI= 11  Education / Stimulability Trials  Education provided on laryngeal endoscopy findings as they relate to voice production, the patient's diagnosis, associated symptoms, rationale for voice therapy and plan of care for management of dysphonia. Discussed importance of vocal hygiene including: hydration, conservation, reducing caffeine/drying agents and reducing throat clearing, coughing, other phonotraumatic behaviors. Recommended the following: steam, gargle and continuous management of reflux with diet and modification. Patient was stimulable for reduced throat clearing behavior using  exercises with respiratory retraining and strategies such as taking sips of water and using moisture lozenges. Encouraged practicing exercises several times daily at rest in order to make them second nature, using them at the first sign of event antecedent to throat clearing.She was amenable to all suggestions.     Functional goals  Length Status Goal   Long term Initiated  Patient will implement and adhere to vocal hygiene protocols on a daily basis, including the elimination of  "phonotraumatic behaviors.    Long term Initiated  Patient and clinician will facilitate changes in vocal function in order to restore functional use of voice for daily occupational, social, and emotional demands.    Long term Initiated  Patient will re-establish phonation with adequate balance of airflow and resonance with decreased muscle tension.    Long term Initiated   Patient will improve coordination of respiration and phonation for efficient vocal production at a conversational level.    Short term Initiated  Patient will complete "open larynx" breathing exercises and/or resonant-focused exercises 3-5x daily to strengthen and balance the intrinsic laryngeal musculature and maximize glottic closure without medial hyperfunction.   Short term Initiated  Patient will improve the quality, efficiency, and ease of phonation through resonant and/or airflow exercises from the syllable to conversation level with 80% accuracy.   Short term Initiated  Patient will discriminate between easy and strained phonation with 80% accuracy.    Short term Initiated   Patient will identify the sensations associated with muscle relaxation in the abdominal, thoracic, neck and facial areas during efficient phonation with minimal clinician cue.        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 Flores.  Prognosis for continued improvement is good.     Recommendations / POC    -Recommend 2-3 sessions of voice therapy over 4-5 weeks with a speech-language pathologist to optimize glottal postures for improved vocal function, vocal efficiency, and ease of phonation  -Continue exercises as discussed in session  -Contact clinician with any further questions     "

## 2022-06-06 NOTE — PATIENT INSTRUCTIONS
"Voice Box Hygiene:    PM: Alginates: Gaviscon Advance (Aniseed flavored)- [employer]; Reflux Gourmet  Continue to manage reflux with diet and medication: [email or web address], Dr. Villagran    AM:  Saline nasal sprays  Steam  Gargle    Aguirre's Gargle  Ingredients:   - Half tsp salt.  - Half tsp baking soda.  - Half tbsp. Loren corn syrup or honey  - 6 oz warm water  Mix all ingredients and silent gargle in small amounts for two minutes. Do not eat or drink for 15 minutes after gargling. Use to relive dry/scratchy and/or sore throat. Developed by Dr.Van Lockhart, Otolaryngologist.     "Clear the feeling" instead of the throat  Sniff +  Sniff + Blow = 3-5 times in a row, 3-5 a day  Sips of water  Moisture lozenges (avoid menthol) Aditya's Jonah's      "

## 2022-06-06 NOTE — TELEPHONE ENCOUNTER
Please contact patient. I would like for her to stop her birth control pill. That could can cause the transaminitis . I would also like for her to see Hepatology. A referral is in.

## 2022-06-07 ENCOUNTER — PATIENT MESSAGE (OUTPATIENT)
Dept: OBSTETRICS AND GYNECOLOGY | Facility: CLINIC | Age: 37
End: 2022-06-07
Payer: COMMERCIAL

## 2022-06-08 ENCOUNTER — OFFICE VISIT (OUTPATIENT)
Dept: HEPATOLOGY | Facility: CLINIC | Age: 37
End: 2022-06-08
Payer: COMMERCIAL

## 2022-06-08 ENCOUNTER — PATIENT MESSAGE (OUTPATIENT)
Dept: HEPATOLOGY | Facility: CLINIC | Age: 37
End: 2022-06-08

## 2022-06-08 VITALS
BODY MASS INDEX: 30.79 KG/M2 | SYSTOLIC BLOOD PRESSURE: 122 MMHG | DIASTOLIC BLOOD PRESSURE: 73 MMHG | HEART RATE: 97 BPM | RESPIRATION RATE: 18 BRPM | HEIGHT: 67 IN | WEIGHT: 196.19 LBS | TEMPERATURE: 99 F | OXYGEN SATURATION: 99 %

## 2022-06-08 DIAGNOSIS — R74.01 TRANSAMINITIS: Primary | ICD-10-CM

## 2022-06-08 PROCEDURE — 3008F PR BODY MASS INDEX (BMI) DOCUMENTED: ICD-10-PCS | Mod: CPTII,S$GLB,, | Performed by: NURSE PRACTITIONER

## 2022-06-08 PROCEDURE — 3074F SYST BP LT 130 MM HG: CPT | Mod: CPTII,S$GLB,, | Performed by: NURSE PRACTITIONER

## 2022-06-08 PROCEDURE — 1160F PR REVIEW ALL MEDS BY PRESCRIBER/CLIN PHARMACIST DOCUMENTED: ICD-10-PCS | Mod: CPTII,S$GLB,, | Performed by: NURSE PRACTITIONER

## 2022-06-08 PROCEDURE — 99203 OFFICE O/P NEW LOW 30 MIN: CPT | Mod: S$GLB,,, | Performed by: NURSE PRACTITIONER

## 2022-06-08 PROCEDURE — 3078F PR MOST RECENT DIASTOLIC BLOOD PRESSURE < 80 MM HG: ICD-10-PCS | Mod: CPTII,S$GLB,, | Performed by: NURSE PRACTITIONER

## 2022-06-08 PROCEDURE — 3008F BODY MASS INDEX DOCD: CPT | Mod: CPTII,S$GLB,, | Performed by: NURSE PRACTITIONER

## 2022-06-08 PROCEDURE — 3074F PR MOST RECENT SYSTOLIC BLOOD PRESSURE < 130 MM HG: ICD-10-PCS | Mod: CPTII,S$GLB,, | Performed by: NURSE PRACTITIONER

## 2022-06-08 PROCEDURE — 3078F DIAST BP <80 MM HG: CPT | Mod: CPTII,S$GLB,, | Performed by: NURSE PRACTITIONER

## 2022-06-08 PROCEDURE — 99203 PR OFFICE/OUTPT VISIT, NEW, LEVL III, 30-44 MIN: ICD-10-PCS | Mod: S$GLB,,, | Performed by: NURSE PRACTITIONER

## 2022-06-08 PROCEDURE — 3044F HG A1C LEVEL LT 7.0%: CPT | Mod: CPTII,S$GLB,, | Performed by: NURSE PRACTITIONER

## 2022-06-08 PROCEDURE — 1159F PR MEDICATION LIST DOCUMENTED IN MEDICAL RECORD: ICD-10-PCS | Mod: CPTII,S$GLB,, | Performed by: NURSE PRACTITIONER

## 2022-06-08 PROCEDURE — 1159F MED LIST DOCD IN RCRD: CPT | Mod: CPTII,S$GLB,, | Performed by: NURSE PRACTITIONER

## 2022-06-08 PROCEDURE — 99999 PR PBB SHADOW E&M-EST. PATIENT-LVL V: CPT | Mod: PBBFAC,,, | Performed by: NURSE PRACTITIONER

## 2022-06-08 PROCEDURE — 3044F PR MOST RECENT HEMOGLOBIN A1C LEVEL <7.0%: ICD-10-PCS | Mod: CPTII,S$GLB,, | Performed by: NURSE PRACTITIONER

## 2022-06-08 PROCEDURE — 1160F RVW MEDS BY RX/DR IN RCRD: CPT | Mod: CPTII,S$GLB,, | Performed by: NURSE PRACTITIONER

## 2022-06-08 PROCEDURE — 99999 PR PBB SHADOW E&M-EST. PATIENT-LVL V: ICD-10-PCS | Mod: PBBFAC,,, | Performed by: NURSE PRACTITIONER

## 2022-06-08 NOTE — PROGRESS NOTES
[facility] VISIT NEW PT NOTE    REFERRING PROVIDER:  Dr. Walter Phipps  PCP: Walter Phipps MD     CHIEF COMPLAINT: elevated liver enzymes    HPI: This is a 37 y.o. Black or  female with PMH noted below, presenting for evaluation of elevated liver enzymes    Previous serologic w/u negative for viral hepatitis A, B and C - will complete sero w/u if repeat enzymes remain elevated     Prior serologic workup:   Lab Results   Component Value Date    HEPBSAG Negative 06/03/2022    HEPCAB Negative 06/03/2022    HEPAIGM Negative 06/03/2022       Liver fibrosis staging:  -- fibroscan if chronically elevated     Risk factors for NAFLD include obesity    Interval HPI: Presents today alone. No alcohol use   Does use the following teas/herbals:   Sharath T brewed tea  Muscle Feast whey protein  Used to drink ashwaganda tea but stopped in May after a few doses   Probiotics   Micronor and Aygestin for many years   Recently lots of nausea and bloated abdomen   Sickness (nausea, near vomiting) after eating at a restaurant 5/25    No other herbal medications, no new medications    Labs done 6/2022 show elevated transaminase levels (ALT > AST, elevated x2 since 5/30/2022)  Platelets WNL, alk phos WNL  Synthetic liver functioning WNL    Lab Results   Component Value Date     (H) 06/02/2022    AST 52 (H) 06/02/2022    ALKPHOS 59 06/02/2022    BILITOT 0.4 06/02/2022    ALBUMIN 3.8 06/02/2022     05/30/2022       Abd U/S done 6/2022 showed normal liver and spleen     Denies family history of liver disease . Denies alcohol consumption currently or in the past-     Immunity to Hep A and B - will check with next labs          Allergy and medication list reviewed and updated     PMHX:  has a past medical history of Acne vulgaris (11/20/2018), Allergic rhinitis due to dust mite (5/13/2019), Endometriosis (11/20/2018), Fibroid, Migraine syndrome, and Mild intermittent asthma without complication  "(5/31/2018).    PSHX:  has a past surgical history that includes Hernia repair (2014) and Laparoscopic appendectomy (N/A, 4/21/2021).    FAMILY HISTORY: Updated and reviewed in [facility]    SOCIAL HISTORY:   Social History     Substance and Sexual Activity   Alcohol Use No       Social History     Substance and Sexual Activity   Drug Use No       ROS:   GENERAL: Denies fatigue  CARDIOVASCULAR: Denies edema  GI: Denies abdominal pain. Does report intermittent bloating and nausea  SKIN: Denies rash, itching   NEURO: Denies confusion, memory loss, or mood changes    PHYSICAL EXAM:   Friendly Black or  female, in no acute distress; alert and oriented to person, place and time  VITALS: /73 (BP Location: Right arm, Patient Position: Sitting, BP Method: Medium (Automatic))   Pulse 97   Temp 99.1 °F (37.3 °C) (Oral)   Resp 18   Ht 5' 7" (1.702 m)   Wt 89 kg (196 lb 3.4 oz)   SpO2 99%   BMI 30.73 kg/m²   EYES: Sclerae anicteric  GI: Soft, non-tender, non-distended. No ascites.  EXTREMITIES:  No edema.  SKIN: Warm and dry. No jaundice. No telangectasias noted. No palmar erythema.  NEURO:  No asterixis.  PSYCH:  Thought and speech pattern appropriate. Behavior normal      EDUCATION:  See instructions discussed with patient in Instructions section of the After Visit Summary     ASSESSMENT & PLAN:  37 y.o. Black or  female with:  1. Elevated liver enzymes   -- Labs note elevated transaminase levels (ALT > AST), elevated x2 since 5/30/2022. Previously chronically WNL  --- synthetic liver function WNL  --- Abd US done 6/2022 showed normal liver and spleen   -- medications possibly contributing (?): do not suspect chronic use of Micronor/Aygestin contributing, ok to continue   Recommending stopping herbal tea for now. Instructed not to restart Ashwaganda root tea  --- previous serological work up : Previous serologic w/u negative for viral hepatitis A, B and C - will complete sero w/u if " repeat enzymes remain elevated   --- Hep A and B immunity: will check with next labs, will arrange Hep A and B vaccines if needed    -- labs in 2 weeks  Orders Placed This Encounter   Procedures    Hepatic Function Panel    Hepatitis A antibody, IgG    Hepatitis B Core Antibody, Total    Hepatitis B Surface Ab, Qualitative      -- fibroscan : if chronic elevation       Labs in 2 week   Follow up for pending results of workup.  Orders Placed This Encounter   Procedures    Hepatic Function Panel    Hepatitis A antibody, IgG    Hepatitis B Core Antibody, Total    Hepatitis B Surface Ab, Qualitative        Thank you for allowing me to participate in the care of BARRETT Haynes    I spent a total of 30 minutes on the day of the visit.This includes face to face time and non-face to face time preparing to see the patient (eg, review of tests), obtaining and/or reviewing separately obtained history, documenting clinical information in the electronic or other health record, independently interpreting results and communicating results to the patient/family/caregiver, and coordinating care.         CC'ed note to:   Walter Phipps MD

## 2022-06-08 NOTE — PATIENT INSTRUCTIONS
1.  Will check immunity markers for Hepatitis A and B and arrange for vaccination if needed  2. Labs in a couple of weeks to  check for multiple causes of liver disease. These labs will release to you as soon as they are resulted but we will discuss them in detail at your upcoming visit to discuss what the lab results mean.   3.  Follow up based on repeat labs   
Recommendation Preamble: The following recommendations were made during the visit:
Recommendations (Free Text): IPL treatment is favored. She can consult with [facility] for treatment at her leisure
Recommendations (Free Text): Consult with [facility] regarding hair removal laser
Detail Level: Zone

## 2022-06-14 ENCOUNTER — OFFICE VISIT (OUTPATIENT)
Dept: PSYCHIATRY | Facility: CLINIC | Age: 37
End: 2022-06-14
Payer: COMMERCIAL

## 2022-06-14 DIAGNOSIS — R69 PSYCHIATRIC DIAGNOSIS DEFERRED: Primary | ICD-10-CM

## 2022-06-14 PROCEDURE — 99999 PR PBB SHADOW E&M-EST. PATIENT-LVL I: CPT | Mod: PBBFAC,,, | Performed by: SOCIAL WORKER

## 2022-06-14 PROCEDURE — 90834 PR PSYCHOTHERAPY W/PATIENT, 45 MIN: ICD-10-PCS | Mod: S$GLB,,, | Performed by: SOCIAL WORKER

## 2022-06-14 PROCEDURE — 90834 PSYTX W PT 45 MINUTES: CPT | Mod: S$GLB,,, | Performed by: SOCIAL WORKER

## 2022-06-14 PROCEDURE — 99999 PR PBB SHADOW E&M-EST. PATIENT-LVL I: ICD-10-PCS | Mod: PBBFAC,,, | Performed by: SOCIAL WORKER

## 2022-06-17 ENCOUNTER — OFFICE VISIT (OUTPATIENT)
Dept: PSYCHIATRY | Facility: CLINIC | Age: 37
End: 2022-06-17
Payer: COMMERCIAL

## 2022-06-17 DIAGNOSIS — R69 PSYCHIATRIC DIAGNOSIS DEFERRED: Primary | ICD-10-CM

## 2022-06-17 PROCEDURE — 90834 PR PSYCHOTHERAPY W/PATIENT, 45 MIN: ICD-10-PCS | Mod: S$GLB,,, | Performed by: SOCIAL WORKER

## 2022-06-17 PROCEDURE — 90834 PSYTX W PT 45 MINUTES: CPT | Mod: S$GLB,,, | Performed by: SOCIAL WORKER

## 2022-06-21 ENCOUNTER — OFFICE VISIT (OUTPATIENT)
Dept: DERMATOLOGY | Facility: CLINIC | Age: 37
End: 2022-06-21
Payer: COMMERCIAL

## 2022-06-21 DIAGNOSIS — Z12.83 SCREENING EXAM FOR SKIN CANCER: Primary | ICD-10-CM

## 2022-06-21 DIAGNOSIS — L70.0 ACNE VULGARIS: ICD-10-CM

## 2022-06-21 DIAGNOSIS — D22.9 MULTIPLE BENIGN NEVI: ICD-10-CM

## 2022-06-21 DIAGNOSIS — L25.9 CONTACT DERMATITIS, UNSPECIFIED CONTACT DERMATITIS TYPE, UNSPECIFIED TRIGGER: ICD-10-CM

## 2022-06-21 DIAGNOSIS — L90.5 SCAR: ICD-10-CM

## 2022-06-21 PROCEDURE — 3044F HG A1C LEVEL LT 7.0%: CPT | Mod: CPTII,S$GLB,, | Performed by: DERMATOLOGY

## 2022-06-21 PROCEDURE — 1159F MED LIST DOCD IN RCRD: CPT | Mod: CPTII,S$GLB,, | Performed by: DERMATOLOGY

## 2022-06-21 PROCEDURE — 1159F PR MEDICATION LIST DOCUMENTED IN MEDICAL RECORD: ICD-10-PCS | Mod: CPTII,S$GLB,, | Performed by: DERMATOLOGY

## 2022-06-21 PROCEDURE — 99999 PR PBB SHADOW E&M-EST. PATIENT-LVL III: CPT | Mod: PBBFAC,,, | Performed by: DERMATOLOGY

## 2022-06-21 PROCEDURE — 99214 PR OFFICE/OUTPT VISIT, EST, LEVL IV, 30-39 MIN: ICD-10-PCS | Mod: S$GLB,,, | Performed by: DERMATOLOGY

## 2022-06-21 PROCEDURE — 99214 OFFICE O/P EST MOD 30 MIN: CPT | Mod: S$GLB,,, | Performed by: DERMATOLOGY

## 2022-06-21 PROCEDURE — 3044F PR MOST RECENT HEMOGLOBIN A1C LEVEL <7.0%: ICD-10-PCS | Mod: CPTII,S$GLB,, | Performed by: DERMATOLOGY

## 2022-06-21 PROCEDURE — 99999 PR PBB SHADOW E&M-EST. PATIENT-LVL III: ICD-10-PCS | Mod: PBBFAC,,, | Performed by: DERMATOLOGY

## 2022-06-21 RX ORDER — DAPSONE 50 MG/G
GEL TOPICAL 2 TIMES DAILY
Qty: 60 G | Refills: 2 | Status: SHIPPED | OUTPATIENT
Start: 2022-06-21 | End: 2023-03-03

## 2022-06-21 RX ORDER — ALCLOMETASONE DIPROPIONATE 0.5 MG/G
CREAM TOPICAL
Qty: 60 G | Refills: 2 | Status: SHIPPED | OUTPATIENT
Start: 2022-06-21 | End: 2023-03-03

## 2022-06-21 NOTE — PROGRESS NOTES
Subjective:       Patient ID:  Joan Wilde is a 37 y.o. female who presents for   Chief Complaint   Patient presents with    Skin Check     TBSE     History of Present Illness: The patient presents for follow up of skin check.  The patient was last seen on: 6/1/2021 for evaluation of acne, which pt says has improved but is not gone. In the past tried Altreno but unable to tolerate. On progesterone only birth control due to endometriosis x past 4 years, previously was on combined OCP. Plans pregnancy in the next 1-2 yrs.  No hx skin cancer.  Patient with new complaint of lesion(s)  Location: right hand  Duration: 1 week  Symptoms: freckling/spots  Relieving factors/Previous treatments: none    Also with hx of dermatitis posterior neck using Aclovate for this intermittently. Has not entirely resolved. Would like refill.  Has firm scar at site from emergency appendectomy.    Review of Systems   Skin: Positive for daily sunscreen use and activity-related sunscreen use. Negative for recent sunburn.   Hematologic/Lymphatic: Does not bruise/bleed easily.        Objective:    Physical Exam   Constitutional: She appears well-developed and well-nourished. No distress.   Neurological: She is alert and oriented to person, place, and time. She is not disoriented.   Psychiatric: She has a normal mood and affect.   Skin:   Areas Examined (abnormalities noted in diagram):   Scalp / Hair Palpated and Inspected  Head / Face Inspection Performed  Neck Inspection Performed  Chest / Axilla Inspection Performed  Abdomen Inspection Performed  Genitals / Buttocks / Groin Inspection Performed  Back Inspection Performed  RUE Inspected  LUE Inspection Performed  RLE Inspected  LLE Inspection Performed  Nails and Digits Inspection Performed                   Diagram Legend     Erythematous scaling macule/papule c/w actinic keratosis       Vascular papule c/w angioma      Pigmented verrucoid papule/plaque c/w seborrheic keratosis       Yellow umbilicated papule c/w sebaceous hyperplasia      Irregularly shaped tan macule c/w lentigo     1-2 mm smooth white papules consistent with Milia      Movable subcutaneous cyst with punctum c/w epidermal inclusion cyst      Subcutaneous movable cyst c/w pilar cyst      Firm pink to brown papule c/w dermatofibroma      Pedunculated fleshy papule(s) c/w skin tag(s)      Evenly pigmented macule c/w junctional nevus     Mildly variegated pigmented, slightly irregular-bordered macule c/w mildly atypical nevus      Flesh colored to evenly pigmented papule c/w intradermal nevus       Pink pearly papule/plaque c/w basal cell carcinoma      Erythematous hyperkeratotic cursted plaque c/w SCC      Surgical scar with no sign of skin cancer recurrence      Open and closed comedones      Inflammatory papules and pustules      Verrucoid papule consistent consistent with wart     Erythematous eczematous patches and plaques     Dystrophic onycholytic nail with subungual debris c/w onychomycosis     Umbilicated papule    Erythematous-base heme-crusted tan verrucoid plaque consistent with inflamed seborrheic keratosis     Erythematous Silvery Scaling Plaque c/w Psoriasis     See annotation      Assessment / Plan:        Screening exam for skin cancer  Total body skin examination performed today including at least 12 points as noted in physical examination. No lesions suspicious for malignancy noted.  Recommend daily sun protection/avoidance, use of at least SPF 30, broad spectrum sunscreen (OTC drug), skin self examinations, and routine physician surveillance to optimize early detection    Contact dermatitis, unspecified contact dermatitis type, unspecified trigger  -     alclometasone (ACLOVATE) 0.05 % cream; Apply as needed to affected area of neck rash up to twice daily  Dispense: 60 g; Refill: 2    Acne vulgaris  Dapsone gel trial BID  Pt desires pregnancy in next 1-2 yrs  Progesterone may be contributory  -     dapsone  (ACZONE) 5 % topical gel; Apply topically 2 (two) times daily to affected area for acne  Dispense: 60 g; Refill: 2           Multiple benign nevi  Discussed ABCDE's of nevi.  Monitor for new mole or moles that are becoming bigger, darker, irritated, or developing irregular borders. Brochure provided. Instructed patient to observe lesion(s) for changes and follow up in clinic if changes are noted. Patient to monitor skin at home for new or changing lesions.     Scar    Advise massage routinely      Follow up in about 1 year (around 6/21/2023).

## 2022-06-24 ENCOUNTER — PATIENT MESSAGE (OUTPATIENT)
Dept: HEPATOLOGY | Facility: CLINIC | Age: 37
End: 2022-06-24
Payer: COMMERCIAL

## 2022-06-24 ENCOUNTER — LAB VISIT (OUTPATIENT)
Dept: LAB | Facility: HOSPITAL | Age: 37
End: 2022-06-24
Attending: INTERNAL MEDICINE
Payer: COMMERCIAL

## 2022-06-24 DIAGNOSIS — R74.01 TRANSAMINITIS: ICD-10-CM

## 2022-06-24 DIAGNOSIS — R74.01 TRANSAMINITIS: Primary | ICD-10-CM

## 2022-06-24 LAB
ALBUMIN SERPL BCP-MCNC: 3.8 G/DL (ref 3.5–5.2)
ALP SERPL-CCNC: 53 U/L (ref 55–135)
ALT SERPL W/O P-5'-P-CCNC: 24 U/L (ref 10–44)
AST SERPL-CCNC: 20 U/L (ref 10–40)
BILIRUB DIRECT SERPL-MCNC: 0.1 MG/DL (ref 0.1–0.3)
BILIRUB SERPL-MCNC: 0.4 MG/DL (ref 0.1–1)
HAV IGG SER QL IA: POSITIVE
HBV CORE AB SERPL QL IA: NEGATIVE
HBV SURFACE AB SER-ACNC: POSITIVE M[IU]/ML
PROT SERPL-MCNC: 6.9 G/DL (ref 6–8.4)

## 2022-06-24 PROCEDURE — 80076 HEPATIC FUNCTION PANEL: CPT | Performed by: NURSE PRACTITIONER

## 2022-06-24 PROCEDURE — 86704 HEP B CORE ANTIBODY TOTAL: CPT | Performed by: NURSE PRACTITIONER

## 2022-06-24 PROCEDURE — 36415 COLL VENOUS BLD VENIPUNCTURE: CPT | Performed by: NURSE PRACTITIONER

## 2022-06-24 PROCEDURE — 86790 VIRUS ANTIBODY NOS: CPT | Performed by: NURSE PRACTITIONER

## 2022-06-24 PROCEDURE — 86706 HEP B SURFACE ANTIBODY: CPT | Performed by: NURSE PRACTITIONER

## 2022-06-28 ENCOUNTER — PATIENT MESSAGE (OUTPATIENT)
Dept: HEPATOLOGY | Facility: CLINIC | Age: 37
End: 2022-06-28
Payer: COMMERCIAL

## 2022-06-30 ENCOUNTER — CLINICAL SUPPORT (OUTPATIENT)
Dept: INTERNAL MEDICINE | Facility: CLINIC | Age: 37
End: 2022-06-30
Payer: COMMERCIAL

## 2022-06-30 VITALS — WEIGHT: 198.31 LBS | BODY MASS INDEX: 31.06 KG/M2

## 2022-06-30 PROCEDURE — 99999 PR PBB SHADOW E&M-EST. PATIENT-LVL I: CPT | Mod: PBBFAC,,,

## 2022-06-30 PROCEDURE — 99999 PR PBB SHADOW E&M-EST. PATIENT-LVL I: ICD-10-PCS | Mod: PBBFAC,,,

## 2022-06-30 NOTE — PROGRESS NOTES
Health  Follow-Up Note   [x] Office  [] Phone  Notes from previous session reviewed.   [x] Previous Session Goals unchanged.   [] Patient/Caregiver Change in Goals.  Goals added or changed by Patient/Caregiver since program participation:  1. Continue same plan  2. Stop the croutons cravings eat better snacks        Additional/Changed support that patient/caregiver has experienced/sought?  (Indicate readiness, support from family, friends, others, community groups, etc)  1.      Additional/Changed obstacles that could prevent patient/caregiver from reaching their goals?  1.  menstrual cramps off pill times 2 months    Feedback provided:  1.  Praised for continued effort and determination.    Diagnostic values/Desriptors for follow-up as needed for chronic condition(s)   Weight: 89.95 kg 198.3 lb gain 2.5 lb total loss 27 lbs    Interventions:   1. Health  listened reflectively, validated thoughts and feelings, offered support and encouragement.   2. Allowed patient to express themselves in a non-biased atmosphere.  3. Health  assisted pt to problem-solve obstacles such as being in a challenging environment and dealing with these challenges.   4. Motivational Interviewed interventions utilized (OARS).   5. Patient responded favorably to interventions and remained actively engaged in the session.   6. Health  will remain available and connected for patient by phone and/or office visits.   7. Positive reinforcement, emotional support and encouragement provided.   8. Focused Education: MI     Plan:  [x] Pt will work on goals as stated above.   [x] Pt will contact Health  for any questions, concerns or needs.  [x] Pt will follow up with Health  in office on  7.28.22.  [] Pt will follow up with Health  on phone in:        [x] Health  will remain available.   [] Health  will contact patient by phone in:        [] Health  will consult:        [] Health  will  inform Provider via EPIC messaging.     Impression:  1. Behavior is consistent with Action Stage of Change.   2. Participation level:       [x] Receptive      [x] Interactive      [] Guarded and Resistant      [x] Self Motivated      [] Refused/Declined to participate   3. [x] Pt voiced understanding of all information presented.       [] Pt voiced needing further information/education. This will be arranged.       [x] Pt would benefit from further education/information as identified by this health . This will be arranged.     Lashell Pedroza RN HC

## 2022-07-05 ENCOUNTER — PATIENT MESSAGE (OUTPATIENT)
Dept: OBSTETRICS AND GYNECOLOGY | Facility: CLINIC | Age: 37
End: 2022-07-05
Payer: COMMERCIAL

## 2022-07-08 ENCOUNTER — IMMUNIZATION (OUTPATIENT)
Dept: PHARMACY | Facility: CLINIC | Age: 37
End: 2022-07-08
Payer: COMMERCIAL

## 2022-07-08 ENCOUNTER — CLINICAL SUPPORT (OUTPATIENT)
Dept: SPEECH THERAPY | Facility: HOSPITAL | Age: 37
End: 2022-07-08
Attending: OTOLARYNGOLOGY
Payer: COMMERCIAL

## 2022-07-08 DIAGNOSIS — Z23 NEED FOR VACCINATION: Primary | ICD-10-CM

## 2022-07-08 DIAGNOSIS — R49.0 DYSPHONIA: ICD-10-CM

## 2022-07-08 DIAGNOSIS — J38.7 IRRITABLE LARYNX: ICD-10-CM

## 2022-07-08 DIAGNOSIS — K21.9 LARYNGOPHARYNGEAL REFLUX (LPR): Chronic | ICD-10-CM

## 2022-07-08 PROCEDURE — 92507 TX SP LANG VOICE COMM INDIV: CPT

## 2022-07-08 NOTE — PATIENT INSTRUCTIONS
- Continue daily steam, consider pocket facial steam.  - Xylimelts ([employer]) and Breezers  - Straw and cup-bubble phonation exercises

## 2022-07-08 NOTE — PROGRESS NOTES
Referring provider: Dr. Paige Frye*  Reason for visit:  Voice Treatment (CPT 94438)  Session #1    Subjective/History   I had the pleasure of seeing Joan Wilde for our first treatment session following full voice evaluation on 6/6/2022 at which time reductions in throat clear behavior were achieved via respiratory retraining exercises. She reports a 30 % reduction in throat-clearing behavior in the past month and 1-2 days with no throat clearing, at all. Alleviating factors include: Gaviscon Advanced (alginate therapy), moisture lozenges, occasional (almost-daily) steam and breathing exercises. Previously identified triggers for throat clear include: arriving at work, stress, eating certain foods such as: philip seeds, hemp seeds, protein powder and avocado. Newly identified triggers for throat clear include: smoke, LPR, dust, temperature changes, indoor carpet, bath tub, miscellaneous allergies, automobile odors (exhaust, gas, car interior, [employer]) and talking for long periods of time. Several triggers at home were identified: pt moving to a new apt next month. Denies changes to speaking voice but describes fatigue with use, intermittent hoarseness and decreased access to upper register for singing. Social history: lives at home with . 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     Laryngeal endoscopy findings per   on 5/9/2022:  Larynx/hypopharynx:      No epiglottis lesions     No epiglottis edema     No AE folds lesions     No vocal cord polyps     Equal and normal bilateral     No hypopharynx lesions     No piriform sinus pooling     No piriform sinus lesions     Post cricoid edema (mild)     Post cricoid erythema (mild)     Past Medical History:   Diagnosis Date    Acne vulgaris 11/20/2018    Allergic rhinitis due to dust mite 5/13/2019    Endometriosis 11/20/2018    Fibroid     Migraine syndrome     associated with flu vaccines    Mild  intermittent asthma without complication 5/31/2018     Current Outpatient Medications on File Prior to Visit   Medication Sig Dispense Refill       Objective   Perceptual assessment:    -CAPE-V Overall Score: 12  -Quality: WNL  -Volume: appropriate for age and gender  -Pitch: appropriate for age and gender  -Flexibility: appropriate for age and gender  -Habitual respiratory pattern: chest/clavicular.    The Reflux Symptom Index (RSI)   Score: 21 (6/6/2022)    Clinical Evaluation/ Treatment  Mrs. Wilde presents at today's visit in no acute stress with 2-3 episodes of throat clearing within the hour. Discussed the workings of the airway as it pertains to voice, swallow and pt's symptoms of laryngeal irritation. Discussed importance of continuous vocal hygiene routines for increasing hydration, reducing throat clearing and other phonotraumatic behaviors and avoidance of targets or irritants. Recommended the following: steam, gargle and management of reflux with diet and modification. Patient was stimulable for reduced throat clearing behavior using e respiratory retraining strategies, sips of water and moisture lozenges. Encouraged practicing exercises several times daily at rest in order to make them second nature, using them at the first sign of event antecedent to throat clearing. She was amenable to all suggestions.     Functional goals  Length Status Goal   Long term Ongoing Patient will implement and adhere to vocal hygiene protocols on a daily basis, including the elimination of phonotraumatic behaviors.    Long term Ongoing Patient and clinician will facilitate changes in vocal function in order to restore functional use of voice for daily occupational, social, and emotional demands.    Long term Ongoing Patient will re-establish phonation with adequate balance of airflow and resonance with decreased muscle tension.    Long term Ongoing Patient will improve coordination of respiration and phonation for  "efficient vocal production at a conversational level.    Short term Met Patient will complete "open larynx" breathing exercises and/or resonant-focused exercises 3-5x daily to strengthen and balance the intrinsic laryngeal musculature and maximize glottic closure without medial hyperfunction.   Short term Ongoing Patient will improve the quality, efficiency, and ease of phonation through resonant and/or airflow exercises from the syllable to conversation level with 80% accuracy.   Short term Ongoing Patient will discriminate between easy and strained phonation with 80% accuracy.    Short term Met Patient will identify the sensations associated with muscle relaxation in the abdominal, thoracic, neck and facial areas during efficient phonation with minimal clinician cue.        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 Flores.  Prognosis for continued improvement is good.     Recommendations / POC    -Recommend 2-3 sessions of voice therapy over 4-5 weeks with a speech-language pathologist to optimize glottal postures for improved vocal function, vocal efficiency, and ease of phonation  -Continue exercises as discussed in session  -Contact clinician with any further questions       "

## 2022-07-28 ENCOUNTER — CLINICAL SUPPORT (OUTPATIENT)
Dept: INTERNAL MEDICINE | Facility: CLINIC | Age: 37
End: 2022-07-28
Payer: COMMERCIAL

## 2022-07-28 VITALS — BODY MASS INDEX: 31.35 KG/M2 | WEIGHT: 200.19 LBS

## 2022-07-28 PROCEDURE — 99999 PR PBB SHADOW E&M-EST. PATIENT-LVL I: CPT | Mod: PBBFAC,,,

## 2022-07-28 PROCEDURE — 99999 PR PBB SHADOW E&M-EST. PATIENT-LVL I: ICD-10-PCS | Mod: PBBFAC,,,

## 2022-07-28 NOTE — PROGRESS NOTES
Health  Follow-Up Note   [x] Office  [] Phone  Notes from previous session reviewed.   [x] Previous Session Goals unchanged.   [] Patient/Caregiver Change in Goals.   Goals added or changed by Patient/Caregiver since program participation:  1. Get back on track  2. Started weight again and exercise and doing yoga still       Additional/Changed support that patient/caregiver has experienced/sought?  (Indicate readiness, support from family, friends, others, community groups, etc)  1.      Additional/Changed obstacles that could prevent patient/caregiver from reaching their goals?  1.  More stressed, eating out more, tired work not wanting to fix food when gets home, moving next month    Feedback provided:  1.  Praised for continued effort and determination.    Diagnostic values/Desriptors for follow-up as needed for chronic condition(s)   Weight: 90.8 kg 200.18 lb gain 1.88 lb total loss 25 lbs    Interventions:   1. Health  listened reflectively, validated thoughts and feelings, offered support and encouragement.   2. Allowed patient to express themselves in a non-biased atmosphere.  3. Health  assisted pt to problem-solve obstacles such as being in a challenging environment and dealing with these challenges.   4. Motivational Interviewed interventions utilized (OARS).   5. Patient responded favorably to interventions and remained actively engaged in the session.   6. Health  will remain available and connected for patient by phone and/or office visits.   7. Positive reinforcement, emotional support and encouragement provided.   8. Focused Education: MI    Plan:  [x] Pt will work on goals as stated above.   [x] Pt will contact Health  for any questions, concerns or needs.  [x] Pt will follow up with Health  in office on  8.29.22.  [] Pt will follow up with Health  on phone in:        [x] Health  will remain available.   [] Health  will contact patient by phone in:         [] Health  will consult:        [] Health  will inform Provider via EPIC messaging.     Impression:  1. Behavior is consistent with Action Stage of Change.   2. Participation level:       [x] Receptive      [x] Interactive      [] Guarded and Resistant      [x] Self Motivated      [] Refused/Declined to participate   3. [x] Pt voiced understanding of all information presented.       [] Pt voiced needing further information/education. This will be arranged.       [x] Pt would benefit from further education/information as identified by this health . This will be arranged.     Lashell Pedroza RN HC

## 2022-08-05 ENCOUNTER — CLINICAL SUPPORT (OUTPATIENT)
Dept: SPEECH THERAPY | Facility: HOSPITAL | Age: 37
End: 2022-08-05
Payer: COMMERCIAL

## 2022-08-05 DIAGNOSIS — J38.7 IRRITABLE LARYNX: ICD-10-CM

## 2022-08-05 DIAGNOSIS — R49.0 DYSPHONIA: ICD-10-CM

## 2022-08-05 DIAGNOSIS — K21.9 LARYNGOPHARYNGEAL REFLUX (LPR): Primary | ICD-10-CM

## 2022-08-05 NOTE — PROGRESS NOTES
Referring provider: Dr. Paige Frye*  Reason for visit:  Voice Treatment (CPT 72871)  Session #2    Subjective/History   I had the pleasure of seeing Joan Wilde for our second treatment session following full-voice evaluation on 6/6/2022 at which time reductions in throat clear behavior were achieved via respiratory retraining exercises. Previously, a clinical-checklist (Carlos, et., al) was used to identify and define triggers for throat-clear/cough. Today, pt endorses the resolution of laryngeal symptoms and reports increased progress correlated to reflux management and dietary considerations. Reports decrease in throat-clear behavior to approximately 3  times a day and relays good understanding of known triggers. She reports additional progress with daily use of vocal hygiene recommendations, direct-steam inhalation and voice therapy exercises with decreased hoarsnenes. Social history: lives at home with . 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     Known- triggers for throat clear include: arriving at work, stress, eating certain foods such as: philip seeds, hemp seeds, peanut protein powder and avocado, smoke, LPR, dust, temperature changes, indoor carpet, bath tub, miscellaneous allergies, automobile odors (exhaust, gas, car interior, [employer]) and talking for long periods of time.    Laryngeal endoscopy findings per   on 5/9/2022:  Larynx/hypopharynx:      No epiglottis lesions     No epiglottis edema     No AE folds lesions     No vocal cord polyps     Equal and normal bilateral     No hypopharynx lesions     No piriform sinus pooling     No piriform sinus lesions     Post cricoid edema (mild)     Post cricoid erythema (mild)     Past Medical History:   Diagnosis Date    Acne vulgaris 11/20/2018    Allergic rhinitis due to dust mite 5/13/2019    Endometriosis 11/20/2018    Fibroid     Migraine syndrome     associated with flu vaccines    Mild  intermittent asthma without complication 5/31/2018     Current Outpatient Medications on File Prior to Visit   Medication Sig Dispense Refill       Objective   Perceptual assessment:    -CAPE-V Overall Score: 4  -Quality: WNL  -Volume: appropriate for age and gender  -Pitch: appropriate for age and gender  -Flexibility: appropriate for age and gender  -Habitual respiratory pattern: chest/clavicular.    The Reflux Symptom Index (RSI)   Score: 21 (6/6/2022)    Clinical Evaluation/ Treatment  Mrs. Wilde presents at today's visit in no acute stress with zero episodes of coughing, throat clearing and no reported laryngeal symptoms. She relayed good understanding of the workings of the larynx as it pertains to her previous symptoms of laryngeal irritation, management of chronic-throat clearing, as well as increased awareness of triggers. Reviewed the importance of continuous vocal hygiene routines for increasing hydration to the glottis with direct warm-mist humidification, reducing throat clearing and other phonotraumatic behaviors and avoidance of known triggers: LPR has been identified as main trigger for throat-clearing. As she has made significant progress in voice therapy w/ use of vocal hygiene recommendations, breathing exercises and increased awareness of triggers, discussed POC for discharge. She will f/u with ENT to discuss further management of reflux and possible referral to GI. Pt agrees to continue HEP independently and will return to voice therapy on an as needed basis. Recommend continued use of throat clearing suppression strategies using respiratory retraining, sips of water and moisture lozenges. She was amenable to all suggestions.     Functional goals  Length Status Goal   Long term Met Patient will implement and adhere to vocal hygiene protocols on a daily basis, including the elimination of phonotraumatic behaviors.    Long term Met Patient and clinician will facilitate changes in vocal function  "in order to restore functional use of voice for daily occupational, social, and emotional demands.    Long term Met Patient will re-establish phonation with adequate balance of airflow and resonance with decreased muscle tension.    Long term Met Patient will improve coordination of respiration and phonation for efficient vocal production at a conversational level.    Short term Met Patient will complete "open larynx" breathing exercises and/or resonant-focused exercises 3-5x daily to strengthen and balance the intrinsic laryngeal musculature and maximize glottic closure without medial hyperfunction.   Short term Met Patient will improve the quality, efficiency, and ease of phonation through resonant and/or airflow exercises from the syllable to conversation level with 80% accuracy.   Short term Ongoing Patient will discriminate between easy and strained phonation with 80% accuracy.    Short term Met Patient will identify the sensations associated with muscle relaxation in the abdominal, thoracic, neck and facial areas during efficient phonation with minimal clinician cue.        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 Flores.  Prognosis for continued improvement is good.     Recommendations / POC    -Discharge today: Pt reports resolution of their initial laryngeal complaint and motivation to continue home exercise program independently.  -Continue exercises as discussed in session  -Contact clinician with any further questions   - F/U w/ ENT and consider referral to GI as reflux has been identified as primary trigger for throat/clear        "

## 2022-08-12 ENCOUNTER — PATIENT MESSAGE (OUTPATIENT)
Dept: OTOLARYNGOLOGY | Facility: CLINIC | Age: 37
End: 2022-08-12
Payer: COMMERCIAL

## 2022-08-15 DIAGNOSIS — R05.9 COUGH: Primary | ICD-10-CM

## 2022-08-15 LAB
CTP QC/QA: YES
SARS-COV-2 AG RESP QL IA.RAPID: POSITIVE

## 2022-08-16 RX ORDER — OMEPRAZOLE 20 MG/1
40 CAPSULE, DELAYED RELEASE ORAL DAILY
Qty: 30 CAPSULE | Refills: 2 | Status: SHIPPED | OUTPATIENT
Start: 2022-08-16 | End: 2022-09-07

## 2022-09-07 ENCOUNTER — OFFICE VISIT (OUTPATIENT)
Dept: OTOLARYNGOLOGY | Facility: CLINIC | Age: 37
End: 2022-09-07
Payer: COMMERCIAL

## 2022-09-07 ENCOUNTER — LAB VISIT (OUTPATIENT)
Dept: LAB | Facility: HOSPITAL | Age: 37
End: 2022-09-07
Attending: OTOLARYNGOLOGY
Payer: COMMERCIAL

## 2022-09-07 VITALS
BODY MASS INDEX: 32.63 KG/M2 | WEIGHT: 208.31 LBS | DIASTOLIC BLOOD PRESSURE: 80 MMHG | HEART RATE: 100 BPM | SYSTOLIC BLOOD PRESSURE: 123 MMHG

## 2022-09-07 DIAGNOSIS — R49.0 MUSCLE TENSION DYSPHONIA: ICD-10-CM

## 2022-09-07 DIAGNOSIS — K21.9 LARYNGOPHARYNGEAL REFLUX (LPR): ICD-10-CM

## 2022-09-07 DIAGNOSIS — J30.9 CHRONIC ALLERGIC RHINITIS: Primary | ICD-10-CM

## 2022-09-07 DIAGNOSIS — R74.01 TRANSAMINITIS: ICD-10-CM

## 2022-09-07 LAB
ALBUMIN SERPL BCP-MCNC: 3.8 G/DL (ref 3.5–5.2)
ALP SERPL-CCNC: 47 U/L (ref 55–135)
ALT SERPL W/O P-5'-P-CCNC: 39 U/L (ref 10–44)
AST SERPL-CCNC: 26 U/L (ref 10–40)
BILIRUB DIRECT SERPL-MCNC: 0.1 MG/DL (ref 0.1–0.3)
BILIRUB SERPL-MCNC: 0.3 MG/DL (ref 0.1–1)
PROT SERPL-MCNC: 7.1 G/DL (ref 6–8.4)

## 2022-09-07 PROCEDURE — 3044F PR MOST RECENT HEMOGLOBIN A1C LEVEL <7.0%: ICD-10-PCS | Mod: CPTII,S$GLB,, | Performed by: OTOLARYNGOLOGY

## 2022-09-07 PROCEDURE — 3079F PR MOST RECENT DIASTOLIC BLOOD PRESSURE 80-89 MM HG: ICD-10-PCS | Mod: CPTII,S$GLB,, | Performed by: OTOLARYNGOLOGY

## 2022-09-07 PROCEDURE — 3074F PR MOST RECENT SYSTOLIC BLOOD PRESSURE < 130 MM HG: ICD-10-PCS | Mod: CPTII,S$GLB,, | Performed by: OTOLARYNGOLOGY

## 2022-09-07 PROCEDURE — 31575 LARYNGOSCOPY: ICD-10-PCS | Mod: S$GLB,,, | Performed by: OTOLARYNGOLOGY

## 2022-09-07 PROCEDURE — 3008F PR BODY MASS INDEX (BMI) DOCUMENTED: ICD-10-PCS | Mod: CPTII,S$GLB,, | Performed by: OTOLARYNGOLOGY

## 2022-09-07 PROCEDURE — 36415 COLL VENOUS BLD VENIPUNCTURE: CPT | Performed by: NURSE PRACTITIONER

## 2022-09-07 PROCEDURE — 1160F PR REVIEW ALL MEDS BY PRESCRIBER/CLIN PHARMACIST DOCUMENTED: ICD-10-PCS | Mod: CPTII,S$GLB,, | Performed by: OTOLARYNGOLOGY

## 2022-09-07 PROCEDURE — 99214 PR OFFICE/OUTPT VISIT, EST, LEVL IV, 30-39 MIN: ICD-10-PCS | Mod: 25,S$GLB,, | Performed by: OTOLARYNGOLOGY

## 2022-09-07 PROCEDURE — 80076 HEPATIC FUNCTION PANEL: CPT | Performed by: NURSE PRACTITIONER

## 2022-09-07 PROCEDURE — 3079F DIAST BP 80-89 MM HG: CPT | Mod: CPTII,S$GLB,, | Performed by: OTOLARYNGOLOGY

## 2022-09-07 PROCEDURE — 1159F MED LIST DOCD IN RCRD: CPT | Mod: CPTII,S$GLB,, | Performed by: OTOLARYNGOLOGY

## 2022-09-07 PROCEDURE — 3008F BODY MASS INDEX DOCD: CPT | Mod: CPTII,S$GLB,, | Performed by: OTOLARYNGOLOGY

## 2022-09-07 PROCEDURE — 3044F HG A1C LEVEL LT 7.0%: CPT | Mod: CPTII,S$GLB,, | Performed by: OTOLARYNGOLOGY

## 2022-09-07 PROCEDURE — 1159F PR MEDICATION LIST DOCUMENTED IN MEDICAL RECORD: ICD-10-PCS | Mod: CPTII,S$GLB,, | Performed by: OTOLARYNGOLOGY

## 2022-09-07 PROCEDURE — 99999 PR PBB SHADOW E&M-EST. PATIENT-LVL III: ICD-10-PCS | Mod: PBBFAC,,, | Performed by: OTOLARYNGOLOGY

## 2022-09-07 PROCEDURE — 99999 PR PBB SHADOW E&M-EST. PATIENT-LVL III: CPT | Mod: PBBFAC,,, | Performed by: OTOLARYNGOLOGY

## 2022-09-07 PROCEDURE — 3074F SYST BP LT 130 MM HG: CPT | Mod: CPTII,S$GLB,, | Performed by: OTOLARYNGOLOGY

## 2022-09-07 PROCEDURE — 99214 OFFICE O/P EST MOD 30 MIN: CPT | Mod: 25,S$GLB,, | Performed by: OTOLARYNGOLOGY

## 2022-09-07 PROCEDURE — 1160F RVW MEDS BY RX/DR IN RCRD: CPT | Mod: CPTII,S$GLB,, | Performed by: OTOLARYNGOLOGY

## 2022-09-07 PROCEDURE — 31575 DIAGNOSTIC LARYNGOSCOPY: CPT | Mod: S$GLB,,, | Performed by: OTOLARYNGOLOGY

## 2022-09-07 RX ORDER — OMEPRAZOLE 20 MG/1
40 CAPSULE, DELAYED RELEASE ORAL DAILY
Qty: 60 CAPSULE | Refills: 4 | Status: SHIPPED | OUTPATIENT
Start: 2022-09-07 | End: 2023-01-09 | Stop reason: SDUPTHER

## 2022-09-07 NOTE — PROGRESS NOTES
Chief Complaint   Patient presents with    Follow-up   .       Interval HPI 9/8/2022:  Follow up visit. Reports that she has been feeling very well. She feels throat pain and globus sensation are overall better. She has been on Protonix 40mg PO daily which has been an increase from 20 mg daily. She feels it is much better. She did see speech therapy and found this to be very helpful.  She has been using Gaviscon advance.       Past Medical History:   Diagnosis Date    Acne vulgaris 11/20/2018    Allergic rhinitis due to dust mite 5/13/2019    Endometriosis 11/20/2018    Fibroid     Migraine syndrome     associated with flu vaccines    Mild intermittent asthma without complication 5/31/2018     Social History     Socioeconomic History    Marital status:    Tobacco Use    Smoking status: Never    Smokeless tobacco: Never   Substance and Sexual Activity    Alcohol use: No    Drug use: No    Sexual activity: Not Currently     Partners: Male   Social History Narrative    Performance improvement specialist at [employer]    She is single.      Social Determinants of Health     Financial Resource Strain: Medium Risk    Difficulty of Paying Living Expenses: Somewhat hard   Food Insecurity: No Food Insecurity    Worried About Running Out of Food in the Last Year: Never true    Ran Out of Food in the Last Year: Never true   Transportation Needs: No Transportation Needs    Lack of Transportation (Medical): No    Lack of Transportation (Non-Medical): No   Physical Activity: Insufficiently Active    Days of Exercise per Week: 2 days    Minutes of Exercise per Session: 20 min   Stress: No Stress Concern Present    Feeling of Stress : Only a little   Social Connections: Unknown    Frequency of Communication with Friends and Family: More than three times a week    Frequency of Social Gatherings with Friends and Family: Never    Active Member of Clubs or Organizations: No    Attends Club or Organization Meetings: Never    Marital  Status:    Housing Stability: Low Risk     Unable to Pay for Housing in the Last Year: No    Number of Places Lived in the Last Year: 1    Unstable Housing in the Last Year: No     Past Surgical History:   Procedure Laterality Date    HERNIA REPAIR  2014    umbilical    LAPAROSCOPIC APPENDECTOMY N/A 2021    Procedure: APPENDECTOMY, LAPAROSCOPIC WITH LYSIS OF ADHESIONS.;  Surgeon: Trent Cannon MD;  Location: [facility] OR [address] [place] [address];  Service: General;  Laterality: N/A;     Family History   Problem Relation Age of Onset    Hypertension Mother     Diabetes Father     Hypertension Father     Stroke Father     Hyperlipidemia Father     No Known Problems Brother     No Known Problems Brother     Breast cancer Neg Hx     Colon cancer Neg Hx     Ovarian cancer Neg Hx     Melanoma Neg Hx     Blindness Neg Hx     Amblyopia Neg Hx     Cataracts Neg Hx     Glaucoma Neg Hx     Macular degeneration Neg Hx     Retinal detachment Neg Hx     Strabismus Neg Hx      labor Neg Hx     Cancer Neg Hx     Eclampsia Neg Hx            Review of Systems  General: negative for chills, fever or weight loss  Psychological: negative for mood changes or depression  Ophthalmic: negative for blurry vision, photophobia or eye pain  ENT: see HPI  Respiratory: no cough, shortness of breath, or wheezing  Cardiovascular: no chest pain or dyspnea on exertion  Gastrointestinal: no abdominal pain, change in bowel habits, or black/ bloody stools  Musculoskeletal: negative for gait disturbance or muscular weakness  Neurological: no syncope or seizures; no ataxia  Dermatological: negative for puritis,  rash and jaundice  Hematologic/lymphatic: no easy bruising, no new lumps or bumps      Physical Exam:    Vitals:    22 1538   BP: 123/80   Pulse: 100       Constitutional: Well appearing / communicating without difficutly.  NAD.  Eyes: EOM I Bilaterally  Head/Face: Normocephalic.  Negative paranasal sinus pressure/tenderness.  Salivary  glands WNL.  House Brackmann I Bilaterally.    Right Ear: Auricle normal appearance. External Auditory Canal within normal limits no lesions or masses,TM w/o masses/lesions/perforations. TM mobility noted.   Left Ear: Auricle normal appearance. External Auditory Canal within normal limits no lesions or masses,TM w/o masses/lesions/perforations. TM mobility noted.  Nose: No gross nasal septal deviation. Inferior Turbinates 3+ bilaterally. No septal perforation. No masses/lesions. External nasal skin appears normal without masses/lesions.  Oral Cavity: Gingiva/lips within normal limits.  Dentition/gingiva healthy appearing. Mucus membranes moist. Floor of mouth soft, no masses palpated. Oral Tongue mobile. Hard Palate appears normal.    Oropharynx: Base of tongue appears normal. No masses/lesions noted. Tonsillar fossa/pharyngeal wall without lesions. Posterior oropharynx WNL.  Soft palate without masses. Midline uvula.   Neck/Lymphatic: No LAD I-VI bilaterally.  No thyromegaly.  No masses noted on exam.      See separate procedure note for FFL.    Diagnostic studies reviewed:   Laboratory 04/15/2019:  IgE level:  Less than 35.  ImmunoCAP:  Negative.     Spirometry 04/16/2019:  Normal spirometry. Airflow not improved after bronchodilator.     Inhalant skin tests 05/13/2019:  3+ histamine control.  3+ dust mites.    Assessment:    ICD-10-CM ICD-9-CM    1. Chronic allergic rhinitis  J30.9 477.9       2. Laryngopharyngeal reflux (LPR)  K21.9 478.79       3. Muscle tension dysphonia  R49.0 784.42           The primary encounter diagnosis was Chronic allergic rhinitis. Diagnoses of Laryngopharyngeal reflux (LPR) and Muscle tension dysphonia were also pertinent to this visit.      Plan:  No orders of the defined types were placed in this encounter.    Continue  nasal saline rinses BID  Continue Flonase 2 sprays per nostril daily  Continue Allegra daily   Continue  Prilosec 40 mg PO daily.   Continue refrain from eating  within 3 hours of going to bed, to elevate the head of bed very subtly and optimize the impact of gravity on the potential reflux, and to avoid alcohol, caffeine, tobacco, tomato sauce, spicy foods, fried food, and chocolate.   Continue vocal hygiene measures.   MTD improved with ST     Follow up in approximately 4 months to reassess progress with treatment regimen.     Paige Grant MD

## 2022-09-08 ENCOUNTER — PATIENT MESSAGE (OUTPATIENT)
Dept: HEPATOLOGY | Facility: CLINIC | Age: 37
End: 2022-09-08
Payer: COMMERCIAL

## 2022-09-08 ENCOUNTER — TELEPHONE (OUTPATIENT)
Dept: HEPATOLOGY | Facility: CLINIC | Age: 37
End: 2022-09-08
Payer: COMMERCIAL

## 2022-09-08 DIAGNOSIS — R74.01 TRANSAMINITIS: Primary | ICD-10-CM

## 2022-09-08 NOTE — TELEPHONE ENCOUNTER
[facility] with [facility]    Please contact pt and schedule the following  -- hepatic function panel in 3 months  -- f/u with me in 6 months with hepatic function panel a few days before, fibroscan same day as appt     Thanks !

## 2022-09-08 NOTE — TELEPHONE ENCOUNTER
Spoke with patient. Scheduled labs in 3 mos and follow up visit in 6 mos with labs and fibroscan prior to visit.

## 2022-09-29 ENCOUNTER — CLINICAL SUPPORT (OUTPATIENT)
Dept: INTERNAL MEDICINE | Facility: CLINIC | Age: 37
End: 2022-09-29
Payer: COMMERCIAL

## 2022-09-29 VITALS — WEIGHT: 205 LBS | BODY MASS INDEX: 32.11 KG/M2

## 2022-09-29 PROCEDURE — 99999 PR PBB SHADOW E&M-EST. PATIENT-LVL I: ICD-10-PCS | Mod: PBBFAC,,,

## 2022-09-29 PROCEDURE — 99999 PR PBB SHADOW E&M-EST. PATIENT-LVL I: CPT | Mod: PBBFAC,,,

## 2022-09-29 NOTE — PROGRESS NOTES
Health  Follow-Up Note   [x] Office  [] Phone  Notes from previous session reviewed.   [x] Previous Session Goals unchanged.   [] Patient/Caregiver Change in Goals.  Goals added or changed by Patient/Caregiver since program participation:  Get back on track  Get back to exercise  Reduce intake on sheat day  Substitute potatoes with healthier option        Additional/Changed support that patient/caregiver has experienced/sought?  (Indicate readiness, support from family, friends, others, community groups, etc)       Additional/Changed obstacles that could prevent patient/caregiver from reaching their goals?   Stress, moving, eating out    Feedback provided:   Praised for continued effort and determination    Diagnostic values/Desriptors for follow-up as needed for chronic condition(s)   Weight: 93 kg 205.03 lb gain 5 lbs total loss 20 lbs    Interventions:   Health  listened reflectively, validated thoughts and feelings, offered support and encouragement.   Allowed patient to express themselves in a non-biased atmosphere.  Health  assisted pt to problem-solve obstacles such as being in a challenging environment and dealing with these challenges.   Motivational Interviewed interventions utilized (OARS).   Patient responded favorably to interventions and remained actively engaged in the session.   Health  will remain available and connected for patient by phone and/or office visits.   Positive reinforcement, emotional support and encouragement provided.   Focused Education: MI    Plan:  [x] Pt will work on goals as stated above.   [x] Pt will contact Health  for any questions, concerns or needs.  [x] Pt will follow up with Health  in office on  10.27.22.  [] Pt will follow up with Health  on phone in:        [x] Health  will remain available.   [] Health  will contact patient by phone in:        [] Health  will consult:        [] Health  will inform Provider  via EPIC messaging.     Impression:  1. Behavior is consistent with Action Stage of Change.   2. Participation level:       [x] Receptive      [x] Interactive      [] Guarded and Resistant      [x] Self Motivated      [] Refused/Declined to participate   3. [x] Pt voiced understanding of all information presented.       [] Pt voiced needing further information/education. This will be arranged.       [x] Pt would benefit from further education/information as identified by this health . This will be arranged.     Lashell Pedroza RN HC

## 2022-10-06 ENCOUNTER — PATIENT MESSAGE (OUTPATIENT)
Dept: RESEARCH | Facility: HOSPITAL | Age: 37
End: 2022-10-06
Payer: COMMERCIAL

## 2022-10-27 ENCOUNTER — CLINICAL SUPPORT (OUTPATIENT)
Dept: INTERNAL MEDICINE | Facility: CLINIC | Age: 37
End: 2022-10-27
Payer: COMMERCIAL

## 2022-10-27 VITALS — BODY MASS INDEX: 32.53 KG/M2 | WEIGHT: 207.69 LBS

## 2022-10-27 PROCEDURE — 99999 PR PBB SHADOW E&M-EST. PATIENT-LVL I: ICD-10-PCS | Mod: PBBFAC,,,

## 2022-10-27 PROCEDURE — 99999 PR PBB SHADOW E&M-EST. PATIENT-LVL I: CPT | Mod: PBBFAC,,,

## 2022-10-27 NOTE — PROGRESS NOTES
Health  Follow-Up Note   [x] Office  [] Phone  Notes from previous session reviewed.   [x] Previous Session Goals unchanged.   [] Patient/Caregiver Change in Goals.  Goals added or changed by Patient/Caregiver since program participation:  Continue same plan  Try taking walk at lunch for 15 min   Try Magnesium Calm       Additional/Changed support that patient/caregiver has experienced/sought?  (Indicate readiness, support from family, friends, others, community groups, etc)       Additional/Changed obstacles that could prevent patient/caregiver from reaching their goals?   Stress from work waiting on decision for job    Feedback provided:   Praised for continued effort and determination.    Diagnostic values/Desriptors for follow-up as needed for chronic condition(s)   Weight: 94.2 kg 205.03 lb gain 2.64 lb total loss 18 lbs    Interventions:   Health  listened reflectively, validated thoughts and feelings, offered support and encouragement.   Allowed patient to express themselves in a non-biased atmosphere.  Health  assisted pt to problem-solve obstacles such as being in a challenging environment and dealing with these challenges.   Motivational Interviewed interventions utilized (OARS).   Patient responded favorably to interventions and remained actively engaged in the session.   Health  will remain available and connected for patient by phone and/or office visits.   Positive reinforcement, emotional support and encouragement provided.   Focused Education: MI    Plan:  [x] Pt will work on goals as stated above.   [x] Pt will contact Health  for any questions, concerns or needs.  [x] Pt will follow up with Health  in office on  11.29.22.  [] Pt will follow up with Health  on phone in:        [x] Health  will remain available.   [] Health  will contact patient by phone in:        [] Health  will consult:        [] Health  will inform Provider via [employer]  messaging.     Impression:  1. Behavior is consistent with Action Stage of Change.   2. Participation level:       [x] Receptive      [x] Interactive      [] Guarded and Resistant      [x] Self Motivated      [] Refused/Declined to participate   3. [x] Pt voiced understanding of all information presented.       [] Pt voiced needing further information/education. This will be arranged.       [x] Pt would benefit from further education/information as identified by this health . This will be arranged.     Lashell Pedroza RN HC

## 2022-11-29 ENCOUNTER — CLINICAL SUPPORT (OUTPATIENT)
Dept: INTERNAL MEDICINE | Facility: CLINIC | Age: 37
End: 2022-11-29
Payer: COMMERCIAL

## 2022-11-29 VITALS — BODY MASS INDEX: 33.22 KG/M2 | WEIGHT: 212.06 LBS

## 2022-11-29 PROCEDURE — 99999 PR PBB SHADOW E&M-EST. PATIENT-LVL I: CPT | Mod: PBBFAC,,,

## 2022-11-29 PROCEDURE — 99999 PR PBB SHADOW E&M-EST. PATIENT-LVL I: ICD-10-PCS | Mod: PBBFAC,,,

## 2022-11-29 NOTE — PROGRESS NOTES
Health  Follow-Up Note   [x] Office  [] Phone  Notes from previous session reviewed.   [x] Previous Session Goals unchanged.   [] Patient/Caregiver Change in Goals.  Goals added or changed by Patient/Caregiver since program participation:  Stop reaching for unhealthy  replace with healthy meals /snacks  Leave sweets/ cakes alone  Use dark chocolate for snack 90 %   Walking at lunch and continue yoga       Additional/Changed support that patient/caregiver has experienced/sought?  (Indicate readiness, support from family, friends, others, community groups, etc)       Additional/Changed obstacles that could prevent patient/caregiver from reaching their goals?   Holidays    Feedback provided:   Praised for continued effort and determination.    Diagnostic values/Desriptors for follow-up as needed for chronic condition(s)   Weight: 96.2 kg 212.08 lb gain 2.83 lbs total loss 13 lbs    Interventions:   Health  listened reflectively, validated thoughts and feelings, offered support and encouragement.   Allowed patient to express themselves in a non-biased atmosphere.  Health  assisted pt to problem-solve obstacles such as being in a challenging environment and dealing with these challenges.   Motivational Interviewed interventions utilized (OARS).   Patient responded favorably to interventions and remained actively engaged in the session.   Health  will remain available and connected for patient by phone and/or office visits.   Positive reinforcement, emotional support and encouragement provided.   Focused Education: MI    Plan:  [x] Pt will work on goals as stated above.   [x] Pt will contact Health  for any questions, concerns or needs.  [x] Pt will follow up with Health  in office on  12.29.22.  [] Pt will follow up with Health  on phone in:        [x] Health  will remain available.   [] Health  will contact patient by phone in:        [] Health  will consult:         [] Health  will inform Provider via EPIC messaging.     Impression:  1. Behavior is consistent with Action Stage of Change.   2. Participation level:       [x] Receptive      [x] Interactive      [] Guarded and Resistant      [x] Self Motivated      [] Refused/Declined to participate   3. [x] Pt voiced understanding of all information presented.       [] Pt voiced needing further information/education. This will be arranged.       [x] Pt would benefit from further education/information as identified by this health . This will be arranged.     Lashell Pedroza RN HC

## 2022-12-06 ENCOUNTER — LAB VISIT (OUTPATIENT)
Dept: LAB | Facility: HOSPITAL | Age: 37
End: 2022-12-06
Payer: COMMERCIAL

## 2022-12-06 DIAGNOSIS — R74.01 TRANSAMINITIS: ICD-10-CM

## 2022-12-06 LAB
ALBUMIN SERPL BCP-MCNC: 3.7 G/DL (ref 3.5–5.2)
ALP SERPL-CCNC: 49 U/L (ref 55–135)
ALT SERPL W/O P-5'-P-CCNC: 33 U/L (ref 10–44)
AST SERPL-CCNC: 24 U/L (ref 10–40)
BILIRUB DIRECT SERPL-MCNC: 0.1 MG/DL (ref 0.1–0.3)
BILIRUB SERPL-MCNC: 0.3 MG/DL (ref 0.1–1)
PROT SERPL-MCNC: 6.7 G/DL (ref 6–8.4)

## 2022-12-06 PROCEDURE — 36415 COLL VENOUS BLD VENIPUNCTURE: CPT | Performed by: NURSE PRACTITIONER

## 2022-12-06 PROCEDURE — 80076 HEPATIC FUNCTION PANEL: CPT | Performed by: NURSE PRACTITIONER

## 2022-12-07 ENCOUNTER — OFFICE VISIT (OUTPATIENT)
Dept: URGENT CARE | Facility: CLINIC | Age: 37
End: 2022-12-07
Payer: COMMERCIAL

## 2022-12-07 VITALS
RESPIRATION RATE: 16 BRPM | HEART RATE: 86 BPM | HEIGHT: 67 IN | DIASTOLIC BLOOD PRESSURE: 80 MMHG | BODY MASS INDEX: 33.27 KG/M2 | TEMPERATURE: 99 F | SYSTOLIC BLOOD PRESSURE: 113 MMHG | WEIGHT: 212 LBS | OXYGEN SATURATION: 97 %

## 2022-12-07 DIAGNOSIS — S16.1XXA NECK STRAIN, INITIAL ENCOUNTER: Primary | ICD-10-CM

## 2022-12-07 PROCEDURE — 3074F PR MOST RECENT SYSTOLIC BLOOD PRESSURE < 130 MM HG: ICD-10-PCS | Mod: CPTII,S$GLB,, | Performed by: NURSE PRACTITIONER

## 2022-12-07 PROCEDURE — 3044F PR MOST RECENT HEMOGLOBIN A1C LEVEL <7.0%: ICD-10-PCS | Mod: CPTII,S$GLB,, | Performed by: NURSE PRACTITIONER

## 2022-12-07 PROCEDURE — 96372 THER/PROPH/DIAG INJ SC/IM: CPT | Mod: S$GLB,,, | Performed by: NURSE PRACTITIONER

## 2022-12-07 PROCEDURE — 1160F RVW MEDS BY RX/DR IN RCRD: CPT | Mod: CPTII,S$GLB,, | Performed by: NURSE PRACTITIONER

## 2022-12-07 PROCEDURE — 3044F HG A1C LEVEL LT 7.0%: CPT | Mod: CPTII,S$GLB,, | Performed by: NURSE PRACTITIONER

## 2022-12-07 PROCEDURE — 96372 PR INJECTION,THERAP/PROPH/DIAG2ST, IM OR SUBCUT: ICD-10-PCS | Mod: S$GLB,,, | Performed by: NURSE PRACTITIONER

## 2022-12-07 PROCEDURE — 3008F PR BODY MASS INDEX (BMI) DOCUMENTED: ICD-10-PCS | Mod: CPTII,S$GLB,, | Performed by: NURSE PRACTITIONER

## 2022-12-07 PROCEDURE — 3074F SYST BP LT 130 MM HG: CPT | Mod: CPTII,S$GLB,, | Performed by: NURSE PRACTITIONER

## 2022-12-07 PROCEDURE — 1159F MED LIST DOCD IN RCRD: CPT | Mod: CPTII,S$GLB,, | Performed by: NURSE PRACTITIONER

## 2022-12-07 PROCEDURE — 1159F PR MEDICATION LIST DOCUMENTED IN MEDICAL RECORD: ICD-10-PCS | Mod: CPTII,S$GLB,, | Performed by: NURSE PRACTITIONER

## 2022-12-07 PROCEDURE — 99213 PR OFFICE/OUTPT VISIT, EST, LEVL III, 20-29 MIN: ICD-10-PCS | Mod: 25,S$GLB,, | Performed by: NURSE PRACTITIONER

## 2022-12-07 PROCEDURE — 1160F PR REVIEW ALL MEDS BY PRESCRIBER/CLIN PHARMACIST DOCUMENTED: ICD-10-PCS | Mod: CPTII,S$GLB,, | Performed by: NURSE PRACTITIONER

## 2022-12-07 PROCEDURE — 99213 OFFICE O/P EST LOW 20 MIN: CPT | Mod: 25,S$GLB,, | Performed by: NURSE PRACTITIONER

## 2022-12-07 PROCEDURE — 3079F PR MOST RECENT DIASTOLIC BLOOD PRESSURE 80-89 MM HG: ICD-10-PCS | Mod: CPTII,S$GLB,, | Performed by: NURSE PRACTITIONER

## 2022-12-07 PROCEDURE — 3079F DIAST BP 80-89 MM HG: CPT | Mod: CPTII,S$GLB,, | Performed by: NURSE PRACTITIONER

## 2022-12-07 PROCEDURE — 3008F BODY MASS INDEX DOCD: CPT | Mod: CPTII,S$GLB,, | Performed by: NURSE PRACTITIONER

## 2022-12-07 RX ORDER — KETOROLAC TROMETHAMINE 30 MG/ML
30 INJECTION, SOLUTION INTRAMUSCULAR; INTRAVENOUS
Status: COMPLETED | OUTPATIENT
Start: 2022-12-07 | End: 2022-12-07

## 2022-12-07 RX ORDER — CYCLOBENZAPRINE HCL 10 MG
10 TABLET ORAL 3 TIMES DAILY PRN
Qty: 30 TABLET | Refills: 0 | Status: SHIPPED | OUTPATIENT
Start: 2022-12-07 | End: 2022-12-17

## 2022-12-07 RX ORDER — MELOXICAM 7.5 MG/1
7.5 TABLET ORAL DAILY
Qty: 14 TABLET | Refills: 0 | Status: SHIPPED | OUTPATIENT
Start: 2022-12-07 | End: 2023-08-08

## 2022-12-07 RX ADMIN — KETOROLAC TROMETHAMINE 30 MG: 30 INJECTION, SOLUTION INTRAMUSCULAR; INTRAVENOUS at 09:12

## 2022-12-07 NOTE — PROGRESS NOTES
"Subjective:       Patient ID: Joan Wilde is a 37 y.o. female.    Vitals:  height is 5' 7" (1.702 m) and weight is 96.2 kg (212 lb). Her oral temperature is 98.9 °F (37.2 °C). Her blood pressure is 113/80 and her pulse is 86. Her respiration is 16 and oxygen saturation is 97%.     Chief Complaint: Neck Pain    This is a 37 y.o. female who presents today with a chief complaint of  neck pain, that started yesterday upon wakening; and worsened today upon wakening. Patient is having difficulty moving her neck side to side, (more trouble moving neck to the left side; and upward. Patient stated that she hears a clicking like sound when trying move her neck. Pt reports she has been going back and forth recently between using a wedge pillow and a memory foam pillow, for her laryngopharyngeal reflux  Denies injury. Denies fever. Denies ear or throat pain     Neck Pain   This is a new problem. The current episode started today. The problem occurs constantly. The pain is associated with a sleep position. The pain is present in the midline. Quality: Pain is a throbbing pain. The pain is at a severity of 8/10. The symptoms are aggravated by position and twisting. The pain is Same all the time. Stiffness is present In the morning. Pertinent negatives include no fever. She has tried nothing for the symptoms. The treatment provided no relief.   Constitution: Negative for fever.   HENT:  Negative for ear pain and sore throat.    Neck: Positive for neck pain and neck stiffness. Negative for painful lymph nodes and neck swelling.   Musculoskeletal:  Negative for trauma.   Hematologic/Lymphatic: Negative for swollen lymph nodes.     Objective:      Physical Exam   Constitutional: She is oriented to person, place, and time. She appears well-developed. She is cooperative.  Non-toxic appearance. She does not appear ill. No distress.   HENT:   Head: Normocephalic.   Ears:   Right Ear: Hearing, tympanic membrane, external ear and ear " canal normal.   Left Ear: Hearing, tympanic membrane, external ear and ear canal normal.   Nose: Nose normal. No mucosal edema, rhinorrhea or nasal deformity. No epistaxis. Right sinus exhibits no maxillary sinus tenderness and no frontal sinus tenderness. Left sinus exhibits no maxillary sinus tenderness and no frontal sinus tenderness.   Mouth/Throat: Uvula is midline, oropharynx is clear and moist and mucous membranes are normal. Mucous membranes are moist. No trismus in the jaw. Normal dentition. No uvula swelling. No oropharyngeal exudate, posterior oropharyngeal edema or posterior oropharyngeal erythema.   Eyes: Lids are normal. No scleral icterus.   Neck: Trachea normal and phonation normal. Neck supple. There are no signs of injury.     No edema present. No erythema present. No neck rigidity present. pain with movement present.   Cardiovascular: Normal rate, regular rhythm and normal heart sounds.   Pulmonary/Chest: Effort normal and breath sounds normal. No respiratory distress. She has no decreased breath sounds. She has no rhonchi.   Abdominal: Normal appearance.   Musculoskeletal: Normal range of motion.         General: No swelling or deformity. Normal range of motion.      Cervical back: She exhibits no tenderness.   Lymphadenopathy:     She has no cervical adenopathy.   Neurological: She is alert and oriented to person, place, and time. She exhibits normal muscle tone. Coordination normal.   Skin: Skin is warm, dry, intact, not diaphoretic and not pale.   Psychiatric: Her speech is normal and behavior is normal. Judgment and thought content normal.   Vitals reviewed.      Assessment:       1. Neck strain, initial encounter          Plan:         Neck strain, initial encounter  -     ketorolac injection 30 mg  -     meloxicam (MOBIC) 7.5 MG tablet; Take 1 tablet (7.5 mg total) by mouth once daily.  Dispense: 14 tablet; Refill: 0  -     cyclobenzaprine (FLEXERIL) 10 MG tablet; Take 1 tablet (10 mg  total) by mouth 3 (three) times daily as needed for Muscle spasms.  Dispense: 30 tablet; Refill: 0       Patient Instructions   Purchase soft collar (from [employer] or other pharmacy)  Heating pad; heat therapy (hot sitz baths)   Perform gently neck stretches   Gentle massages  Change pillow until pain resolves

## 2022-12-07 NOTE — PATIENT INSTRUCTIONS
Purchase soft collar (from [employer] or other pharmacy)  Heating pad; heat therapy (hot sitz baths)   Perform gently neck stretches   Gentle massages  Change pillow until pain resolves

## 2023-01-09 ENCOUNTER — OFFICE VISIT (OUTPATIENT)
Dept: OTOLARYNGOLOGY | Facility: CLINIC | Age: 38
End: 2023-01-09
Payer: COMMERCIAL

## 2023-01-09 VITALS
DIASTOLIC BLOOD PRESSURE: 83 MMHG | HEART RATE: 96 BPM | BODY MASS INDEX: 34.53 KG/M2 | SYSTOLIC BLOOD PRESSURE: 126 MMHG | WEIGHT: 220 LBS | HEIGHT: 67 IN

## 2023-01-09 DIAGNOSIS — K21.9 LARYNGOPHARYNGEAL REFLUX (LPR): Chronic | ICD-10-CM

## 2023-01-09 DIAGNOSIS — J30.9 CHRONIC ALLERGIC RHINITIS: Primary | Chronic | ICD-10-CM

## 2023-01-09 DIAGNOSIS — R06.83 SNORING: ICD-10-CM

## 2023-01-09 PROCEDURE — 3008F BODY MASS INDEX DOCD: CPT | Mod: CPTII,S$GLB,, | Performed by: OTOLARYNGOLOGY

## 2023-01-09 PROCEDURE — 31575 LARYNGOSCOPY: ICD-10-PCS | Mod: S$GLB,,, | Performed by: OTOLARYNGOLOGY

## 2023-01-09 PROCEDURE — 99999 PR PBB SHADOW E&M-EST. PATIENT-LVL IV: CPT | Mod: PBBFAC,,, | Performed by: OTOLARYNGOLOGY

## 2023-01-09 PROCEDURE — 3074F PR MOST RECENT SYSTOLIC BLOOD PRESSURE < 130 MM HG: ICD-10-PCS | Mod: CPTII,S$GLB,, | Performed by: OTOLARYNGOLOGY

## 2023-01-09 PROCEDURE — 99999 PR PBB SHADOW E&M-EST. PATIENT-LVL IV: ICD-10-PCS | Mod: PBBFAC,,, | Performed by: OTOLARYNGOLOGY

## 2023-01-09 PROCEDURE — 3008F PR BODY MASS INDEX (BMI) DOCUMENTED: ICD-10-PCS | Mod: CPTII,S$GLB,, | Performed by: OTOLARYNGOLOGY

## 2023-01-09 PROCEDURE — 99214 PR OFFICE/OUTPT VISIT, EST, LEVL IV, 30-39 MIN: ICD-10-PCS | Mod: 25,S$GLB,, | Performed by: OTOLARYNGOLOGY

## 2023-01-09 PROCEDURE — 31575 DIAGNOSTIC LARYNGOSCOPY: CPT | Mod: S$GLB,,, | Performed by: OTOLARYNGOLOGY

## 2023-01-09 PROCEDURE — 99214 OFFICE O/P EST MOD 30 MIN: CPT | Mod: 25,S$GLB,, | Performed by: OTOLARYNGOLOGY

## 2023-01-09 PROCEDURE — 3074F SYST BP LT 130 MM HG: CPT | Mod: CPTII,S$GLB,, | Performed by: OTOLARYNGOLOGY

## 2023-01-09 PROCEDURE — 1159F MED LIST DOCD IN RCRD: CPT | Mod: CPTII,S$GLB,, | Performed by: OTOLARYNGOLOGY

## 2023-01-09 PROCEDURE — 3079F DIAST BP 80-89 MM HG: CPT | Mod: CPTII,S$GLB,, | Performed by: OTOLARYNGOLOGY

## 2023-01-09 PROCEDURE — 3079F PR MOST RECENT DIASTOLIC BLOOD PRESSURE 80-89 MM HG: ICD-10-PCS | Mod: CPTII,S$GLB,, | Performed by: OTOLARYNGOLOGY

## 2023-01-09 PROCEDURE — 1159F PR MEDICATION LIST DOCUMENTED IN MEDICAL RECORD: ICD-10-PCS | Mod: CPTII,S$GLB,, | Performed by: OTOLARYNGOLOGY

## 2023-01-09 RX ORDER — OMEPRAZOLE 20 MG/1
40 CAPSULE, DELAYED RELEASE ORAL DAILY
Qty: 60 CAPSULE | Refills: 4 | Status: SHIPPED | OUTPATIENT
Start: 2023-01-09 | End: 2023-05-08 | Stop reason: SDUPTHER

## 2023-01-09 NOTE — PROCEDURES
Laryngoscopy    Date/Time: 1/9/2023 3:20 PM  Performed by: Paige Grant MD  Authorized by: Paige Grant MD     Consent Done?:  Yes (Verbal)  Anesthesia:     Local anesthetic:  Lidocaine 2% and Nikita-Synephrine 1/2%  Laryngoscopy:     Areas examined:  Nasal cavities, nasopharynx, oropharynx, hypopharynx, larynx and vocal cords  Nose External:      No external nasal deformity  Nose Intranasal:      Mucosa no polyps     Mucosa ulcers not present     No mucosa lesions present     No septum gross deformity     Turbinates not enlarged  Nasopharynx:      No mucosa lesions     Adenoids not present     Posterior choanae patent     Eustachian tube patent  Larynx/hypopharynx:      No epiglottis lesions     No epiglottis edema     No AE folds lesions     No vocal cord polyps     Equal and normal bilateral     No hypopharynx lesions     No piriform sinus pooling     No piriform sinus lesions     Post cricoid edema (mild)     Post cricoid erythema (mild)

## 2023-01-09 NOTE — PROGRESS NOTES
Chief Complaint   Patient presents with    Follow-up     Overall well since last visit   .       Interval HPI 1/9/2023:  Follow up visit. Reports that she has been feeling very well. She feels throat pain and globus sensation are overall better. She has been on Protonix 40mg PO daily. She has noted in the last month that coffee seemed to be a trigger for heartburn.   She has been using Gaviscon advance daily.  She notes that recently she has been snoring more throughout the last year.  She states that her  has noted this has been more of a problem- he has noticed episodes which concerned him for apnea. She lost 38 lbs in 2020 and reports that she has gained most of  this weight back over the last 2 years. She thinks that the snoring has been worse with the weight gain. She notes that she has trouble waking in the morning, decreased energy, and daytime somnolence. She states that her watch has shown decreased oxygen levels while sleeping.       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      Social Determinants of Health     Financial Resource Strain: Medium Risk    Difficulty of Paying Living Expenses: Somewhat hard   Food Insecurity: No Food Insecurity    Worried About Running Out of Food in the Last Year: Never true    Ran Out of Food in the Last Year: Never true   Transportation Needs: No Transportation Needs    Lack of Transportation  (Medical): No    Lack of Transportation (Non-Medical): No   Physical Activity: Insufficiently Active    Days of Exercise per Week: 2 days    Minutes of Exercise per Session: 20 min   Stress: No Stress Concern Present    Feeling of Stress : Only a little   Social Connections: Unknown    Frequency of Communication with Friends and Family: More than three times a week    Frequency of Social Gatherings with Friends and Family: Never    Active Member of Clubs or Organizations: No    Attends Club or Organization Meetings: Never    Marital Status:    Housing Stability: Low Risk     Unable to Pay for Housing in the Last Year: No    Number of Places Lived in the Last Year: 1    Unstable Housing in the Last Year: No     Past Surgical History:   Procedure Laterality Date    HERNIA REPAIR      umbilical    LAPAROSCOPIC APPENDECTOMY N/A 2021    Procedure: APPENDECTOMY, LAPAROSCOPIC WITH LYSIS OF ADHESIONS.;  Surgeon: Trent Cannon MD;  Location: [facility] OR [address] [place] [address];  Service: General;  Laterality: N/A;     Family History   Problem Relation Age of Onset    Hypertension Mother     Diabetes Father     Hypertension Father     Stroke Father     Hyperlipidemia Father     No Known Problems Brother     No Known Problems Brother     Breast cancer Neg Hx     Colon cancer Neg Hx     Ovarian cancer Neg Hx     Melanoma Neg Hx     Blindness Neg Hx     Amblyopia Neg Hx     Cataracts Neg Hx     Glaucoma Neg Hx     Macular degeneration Neg Hx     Retinal detachment Neg Hx     Strabismus Neg Hx      labor Neg Hx     Cancer Neg Hx     Eclampsia Neg Hx            Review of Systems  General: negative for chills, fever or weight loss  Psychological: negative for mood changes or depression  Ophthalmic: negative for blurry vision, photophobia or eye pain  ENT: see HPI  Respiratory: no cough, shortness of breath, or wheezing  Cardiovascular: no chest pain or dyspnea on exertion  Gastrointestinal: no abdominal pain, change in  bowel habits, or black/ bloody stools  Musculoskeletal: negative for gait disturbance or muscular weakness  Neurological: no syncope or seizures; no ataxia  Dermatological: negative for puritis,  rash and jaundice  Hematologic/lymphatic: no easy bruising, no new lumps or bumps      Physical Exam:    Vitals:    01/09/23 1536   BP: 126/83   Pulse: 96         Constitutional: Well appearing / communicating without difficutly.  NAD.  Eyes: EOM I Bilaterally  Head/Face: Normocephalic.  Negative paranasal sinus pressure/tenderness.  Salivary glands WNL.  House Brackmann I Bilaterally.    Right Ear: Auricle normal appearance. External Auditory Canal within normal limits no lesions or masses,TM w/o masses/lesions/perforations. TM mobility noted.   Left Ear: Auricle normal appearance. External Auditory Canal within normal limits no lesions or masses,TM w/o masses/lesions/perforations. TM mobility noted.  Nose: No gross nasal septal deviation. Inferior Turbinates 3+ bilaterally. No septal perforation. No masses/lesions. External nasal skin appears normal without masses/lesions.  Oral Cavity: Gingiva/lips within normal limits.  Dentition/gingiva healthy appearing. Mucus membranes moist. Floor of mouth soft, no masses palpated. Oral Tongue mobile. Hard Palate appears normal.    Oropharynx: Base of tongue appears normal. No masses/lesions noted. Tonsillar fossa/pharyngeal wall without lesions. Posterior oropharynx WNL.  Soft palate without masses. Midline uvula.   Neck/Lymphatic: No LAD I-VI bilaterally.  No thyromegaly.  No masses noted on exam.      See separate procedure note for FFL.    Diagnostic studies reviewed:   Laboratory 04/15/2019:  IgE level:  Less than 35.  ImmunoCAP:  Negative.     Spirometry 04/16/2019:  Normal spirometry. Airflow not improved after bronchodilator.     Inhalant skin tests 05/13/2019:  3+ histamine control.  3+ dust mites.    Assessment:    ICD-10-CM ICD-9-CM    1. Chronic allergic rhinitis  J30.9  477.9       2. Laryngopharyngeal reflux (LPR)  K21.9 478.79       3. Snoring  R06.83 786.09 Ambulatory referral/consult to Sleep Disorders          The primary encounter diagnosis was Chronic allergic rhinitis. Diagnoses of Laryngopharyngeal reflux (LPR) and Snoring were also pertinent to this visit.      Plan:  Orders Placed This Encounter   Procedures    Ambulatory referral/consult to Sleep Disorders       Continue  nasal saline rinses BID  Continue Flonase 2 sprays per nostril daily  Continue Allegra daily   Continue  Prilosec 40 mg PO daily.   Continue refrain from eating within 3 hours of going to bed, to elevate the head of bed very subtly and optimize the impact of gravity on the potential reflux, and to avoid alcohol, caffeine, tobacco, tomato sauce, spicy foods, fried food, and chocolate.   Refer to sleep medicine- snoring with concern for AMARJIT.      Follow up in approximately 4-6  months to reassess progress with treatment regimen.     Paige Grant MD

## 2023-01-19 ENCOUNTER — CLINICAL SUPPORT (OUTPATIENT)
Dept: INTERNAL MEDICINE | Facility: CLINIC | Age: 38
End: 2023-01-19
Payer: COMMERCIAL

## 2023-01-19 VITALS — HEIGHT: 67 IN | BODY MASS INDEX: 34.39 KG/M2 | WEIGHT: 219.13 LBS

## 2023-01-19 PROCEDURE — 99999 PR PBB SHADOW E&M-EST. PATIENT-LVL I: CPT | Mod: PBBFAC,,,

## 2023-01-19 PROCEDURE — 99999 PR PBB SHADOW E&M-EST. PATIENT-LVL I: ICD-10-PCS | Mod: PBBFAC,,,

## 2023-01-19 NOTE — PROGRESS NOTES
Health  Follow-Up Note   [x] Office  [] Phone  Notes from previous session reviewed.   [x] Previous Session Goals unchanged.   [] Patient/Caregiver Change in Goals.  Goals added or changed by Patient/Caregiver since program participation:  Walk more  Get back on salads   [place] more  Take care of cravings      Additional/Changed support that patient/caregiver has experienced/sought?  (Indicate readiness, support from family, friends, others, community groups, etc)    (listen to him more Positive attitude)    Additional/Changed obstacles that could prevent patient/caregiver from reaching their goals?   Work stress, eating wrong things    Feedback provided:   Praised for continued effort and determination.    Diagnostic values/Desriptors for follow-up as needed for chronic condition(s)   Weight: 99.4 kg 219.14 lb gain 7 lbs total loss 6 lbs    Interventions:   Health  listened reflectively, validated thoughts and feelings, offered support and encouragement.   Allowed patient to express themselves in a non-biased atmosphere.  Health  assisted pt to problem-solve obstacles such as being in a challenging environment and dealing with these challenges.   Motivational Interviewed interventions utilized (OARS).   Patient responded favorably to interventions and remained actively engaged in the session.   Health  will remain available and connected for patient by phone and/or office visits.   Positive reinforcement, emotional support and encouragement provided.   Focused Education: MI    Plan:  [x] Pt will work on goals as stated above.   [x] Pt will contact Health  for any questions, concerns or needs.  [x] Pt will follow up with Health  in office on  3.2.25.  [] Pt will follow up with Health  on phone in:        [x] Health  will remain available.   [] Health  will contact patient by phone in:        [] Health  will consult:        [] Health  will inform Provider  via EPIC messaging.     Impression:  1. Behavior is consistent with Action Stage of Change.   2. Participation level:       [x] Receptive      [x] Interactive      [] Guarded and Resistant      [x] Self Motivated      [] Refused/Declined to participate   3. [x] Pt voiced understanding of all information presented.       [] Pt voiced needing further information/education. This will be arranged.       [x] Pt would benefit from further education/information as identified by this health . This will be arranged.     Lashell Pedroza RN HC

## 2023-02-15 ENCOUNTER — PATIENT MESSAGE (OUTPATIENT)
Dept: OBSTETRICS AND GYNECOLOGY | Facility: CLINIC | Age: 38
End: 2023-02-15
Payer: COMMERCIAL

## 2023-02-24 ENCOUNTER — PATIENT MESSAGE (OUTPATIENT)
Dept: INTERNAL MEDICINE | Facility: CLINIC | Age: 38
End: 2023-02-24
Payer: COMMERCIAL

## 2023-02-24 NOTE — TELEPHONE ENCOUNTER
Just pulled LINKS report and Pneumoccal and Varicella is not listed.    Please advise,  Thank You.

## 2023-02-28 ENCOUNTER — OFFICE VISIT (OUTPATIENT)
Dept: OPTOMETRY | Facility: CLINIC | Age: 38
End: 2023-02-28
Payer: COMMERCIAL

## 2023-02-28 DIAGNOSIS — H52.13 MYOPIA OF BOTH EYES: Primary | ICD-10-CM

## 2023-02-28 PROCEDURE — 92015 PR REFRACTION: ICD-10-PCS | Mod: S$GLB,,, | Performed by: OPTOMETRIST

## 2023-02-28 PROCEDURE — 99999 PR PBB SHADOW E&M-EST. PATIENT-LVL III: ICD-10-PCS | Mod: PBBFAC,,, | Performed by: OPTOMETRIST

## 2023-02-28 PROCEDURE — 92014 PR EYE EXAM, EST PATIENT,COMPREHESV: ICD-10-PCS | Mod: S$GLB,,, | Performed by: OPTOMETRIST

## 2023-02-28 PROCEDURE — 92014 COMPRE OPH EXAM EST PT 1/>: CPT | Mod: S$GLB,,, | Performed by: OPTOMETRIST

## 2023-02-28 PROCEDURE — 92015 DETERMINE REFRACTIVE STATE: CPT | Mod: S$GLB,,, | Performed by: OPTOMETRIST

## 2023-02-28 PROCEDURE — 99999 PR PBB SHADOW E&M-EST. PATIENT-LVL III: CPT | Mod: PBBFAC,,, | Performed by: OPTOMETRIST

## 2023-02-28 NOTE — PROGRESS NOTES
HPI    Joan Murphy is here today for concerns about ocular health. Pt states   that while reading on phone without glasses, when she looks up at there   tv, it takes longer for her eyes to focus on the images. 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   DLS: 3/30/2022 Dr. Ruth  (-)Flashes (-)Floaters (-)Diplopia (-)Headaches   (+)Itching when she doesn't use gtts (-)Tearing (-) Burning (+)Dryness   when not using gtts (-) Photophobia  (-)Glare  Past Eye Sx: No past ocular Sx  Eye Meds: Azelastin 1x per day OU and Refresh OU PRN  Last edited by Blank Dolan, OD on 2/28/2023  8:46 AM.            Assessment /Plan     For exam results, see Encounter Report.    Myopia of both eyes      Eyeglass Final Rx       Eyeglass Final Rx         Sphere Cylinder Add    Right -0.75 Sphere +1.00    Left -1.00 Sphere +1.00      Type: PAL    Expiration Date: 2/28/2024              Eyeglass Final Rx #2         Sphere Cylinder Add    Right +0.25      Left [place]        Type: SVL-Reading    Expiration Date: 2/28/2024                   RTC in 1 year for annual eye exam unless changes noted sooner.

## 2023-03-01 ENCOUNTER — CLINICAL SUPPORT (OUTPATIENT)
Dept: OTHER | Facility: CLINIC | Age: 38
End: 2023-03-01
Payer: COMMERCIAL

## 2023-03-01 DIAGNOSIS — Z00.8 ENCOUNTER FOR OTHER GENERAL EXAMINATION: ICD-10-CM

## 2023-03-02 ENCOUNTER — CLINICAL SUPPORT (OUTPATIENT)
Dept: INTERNAL MEDICINE | Facility: CLINIC | Age: 38
End: 2023-03-02
Payer: COMMERCIAL

## 2023-03-02 VITALS
BODY MASS INDEX: 34.21 KG/M2 | DIASTOLIC BLOOD PRESSURE: 76 MMHG | HEIGHT: 67 IN | SYSTOLIC BLOOD PRESSURE: 118 MMHG | WEIGHT: 218 LBS

## 2023-03-02 VITALS — WEIGHT: 219.38 LBS | BODY MASS INDEX: 34.36 KG/M2

## 2023-03-02 LAB
HDLC SERPL-MCNC: 34 MG/DL
POC CHOLESTEROL, LDL (DOCK): 59 MG/DL
POC CHOLESTEROL, TOTAL: 126 MG/DL
POC GLUCOSE, FASTING: 79 MG/DL (ref 60–110)
TRIGL SERPL-MCNC: 202 MG/DL

## 2023-03-02 PROCEDURE — 99999 PR PBB SHADOW E&M-EST. PATIENT-LVL I: CPT | Mod: PBBFAC,,,

## 2023-03-02 PROCEDURE — 99999 PR PBB SHADOW E&M-EST. PATIENT-LVL I: ICD-10-PCS | Mod: PBBFAC,,,

## 2023-03-02 NOTE — PROGRESS NOTES
Health  Follow-Up Note   [x] Office  [] Phone  Notes from previous session reviewed.   [x] Previous Session Goals unchanged.   [] Patient/Caregiver Change in Goals.  Goals added or changed by Patient/Caregiver since program participation:  Get back on track  Back to eating salads and increasing fiber  Started exercise this week     Stop Erythritol      Additional/Changed support that patient/caregiver has experienced/sought?  (Indicate readiness, support from family, friends, others, community groups, etc)       Additional/Changed obstacles that could prevent patient/caregiver from reaching their goals?   Stress from work     Feedback provided:   Praised for continued effort and determination    Diagnostic values/Desriptors for follow-up as needed for chronic condition(s)   Weight: 99.5 kg 219.36 gain 0.22    Interventions:   Health  listened reflectively, validated thoughts and feelings, offered support and encouragement.   Allowed patient to express themselves in a non-biased atmosphere.  Health  assisted pt to problem-solve obstacles such as being in a challenging environment and dealing with these challenges.   Motivational Interviewed interventions utilized (OARS).   Patient responded favorably to interventions and remained actively engaged in the session.   Health  will remain available and connected for patient by phone and/or office visits.   Positive reinforcement, emotional support and encouragement provided.   Focused Education: MI    Plan:  [x] Pt will work on goals as stated above.   [x] Pt will contact Health  for any questions, concerns or needs.  [x] Pt will follow up with Health  in office on  4.6.23.  [] Pt will follow up with Health  on phone in:        [x] Health  will remain available.   [] Health  will contact patient by phone in:        [] Health  will consult:        [] Health  will inform Provider via EPIC messaging.      Impression:  1. Behavior is consistent with Action Stage of Change.   2. Participation level:       [x] Receptive      [x] Interactive      [] Guarded and Resistant      [x] Self Motivated      [] Refused/Declined to participate   3. [x] Pt voiced understanding of all information presented.       [] Pt voiced needing further information/education. This will be arranged.       [x] Pt would benefit from further education/information as identified by this health . This will be arranged.     Lashell Pedroza RN HC

## 2023-03-03 ENCOUNTER — PATIENT MESSAGE (OUTPATIENT)
Dept: PRIMARY CARE CLINIC | Facility: CLINIC | Age: 38
End: 2023-03-03

## 2023-03-03 ENCOUNTER — LAB VISIT (OUTPATIENT)
Dept: LAB | Facility: HOSPITAL | Age: 38
End: 2023-03-03
Payer: COMMERCIAL

## 2023-03-03 ENCOUNTER — OFFICE VISIT (OUTPATIENT)
Dept: PRIMARY CARE CLINIC | Facility: CLINIC | Age: 38
End: 2023-03-03
Payer: COMMERCIAL

## 2023-03-03 VITALS
HEART RATE: 73 BPM | WEIGHT: 204.81 LBS | OXYGEN SATURATION: 96 % | SYSTOLIC BLOOD PRESSURE: 118 MMHG | BODY MASS INDEX: 32.08 KG/M2 | DIASTOLIC BLOOD PRESSURE: 76 MMHG | TEMPERATURE: 98 F

## 2023-03-03 DIAGNOSIS — R74.01 TRANSAMINITIS: ICD-10-CM

## 2023-03-03 DIAGNOSIS — K21.9 LARYNGOPHARYNGEAL REFLUX: ICD-10-CM

## 2023-03-03 DIAGNOSIS — E66.9 OBESITY (BMI 30-39.9): ICD-10-CM

## 2023-03-03 DIAGNOSIS — Z00.00 ENCOUNTER FOR ROUTINE HISTORY AND PHYSICAL EXAMINATION OF ADULT: Primary | ICD-10-CM

## 2023-03-03 DIAGNOSIS — N80.9 ENDOMETRIOSIS: ICD-10-CM

## 2023-03-03 DIAGNOSIS — F43.23 ADJUSTMENT DISORDER WITH MIXED ANXIETY AND DEPRESSED MOOD: ICD-10-CM

## 2023-03-03 PROBLEM — J45.21 MILD INTERMITTENT ASTHMA WITH ACUTE EXACERBATION: Status: RESOLVED | Noted: 2018-05-31 | Resolved: 2023-03-03

## 2023-03-03 LAB
ALBUMIN SERPL BCP-MCNC: 3.9 G/DL (ref 3.5–5.2)
ALP SERPL-CCNC: 52 U/L (ref 55–135)
ALT SERPL W/O P-5'-P-CCNC: 22 U/L (ref 10–44)
AST SERPL-CCNC: 21 U/L (ref 10–40)
BILIRUB DIRECT SERPL-MCNC: 0.1 MG/DL (ref 0.1–0.3)
BILIRUB SERPL-MCNC: 0.3 MG/DL (ref 0.1–1)
PROT SERPL-MCNC: 7.1 G/DL (ref 6–8.4)

## 2023-03-03 PROCEDURE — 36415 COLL VENOUS BLD VENIPUNCTURE: CPT | Performed by: NURSE PRACTITIONER

## 2023-03-03 PROCEDURE — 99999 PR PBB SHADOW E&M-EST. PATIENT-LVL III: CPT | Mod: PBBFAC,,, | Performed by: INTERNAL MEDICINE

## 2023-03-03 PROCEDURE — 99499 NO LOS: ICD-10-PCS | Mod: S$GLB,,, | Performed by: INTERNAL MEDICINE

## 2023-03-03 PROCEDURE — 1160F RVW MEDS BY RX/DR IN RCRD: CPT | Mod: CPTII,S$GLB,, | Performed by: INTERNAL MEDICINE

## 2023-03-03 PROCEDURE — 3008F PR BODY MASS INDEX (BMI) DOCUMENTED: ICD-10-PCS | Mod: CPTII,S$GLB,, | Performed by: INTERNAL MEDICINE

## 2023-03-03 PROCEDURE — 1159F MED LIST DOCD IN RCRD: CPT | Mod: CPTII,S$GLB,, | Performed by: INTERNAL MEDICINE

## 2023-03-03 PROCEDURE — 3074F PR MOST RECENT SYSTOLIC BLOOD PRESSURE < 130 MM HG: ICD-10-PCS | Mod: CPTII,S$GLB,, | Performed by: INTERNAL MEDICINE

## 2023-03-03 PROCEDURE — 3078F PR MOST RECENT DIASTOLIC BLOOD PRESSURE < 80 MM HG: ICD-10-PCS | Mod: CPTII,S$GLB,, | Performed by: INTERNAL MEDICINE

## 2023-03-03 PROCEDURE — 3074F SYST BP LT 130 MM HG: CPT | Mod: CPTII,S$GLB,, | Performed by: INTERNAL MEDICINE

## 2023-03-03 PROCEDURE — 80076 HEPATIC FUNCTION PANEL: CPT | Performed by: NURSE PRACTITIONER

## 2023-03-03 PROCEDURE — 3008F BODY MASS INDEX DOCD: CPT | Mod: CPTII,S$GLB,, | Performed by: INTERNAL MEDICINE

## 2023-03-03 PROCEDURE — 3078F DIAST BP <80 MM HG: CPT | Mod: CPTII,S$GLB,, | Performed by: INTERNAL MEDICINE

## 2023-03-03 PROCEDURE — 1160F PR REVIEW ALL MEDS BY PRESCRIBER/CLIN PHARMACIST DOCUMENTED: ICD-10-PCS | Mod: CPTII,S$GLB,, | Performed by: INTERNAL MEDICINE

## 2023-03-03 PROCEDURE — 99999 PR PBB SHADOW E&M-EST. PATIENT-LVL III: ICD-10-PCS | Mod: PBBFAC,,, | Performed by: INTERNAL MEDICINE

## 2023-03-03 PROCEDURE — 1159F PR MEDICATION LIST DOCUMENTED IN MEDICAL RECORD: ICD-10-PCS | Mod: CPTII,S$GLB,, | Performed by: INTERNAL MEDICINE

## 2023-03-03 PROCEDURE — 99499 UNLISTED E&M SERVICE: CPT | Mod: S$GLB,,, | Performed by: INTERNAL MEDICINE

## 2023-03-03 NOTE — PATIENT INSTRUCTIONS
Thank you for coming to visit today.  As discussed today for your anxiety/adjustment reaction I have referred you to the counselor in [facility].    There were no significant findings today on history or exam but the key to better health is weight loss.  To that end I will suggest that you cut urine take by 20%.  The enemy in most cases carbohydrates so if you can cut your bread, rice, potatoes, and pasta in half you will lose weight.  You can not sustained weight loss without exercise and a daily walk particularly after dinner has been shown to have multiple benefits.  Also 11,000 steps a day controls weight.  Once I see your labs I can make other suggestions or if you would like I can talk to you about various medications.  Depending on results of the lab I will know when I need to see you again.  Certainly if you have a medical problem I want to be the physician who sees you.  For right now 1 year for your physical would be the outside appointment.  
Fall with Harm Risk

## 2023-03-03 NOTE — PROGRESS NOTES
37-year-old woman with a history of gynecological issues (fibroids, history of endometriosis, on birth control pills and additional progesterone, secondary acne due to the progesterone), laryngeal reflux, adjustment reaction with anxiety, here to establish as well as obtain immigration laboratory.  The patient has immigrating from [place].      History of present illness and pertinent review of systems:  1. Encounter for physical exam:  Patient is here to establish.  She notes that she is currently immigrating from [place].  She has been  for only a year and a half and she and her  are contemplating having a child.  The anxiety and stress of her job interferes with her developing a healthier lifestyle which would include exercise and changes in her diet.  The patient works as a  at [employer].  The patient is vaccinated and does get her routine health maintenance.  She also wears seatbelts.    2. Laryngeal reflux:  The patient is currently on omeprazole.  She initially presented as asthma on atypical cough.  ENT found that the patient had laryngeal reflux and her cough improved.  She continues however to have some nasal symptoms which may be related to this or allergic rhinitis.  She has no worrisome symptoms of food sticking, sour taste in her mouth, or change in her voice.    3. Gynecological issues:  The patient has both fibroids and endometriosis.  The latter has caused her to have severe painful periods as well as excessive bleeding.  She currently is on hormones and on the times that her gynecologist has asked her not to use her hormones continuously she has had a return of painful periods as well as the excess bleeding.  She has been referred to an endometriosis specialist at [facility].    4. Adjustment reaction associated with anxiety:  The patient is an immigrant in his always anxious about keeping her job.  Her job is stressful in causes a stress eater.  She often works  late.  She currently is seeing a counselor but is interested in establishing with our office which would include the counselor of [facility].      5. Obesity:  The patient states since beginning the immigration process has been stressful and she is been steadily gaining weight.  She often does not find time to exercise in her dietary habits have changed.  Additionally she tends to stress eat.  She has no symptoms of diabetes, thyroid disease, but does have reflux.  She has no symptoms of degenerative joint disease at this time.    Past medical history:  Medical:  Patient has no medical hospitalizations.    Surgical:  The patient had an umbilical hernia repair.  She also underwent appendectomy.    Trauma:  The patient has had no fractures or dislocations.    Psych:  Patient notes anxiety and adjustment reaction.  She has seen counseling in the past  OBGYN: Menses began at age 12.  The patient has never been pregnant.  Her menses when she does not take her pill continuously has been regular.  She does not use tampons.  Her last Pap was approximally between 1 and 2 years.  She has never had a mammogram.  Prevention:  The patient is vaccines are up-to-date including COVID, flu, and Tdap.  She is also had the hepatitis-B vaccine.    Family history:  Hypertension: Parents.    Diabetes mellitus: Father   BPH:  father   Siblings:  Patient has 2 siblings both brothers who are younger.  They are alive and well.      Social history:  Tobacco:  The patient does not smoke.    Alcohol:  Patient has less than 1 drink per week.    Recreational drugs:  The patient uses no recreational drugs.    Bowel:  The patient moves her bowels daily.    Diet:  Patient consumes 2 meals a day and 2-3 caffeinated beverages per day.    Sleep:  The patient sleeps 7 hours.  Exercise:  The patient does do or trying to do yoga daily.  She also attempts to do weightlifting 3 times per week.    Circumstances:  The patient is  and has no  children.  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  Her hobbies include reading, TB, cooking.      Allergies:    Metoclopramide:  The patient had an acute dystonic reaction    Medications: Medication list was reviewed with the patient.      Review of systems:  11 system review was done.  The patient wears glasses.  She has intermittent allergic rhinitis.  She is also had a history of alopecia, eczema, and contact dermatitis.  She also notes that the additional progesterone has caused her to have intermittent headaches.  The remaining review of systems was negative.    Physical Exam  Vitals reviewed.   Constitutional:       General: She is not in acute distress.     Appearance: She is obese. She is not ill-appearing.   HENT:      Head: Atraumatic.      Right Ear: Tympanic membrane, ear canal and external ear normal.      Left Ear: Tympanic membrane, ear canal and external ear normal.      Nose: Nose normal. No congestion or rhinorrhea.      Mouth/Throat:      Mouth: Mucous membranes are moist.      Pharynx: Oropharynx is clear. No oropharyngeal exudate.   Eyes:      General: No scleral icterus.     Extraocular Movements: Extraocular movements intact.      Pupils: Pupils are equal, round, and reactive to light.      Comments: Fundi are benign without exudate hemorrhage or scar.  The arterial vessels are normal   Neck:      Vascular: No carotid bruit.   Cardiovascular:      Rate and Rhythm: Normal rate and regular rhythm.      Pulses: Normal pulses.      Heart sounds: Normal heart sounds. No murmur heard.  Pulmonary:      Effort: Pulmonary effort is normal. No respiratory distress.      Breath sounds: No wheezing, rhonchi or rales.   Abdominal:      General: Bowel sounds are normal. There is no distension.      Palpations: Abdomen is soft.      Tenderness: There is no abdominal tenderness. There is no right CVA tenderness or left CVA tenderness.      Comments: No hepatosplenomegaly no abdominal bruits including  over the liver   Musculoskeletal:         General: No swelling or tenderness. Normal range of motion.      Cervical back: Normal range of motion and neck supple. No tenderness.      Right lower leg: No edema.      Left lower leg: No edema.   Lymphadenopathy:      Cervical: No cervical adenopathy.   Skin:     Coloration: Skin is not jaundiced.      Findings: No bruising, erythema, lesion or rash.   Neurological:      General: No focal deficit present.      Mental Status: She is alert and oriented to person, place, and time.      Cranial Nerves: No cranial nerve deficit.      Gait: Gait normal.   Psychiatric:         Mood and Affect: Mood normal.         Behavior: Behavior normal.     Assessment/plan:  1. Encounter for physical exam and for laboratory for immigration:  Per the history the patient is lifestyle could be modified for general improvement of her health.  She has gotten most of her routine maintenance and vaccinations.  She needs additional testing for immigration.  Plan:  Explanation the above to the patient.  Routine laboratory in addition to the immigration laboratory which will be called to the patient.  She is aware that my phone number will come up as no caller ID.    2. Laryngeal reflux:  This is in part due to her obesity.  Originally the patient presented as asthma variant cough.  The omeprazole is controlling the majority of her symptoms though she still has some nasal symptoms which potentially could be related.    Plan: Reviewed above with the patient.    Discussed weight loss is important part of the therapeutic plan of this condition.      3. Gynecological issues: The patient has both fibroids and endometriosis.  She is on both birth controls pills and added progesterone.  The latter has resulted in some occasional headaches which the patient can relieve simply by taking a walk.  She also notes acne secondary to the medications well.  The patient is meeting with an endometriosis specialist and  she is hoping that some of these drugs can be modified as she would like to get pregnant.    Plan: As above.    4. Adjustment reaction with secondary anxiety:  I was able to introduce the patient to the [facility] therapist  who met briefly with the patient.  The patient's anxiety has resulted in her stress eating and weight gain.  I did explore the importance of finding other ways to manage anxiety and stress other than eating.    Plan: Reviewed above with the patient.  The patient has made an appointment to see the therapist.      5.  Obesity:  The patient has no symptoms of diabetes or thyroid disease at this time.  Most of her weight appears to be the result of her lifestyle which is sedentary in combination with her stress eating.  Plan:  Explanation of the above.  I have asked the patient to reduce her intake by 20% without any seconds.  I have discussed healthier snacks when the patient feel stressed.    I have also recommended that the patient reduce her carbohydrates (bread, rice, potatoes, pasta) by 50%.

## 2023-03-06 ENCOUNTER — TELEPHONE (OUTPATIENT)
Dept: PRIMARY CARE CLINIC | Facility: CLINIC | Age: 38
End: 2023-03-06
Payer: COMMERCIAL

## 2023-03-06 ENCOUNTER — LAB VISIT (OUTPATIENT)
Dept: LAB | Facility: HOSPITAL | Age: 38
End: 2023-03-06
Payer: COMMERCIAL

## 2023-03-06 DIAGNOSIS — Z00.00 ENCOUNTER FOR ROUTINE HISTORY AND PHYSICAL EXAMINATION OF ADULT: Primary | ICD-10-CM

## 2023-03-06 DIAGNOSIS — Z00.00 ENCOUNTER FOR ROUTINE HISTORY AND PHYSICAL EXAMINATION OF ADULT: ICD-10-CM

## 2023-03-06 LAB
BILIRUB UR QL STRIP: NEGATIVE
CLARITY UR REFRACT.AUTO: CLEAR
COLOR UR AUTO: COLORLESS
GLUCOSE UR QL STRIP: NEGATIVE
HGB UR QL STRIP: NEGATIVE
KETONES UR QL STRIP: NEGATIVE
LEUKOCYTE ESTERASE UR QL STRIP: NEGATIVE
NITRITE UR QL STRIP: NEGATIVE
PH UR STRIP: 7 [PH] (ref 5–8)
PROT UR QL STRIP: NEGATIVE
SP GR UR STRIP: 1 (ref 1–1.03)
URN SPEC COLLECT METH UR: ABNORMAL

## 2023-03-06 PROCEDURE — 36415 COLL VENOUS BLD VENIPUNCTURE: CPT | Performed by: INTERNAL MEDICINE

## 2023-03-06 PROCEDURE — 87591 N.GONORRHOEAE DNA AMP PROB: CPT | Performed by: INTERNAL MEDICINE

## 2023-03-06 PROCEDURE — 81003 URINALYSIS AUTO W/O SCOPE: CPT | Performed by: INTERNAL MEDICINE

## 2023-03-06 PROCEDURE — 86592 SYPHILIS TEST NON-TREP QUAL: CPT | Performed by: INTERNAL MEDICINE

## 2023-03-07 ENCOUNTER — TELEPHONE (OUTPATIENT)
Dept: PRIMARY CARE CLINIC | Facility: CLINIC | Age: 38
End: 2023-03-07
Payer: COMMERCIAL

## 2023-03-07 LAB
C TRACH DNA SPEC QL NAA+PROBE: NOT DETECTED
N GONORRHOEA DNA SPEC QL NAA+PROBE: NOT DETECTED
RPR SER QL: NORMAL

## 2023-03-07 NOTE — TELEPHONE ENCOUNTER
Reviewed labs with patient.  Lab was unremarkable except for the fact the patient has mumps titer was negative.  The patient needs to have an MMR as she is trying to get pregnant and she will also need it for purposes of immigration.  She will schedule that vaccine.  Patient should follow up p.r.n. or in 1 year.

## 2023-03-07 NOTE — TELEPHONE ENCOUNTER
Reviewed negative RPR with patient.  She can not take a copy of this result to the immigration people.

## 2023-03-08 ENCOUNTER — OFFICE VISIT (OUTPATIENT)
Dept: HEPATOLOGY | Facility: CLINIC | Age: 38
End: 2023-03-08
Payer: COMMERCIAL

## 2023-03-08 ENCOUNTER — PATIENT MESSAGE (OUTPATIENT)
Dept: HEPATOLOGY | Facility: CLINIC | Age: 38
End: 2023-03-08

## 2023-03-08 ENCOUNTER — PROCEDURE VISIT (OUTPATIENT)
Dept: HEPATOLOGY | Facility: CLINIC | Age: 38
End: 2023-03-08
Payer: COMMERCIAL

## 2023-03-08 VITALS — WEIGHT: 222.44 LBS | BODY MASS INDEX: 34.91 KG/M2 | HEIGHT: 67 IN

## 2023-03-08 DIAGNOSIS — K76.0 FATTY LIVER: Primary | ICD-10-CM

## 2023-03-08 DIAGNOSIS — E66.9 OBESITY (BMI 30-39.9): ICD-10-CM

## 2023-03-08 DIAGNOSIS — R74.01 TRANSAMINITIS: ICD-10-CM

## 2023-03-08 PROCEDURE — 3044F PR MOST RECENT HEMOGLOBIN A1C LEVEL <7.0%: ICD-10-PCS | Mod: CPTII,S$GLB,, | Performed by: NURSE PRACTITIONER

## 2023-03-08 PROCEDURE — 99214 PR OFFICE/OUTPT VISIT, EST, LEVL IV, 30-39 MIN: ICD-10-PCS | Mod: S$GLB,,, | Performed by: NURSE PRACTITIONER

## 2023-03-08 PROCEDURE — 3044F HG A1C LEVEL LT 7.0%: CPT | Mod: CPTII,S$GLB,, | Performed by: NURSE PRACTITIONER

## 2023-03-08 PROCEDURE — 99214 OFFICE O/P EST MOD 30 MIN: CPT | Mod: S$GLB,,, | Performed by: NURSE PRACTITIONER

## 2023-03-08 PROCEDURE — 1159F PR MEDICATION LIST DOCUMENTED IN MEDICAL RECORD: ICD-10-PCS | Mod: CPTII,S$GLB,, | Performed by: NURSE PRACTITIONER

## 2023-03-08 PROCEDURE — 1160F RVW MEDS BY RX/DR IN RCRD: CPT | Mod: CPTII,S$GLB,, | Performed by: NURSE PRACTITIONER

## 2023-03-08 PROCEDURE — 76981 FIBROSCAN NEW ORLEANS (VIBRATION CONTROLLED TRANSIENT ELASTOGRAPHY): ICD-10-PCS | Mod: S$GLB,,, | Performed by: NURSE PRACTITIONER

## 2023-03-08 PROCEDURE — 76981 USE PARENCHYMA: CPT | Mod: S$GLB,,, | Performed by: NURSE PRACTITIONER

## 2023-03-08 PROCEDURE — 99999 PR PBB SHADOW E&M-EST. PATIENT-LVL IV: CPT | Mod: PBBFAC,,, | Performed by: NURSE PRACTITIONER

## 2023-03-08 PROCEDURE — 99999 PR PBB SHADOW E&M-EST. PATIENT-LVL IV: ICD-10-PCS | Mod: PBBFAC,,, | Performed by: NURSE PRACTITIONER

## 2023-03-08 PROCEDURE — 1159F MED LIST DOCD IN RCRD: CPT | Mod: CPTII,S$GLB,, | Performed by: NURSE PRACTITIONER

## 2023-03-08 PROCEDURE — 3008F BODY MASS INDEX DOCD: CPT | Mod: CPTII,S$GLB,, | Performed by: NURSE PRACTITIONER

## 2023-03-08 PROCEDURE — 3008F PR BODY MASS INDEX (BMI) DOCUMENTED: ICD-10-PCS | Mod: CPTII,S$GLB,, | Performed by: NURSE PRACTITIONER

## 2023-03-08 PROCEDURE — 1160F PR REVIEW ALL MEDS BY PRESCRIBER/CLIN PHARMACIST DOCUMENTED: ICD-10-PCS | Mod: CPTII,S$GLB,, | Performed by: NURSE PRACTITIONER

## 2023-03-08 NOTE — PATIENT INSTRUCTIONS
1. Fibroscan to look for fat or scar tissue in the liver showed ~67% fat in the liver, no scar tissue damage   2. Follow up in 1 year with US and labs a few days before, fibroscan same day     There is no FDA approved therapy for fatty liver disease. Therefore, these things are important:  Limit alcohol consumption  2 Weight loss goal of 20 lbs, referral for [facility] if interested. Also, if interested in a dietician visit to create a weight loss plan, contact the dietician team at [facility] at [email or web address] to schedule a visit to you can call [facility] in [place]: [phone] and the  will transfer the call to one of the dieticians to schedule an appointment. Or you can also call [phone] to schedule. They do offer video visits   3. Low carb/sugar, high fiber and protein diet.Try to limit your carb intake to LESS than 30-45 grams of carbs with a meal or LESS than 5-10 grams with any snack (total of any snack foods eaten during that time). Use [employer] [facility] Pal or Lose It angelika to add up your carbs through the day. Do NOT drink any beverages with calories or carbs (this can lead to high blood sugar and weight gain). Also, some of our patients have been very successful with weight loss using the pre made/planned meal planning services that limit calories and portion size ( The main thing to look for is low calorie, high protein, low carb)  4. Exercise, 5 days per week, 30 minutes per day, as tolerated  5. Recommend good cholesterol, blood pressure, blood sugar levels       In some people, fatty liver can progress to steatohepatitis (inflamatory fatty liver) and possibly to cirrhosis, increasing the risk for liver cancer, liver failure, and death. Therefore, the lifestyle changes are very important to decrease this risk.     Website with information about fatty liver and inflammation related to fatty liver ([facility]) = www.nashtruth.com  AND [email or web address]

## 2023-03-08 NOTE — PROGRESS NOTES
[facility] VISIT FOLLOW UP NOTE    PCP: Walter Phipps MD     CHIEF COMPLAINT: fatty liver, elevated liver enzymes     HPI: This is a 37 y.o. Black or  female with PMH noted below, presenting for follow up of above    Previous serologic w/u negative for viral hepatitis A, B and C     Prior serologic workup:   Lab Results   Component Value Date    HEPBSAG Non-reactive 03/03/2023    HEPCAB Non-reactive 03/03/2023    HEPAIGM Non-reactive 03/03/2023       Liver fibrosis staging:  -- fibroscan 3/2023 noted F0, S3 (kPA 4, )    Risk factors for NAFLD include obesity    Interval HPI: Presents today alone. No alcohol use   Drinks herbal Sharath T brewed tea and black tea. No oral herbal medications   Following Low carb (<100 grams per day), moderate fat  Resuming exercise     Labs done 3/2023 show normal transaminase levels (ALT > AST, elevated x2  5/30/2022-6/2022 during period of illness )  Platelets WNL, alk phos WNL  Synthetic liver functioning WNL    Lab Results   Component Value Date    ALT 23 03/03/2023    AST 22 03/03/2023    ALKPHOS 63 03/03/2023    BILITOT 0.3 03/03/2023    ALBUMIN 4.1 03/03/2023     03/03/2023       Abd U/S done 6/2022 showed normal liver and spleen     Denies family history of liver disease . Denies alcohol consumption currently or in the past-     +Immunity to Hep A and B          Allergy and medication list reviewed and updated     PMHX:  has a past medical history of Acne vulgaris (11/20/2018), Allergic rhinitis due to dust mite (5/13/2019), Endometriosis (11/20/2018), Fibroid, Migraine syndrome, and Mild intermittent asthma without complication (5/31/2018).    PSHX:  has a past surgical history that includes Hernia repair (2014) and Laparoscopic appendectomy (N/A, 4/21/2021).    FAMILY HISTORY: Updated and reviewed in [facility]    SOCIAL HISTORY:   Social History     Substance and Sexual Activity   Alcohol Use No       Social History     Substance and  "Sexual Activity   Drug Use No       ROS:   GENERAL: Denies fatigue  CARDIOVASCULAR: Denies edema  GI: Denies abdominal pain. Does report intermittent bloating and nausea  SKIN: Denies rash, itching   NEURO: Denies confusion, memory loss, or mood changes    PHYSICAL EXAM:   Friendly Black or  female, in no acute distress; alert and oriented to person, place and time  VITALS: Ht 5' 7" (1.702 m)   Wt 100.9 kg (222 lb 7.1 oz)   BMI 34.84 kg/m²   EYES: Sclerae anicteric  GI: Soft, non-tender, non-distended. No ascites.  EXTREMITIES:  No edema.  SKIN: Warm and dry. No jaundice. No telangectasias noted. No palmar erythema.  NEURO:  No asterixis.  PSYCH:  Thought and speech pattern appropriate. Behavior normal      EDUCATION:  See instructions discussed with patient in Instructions section of the After Visit Summary     ASSESSMENT & PLAN:  37 y.o. Black or  female with:  1. 1.  Fatty liver, likely related to metabolic risk factors   - see HPI  -- Fibroscan 3/2023 noted F0, S3 - will repeat yearly   -- Recommendations discussed with patient:  Limit alcohol consumption (pt does not drink currently)  2 Weight loss goal of 25 lbs  3. Low carb/sugar, high fiber and protein diet, limit your carb intake to LESS than 30-45 grams of carbs with a meal or LESS than 5-10 grams with any snack   4. Exercise, 5 days per week, 30 minutes per day, as tolerated  5. Recommend good cholesterol, blood pressure, blood sugar levels   6. Consider enrolling in [facility] fibrosis clinical trails - no indication, no fibrosis     2.  Obesity  -- Body mass index is 34.84 kg/m².   -- increases risk for fatty liver        Follow up in about 1 year (around 3/8/2024). With US, labs and fibroscan before   Orders Placed This Encounter   Procedures    FibroScan Hay (Vibration Controlled Transient Elastography)    US Abdomen Complete    Hepatic Function Panel        Thank you for allowing me to participate in the care of " BARRETT Haynes    I spent a total of 30 minutes on the day of the visit.This includes face to face time and non-face to face time preparing to see the patient (eg, review of tests), obtaining and/or reviewing separately obtained history, documenting clinical information in the electronic or other health record, independently interpreting results and communicating results to the patient/family/caregiver, and coordinating care.         CC'ed note to:

## 2023-03-10 ENCOUNTER — CLINICAL SUPPORT (OUTPATIENT)
Dept: PRIMARY CARE CLINIC | Facility: CLINIC | Age: 38
End: 2023-03-10
Payer: COMMERCIAL

## 2023-03-10 DIAGNOSIS — F43.23 ADJUSTMENT DISORDER WITH MIXED ANXIETY AND DEPRESSED MOOD: Primary | ICD-10-CM

## 2023-03-10 NOTE — PROGRESS NOTES
"[facility]AVIOR[facility] HE[facility] INTAKE    DATE:  3/10/2023  REFERRAL SOURCE:  Walter Phipps MD  TYPE OF VISIT:  In person  LENGTH OF SESSION: 60  .  HISTORY OF PRESENTING ILLNESS:  Joan Wilde, a 37 y.o. female with history of Adjustment disorders; with anxious mood [F43.22].    CHIEF COMPLAINT/REASON FOR ENCOUNTER: Pt presented for initial assessment. Met with patient. Pt's chief complaint includes the following: depression.    Patient does not currently have a psychiatrist.    Patient does not currently have a therapist.     They are not  interested in medication changes.    Current symptoms:  Depression: dysphoric mood and anhedonia.  Anxiety: denies.  Insomnia:  denies .  Mariam:  denies.  Psychosis: denies .      Session Content/Presenting Problem Hx:  Pt states that [employer] process and work environment. Pt reports supportive environment. Pt expressed frustration at work environment including issues with boss and inclusiveness. Pt feels like they have difficulty trusting supervisors at times. Pt would like to use therapy sessions to help process anxiety symptoms and increase coping skills.  Current social stressors:   Pt expresses difficulty coping with "toxic" work environment.      Risk assessment:  Patient reports no suicidal ideation  Patient reports no homicidal ideation  Patient reports no self-injurious behavior  Patient reports no violent behavior    PSYCHIATRIC HISTORY:  History of Mariam or diagnosis of Bipolar Disorder in the past:  No  History of Psychosis or diagnosis of Schizophrenia in the past:  No  Previous Psychiatric Hospitalizations:  No  Previous SI/HI:   No  Previous Suicide Attempts:  No  Previous Medication Trials: No   Previous Psychiatric Outpatient Treatment:  Yes - [facility])  History of Trauma:  No  History of Violence:  No  Access to a Gun:  No    SUBSTANCE ABUSE HISTORY:  Tobacco:  No   Alcohol: occasional  Illicit Substances: No  Misuse of Prescription Medications:  No    MEDICAL " HISTORY:  Past Medical History:   Diagnosis Date    Acne vulgaris 11/20/2018    Allergic rhinitis due to dust mite 5/13/2019    Endometriosis 11/20/2018    Fibroid     Migraine syndrome     associated with flu vaccines    Mild intermittent asthma without complication 5/31/2018       NEUROLOGIC HISTORY:  Seizures:  No  Head trauma:  No  Memory loss:  No    SOCIAL HISTORY (MARRIAGE, EMPLOYMENT, etc.):  Living Situation: lives with   Family Life Cycle:   Family:   Nuclear/Marriage:   Extended Family:   Supports: , co-worker, mentor  Education/Vocation:   Anglican/Spirituality: Confucianist  Hobbies and Interests:     PSYCHIATRIC FAMILY HISTORY: not known      MENTAL HEALTH STATUS EXAM  General Appearance:  unremarkable, age appropriate   Speech: normal tone, normal rate, normal pitch, normal volume      Level of Cooperation: cooperative      Thought Processes: normal and logical   Mood: steady      Thought Content: normal, no suicidality, no homicidality, delusions, or paranoia   Affect: congruent and appropriate   Orientation: Oriented x3   Memory: recent >  intact, remote >  intact   Attention Span & Concentration: intact   Fund of General Knowledge: intact and appropriate to age and level of education   Abstract Reasoning: interpretation of similarities was abstract   Judgment & Insight: good     Language  intact       IMPRESSION:   My diagnostic impression is Adjustment disorders; with depressed mood [F43.21], as evidenced by .     PROVISIONAL DIAGNOSES:  No diagnosis found.     STRENGTHS AND LIABILITIES: Strength: Patient is intelligent., Strength: Patient is motivated for change., Liability: Patient is impulsive., Liability: Patient has no suport network.    TREATMENT GOALS: Depression: acquiring relapse prevention skills, reducing excessive guilt, and reducing negative automatic thoughts    PLAN: In this session a psych evaluation was conducted to get history and process pt's life.  Problem-solving Therapy will be utilized in future individual  therapy sessions to increase support and behavior modification.     RETURN TO CLINIC: No follow-ups on file.

## 2023-03-15 DIAGNOSIS — Z12.31 OTHER SCREENING MAMMOGRAM: ICD-10-CM

## 2023-03-16 ENCOUNTER — OFFICE VISIT (OUTPATIENT)
Dept: OBSTETRICS AND GYNECOLOGY | Facility: CLINIC | Age: 38
End: 2023-03-16
Payer: COMMERCIAL

## 2023-03-16 VITALS
HEIGHT: 67 IN | BODY MASS INDEX: 32.87 KG/M2 | WEIGHT: 209.44 LBS | DIASTOLIC BLOOD PRESSURE: 68 MMHG | SYSTOLIC BLOOD PRESSURE: 110 MMHG

## 2023-03-16 DIAGNOSIS — N80.109 ENDOMETRIOSIS, OVARY: ICD-10-CM

## 2023-03-16 DIAGNOSIS — R10.2 PELVIC PAIN IN FEMALE: Primary | ICD-10-CM

## 2023-03-16 PROCEDURE — 3074F PR MOST RECENT SYSTOLIC BLOOD PRESSURE < 130 MM HG: ICD-10-PCS | Mod: CPTII,S$GLB,, | Performed by: OBSTETRICS & GYNECOLOGY

## 2023-03-16 PROCEDURE — 99215 PR OFFICE/OUTPT VISIT, EST, LEVL V, 40-54 MIN: ICD-10-PCS | Mod: 57,S$GLB,, | Performed by: OBSTETRICS & GYNECOLOGY

## 2023-03-16 PROCEDURE — 3078F PR MOST RECENT DIASTOLIC BLOOD PRESSURE < 80 MM HG: ICD-10-PCS | Mod: CPTII,S$GLB,, | Performed by: OBSTETRICS & GYNECOLOGY

## 2023-03-16 PROCEDURE — 1159F PR MEDICATION LIST DOCUMENTED IN MEDICAL RECORD: ICD-10-PCS | Mod: CPTII,S$GLB,, | Performed by: OBSTETRICS & GYNECOLOGY

## 2023-03-16 PROCEDURE — 3008F PR BODY MASS INDEX (BMI) DOCUMENTED: ICD-10-PCS | Mod: CPTII,S$GLB,, | Performed by: OBSTETRICS & GYNECOLOGY

## 2023-03-16 PROCEDURE — 3078F DIAST BP <80 MM HG: CPT | Mod: CPTII,S$GLB,, | Performed by: OBSTETRICS & GYNECOLOGY

## 2023-03-16 PROCEDURE — 99215 OFFICE O/P EST HI 40 MIN: CPT | Mod: 57,S$GLB,, | Performed by: OBSTETRICS & GYNECOLOGY

## 2023-03-16 PROCEDURE — 99999 PR PBB SHADOW E&M-EST. PATIENT-LVL III: CPT | Mod: PBBFAC,,, | Performed by: OBSTETRICS & GYNECOLOGY

## 2023-03-16 PROCEDURE — 3074F SYST BP LT 130 MM HG: CPT | Mod: CPTII,S$GLB,, | Performed by: OBSTETRICS & GYNECOLOGY

## 2023-03-16 PROCEDURE — 3008F BODY MASS INDEX DOCD: CPT | Mod: CPTII,S$GLB,, | Performed by: OBSTETRICS & GYNECOLOGY

## 2023-03-16 PROCEDURE — 99999 PR PBB SHADOW E&M-EST. PATIENT-LVL III: ICD-10-PCS | Mod: PBBFAC,,, | Performed by: OBSTETRICS & GYNECOLOGY

## 2023-03-16 PROCEDURE — 1159F MED LIST DOCD IN RCRD: CPT | Mod: CPTII,S$GLB,, | Performed by: OBSTETRICS & GYNECOLOGY

## 2023-03-16 PROCEDURE — 3044F HG A1C LEVEL LT 7.0%: CPT | Mod: CPTII,S$GLB,, | Performed by: OBSTETRICS & GYNECOLOGY

## 2023-03-16 PROCEDURE — 3044F PR MOST RECENT HEMOGLOBIN A1C LEVEL <7.0%: ICD-10-PCS | Mod: CPTII,S$GLB,, | Performed by: OBSTETRICS & GYNECOLOGY

## 2023-03-16 NOTE — PROGRESS NOTES
Subjective:       Patient ID: Joan Wilde is a 38 y.o. female.    Chief Complaint:  Endometriosis      History of Present Illness  HPI  Pt is 38 y.o. with No LMP recorded (lmp unknown). (Menstrual status: Birth Control).  Sent here in consultation from Dr. Ireland regarding endometriosis therapy.  Patient states that she is a long history of pelvic pain and was diagnosed with an endometrioma after an ovarian torsion in .  Since then, she is tried multiple therapies including 3 months of Depo Lupron, Depo-Provera, Nexplanon, and her current therapy.  She is also had ultrasounds in the past which did confirm the presence of a fibroid.  She was not able to tolerate birth control pills with estrogen.    She states that her pain has been extreme over the course of every month.  She is now having pain even between cycles.  Patient is interested in future fertility.  Her and her partner have been intermittently trying.  Here to talk about next steps.    GYN & OB History  No LMP recorded (lmp unknown). (Menstrual status: Birth Control).   Date of Last Pap: No result found    OB History    Para Term  AB Living   0 0 0 0 0 0   SAB IAB Ectopic Multiple Live Births   0 0 0 0 0       Review of Systems  Review of Systems   Constitutional:  Negative for activity change, appetite change and fatigue.   HENT: Negative.  Negative for tinnitus.    Eyes: Negative.    Respiratory:  Negative for cough and shortness of breath.    Cardiovascular:  Negative for chest pain and palpitations.   Gastrointestinal:  Positive for abdominal pain. Negative for blood in stool, constipation, diarrhea and nausea.   Endocrine: Negative.  Negative for hot flashes.   Genitourinary:  Positive for dyspareunia and pelvic pain. Negative for dysmenorrhea, dysuria, frequency, menstrual problem, vaginal discharge, urinary incontinence and postcoital bleeding.   Musculoskeletal:  Negative for back pain and joint swelling.   Integumentary:   Negative for rash, breast mass, nipple discharge and breast skin changes.   Neurological: Negative.  Negative for headaches.   Hematological: Negative.  Does not bruise/bleed easily.   Psychiatric/Behavioral:  The patient is not nervous/anxious.    Breast: negative.  Negative for mass, nipple discharge and skin changes        Objective:    Physical Exam:   Constitutional: She is oriented to person, place, and time. She appears well-developed and well-nourished. No distress.    HENT:   Head: Normocephalic and atraumatic.    Eyes: Pupils are equal, round, and reactive to light. Conjunctivae and lids are normal.     Cardiovascular:  Normal rate and regular rhythm.      Exam reveals no edema.        Pulmonary/Chest: Effort normal.        Abdominal: Soft. Bowel sounds are normal. She exhibits no distension. There is no abdominal tenderness. There is no rebound and no guarding.     Genitourinary:    Vagina, uterus, right adnexa and left adnexa normal.      Pelvic exam was performed with patient supine.   The external female genitalia was normal.   Labial bartholins normal.There is no tenderness or lesion on the right labia. There is no tenderness or lesion on the left labia. Cervix is normal. Right adnexum displays no mass and no tenderness. Left adnexum displays no mass and no tenderness. No  no vaginal discharge, rectocele, cystocele or unspecified prolapse of vaginal walls in the vagina. Cervix exhibits no motion tenderness and no discharge. Uterus is not deviated, not fixed and not hosting fibroids. Normal urethral meatus.Urethral Meatus exhibits: urethral lesionUrethra findings: no tendernessBladder findings: no bladder tenderness   Genitourinary Comments: Retroflexed uterus, no pelvic floor spasm.  No obvious fullness.             Musculoskeletal: Normal range of motion and moves all extremeties.       Neurological: She is alert and oriented to person, place, and time. She has normal strength.    Skin: Skin is warm  and dry.    Psychiatric: She has a normal mood and affect. Her speech is normal and behavior is normal. Thought content normal. Her mood appears not anxious. She does not exhibit a depressed mood. She expresses no suicidal plans and no homicidal plans.        Assessment:        1. Pelvic pain in female    2. Endometriosis, ovary                Plan:      Joan was seen today for endometriosis.    Diagnoses and all orders for this visit:    Pelvic pain in female  -     US Pelvis Comp with Transvag NON-OB (xpd; Future    Endometriosis, ovary  -     US Pelvis Comp with Transvag NON-OB (xpd; Future    I spent a total of 40 minutes on the day of the visit.  This includes face to face time and non-face to face time preparing to see the patient (eg, review of tests), obtaining and/or reviewing separately obtained history, documenting clinical information in the electronic or other health record, independently interpreting results and communicating results to the patient/family/caregiver, or care coordinator.    We had an extensive discussion today about the complicated nature of her history.  Given that patient had an endometrium in the past, she likely has advanced endometriosis.  I am further suspicious given her retroflexed uterus that she has cul-de-sac disease.  We talked about the options of starting with more aggressive medical therapy versus surgical therapy.  Since patient is currently 38 and interested in future fertility, we would like to try options that will not waste her time.  Patient has been trying to avoid surgery but realizes that it makes sense to proceed with a diagnostic laparoscopy and possible excision.  All questions were answered.  We will try to coordinate this with her schedule.

## 2023-03-17 ENCOUNTER — PATIENT MESSAGE (OUTPATIENT)
Dept: RESEARCH | Facility: HOSPITAL | Age: 38
End: 2023-03-17
Payer: COMMERCIAL

## 2023-03-20 ENCOUNTER — PATIENT MESSAGE (OUTPATIENT)
Dept: OBSTETRICS AND GYNECOLOGY | Facility: CLINIC | Age: 38
End: 2023-03-20
Payer: COMMERCIAL

## 2023-03-20 DIAGNOSIS — N80.9 ENDOMETRIOSIS: ICD-10-CM

## 2023-03-21 ENCOUNTER — TELEPHONE (OUTPATIENT)
Dept: OBSTETRICS AND GYNECOLOGY | Facility: CLINIC | Age: 38
End: 2023-03-21
Payer: COMMERCIAL

## 2023-03-21 ENCOUNTER — PATIENT OUTREACH (OUTPATIENT)
Dept: ADMINISTRATIVE | Facility: HOSPITAL | Age: 38
End: 2023-03-21
Payer: COMMERCIAL

## 2023-03-21 DIAGNOSIS — R10.2 PELVIC PAIN IN FEMALE: ICD-10-CM

## 2023-03-21 DIAGNOSIS — N80.109 ENDOMETRIOSIS, OVARY: Primary | ICD-10-CM

## 2023-03-21 RX ORDER — NORETHINDRONE 5 MG/1
5 TABLET ORAL DAILY
Qty: 30 TABLET | Refills: 6 | Status: SHIPPED | OUTPATIENT
Start: 2023-03-21 | End: 2023-12-14 | Stop reason: SDUPTHER

## 2023-03-21 RX ORDER — ACETAMINOPHEN AND CODEINE PHOSPHATE 120; 12 MG/5ML; MG/5ML
1 SOLUTION ORAL DAILY
Qty: 28 TABLET | Refills: 6 | Status: SHIPPED | OUTPATIENT
Start: 2023-03-21 | End: 2023-11-02

## 2023-03-21 NOTE — PROGRESS NOTES
Health Maintenance Due   Topic Date Due    Pneumococcal Vaccines (Age 0-64) (1 - PCV) Never done    Mammogram  Never done    COVID-19 Vaccine (5 - Booster for Pfizer series) 09/02/2022        updated. Triggered LINKS and Care everywhere. Reconciled immunizations.     Brittni Vaz LPN   Clinical Care Coordinator  Primary Care and Wellness

## 2023-03-29 ENCOUNTER — HOSPITAL ENCOUNTER (OUTPATIENT)
Dept: RADIOLOGY | Facility: HOSPITAL | Age: 38
Discharge: HOME | End: 2023-03-29
Attending: OBSTETRICS & GYNECOLOGY
Payer: COMMERCIAL

## 2023-03-29 DIAGNOSIS — R10.2 PELVIC PAIN IN FEMALE: ICD-10-CM

## 2023-03-29 DIAGNOSIS — N80.109 ENDOMETRIOSIS, OVARY: ICD-10-CM

## 2023-03-29 PROCEDURE — 76856 US EXAM PELVIC COMPLETE: CPT | Mod: TC

## 2023-03-29 PROCEDURE — 76830 US PELVIS COMP WITH TRANSVAG NON-OB (XPD): ICD-10-PCS | Mod: 26,,, | Performed by: STUDENT IN AN ORGANIZED HEALTH CARE EDUCATION/TRAINING PROGRAM

## 2023-03-29 PROCEDURE — 76856 US EXAM PELVIC COMPLETE: CPT | Mod: 26,,, | Performed by: STUDENT IN AN ORGANIZED HEALTH CARE EDUCATION/TRAINING PROGRAM

## 2023-03-29 PROCEDURE — 76856 US PELVIS COMP WITH TRANSVAG NON-OB (XPD): ICD-10-PCS | Mod: 26,,, | Performed by: STUDENT IN AN ORGANIZED HEALTH CARE EDUCATION/TRAINING PROGRAM

## 2023-03-29 PROCEDURE — 76830 TRANSVAGINAL US NON-OB: CPT | Mod: 26,,, | Performed by: STUDENT IN AN ORGANIZED HEALTH CARE EDUCATION/TRAINING PROGRAM

## 2023-03-31 ENCOUNTER — OFFICE VISIT (OUTPATIENT)
Dept: PSYCHIATRY | Facility: CLINIC | Age: 38
End: 2023-03-31
Payer: COMMERCIAL

## 2023-03-31 DIAGNOSIS — F43.23 ADJUSTMENT DISORDER WITH MIXED ANXIETY AND DEPRESSED MOOD: Primary | ICD-10-CM

## 2023-03-31 PROCEDURE — 90834 PR PSYCHOTHERAPY W/PATIENT, 45 MIN: ICD-10-PCS | Mod: S$GLB,,, | Performed by: SOCIAL WORKER

## 2023-03-31 PROCEDURE — 3044F PR MOST RECENT HEMOGLOBIN A1C LEVEL <7.0%: ICD-10-PCS | Mod: CPTII,S$GLB,, | Performed by: SOCIAL WORKER

## 2023-03-31 PROCEDURE — 90834 PSYTX W PT 45 MINUTES: CPT | Mod: S$GLB,,, | Performed by: SOCIAL WORKER

## 2023-03-31 PROCEDURE — 3044F HG A1C LEVEL LT 7.0%: CPT | Mod: CPTII,S$GLB,, | Performed by: SOCIAL WORKER

## 2023-04-04 NOTE — PROGRESS NOTES
Individual Psychotherapy (PhD/LCSW)    3/31/2023    Site:  [facility]         Therapeutic Intervention: Met with patient.  Outpatient - Insight oriented psychotherapy 45 min - CPT code 87729 and Outpatient - Supportive psychotherapy 45 min - CPT Code 51802    Chief complaint/reason for encounter: depression and anxiety     Interval history and content of current session: Patient returned to the clinic today for follow up appointment.  She has had a conflict with her manager at work over the past two weeks.  This was very anxiety provoking for patient.  She was having difficulty sleeping.  She and her manager met with a  today to discuss conflict resolution.  She is pleased with the outcome of the meeting.  She continues to have regular work stress.  The first part of her green card application has been approved, and she is pleased with this.  Her spouse continues to be a good support.  Discussed ways for patient to cope with work stress.        Treatment plan:  Target symptoms: depression, anxiety , adjustment, work stress  Why chosen therapy is appropriate versus another modality: relevant to diagnosis, patient responds to this modality  Outcome monitoring methods: self-report, observation  Therapeutic intervention type: insight oriented psychotherapy, supportive psychotherapy    Risk parameters:  Patient reports no suicidal ideation  Patient reports no homicidal ideation  Patient reports no self-injurious behavior  Patient reports no violent behavior    Verbal deficits: None    Patient's response to intervention:  The patient's response to intervention is accepting.    Progress toward goals and other mental status changes:  The patient's progress toward goals is fair .    Diagnosis:     ICD-10-CM ICD-9-CM   1. Adjustment disorder with mixed anxiety and depressed mood  F43.23 309.28       Plan:  individual psychotherapy    Return to clinic: as scheduled    Length of Service (minutes): 45

## 2023-04-05 ENCOUNTER — PATIENT MESSAGE (OUTPATIENT)
Dept: OBSTETRICS AND GYNECOLOGY | Facility: CLINIC | Age: 38
End: 2023-04-05
Payer: COMMERCIAL

## 2023-04-06 ENCOUNTER — PATIENT MESSAGE (OUTPATIENT)
Dept: OBSTETRICS AND GYNECOLOGY | Facility: CLINIC | Age: 38
End: 2023-04-06
Payer: COMMERCIAL

## 2023-04-06 ENCOUNTER — CLINICAL SUPPORT (OUTPATIENT)
Dept: INTERNAL MEDICINE | Facility: CLINIC | Age: 38
End: 2023-04-06
Payer: COMMERCIAL

## 2023-04-06 VITALS — WEIGHT: 217.81 LBS | BODY MASS INDEX: 34.11 KG/M2

## 2023-04-06 PROCEDURE — 99999 PR PBB SHADOW E&M-EST. PATIENT-LVL I: CPT | Mod: PBBFAC,,,

## 2023-04-06 PROCEDURE — 99999 PR PBB SHADOW E&M-EST. PATIENT-LVL I: ICD-10-PCS | Mod: PBBFAC,,,

## 2023-04-06 NOTE — PROGRESS NOTES
Health  Follow-Up Note   [x] Office  [] Phone  Notes from previous session reviewed.   [x] Previous Session Goals unchanged.   [x] Patient/Caregiver Change in Goals.  Goals added or changed by Patient/Caregiver since program participation:  Start eating breakfast and small meals throughout day  Increase walking and exercise   Procedure on 5.1.23      Additional/Changed support that patient/caregiver has experienced/sought?  (Indicate readiness, support from family, friends, others, community groups, etc)       Additional/Changed obstacles that could prevent patient/caregiver from reaching their goals?   Stress from work     Feedback provided:   Praised for good job down 1.55 lbs.    Diagnostic values/Desriptors for follow-up as needed for chronic condition(s)   Weight: 98.8 kg 217.81 lb     Interventions:   Health  listened reflectively, validated thoughts and feelings, offered support and encouragement.   Allowed patient to express themselves in a non-biased atmosphere.  Health  assisted pt to problem-solve obstacles such as being in a challenging environment and dealing with these challenges.   Motivational Interviewed interventions utilized (OARS).   Patient responded favorably to interventions and remained actively engaged in the session.   Health  will remain available and connected for patient by phone and/or office visits.   Positive reinforcement, emotional support and encouragement provided.   Focused Education: MI    Plan:  [x] Pt will work on goals as stated above.   [x] Pt will contact Health  for any questions, concerns or needs.  [x] Pt will follow up with Health  in office on  6.8.23.  [] Pt will follow up with Health  on phone in:        [x] Health  will remain available.   [] Health  will contact patient by phone in:        [] Health  will consult:        [] Health  will inform Provider via EPIC messaging.     Impression:  1. Behavior is  consistent with Action Stage of Change.   2. Participation level:       [x] Receptive      [x] Interactive      [] Guarded and Resistant      [x] Self Motivated      [] Refused/Declined to participate   3. [x] Pt voiced understanding of all information presented.       [] Pt voiced needing further information/education. This will be arranged.       [x] Pt would benefit from further education/information as identified by this health . This will be arranged.     Lashell Pedroza RN HC

## 2023-04-10 ENCOUNTER — OFFICE VISIT (OUTPATIENT)
Dept: SLEEP MEDICINE | Facility: CLINIC | Age: 38
End: 2023-04-10
Payer: COMMERCIAL

## 2023-04-10 VITALS
WEIGHT: 223.81 LBS | SYSTOLIC BLOOD PRESSURE: 125 MMHG | DIASTOLIC BLOOD PRESSURE: 79 MMHG | HEART RATE: 95 BPM | BODY MASS INDEX: 35.05 KG/M2

## 2023-04-10 DIAGNOSIS — G47.30 SLEEP APNEA, UNSPECIFIED TYPE: Primary | ICD-10-CM

## 2023-04-10 DIAGNOSIS — R06.83 SNORING: ICD-10-CM

## 2023-04-10 PROCEDURE — 99999 PR PBB SHADOW E&M-EST. PATIENT-LVL IV: ICD-10-PCS | Mod: PBBFAC,,, | Performed by: PSYCHIATRY & NEUROLOGY

## 2023-04-10 PROCEDURE — 3044F HG A1C LEVEL LT 7.0%: CPT | Mod: CPTII,S$GLB,, | Performed by: PSYCHIATRY & NEUROLOGY

## 2023-04-10 PROCEDURE — 99204 OFFICE O/P NEW MOD 45 MIN: CPT | Mod: S$GLB,,, | Performed by: PSYCHIATRY & NEUROLOGY

## 2023-04-10 PROCEDURE — 1159F MED LIST DOCD IN RCRD: CPT | Mod: CPTII,S$GLB,, | Performed by: PSYCHIATRY & NEUROLOGY

## 2023-04-10 PROCEDURE — 99204 PR OFFICE/OUTPT VISIT, NEW, LEVL IV, 45-59 MIN: ICD-10-PCS | Mod: S$GLB,,, | Performed by: PSYCHIATRY & NEUROLOGY

## 2023-04-10 PROCEDURE — 3074F PR MOST RECENT SYSTOLIC BLOOD PRESSURE < 130 MM HG: ICD-10-PCS | Mod: CPTII,S$GLB,, | Performed by: PSYCHIATRY & NEUROLOGY

## 2023-04-10 PROCEDURE — 99999 PR PBB SHADOW E&M-EST. PATIENT-LVL IV: CPT | Mod: PBBFAC,,, | Performed by: PSYCHIATRY & NEUROLOGY

## 2023-04-10 PROCEDURE — 3078F PR MOST RECENT DIASTOLIC BLOOD PRESSURE < 80 MM HG: ICD-10-PCS | Mod: CPTII,S$GLB,, | Performed by: PSYCHIATRY & NEUROLOGY

## 2023-04-10 PROCEDURE — 3044F PR MOST RECENT HEMOGLOBIN A1C LEVEL <7.0%: ICD-10-PCS | Mod: CPTII,S$GLB,, | Performed by: PSYCHIATRY & NEUROLOGY

## 2023-04-10 PROCEDURE — 3008F PR BODY MASS INDEX (BMI) DOCUMENTED: ICD-10-PCS | Mod: CPTII,S$GLB,, | Performed by: PSYCHIATRY & NEUROLOGY

## 2023-04-10 PROCEDURE — 3078F DIAST BP <80 MM HG: CPT | Mod: CPTII,S$GLB,, | Performed by: PSYCHIATRY & NEUROLOGY

## 2023-04-10 PROCEDURE — 3074F SYST BP LT 130 MM HG: CPT | Mod: CPTII,S$GLB,, | Performed by: PSYCHIATRY & NEUROLOGY

## 2023-04-10 PROCEDURE — 3008F BODY MASS INDEX DOCD: CPT | Mod: CPTII,S$GLB,, | Performed by: PSYCHIATRY & NEUROLOGY

## 2023-04-10 PROCEDURE — 1159F PR MEDICATION LIST DOCUMENTED IN MEDICAL RECORD: ICD-10-PCS | Mod: CPTII,S$GLB,, | Performed by: PSYCHIATRY & NEUROLOGY

## 2023-04-10 NOTE — PROGRESS NOTES
Joan Wilde is a 38 y.o. female is here to be evaluated for a sleep disorder; referred by Jamila Hercules*.    The patient reports snoring, gasping for air, choking, interrupted sleep, nocturia x2-3 and witnessed apneas since several years ago.   Joan Wilde denied  excessive daytime sleepiness.    The patient feels rested upon awakening. Takes naps.     The patient  denies morning headaches and reports occasional  dry mouth on awakening.   Joan Wilde reports occasional  nasal congestion. Seeing an allergologist and an ENT - on Flonase and antihistamines.   At times wheezing in spring-summer - and LPR (no asthma)    Joan Wilde  denies symptoms concerning for parasomnia except for occasional somniloquy.  The patient  denies auxiliary symptoms of narcolepsy including sleep onset paralysis, hypnagogic hallucinations, sleep attacks and cataplexy.    Joan Wilde denied symptoms concerning for RLS; nocturnal leg movements have not been noticed.   The patient does not experience sleep related leg cramps.       Medications pertinent to sleep  disorders taken currently: tea at night (caroline)  Previous  medications taken  for sleep disorders:  no    FH: insomnia in her father.     Sleep studies  no    Occupation:no   Bed partner: mo  Exercise routine: no  Caffeine:  no beverages per day; green tea  Alcohol: no  Smoking:no  EPWORTH SLEEPINESS SCALE TOTAL SCORE  4/10/2023   Total score 0         EPWORTH SLEEPINESS SCALE 4/10/2023   Sitting and reading 0   Watching TV 0   Sitting, inactive in a public place (e.g. a theatre or a meeting) 0   As a passenger in a car for an hour without a break 0   Lying down to rest in the afternoon when circumstances permit 0   Sitting and talking to someone 0   Sitting quietly after a lunch without alcohol 0   In a car, while stopped for a few minutes in traffic 0   Total score 0         EPWORTH SLEEPINESS SCALE 4/10/2023   Sitting and reading 0   Watching  TV 0   Sitting, inactive in a public place (e.g. a theatre or a meeting) 0   As a passenger in a car for an hour without a break 0   Lying down to rest in the afternoon when circumstances permit 0   Sitting and talking to someone 0   Sitting quietly after a lunch without alcohol 0   In a car, while stopped for a few minutes in traffic 0   Total score 0     Sleep Clinic New Patient 4/10/2023   What time do you go to bed on a week day? (Give a range) 10-11:30pm   What time do you go to bed on a day off? (Give a range) 12am-1am   How long does it take you to fall asleep? (Give a range) 30 minutes-1 hour   On average, how many times per night do you wake up? 2-3   How long does it take you to fall back into sleep? (Give a range) 5 minutes   What time do you wake up to start your day on a week day? (Give a range) 3am   What time do you wake up to start your day on a day off? (Give a range) 3am   What time do you get out of bed? (Give a range) 5 minutes   On average, how many hours do you sleep? 6-7 hours   On average, how many naps do you take per day? none   Rate your sleep quality from 0 to 5 (0-poor, 5-great). 3   Have you experienced:  N/a   How much weight have you lost or gained (in lbs.) in the last year? 0   On average, how many times per night do you go to the bathroom?  2-3   Have you ever had a sleep study/CPAP machine/surgery for sleep apnea? No   Have you ever had a CPAP machine for sleep apnea? No   Have you ever had surgery for sleep apnea? No       Sleep Clinic ROS  4/10/2023   Difficulty breathing through the nose?  No   Sore throat or dry mouth in the morning? No   Irregular or very fast heart beat?  No   Shortness of breath?  No   Acid reflux? Yes   Body aches and pains?  No   Morning headaches? No   Dizziness? No   Mood changes?  No   Do you exercise?  Yes   Do you feel like moving your legs a lot?  No       DME:         PAST MEDICAL HISTORY:    Active Ambulatory Problems     Diagnosis Date Noted     Acne vulgaris 2018    Endometriosis 2018    Allergic rhinitis due to dust mite 2019    Allergic conjunctivitis, bilateral 05/15/2020    Laryngopharyngeal reflux 2020    Adjustment disorder with mixed anxiety and depressed mood 2022    Fatty liver 2023    Obesity (BMI 30-39.9) 2023     Resolved Ambulatory Problems     Diagnosis Date Noted    Mild intermittent asthma with acute exacerbation 2018    Acute osteomyelitis of phalanx of foot, right 2018    Cough     Suspected COVID-19 virus infection 2020    Gastroesophageal reflux disease 2020    Acute appendicitis 2021    Transaminitis 2022     Past Medical History:   Diagnosis Date    Fibroid     Migraine syndrome     Mild intermittent asthma without complication 2018                PAST SURGICAL HISTORY:    Past Surgical History:   Procedure Laterality Date    HERNIA REPAIR      umbilical    LAPAROSCOPIC APPENDECTOMY N/A 2021    Procedure: APPENDECTOMY, LAPAROSCOPIC WITH LYSIS OF ADHESIONS.;  Surgeon: Trent Cannon MD;  Location: [facility] OR [address] [place] [address];  Service: General;  Laterality: N/A;         FAMILY HISTORY:                Family History   Problem Relation Age of Onset    Hypertension Mother     Diabetes Father     Hypertension Father     Stroke Father     Hyperlipidemia Father     No Known Problems Brother     No Known Problems Brother     Breast cancer Neg Hx     Colon cancer Neg Hx     Ovarian cancer Neg Hx     Melanoma Neg Hx     Blindness Neg Hx     Amblyopia Neg Hx     Cataracts Neg Hx     Glaucoma Neg Hx     Macular degeneration Neg Hx     Retinal detachment Neg Hx     Strabismus Neg Hx      labor Neg Hx     Cancer Neg Hx     Eclampsia Neg Hx        SOCIAL HISTORY:          Tobacco:   Social History     Tobacco Use   Smoking Status Never   Smokeless Tobacco Never       alcohol use:    Social History     Substance and Sexual Activity   Alcohol Use No                    ALLERGIES:    Review of patient's allergies indicates:   Allergen Reactions    Metoclopramide Other (See Comments)     Causes tongue to roll back in mouth       CURRENT MEDICATIONS:    Current Outpatient Medications   Medication Sig Dispense Refill    ascorbic acid, vitamin C, (VITAMIN C) 1000 MG tablet Take 2 tablets (2,000 mg total) by mouth once daily.      clindamycin (CLEOCIN T) 1 % lotion Use every night at bedtime on face for pimples 60 mL 3    clotrimazole-betamethasone 1-0.05% (LOTRISONE) cream Apply topically 2 (two) times daily to rash on neck x 2 weeks 45 g 2    conjugated estrogens (PREMARIN) vaginal cream Use dime size amount of estrogen cream in vagina nightly for 2 weeks, then twice a week thereafter. 30 g 2    fluticasone propionate (FLONASE) 50 mcg/actuation nasal spray 2 sprays (100 mcg total) by Each Nostril route once daily. 16 g 11    meloxicam (MOBIC) 7.5 MG tablet Take 1 tablet (7.5 mg total) by mouth once daily. 14 tablet 0    norethindrone (AYGESTIN) 5 mg Tab Take 1 tablet (5 mg total) by mouth once daily. 30 tablet 6    norethindrone (ORTHO MICRONOR) 0.35 mg tablet Take 1 tablet (0.35 mg total) by mouth once daily. 28 tablet 6    olopatadine (PATANOL) 0.1 % ophthalmic solution 1 drop 2 (two) times daily.      omeprazole (PRILOSEC) 20 MG capsule Take 2 capsules (40 mg total) by mouth once daily. 60 capsule 4     No current facility-administered medications for this visit.                        PHYSICAL EXAM:  /79 (BP Location: Left arm, Patient Position: Sitting, BP Method: Large (Automatic))   Pulse 95   Wt 101.5 kg (223 lb 12.8 oz)   LMP  (LMP Unknown)   BMI 35.05 kg/m²   GENERAL: Normal development, well groomed.  HEENT:   HEENT:  Conjunctivae are non-erythematous; Pupils equal, round, and reactive to light; Nose is symmetrical; Nasal mucosa is pink and moist; Septum is midline; Inferior turbinates are normal; Nasal airflow is normal; Posterior pharynx is pink; Modified  Mallampati:III; Posterior palate is  low ; Tonsils not visualized; Uvula is normal and pink;Tongue is normal; Dentition is fair; No TMJ tenderness; Jaw opening and protrusion without click and without discomfort.  NECK: Supple. Neck circumference is 16 inches. No thyromegaly. No palpable nodes.     SKIN: On face and neck: No abrasions, no rashes, no lesions.  No subcutaneous nodules are palpable.  RESPIRATORY: Chest is clear to auscultation.  Normal chest expansion and non-labored breathing at rest.  CARDIOVASCULAR: Normal S1, S2.  No murmurs, gallops or rubs. No carotid bruits bilaterally.  No edema. No clubbing. No cyanosis.    NEURO: Oriented to time, place and person. Normal attention span and concentration. Gait normal.    PSYCH: Affect is full. Mood is normal  MUSCULOSKELETAL: Moves 4 extremities. Gait normal.           ASSESSMENT:    1. Sleep Apnea NEC. The patient symptomatically has  snoring, gasping for air, witnessed apneas, interrupted sleep, and nocturia  with exam findings of crowded airway, elevated BMI, and large neck size. The patient has medical co-morbidities of  LPR  (laringeo-pharyngeal reflux) which can be worsened by AMARJIT. This warrants further investigation for possible obstructive sleep apnea.            PLAN:    Diagnostic: Polysomnogram in lab. The nature of this procedure and its indication was discussed with the patient. We will notify the patient about sleep study resuts via My Chart.        During our discussion today, we talked about the etiology of  AMARJIT as well as the potential ramifications of untreated sleep apnea, which could include daytime sleepiness, hypertension, heart disease and/or stroke.  We discussed potential treatment options, which could include weight loss, body positioning, continuous positive airway pressure (CPAP), or referral for surgical consideration. Meanwhile, she  is urged to avoid supine sleep, weight gain and alcoholic beverages since all of these can worsen  AMARJIT.     The patient was given open opportunity to ask questions and/or express concerns about treatment plan. Two point patient identifier confirmed.       Precautions: The patient was advised to abstain from driving should he feel sleepy or drowsy.    Follow up: MD after the sleep study has been completed.     46-minute visit. >50% spent counseling patient and coordination of care.  The patient was  cautioned against drowsy driving.

## 2023-04-10 NOTE — PATIENT INSTRUCTIONS
SLEEP LAB ([facility]) will contact you to schedulethe sleep study. Their number is [phone] (ext 2). Please call them if you do not hear from them in 2 weeks from now.  The [facility] Lab is located on 7th floor of the [facility] (for home and in lab studies); [facility] is located in [facility] ( in lab studies only).    SLEEP CLINIC (my assistant) will call you when the sleep study results are ready or I will message you through the portal with the results as we have discussed - if you have not heard from us by 2 weeks from the date of the study, or you can use [facility] to contact me.    Our clinic phone number is [phone] (ext 1)       You are advised to abstain from driving should you feel sleepy or drowsy.

## 2023-04-13 ENCOUNTER — TELEPHONE (OUTPATIENT)
Dept: SLEEP MEDICINE | Facility: OTHER | Age: 38
End: 2023-04-13
Payer: COMMERCIAL

## 2023-04-21 ENCOUNTER — TELEPHONE (OUTPATIENT)
Dept: SLEEP MEDICINE | Facility: OTHER | Age: 38
End: 2023-04-21
Payer: COMMERCIAL

## 2023-04-21 ENCOUNTER — PATIENT MESSAGE (OUTPATIENT)
Dept: SLEEP MEDICINE | Facility: OTHER | Age: 38
End: 2023-04-21
Payer: COMMERCIAL

## 2023-04-22 ENCOUNTER — HOSPITAL ENCOUNTER (OUTPATIENT)
Dept: SLEEP MEDICINE | Facility: HOSPITAL | Age: 38
Discharge: HOME | End: 2023-04-22
Attending: PSYCHIATRY & NEUROLOGY
Payer: COMMERCIAL

## 2023-04-22 DIAGNOSIS — G47.33 OSA (OBSTRUCTIVE SLEEP APNEA): Primary | ICD-10-CM

## 2023-04-22 DIAGNOSIS — G47.30 SLEEP APNEA, UNSPECIFIED TYPE: ICD-10-CM

## 2023-04-22 PROCEDURE — 95810 POLYSOM 6/> YRS 4/> PARAM: CPT

## 2023-04-23 PROBLEM — G47.30 SLEEP APNEA: Status: ACTIVE | Noted: 2023-04-23

## 2023-04-23 NOTE — PROGRESS NOTES
Education was done via explanation of sleep study process and procedure. All questions were answered.    Pt. did not meet criteria for CPAP. Few respiratory events were observed. Supine REM sleep was obtained.     Low sat of 83% was observed in study. EKG revealed NSR. Soft to moderate snoring was heard. Thank you letter was given in a.m   Immediate Brief Procedure Note    Patient Name: Kahlil Blair  YOB: 1980  DATE OF PROCEDURE: 3/14/2023  PROCEDURALIST: Feliz Rodriguez MD  ASSISTANT(S): None  ANESTHESIA TYPE: Local anesthesia   ANESTHESIOLOGIST: No anesthesia staff entered.     PROCEDURE PERFORMED: Left lobe Thyroid FNA    Pre-procedure Dx: Left thyroid nodule    Post-procedure Dx: Same    Findings: Left lobe thyroid FNA    Estimated Blood Loss: Less than 5 ml    Complications: None    Specimens Removed: Yes 3x 25g FNA, 2ml aspirate of gelatinous material

## 2023-04-24 ENCOUNTER — PATIENT MESSAGE (OUTPATIENT)
Dept: OBSTETRICS AND GYNECOLOGY | Facility: CLINIC | Age: 38
End: 2023-04-24
Payer: COMMERCIAL

## 2023-04-24 ENCOUNTER — OFFICE VISIT (OUTPATIENT)
Dept: URGENT CARE | Facility: CLINIC | Age: 38
End: 2023-04-24
Payer: COMMERCIAL

## 2023-04-24 ENCOUNTER — TELEPHONE (OUTPATIENT)
Dept: OBSTETRICS AND GYNECOLOGY | Facility: CLINIC | Age: 38
End: 2023-04-24
Payer: COMMERCIAL

## 2023-04-24 VITALS
HEART RATE: 83 BPM | HEIGHT: 67 IN | TEMPERATURE: 100 F | SYSTOLIC BLOOD PRESSURE: 116 MMHG | RESPIRATION RATE: 20 BRPM | DIASTOLIC BLOOD PRESSURE: 82 MMHG | BODY MASS INDEX: 34.06 KG/M2 | WEIGHT: 217 LBS | OXYGEN SATURATION: 98 %

## 2023-04-24 DIAGNOSIS — R50.9 LOW GRADE FEVER: ICD-10-CM

## 2023-04-24 DIAGNOSIS — R11.0 NAUSEA: Primary | ICD-10-CM

## 2023-04-24 LAB
B-HCG UR QL: NEGATIVE
CTP QC/QA: YES
CTP QC/QA: YES
SARS-COV-2 AG RESP QL IA.RAPID: NEGATIVE

## 2023-04-24 PROCEDURE — 99213 PR OFFICE/OUTPT VISIT, EST, LEVL III, 20-29 MIN: ICD-10-PCS | Mod: S$GLB,,,

## 2023-04-24 PROCEDURE — 81025 URINE PREGNANCY TEST: CPT | Mod: S$GLB,,,

## 2023-04-24 PROCEDURE — 87811 SARS CORONAVIRUS 2 ANTIGEN POCT, MANUAL READ: ICD-10-PCS | Mod: QW,S$GLB,,

## 2023-04-24 PROCEDURE — 99213 OFFICE O/P EST LOW 20 MIN: CPT | Mod: S$GLB,,,

## 2023-04-24 PROCEDURE — 81025 POCT URINE PREGNANCY: ICD-10-PCS | Mod: S$GLB,,,

## 2023-04-24 PROCEDURE — 87811 SARS-COV-2 COVID19 W/OPTIC: CPT | Mod: QW,S$GLB,,

## 2023-04-24 RX ORDER — ONDANSETRON 8 MG/1
8 TABLET, ORALLY DISINTEGRATING ORAL
Status: COMPLETED | OUTPATIENT
Start: 2023-04-24 | End: 2023-04-24

## 2023-04-24 RX ORDER — ONDANSETRON 4 MG/1
4 TABLET, ORALLY DISINTEGRATING ORAL EVERY 8 HOURS PRN
Qty: 10 TABLET | Refills: 0 | Status: SHIPPED | OUTPATIENT
Start: 2023-04-24 | End: 2023-11-02

## 2023-04-24 RX ADMIN — ONDANSETRON 8 MG: 8 TABLET, ORALLY DISINTEGRATING ORAL at 05:04

## 2023-04-24 NOTE — TELEPHONE ENCOUNTER
----- Message from Karyn Wilhelm sent at 4/24/2023  9:48 AM CDT -----  Name of Who is Calling:RO CORBETT [[id number]]              What is the request in detail:Requesting  a call back regarding upcoming procedure.               Can the clinic reply by [facility]:no              What Number to Call Back if not in [facility]:[phone]

## 2023-04-24 NOTE — PATIENT INSTRUCTIONS
Take Zofran as prescribed for nausea symptoms    Over the next few days, do not force yourself to eat if you are not hungry. If you develop an appetite start with bananas, rice, applesauce, toast (bland food) and work your way on up to heartier meals.  Avoid spicy, greasy, fatty foods while the nausea is still present.     Monitor your symptoms closely. If you develop fever, vomiting with or without blood, new abdominal pain, urinary symptoms, go to the ER immediately. Follow up with your PCP if nausea symptoms persist.       - You must understand that you have received an Urgent Care treatment only and that you may be released before all of your medical problems are known or treated.   - You, the patient, will arrange for follow up care as instructed with your primary care provider or recommended specialist.   - If your condition worsens or fails to improve we recommend that you receive another evaluation at the ER immediately or contact your PCP to discuss your concerns, or return here.   - Please do not drive or make any important decisions for 24 hours if you have received any pain medications, sedatives or mood altering drugs during your visit.    Disclaimer: This document was drafted with the use of a voice recognition device and is likely to have sound alike errors.

## 2023-04-24 NOTE — TELEPHONE ENCOUNTER
Patient called in to change surgery date to May 15 all appointments changed and updated in portal.

## 2023-04-24 NOTE — PROGRESS NOTES
"Subjective:      Patient ID: Joan Wilde is a 38 y.o. female.    Vitals:  height is 5' 7" (1.702 m) and weight is 98.4 kg (217 lb). Her temperature is 99.5 °F (37.5 °C). Her blood pressure is 116/82 and her pulse is 83. Her respiration is 20 and oxygen saturation is 98%.     Chief Complaint: Nausea    This is a 38 y.o. female who presents today with a chief complaint of intermittent nausea that started 5 days ago. Pt states that movement such as walking or exertion makes her feel more nauseas. Denies vomiting, dizziness, light headedness. Pt felt bloated Saturday but resolved. Pt states that she is unsure if she is pregnant. States she was feeling better over the weekend after she completed her sleep study and rested but symptoms returned yesterday. She tried an  prescription of zofran which partially relieved symptoms temporarily. States she is currently nauseous now. Hx of appendectomy. Denies urinary symptoms, abdominal pain, fever. States she ate sushi last Tuesday but denied any diarrhea. Allergic to metoclopramide.       Nausea  This is a new problem. The current episode started in the past 7 days. The problem occurs constantly. The problem has been unchanged. Associated symptoms include nausea. Pertinent negatives include no abdominal pain, chills, congestion, coughing, fever, headaches (resolved 3 days ago), visual change or vomiting. Exacerbated by: movement. She has tried nothing for the symptoms.     Constitution: Negative for chills and fever.   HENT:  Negative for congestion.    Respiratory:  Negative for cough.    Gastrointestinal:  Positive for history of abdominal surgery and nausea. Negative for abdominal pain, vomiting, constipation and diarrhea.   Genitourinary:  Negative for dysuria, frequency, urgency and hematuria.   Allergic/Immunologic: Negative for sneezing.   Neurological:  Negative for headaches (resolved 3 days ago).    Objective:     Vitals:    23 1707   BP: " 116/82   Pulse: 83   Resp: 20   Temp: 99.5 °F (37.5 °C)       Physical Exam   Constitutional: She is oriented to person, place, and time. She appears well-developed.  Non-toxic appearance. She does not appear ill. No distress.   HENT:   Head: Normocephalic and atraumatic.   Ears:   Right Ear: External ear normal.   Left Ear: External ear normal.   Nose: Nose normal. No rhinorrhea or purulent discharge. No epistaxis.   Mouth/Throat: Mucous membranes are normal.   Eyes: Conjunctivae and lids are normal.   Neck: Trachea normal. Neck supple.   Cardiovascular: Normal rate, regular rhythm and normal heart sounds.   Pulmonary/Chest: Effort normal and breath sounds normal. No respiratory distress. She has no wheezes. She has no rhonchi. She has no rales.   Abdominal: Normal appearance and bowel sounds are normal. She exhibits no distension and no mass. Soft. There is no abdominal tenderness. There is no rebound, no guarding, no tenderness at McBurney's point, negative Washington's sign, no left CVA tenderness, negative Rovsing's sign and no right CVA tenderness.   Musculoskeletal: Normal range of motion.         General: Normal range of motion.   Neurological: She is alert and oriented to person, place, and time. She has normal strength.   Skin: Skin is warm, dry, intact, not diaphoretic and not pale.   Psychiatric: Her speech is normal and behavior is normal. Judgment and thought content normal.   Nursing note and vitals reviewed.    Assessment:     1. Nausea    2. Low grade fever        Results for orders placed or performed in visit on 04/24/23   POCT urine pregnancy   Result Value Ref Range    POC Preg Test, Ur Negative Negative     Acceptable Yes    SARS Coronavirus 2 Antigen, POCT Manual Read   Result Value Ref Range    SARS Coronavirus 2 Antigen Negative Negative     Acceptable Yes        Plan:       Nausea  -     POCT urine pregnancy  -     ondansetron (ZOFRAN-ODT) 4 MG TbDL; Take 1 tablet  (4 mg total) by mouth every 8 (eight) hours as needed (nausea).  Dispense: 10 tablet; Refill: 0  -     ondansetron disintegrating tablet 8 mg    Low grade fever  -     SARS Coronavirus 2 Antigen, POCT Manual Read        Patient Instructions   Take Zofran as prescribed for nausea symptoms    Over the next few days, do not force yourself to eat if you are not hungry. If you develop an appetite start with bananas, rice, applesauce, toast (bland food) and work your way on up to heartier meals.  Avoid spicy, greasy, fatty foods while the nausea is still present.     Monitor your symptoms closely. If you develop fever, vomiting with or without blood, new abdominal pain, urinary symptoms, go to the ER immediately. Follow up with your PCP if nausea symptoms persist.       - You must understand that you have received an Urgent Care treatment only and that you may be released before all of your medical problems are known or treated.   - You, the patient, will arrange for follow up care as instructed with your primary care provider or recommended specialist.   - If your condition worsens or fails to improve we recommend that you receive another evaluation at the ER immediately or contact your PCP to discuss your concerns, or return here.   - Please do not drive or make any important decisions for 24 hours if you have received any pain medications, sedatives or mood altering drugs during your visit.    Disclaimer: This document was drafted with the use of a voice recognition device and is likely to have sound alike errors.       Medical Decision Making:   History:   Old Medical Records: I decided to obtain old medical records.  Urgent Care Management:  Zofran given in clinic. PO challenge successful. Prescription of Zofran given. ER precautions discussed. POCT COVID was negative. UPT was negative.

## 2023-04-28 ENCOUNTER — TELEPHONE (OUTPATIENT)
Dept: OBSTETRICS AND GYNECOLOGY | Facility: CLINIC | Age: 38
End: 2023-04-28
Payer: COMMERCIAL

## 2023-04-28 NOTE — TELEPHONE ENCOUNTER
----- Message from Jesse Casey sent at 4/28/2023  1:11 PM CDT -----  The domain that  came in today is the one to use  [phone]

## 2023-04-29 ENCOUNTER — PATIENT MESSAGE (OUTPATIENT)
Dept: SLEEP MEDICINE | Facility: CLINIC | Age: 38
End: 2023-04-29

## 2023-04-29 PROBLEM — G47.33 OSA (OBSTRUCTIVE SLEEP APNEA): Status: ACTIVE | Noted: 2023-04-23

## 2023-04-29 PROCEDURE — 95810 PR POLYSOMNOGRAPHY, 4 OR MORE: ICD-10-PCS | Mod: 26,,, | Performed by: PSYCHIATRY & NEUROLOGY

## 2023-04-29 PROCEDURE — 95810 POLYSOM 6/> YRS 4/> PARAM: CPT | Mod: 26,,, | Performed by: PSYCHIATRY & NEUROLOGY

## 2023-04-30 NOTE — PROCEDURES
"    Diagnostic Report  [facility] - [address] [place] [address]  Phone: [phone]  Fax: [phone]           Patient Name: RO CORBETT Study Date: 4/22/2023   YOB: 1985 Patient MRN: [id number]   Age:  38 year     Sex: Female Referring Physician: MD Divine   Height: 5' 7" Recording Tech: Anju Hawley RRT RPSGT   Weight: 223.0 lbs Scoring Tech: Patrick Mcdowell RPSGT   BMI:  35.0 AASM:  1 A   AHI: 6.7 Interpreting Physician: Sheron Man MD   RERA index: - Low oxygen sat: 87.0%   RDI: 6.7        Polysomnogram Data: A full night polysomnogram recorded the standard physiologic parameters including EEG, EOG, EMG, EKG, nasal and oral airflow.  Respiratory parameters of chest and abdominal movements were recorded with Peizo-Crystal motion transducers.  Oxygen saturation was recorded by pulse oximetry.    Sleep architecture: This is a baseline polysomnogram. At light's out, the patient fell asleep in 33.0 minutes and slept for 82.8% of the time. Total sleep time (TST) was 378.0 minutes. 4.0% of TST was in Stage N1 sleep, 19.7% TST in slow wave sleep, and 21.2% TST in REM sleep. The REM latency was 172.0 minutes. Sleep architecture was significantly disrupted due to underlying sleep apnea.     Respiratory: Mild to moderate snoring was present. The polysomnogram revealed a presence of - obstructive, - central, and - mixed apneas resulting in a Total Apnea index of - events per hour.  There were 42 hypopneas resulting in a Total Hypopnea index of 6.7 events per hour.  The combined Apnea/Hypopnea index was 6.7 events per hour.  There were a total of - RERA events resulting in a Respiratory Disturbance Index (RDI) of 6.7 events per hour.  Mean oxygen saturation was 97.9%.  The lowest oxygen saturation during sleep was 94.0%.  Time spent ?88% oxygen saturation was - minutes (-).The patient did not qualify for a split night study due to an insufficient number of " "events in the first half of the study.    Motor movement / Parasomnia: There were no significant  limb movements of sleep noted. The total limb movement index was - (- with arousal).     Cardiac: Cardiac rhythm monitoring revealed a sinus rhythm.    The average pulse rate was 74.1 bpm.  The minimum pulse rate was 59.0 bpm while the maximum pulse rate was 107.0 bpm.      Patient perception: On a post-sleep study questionnaire, the patient indicated that sleep was the same in the lab than compared to home.    IMPRESSION:  Obstructive Sleep Apnea (G47.33),  mild based on AHI criteria.    RECOMMENDATION:    CPAP titration study, if the patient is interested in this treatment modality.  Alternative treatment options (oral appliance, EPAP, ENT surgery) may be discussed with the patient.          I have reviewed the attached data report and the raw data tracings of this study epoch-by-epoch and have determined that the recording quality and scoring of events are sufficient to allow for interpretation and electronically signed by:      Sheron Man MD 4/22/2023                              [facility]    Diagnostic PSG Report    Patient Name: RO CORBETT Study Date: 4/22/2023   YOB: 1985 MRN #: [id number]   Age: 38 year VAL #: [id number]   Sex: Female Referring Provider: MD Divien   Height: 5' 7" Recording Tech: Anju Hawley RRT RPSGT   Weight: 223.0 lbs Scoring Tech: Patrick Mcdowell RRT RPSGT   BMI: 35.0 Interpreting Physician: Sheron Man MD   ESS: - Neck Circumference: -     Study Overview    Lights Off: 09:46:56 PM  Count Index   Lights On: 05:23:30 AM Awakenings: 20 3.2   Time in Bed: 456.6 min. Arousals: 42 6.7   Total Sleep Time: 378.0 min. Apneas & Hypopneas: 42 6.7    Sleep Efficiency: 82.8% Limb Movements: - -   Sleep Latency: 33.0 min. Snores: - -   Wake After Sleep Onset: 45.5 min. Desaturations: 5 0.8    REM Latency from Sleep Onset: 172.0 min. Minimum " SpO2 TST: 94.0%        Sleep Architecture   % of Time in Bed    Stages Time (mins) % Sleep Time   Wake 79.5    Stage N1 15.0 4.0%   Stage N2 208.5 55.2%   Stage N3 74.5 19.7%   REM 80.0 21.2%         Arousal Summary     NREM REM Sleep Index   Respiratory Arousals - 11 11 1.7   PLM Arousals - - - -   Isolated Limb Movement Arousals - - - -   Spontaneous Arousals 22 9 31 4.9   Total 22 20 42 6.7       Limb Movement Summary     Count Index   Isolated Limb Movements - -   Periodic Limb Movements (PLMs) - -   Total Limb Movements - -         Respiratory Summary     By Sleep Stage By Body Position Total    NREM REM Supine Non-Supine    Time (min) 298.0 80.0 49.5 328.5 378.0           Obstructive Apnea - - - - -   Mixed Apnea - - - - -   Central Apnea - - - - -   Total Apneas - - - - -   Total Apnea Index - - - - -           Hypopnea 1 41 12 30 42   Hypopnea Index 0.2 30.8 14.5 5.5 6.7           Apnea & Hypopnea 1 41 12 30 42   Apnea & Hypopnea Index 0.2 30.8 14.5 5.5 6.7           RERAs - - - - -   RERA Index - - - - -           RDI 0.2 30.8 14.5 5.5 6.7      Scoring Criteria: Hypopneas scored at Choose an item.% desaturation criteria.    Respiratory Event Durations     Apnea Hypopnea    NREM REM NREM REM   Average (seconds) - - 12.1 9.8   Maximum (seconds) - - 12.1 14.4       Oxygen Saturation Summary     Wake NREM REM TST TIB   Average SpO2 98.9% 97.7% 97.7% 97.7% 97.9%   Minimum SpO2 87.0% 94.0% 94.0% 94.0% 87.0%   Maximum SpO2 100.0% 100.0% 100.0% 100.0% 100.0%       Oxygen Saturation Distribution    Range (%) Time in range (min) Time in range (%)   90.0 - 100.0 445.2 99.0%   80.0 - 90.0 0.5 0.1%   70.0 - 80.0 - -   60.0 - 70.0 - -   50.0 - 60.0 - -   0.0 - 50.0 - -   Time Spent ?88% SpO2    Range (%) Time in range (min) Time in range (%)   0.0 - 88.0 0.1 0.0%          Count Index   Desaturations 5 0.8      Cardiac Summary     Wake NREM REM Sleep Total   Average Pulse Rate (BPM) 75.9 73.3 75.7 73.8 74.1   Minimum  Pulse Rate (BPM) 60.0 59.0 59.0 59.0 59.0   Maximum Pulse Rate (BPM) 107.0 89.0 101.0 101.0 107.0     Pulse Rate Distribution    Range (bpm) Time in range (min) Time in range (%)   0.0 - 40.0 - -   40.0 - 60.0 0.5 0.1%   60.0 - 80.0 419.7 93.3%   80.0 - 100.0 28.9 6.4%   100.0 - 120.0 0.6 0.1%   120.0 - 140.0 - -   140.0 - 200.0 - -     EtCO2 Summary    Stage Min (mmHg) Average (mmHg) Max (mmHg)   Wake - - -   NREM(1+2+3) - - -   REM - - -     Range (mmHg) Time in range (min) Time in range (%)   20.0 - 40.0 - -   40.0 - 50.0 - -   50.0 - 55.0 - -   55.0 - 100.0 - -   Excluded data <20.0 & >65.0 457.5 100.0%     TcCO2 Summary    Stage Min (mmHg) Average (mmHg) Max (mmHg)   Wake - - -   NREM(1+2+3) - - -   REM - - -     Range (mmHg) Time in range (min) Time in range (%)   - - -   - - -   - - -   - - -   - - -     Comments    -

## 2023-05-01 ENCOUNTER — PATIENT MESSAGE (OUTPATIENT)
Dept: OBSTETRICS AND GYNECOLOGY | Facility: CLINIC | Age: 38
End: 2023-05-01
Payer: COMMERCIAL

## 2023-05-02 ENCOUNTER — TELEPHONE (OUTPATIENT)
Dept: OBSTETRICS AND GYNECOLOGY | Facility: CLINIC | Age: 38
End: 2023-05-02
Payer: COMMERCIAL

## 2023-05-02 NOTE — TELEPHONE ENCOUNTER
----- Message from Stella Stewart sent at 5/1/2023  2:58 PM CDT -----  Type:  Patient Returning Call      Who Called:  RO CORBETT [[id number]]      Who Left Message for Patient: Unsure      Does the patient know what this is regarding?: [facility] paperwork      Would the patient rather a call back or a response via [facility]?  Call back       Best Call Back Number: [phone] (home) [phone] (work)      Additional Information:

## 2023-05-08 ENCOUNTER — OFFICE VISIT (OUTPATIENT)
Dept: OTOLARYNGOLOGY | Facility: CLINIC | Age: 38
End: 2023-05-08
Payer: COMMERCIAL

## 2023-05-08 VITALS
WEIGHT: 220.38 LBS | HEART RATE: 82 BPM | SYSTOLIC BLOOD PRESSURE: 125 MMHG | BODY MASS INDEX: 34.51 KG/M2 | DIASTOLIC BLOOD PRESSURE: 83 MMHG

## 2023-05-08 DIAGNOSIS — R49.0 MUSCLE TENSION DYSPHONIA: ICD-10-CM

## 2023-05-08 DIAGNOSIS — K21.9 LARYNGOPHARYNGEAL REFLUX (LPR): ICD-10-CM

## 2023-05-08 DIAGNOSIS — J30.9 CHRONIC ALLERGIC RHINITIS: Primary | ICD-10-CM

## 2023-05-08 PROCEDURE — 3044F PR MOST RECENT HEMOGLOBIN A1C LEVEL <7.0%: ICD-10-PCS | Mod: CPTII,S$GLB,, | Performed by: OTOLARYNGOLOGY

## 2023-05-08 PROCEDURE — 99214 PR OFFICE/OUTPT VISIT, EST, LEVL IV, 30-39 MIN: ICD-10-PCS | Mod: 25,S$GLB,, | Performed by: OTOLARYNGOLOGY

## 2023-05-08 PROCEDURE — 3044F HG A1C LEVEL LT 7.0%: CPT | Mod: CPTII,S$GLB,, | Performed by: OTOLARYNGOLOGY

## 2023-05-08 PROCEDURE — 1160F PR REVIEW ALL MEDS BY PRESCRIBER/CLIN PHARMACIST DOCUMENTED: ICD-10-PCS | Mod: CPTII,S$GLB,, | Performed by: OTOLARYNGOLOGY

## 2023-05-08 PROCEDURE — 3074F PR MOST RECENT SYSTOLIC BLOOD PRESSURE < 130 MM HG: ICD-10-PCS | Mod: CPTII,S$GLB,, | Performed by: OTOLARYNGOLOGY

## 2023-05-08 PROCEDURE — 3079F DIAST BP 80-89 MM HG: CPT | Mod: CPTII,S$GLB,, | Performed by: OTOLARYNGOLOGY

## 2023-05-08 PROCEDURE — 31575 LARYNGOSCOPY: ICD-10-PCS | Mod: S$GLB,,, | Performed by: OTOLARYNGOLOGY

## 2023-05-08 PROCEDURE — 1159F PR MEDICATION LIST DOCUMENTED IN MEDICAL RECORD: ICD-10-PCS | Mod: CPTII,S$GLB,, | Performed by: OTOLARYNGOLOGY

## 2023-05-08 PROCEDURE — 1160F RVW MEDS BY RX/DR IN RCRD: CPT | Mod: CPTII,S$GLB,, | Performed by: OTOLARYNGOLOGY

## 2023-05-08 PROCEDURE — 3008F PR BODY MASS INDEX (BMI) DOCUMENTED: ICD-10-PCS | Mod: CPTII,S$GLB,, | Performed by: OTOLARYNGOLOGY

## 2023-05-08 PROCEDURE — 3074F SYST BP LT 130 MM HG: CPT | Mod: CPTII,S$GLB,, | Performed by: OTOLARYNGOLOGY

## 2023-05-08 PROCEDURE — 99999 PR PBB SHADOW E&M-EST. PATIENT-LVL III: ICD-10-PCS | Mod: PBBFAC,,, | Performed by: OTOLARYNGOLOGY

## 2023-05-08 PROCEDURE — 3008F BODY MASS INDEX DOCD: CPT | Mod: CPTII,S$GLB,, | Performed by: OTOLARYNGOLOGY

## 2023-05-08 PROCEDURE — 99999 PR PBB SHADOW E&M-EST. PATIENT-LVL III: CPT | Mod: PBBFAC,,, | Performed by: OTOLARYNGOLOGY

## 2023-05-08 PROCEDURE — 31575 DIAGNOSTIC LARYNGOSCOPY: CPT | Mod: S$GLB,,, | Performed by: OTOLARYNGOLOGY

## 2023-05-08 PROCEDURE — 99214 OFFICE O/P EST MOD 30 MIN: CPT | Mod: 25,S$GLB,, | Performed by: OTOLARYNGOLOGY

## 2023-05-08 PROCEDURE — 1159F MED LIST DOCD IN RCRD: CPT | Mod: CPTII,S$GLB,, | Performed by: OTOLARYNGOLOGY

## 2023-05-08 PROCEDURE — 3079F PR MOST RECENT DIASTOLIC BLOOD PRESSURE 80-89 MM HG: ICD-10-PCS | Mod: CPTII,S$GLB,, | Performed by: OTOLARYNGOLOGY

## 2023-05-08 RX ORDER — OMEPRAZOLE 20 MG/1
40 CAPSULE, DELAYED RELEASE ORAL DAILY
Qty: 60 CAPSULE | Refills: 4 | Status: SHIPPED | OUTPATIENT
Start: 2023-05-08 | End: 2023-10-20

## 2023-05-08 RX ORDER — FLUTICASONE PROPIONATE 50 MCG
2 SPRAY, SUSPENSION (ML) NASAL DAILY
Qty: 16 G | Refills: 11 | Status: SHIPPED | OUTPATIENT
Start: 2023-05-08

## 2023-05-08 NOTE — PROCEDURES
Joan Wilde is a 38 y.o. female patient.    Pulse: 82 (05/08/23 1549)  BP: 125/83 (05/08/23 1549)  Weight: 100 kg (220 lb 5.6 oz) (05/08/23 1549)       Laryngoscopy    Date/Time: 5/8/2023 3:20 PM  Performed by: Paige Grant MD  Authorized by: Paige Grant MD     Consent Done?:  Yes (Verbal)  Anesthesia:     Local anesthetic:  Lidocaine 2% and Nikita-Synephrine 1/2%  Laryngoscopy:     Areas examined:  Nasal cavities, nasopharynx, oropharynx, hypopharynx, larynx and vocal cords  Nose External:      No external nasal deformity  Nose Intranasal:      Mucosa no polyps     Mucosa ulcers not present     No mucosa lesions present     No septum gross deformity     Turbinates not enlarged  Nasopharynx:      No mucosa lesions     Adenoids not present     Posterior choanae patent     Eustachian tube patent  Larynx/hypopharynx:      No epiglottis lesions     No epiglottis edema     No AE folds lesions     No vocal cord polyps     Equal and normal bilateral     No hypopharynx lesions     No piriform sinus pooling     No piriform sinus lesions     Post cricoid edema (mild)     Post cricoid erythema (mild)          5/8/2023

## 2023-05-08 NOTE — PROGRESS NOTES
Chief Complaint   Patient presents with    Follow-up   .       Interval HPI 1/9/2023:  Follow up visit. Reports that she has been feeling very well. She feels throat pain and globus sensation are overall better. She has been on Protonix 40mg PO daily. She has noted in the last month that coffee seemed to be a trigger for heartburn.   She has been using Gaviscon advance daily.  She notes that recently she has been snoring more throughout the last year.  She states that her  has noted this has been more of a problem- he has noticed episodes which concerned him for apnea. She lost 38 lbs in 2020 and reports that she has gained most of  this weight back over the last 2 years. She thinks that the snoring has been worse with the weight gain. She notes that she has trouble waking in the morning, decreased energy, and daytime somnolence. She states that her watch has shown decreased oxygen levels while sleeping.     Interval HPI 5/8/2023:  Follow up visit. She reports that she is having increased mucscle tension when she is at work.  She was dx'd with AMARJIT and will need to start CPAP versus oral appliance. She is still taking gaviscon advance and Prilosec 40mg PO daily.       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      Social Determinants of Health     Financial Resource Strain: Low Risk     Difficulty of Paying Living Expenses: Not hard at all    Food Insecurity: No Food Insecurity    Worried About Running Out of Food in the Last Year: Never true    Ran Out of Food in the Last Year: Never true   Transportation Needs: No Transportation Needs    Lack of Transportation (Medical): No    Lack of Transportation (Non-Medical): No   Physical Activity: Insufficiently Active    Days of Exercise per Week: 5 days    Minutes of Exercise per Session: 20 min   Stress: Stress Concern Present    Feeling of Stress : To some extent   Social Connections: Unknown    Frequency of Communication with Friends and Family: More than three times a week    Frequency of Social Gatherings with Friends and Family: Never    Active Member of Clubs or Organizations: No    Attends Club or Organization Meetings: 1 to 4 times per year    Marital Status:    Housing Stability: Low Risk     Unable to Pay for Housing in the Last Year: No    Number of Places Lived in the Last Year: 2    Unstable Housing in the Last Year: No     Past Surgical History:   Procedure Laterality Date    HERNIA REPAIR  2014    umbilical    LAPAROSCOPIC APPENDECTOMY N/A 2021    Procedure: APPENDECTOMY, LAPAROSCOPIC WITH LYSIS OF ADHESIONS.;  Surgeon: Trent Cannon MD;  Location: [facility] OR [address] [place] [address];  Service: General;  Laterality: N/A;     Family History   Problem Relation Age of Onset    Hypertension Mother     Diabetes Father     Hypertension Father     Stroke Father     Hyperlipidemia Father     No Known Problems Brother     No Known Problems Brother     Breast cancer Neg Hx     Colon cancer Neg Hx     Ovarian cancer Neg Hx     Melanoma Neg Hx     Blindness Neg Hx     Amblyopia Neg Hx     Cataracts Neg Hx     Glaucoma Neg Hx     Macular degeneration Neg Hx     Retinal detachment Neg Hx     Strabismus Neg Hx      labor Neg Hx     Cancer Neg Hx     Eclampsia Neg Hx            Review of Systems  General: negative for chills, fever or weight loss  Psychological: negative for mood changes or  depression  Ophthalmic: negative for blurry vision, photophobia or eye pain  ENT: see HPI  Respiratory: no cough, shortness of breath, or wheezing  Cardiovascular: no chest pain or dyspnea on exertion  Gastrointestinal: no abdominal pain, change in bowel habits, or black/ bloody stools  Musculoskeletal: negative for gait disturbance or muscular weakness  Neurological: no syncope or seizures; no ataxia  Dermatological: negative for puritis,  rash and jaundice  Hematologic/lymphatic: no easy bruising, no new lumps or bumps      Physical Exam:    Vitals:    05/08/23 1549   BP: 125/83   Pulse: 82         Constitutional: Well appearing / communicating without difficutly.  NAD.  Eyes: EOM I Bilaterally  Head/Face: Normocephalic.  Negative paranasal sinus pressure/tenderness.  Salivary glands WNL.  House Brackmann I Bilaterally.    Right Ear: Auricle normal appearance. External Auditory Canal within normal limits no lesions or masses,TM w/o masses/lesions/perforations. TM mobility noted.   Left Ear: Auricle normal appearance. External Auditory Canal within normal limits no lesions or masses,TM w/o masses/lesions/perforations. TM mobility noted.  Nose: No gross nasal septal deviation. Inferior Turbinates 3+ bilaterally. No septal perforation. No masses/lesions. External nasal skin appears normal without masses/lesions.  Oral Cavity: Gingiva/lips within normal limits.  Dentition/gingiva healthy appearing. Mucus membranes moist. Floor of mouth soft, no masses palpated. Oral Tongue mobile. Hard Palate appears normal.    Oropharynx: Base of tongue appears normal. No masses/lesions noted. Tonsillar fossa/pharyngeal wall without lesions. Posterior oropharynx WNL.  Soft palate without masses. Midline uvula.   Neck/Lymphatic: No LAD I-VI bilaterally.  No thyromegaly.  No masses noted on exam.      See separate procedure note for FFL.    Diagnostic studies reviewed:   Laboratory 04/15/2019:  IgE level:  Less than 35.  ImmunoCAP:   Negative.     Spirometry 04/16/2019:  Normal spirometry. Airflow not improved after bronchodilator.     Inhalant skin tests 05/13/2019:  3+ histamine control.  3+ dust mites.    Assessment:    ICD-10-CM ICD-9-CM    1. Chronic allergic rhinitis  J30.9 477.9       2. Laryngopharyngeal reflux (LPR)  K21.9 478.79       3. Muscle tension dysphonia  R49.0 784.42             The primary encounter diagnosis was Chronic allergic rhinitis. Diagnoses of Laryngopharyngeal reflux (LPR) and Muscle tension dysphonia were also pertinent to this visit.      Plan:  No orders of the defined types were placed in this encounter.      Continue  nasal saline rinses BID  Continue Flonase 2 sprays per nostril daily  Continue Allegra daily   Continue  Prilosec 40 mg PO daily.   Continue refrain from eating within 3 hours of going to bed, to elevate the head of bed very subtly and optimize the impact of gravity on the potential reflux, and to avoid alcohol, caffeine, tobacco, tomato sauce, spicy foods, fried food, and chocolate.   Keep follow up with sleep medicine for sleep study results and management plan.      Follow up in approximately 4-6  months to reassess progress with treatment regimen.     Paige Grant MD

## 2023-05-11 ENCOUNTER — ANESTHESIA EVENT (OUTPATIENT)
Dept: SURGERY | Facility: OTHER | Age: 38
End: 2023-05-11
Payer: COMMERCIAL

## 2023-05-11 ENCOUNTER — OFFICE VISIT (OUTPATIENT)
Dept: OBSTETRICS AND GYNECOLOGY | Facility: CLINIC | Age: 38
End: 2023-05-11
Payer: COMMERCIAL

## 2023-05-11 ENCOUNTER — HOSPITAL ENCOUNTER (OUTPATIENT)
Dept: PREADMISSION TESTING | Facility: OTHER | Age: 38
Discharge: HOME | End: 2023-05-11
Attending: OBSTETRICS & GYNECOLOGY
Payer: COMMERCIAL

## 2023-05-11 VITALS
SYSTOLIC BLOOD PRESSURE: 127 MMHG | WEIGHT: 214 LBS | BODY MASS INDEX: 33.59 KG/M2 | HEIGHT: 67 IN | BODY MASS INDEX: 34.05 KG/M2 | TEMPERATURE: 98 F | RESPIRATION RATE: 18 BRPM | OXYGEN SATURATION: 97 % | WEIGHT: 217.38 LBS | DIASTOLIC BLOOD PRESSURE: 81 MMHG | SYSTOLIC BLOOD PRESSURE: 127 MMHG | DIASTOLIC BLOOD PRESSURE: 74 MMHG | HEART RATE: 101 BPM

## 2023-05-11 DIAGNOSIS — N80.9 ENDOMETRIOSIS: Primary | ICD-10-CM

## 2023-05-11 PROCEDURE — 99499 UNLISTED E&M SERVICE: CPT | Mod: S$GLB,,, | Performed by: OBSTETRICS & GYNECOLOGY

## 2023-05-11 PROCEDURE — 99999 PR PBB SHADOW E&M-EST. PATIENT-LVL III: CPT | Mod: PBBFAC,,, | Performed by: OBSTETRICS & GYNECOLOGY

## 2023-05-11 PROCEDURE — 99999 PR PBB SHADOW E&M-EST. PATIENT-LVL III: ICD-10-PCS | Mod: PBBFAC,,, | Performed by: OBSTETRICS & GYNECOLOGY

## 2023-05-11 PROCEDURE — 99499 NO LOS: ICD-10-PCS | Mod: S$GLB,,, | Performed by: OBSTETRICS & GYNECOLOGY

## 2023-05-11 RX ORDER — MUPIROCIN 20 MG/G
OINTMENT TOPICAL
Status: CANCELLED | OUTPATIENT
Start: 2023-05-11

## 2023-05-11 RX ORDER — SODIUM CHLORIDE 0.9 % (FLUSH) 0.9 %
10 SYRINGE (ML) INJECTION
Status: CANCELLED | OUTPATIENT
Start: 2023-05-11

## 2023-05-11 RX ORDER — FAMOTIDINE 20 MG/1
20 TABLET, FILM COATED ORAL
Status: CANCELLED | OUTPATIENT
Start: 2023-05-11

## 2023-05-11 RX ORDER — LIDOCAINE HYDROCHLORIDE 10 MG/ML
1 INJECTION, SOLUTION EPIDURAL; INFILTRATION; INTRACAUDAL; PERINEURAL ONCE
Status: CANCELLED | OUTPATIENT
Start: 2023-05-11 | End: 2023-05-11

## 2023-05-11 RX ORDER — SODIUM CHLORIDE 9 MG/ML
INJECTION, SOLUTION INTRAVENOUS CONTINUOUS
Status: CANCELLED | OUTPATIENT
Start: 2023-05-11

## 2023-05-11 NOTE — DISCHARGE INSTRUCTIONS
Information to Prepare you for your Surgery    PRE-ADMIT TESTING -  [phone]    [address] [facility]          Your surgery has been scheduled at [facility]. We are pleased to have the opportunity to serve you. For Further Information please call [phone].    On the day of surgery please report to the Information Desk on the 1st floor.    CONTACT YOUR PHYSICIAN'S OFFICE THE DAY PRIOR TO YOUR SURGERY TO OBTAIN YOUR ARRIVAL TIME.     The evening before surgery do not eat anything after 9 p.m. ( this includes hard candy, chewing gum and mints).  You may only have GATORADE, POWERADE AND WATER  from 9 p.m. until you leave your home.   DO NOT DRINK ANY LIQUIDS ON THE WAY TO THE HOSPITAL.      Why does your anesthesiologist allow you to drink Gatorade/Powerade before surgery?  Gatorade/Powerade helps to increase your comfort before surgery and to decrease your nausea after surgery. The carbohydrates in Gatorade/Powerade help reduce your body's stress response to surgery.  If you are a diabetic-drink only water prior to surgery.       Patients may have 2 visitors pre and post procedure. Only 2 visitors will be allowed in the Surgical building with the patient. No one under the age of 12 will be allowed into the facility.    SPECIAL MEDICATION INSTRUCTIONS: TAKE medications checked off by the Anesthesiologist on your Medication List.    Angiogram Patients: Take medications as instructed by your physician, including aspirin.     Surgery Patients:    If you take ASPIRIN - Your PHYSICIAN/SURGEON will need to inform you IF/OR when you need to stop taking aspirin prior to your surgery.     The week prior to surgery do not ot take any medications containing IBUPROFEN or NSAIDS ( Advil, Motrin, Goodys, BC, Aleve, Naproxen etc) If you are not sure if you should take a medicine please call your surgeon's office.  Ok to take Tylenol    Do Not Wear any make-up  (especially eye make-up) to surgery. Please remove any false eyelashes or eyelash extensions. If you arrive the day of surgery with makeup/eyelashes on you will be required to remove prior to surgery. (There is a risk of corneal abrasions if eye makeup/eyelash extensions are not removed)      Leave all valuables at home.   Do Not wear any jewelry or watches, including any metal in body piercings. Jewelry must be removed prior to coming to the hospital.  There is a possibility that rings that are unable to be removed may be cut off if they are on the surgical extremity.    Please remove all hair extensions, wigs, clips and any other metal accessories/ ornaments from your hair.  These items may pose a flammable/fire risk in Surgery and must be removed.    Do not shave your surgical area at least 5 days prior to your surgery. The surgical prep will be performed at the hospital according to Infection Control regulations.    Contact Lens must be removed before surgery. Either do not wear the contact lens or bring a case and solution for storage.  Please bring a container for eyeglasses or dentures as required.  Bring any paperwork your physician has provided, such as consent forms,  history and physicals, doctor's orders, etc.   Bring comfortable clothes that are loose fitting to wear upon discharge. Take into consideration the type of surgery being performed.  Maintain your diet as advised per your physician the day prior to surgery.      Adequate rest the night before surgery is advised.   Park in the Parking lot behind the hospital or in the [facility] across the street from the parking lot. Parking is complimentary.  If you will be discharged the same day as your procedure, please arrange for a responsible adult to drive you home or to accompany you if traveling by taxi.   YOU WILL NOT BE PERMITTED TO DRIVE OR TO LEAVE THE HOSPITAL ALONE AFTER SURGERY.   If you are being discharged the same day, it is  strongly recommended that you arrange for someone to remain with you for the first 24 hrs following your surgery.    The Surgeon will speak to your family/visitor after your surgery regarding the outcome of your surgery and post op care.  The Surgeon may speak to you after your surgery, but there is a possibility you may not remember the details.  Please check with your family members regarding the conversation with the Surgeon.    We strongly recommend whoever is bringing you home be present for discharge instructions.  This will ensure a thorough understanding for your post op home care.    ALL CHILDREN MUST ALWAYS BE ACCOMPANIED BY AN ADULT.    Visitors-Refer to current Visitor policy handouts.    Thank you for your cooperation.  The Staff of [facility].            Bathing Instructions with Hibiclens    Shower the evening before and morning of your procedure with Chlorhexidine (Hibiclens)  do not use Chlorhexidine on your face or genitals. Do not get in your eyes.  Wash your face with water and your regular face wash/soap  Use your regular shampoo  Apply Chlorhexidine (Hibiclens) directly on your skin or on a wet washcloth and wash gently. When showering: Move away from the shower stream when applying Chlorhexidine (Hibiclens) to avoid rinsing off too soon.  Rinse thoroughly with warm water  Do not dilute Chlorhexidine (Hibiclens)   Dry off as usual, do not use any deodorant, powder, body lotions, perfume, after shave or cologne.

## 2023-05-11 NOTE — ANESTHESIA PREPROCEDURE EVALUATION
05/11/2023  Joan Wilde is a 38 y.o., female.      Pre-op Assessment    I have reviewed the Patient Summary Reports.     I have reviewed the Nursing Notes.    I have reviewed the Medications.     Review of Systems  Anesthesia Hx:  No problems with previous Anesthesia  Denies Family Hx of Anesthesia complications.   Denies Personal Hx of Anesthesia complications.   Social:  Non-Smoker    Hematology/Oncology:  Hematology Normal   Oncology Normal     EENT/Dental:EENT/Dental Normal   Cardiovascular:  Cardiovascular Normal Exercise tolerance: good     Pulmonary:   Sleep Apnea    Renal/:  Renal/ Normal     Hepatic/GI:   GERD    Musculoskeletal:  Musculoskeletal Normal    Neurological:   Headaches    Endocrine:  Endocrine Normal  Obesity / BMI > 30  Dermatological:  Skin Normal    Psych:   anxiety depression          Physical Exam  General: Well nourished, Cooperative, Oriented and Alert    Airway:  Mouth Opening: Normal  TM Distance: Normal  Neck ROM: Normal ROM    Dental:  Intact        Anesthesia Plan  Type of Anesthesia, risks & benefits discussed:    Anesthesia Type: Gen ETT  Intra-op Monitoring Plan: Standard ASA Monitors  Post Op Pain Control Plan: multimodal analgesia  Induction:  IV  Airway Plan: Video and Direct  Informed Consent: Informed consent signed with the Patient and all parties understand the risks and agree with anesthesia plan.  All questions answered.   ASA Score: 2  Day of Surgery Review of History & Physical: H&P Update referred to the surgeon/provider.  Anesthesia Plan Notes: Recent labs in [facility]    Ready For Surgery From Anesthesia Perspective.     .

## 2023-05-11 NOTE — PROGRESS NOTES
Subjective:      Patient ID: Joan Wilde is a 38 y.o. female.    Chief Complaint:  Pre-op Exam      History of Present Illness  HPI  Pt is 38 y.o. here in preop.  Scheduled for Dx LSC, excision of endometriosis    GYN & OB History  No LMP recorded (exact date). (Menstrual status: Birth Control).   Date of Last Pap: No result found    OB History    Para Term  AB Living   0 0 0 0 0 0   SAB IAB Ectopic Multiple Live Births   0 0 0 0 0       Review of Systems  Review of Systems   Constitutional:  Negative for activity change, appetite change and fatigue.   HENT: Negative.  Negative for tinnitus.    Eyes: Negative.    Respiratory:  Negative for cough and shortness of breath.    Cardiovascular:  Negative for chest pain and palpitations.   Gastrointestinal: Negative.  Negative for abdominal pain, blood in stool, constipation, diarrhea and nausea.   Endocrine: Negative.  Negative for hot flashes.   Genitourinary:  Negative for dysmenorrhea, dyspareunia, dysuria, frequency, menstrual problem, pelvic pain, vaginal discharge, urinary incontinence and postcoital bleeding.   Musculoskeletal:  Negative for back pain and joint swelling.   Integumentary:  Negative for rash, breast mass, nipple discharge and breast skin changes.   Neurological: Negative.  Negative for headaches.   Hematological: Negative.  Does not bruise/bleed easily.   Psychiatric/Behavioral:  The patient is not nervous/anxious.    Breast: negative.  Negative for mass, nipple discharge and skin changes       Objective:     Physical Exam:   Constitutional: She is oriented to person, place, and time. She appears well-developed and well-nourished. No distress.    HENT:   Head: Normocephalic and atraumatic.    Eyes: Pupils are equal, round, and reactive to light. Conjunctivae and lids are normal.     Cardiovascular:  Normal rate.      Exam reveals no edema.        Pulmonary/Chest: Effort normal.        Abdominal: Soft. Bowel sounds are  normal. She exhibits no distension. There is no abdominal tenderness. There is no rebound and no guarding.             Musculoskeletal: Normal range of motion and moves all extremeties.       Neurological: She is alert and oriented to person, place, and time. She has normal strength.    Skin: Skin is warm and dry.    Psychiatric: She has a normal mood and affect. Her speech is normal and behavior is normal. Thought content normal. Her mood appears not anxious. She does not exhibit a depressed mood. She expresses no suicidal plans and no homicidal plans.       Assessment:     1. Endometriosis               Plan:     Joan was seen today for pre-op exam.    Diagnoses and all orders for this visit:    Endometriosis  -     Full code; Standing  -     Vital signs; Standing  -     Insert peripheral IV; Standing  -     Chlorhexidine (CHG) 2% Wipes; Standing  -     Notify Physician/Vital Signs Parameters Urine output less than 0.5mL/kg/hr (with indwelling catheter) or 30 mL/hr (without indwelling catheter) or blood glucose greater than 200 mg/dL; Standing  -     Notify physician; Standing  -     Notify Physician - Potential Need of Opioid Reversal; Standing  -     Diet NPO; Standing  -     Place sequential compression device; Standing  -     Chlorohexidine Gluconate Bath; Standing  -     Place in Outpatient; Standing  -     Pregnancy, urine rapid; Standing    Other orders  -     0.9%  NaCl infusion  -     IP VTE HIGH RISK PATIENT; Standing  -     mupirocin 2 % ointment  -     famotidine tablet 20 mg  -     Ambulate; Standing      Factual information regarding the medical condition and the need for Dx LSC, excision of endometrisis was discussed with the patient, who verbalized understanding. The therapeutic rationale, benefits, risks and potential complications were discussed, including the possibility of pain, bleeding, infection, loss of function, disfigurement, death or other unforeseen complications. Alternatives to  the procedure were discussed (including potential risks, complications and benefits of each). No guarantee was expressed or implied as to the results of the procedure. Informed consent was given, as well as a request to proceed.

## 2023-05-15 ENCOUNTER — ANESTHESIA (OUTPATIENT)
Dept: SURGERY | Facility: OTHER | Age: 38
End: 2023-05-15
Payer: COMMERCIAL

## 2023-05-15 ENCOUNTER — HOSPITAL ENCOUNTER (OUTPATIENT)
Facility: OTHER | Age: 38
Discharge: HOME | End: 2023-05-15
Attending: OBSTETRICS & GYNECOLOGY | Admitting: OBSTETRICS & GYNECOLOGY
Payer: COMMERCIAL

## 2023-05-15 DIAGNOSIS — N80.9 ENDOMETRIOSIS: ICD-10-CM

## 2023-05-15 DIAGNOSIS — Z98.890 STATUS POST LAPAROSCOPY: Primary | ICD-10-CM

## 2023-05-15 LAB
B-HCG UR QL: NEGATIVE
CTP QC/QA: YES

## 2023-05-15 PROCEDURE — 25000003 PHARM REV CODE 250: Performed by: OBSTETRICS & GYNECOLOGY

## 2023-05-15 PROCEDURE — 63600175 PHARM REV CODE 636 W HCPCS: Performed by: NURSE ANESTHETIST, CERTIFIED REGISTERED

## 2023-05-15 PROCEDURE — 25000003 PHARM REV CODE 250: Performed by: NURSE ANESTHETIST, CERTIFIED REGISTERED

## 2023-05-15 PROCEDURE — 44180 PR LAP, SURG ENTEROLYSIS: ICD-10-PCS | Mod: 22,,, | Performed by: OBSTETRICS & GYNECOLOGY

## 2023-05-15 PROCEDURE — 71000016 HC POSTOP RECOV ADDL HR: Performed by: OBSTETRICS & GYNECOLOGY

## 2023-05-15 PROCEDURE — 71000015 HC POSTOP RECOV 1ST HR: Performed by: OBSTETRICS & GYNECOLOGY

## 2023-05-15 PROCEDURE — 27201423 OPTIME MED/SURG SUP & DEVICES STERILE SUPPLY: Performed by: OBSTETRICS & GYNECOLOGY

## 2023-05-15 PROCEDURE — D9220A PRA ANESTHESIA: ICD-10-PCS | Mod: ANES,,, | Performed by: ANESTHESIOLOGY

## 2023-05-15 PROCEDURE — 63600175 PHARM REV CODE 636 W HCPCS: Performed by: ANESTHESIOLOGY

## 2023-05-15 PROCEDURE — 25000003 PHARM REV CODE 250: Performed by: ANESTHESIOLOGY

## 2023-05-15 PROCEDURE — D9220A PRA ANESTHESIA: ICD-10-PCS | Mod: CRNA,,, | Performed by: NURSE ANESTHETIST, CERTIFIED REGISTERED

## 2023-05-15 PROCEDURE — 37000008 HC ANESTHESIA 1ST 15 MINUTES: Performed by: OBSTETRICS & GYNECOLOGY

## 2023-05-15 PROCEDURE — 44180 LAP ENTEROLYSIS: CPT | Mod: 22,,, | Performed by: OBSTETRICS & GYNECOLOGY

## 2023-05-15 PROCEDURE — D9220A PRA ANESTHESIA: Mod: CRNA,,, | Performed by: NURSE ANESTHETIST, CERTIFIED REGISTERED

## 2023-05-15 PROCEDURE — 36000708 HC OR TIME LEV III 1ST 15 MIN: Performed by: OBSTETRICS & GYNECOLOGY

## 2023-05-15 PROCEDURE — 36000709 HC OR TIME LEV III EA ADD 15 MIN: Performed by: OBSTETRICS & GYNECOLOGY

## 2023-05-15 PROCEDURE — D9220A PRA ANESTHESIA: Mod: ANES,,, | Performed by: ANESTHESIOLOGY

## 2023-05-15 PROCEDURE — 81025 URINE PREGNANCY TEST: CPT | Performed by: OBSTETRICS & GYNECOLOGY

## 2023-05-15 PROCEDURE — 37000009 HC ANESTHESIA EA ADD 15 MINS: Performed by: OBSTETRICS & GYNECOLOGY

## 2023-05-15 PROCEDURE — 71000033 HC RECOVERY, INTIAL HOUR: Performed by: OBSTETRICS & GYNECOLOGY

## 2023-05-15 PROCEDURE — 71000039 HC RECOVERY, EACH ADD'L HOUR: Performed by: OBSTETRICS & GYNECOLOGY

## 2023-05-15 RX ORDER — HYDROCODONE BITARTRATE AND ACETAMINOPHEN 5; 325 MG/1; MG/1
1 TABLET ORAL EVERY 6 HOURS PRN
Qty: 5 TABLET | Refills: 0 | Status: SHIPPED | OUTPATIENT
Start: 2023-05-15 | End: 2023-06-15

## 2023-05-15 RX ORDER — IBUPROFEN 600 MG/1
600 TABLET ORAL 3 TIMES DAILY
Qty: 30 TABLET | Refills: 0 | Status: SHIPPED | OUTPATIENT
Start: 2023-05-15 | End: 2023-06-15

## 2023-05-15 RX ORDER — HYDROMORPHONE HYDROCHLORIDE 2 MG/ML
0.4 INJECTION, SOLUTION INTRAMUSCULAR; INTRAVENOUS; SUBCUTANEOUS EVERY 5 MIN PRN
Status: DISCONTINUED | OUTPATIENT
Start: 2023-05-15 | End: 2023-05-15 | Stop reason: HOSPADM

## 2023-05-15 RX ORDER — HYDROMORPHONE HYDROCHLORIDE 2 MG/ML
0.2 INJECTION, SOLUTION INTRAMUSCULAR; INTRAVENOUS; SUBCUTANEOUS
Status: DISCONTINUED | OUTPATIENT
Start: 2023-05-15 | End: 2023-05-15 | Stop reason: HOSPADM

## 2023-05-15 RX ORDER — SODIUM CHLORIDE 9 MG/ML
INJECTION, SOLUTION INTRAVENOUS CONTINUOUS
Status: DISCONTINUED | OUTPATIENT
Start: 2023-05-15 | End: 2023-05-15 | Stop reason: HOSPADM

## 2023-05-15 RX ORDER — FAMOTIDINE 20 MG/1
20 TABLET, FILM COATED ORAL
Status: DISCONTINUED | OUTPATIENT
Start: 2023-05-15 | End: 2023-05-15 | Stop reason: HOSPADM

## 2023-05-15 RX ORDER — PROPOFOL 10 MG/ML
VIAL (ML) INTRAVENOUS
Status: DISCONTINUED | OUTPATIENT
Start: 2023-05-15 | End: 2023-05-15

## 2023-05-15 RX ORDER — OXYCODONE HYDROCHLORIDE 5 MG/1
5 TABLET ORAL
Status: DISCONTINUED | OUTPATIENT
Start: 2023-05-15 | End: 2023-05-15 | Stop reason: HOSPADM

## 2023-05-15 RX ORDER — ONDANSETRON 2 MG/ML
4 INJECTION INTRAMUSCULAR; INTRAVENOUS EVERY 4 HOURS PRN
Status: DISCONTINUED | OUTPATIENT
Start: 2023-05-15 | End: 2023-05-15 | Stop reason: HOSPADM

## 2023-05-15 RX ORDER — MUPIROCIN 20 MG/G
OINTMENT TOPICAL
Status: DISCONTINUED | OUTPATIENT
Start: 2023-05-15 | End: 2023-05-15 | Stop reason: HOSPADM

## 2023-05-15 RX ORDER — SODIUM CHLORIDE 0.9 % (FLUSH) 0.9 %
10 SYRINGE (ML) INJECTION
Status: DISCONTINUED | OUTPATIENT
Start: 2023-05-15 | End: 2023-05-15 | Stop reason: HOSPADM

## 2023-05-15 RX ORDER — LIDOCAINE HYDROCHLORIDE 20 MG/ML
INJECTION INTRAVENOUS
Status: DISCONTINUED | OUTPATIENT
Start: 2023-05-15 | End: 2023-05-15

## 2023-05-15 RX ORDER — DEXAMETHASONE SODIUM PHOSPHATE 4 MG/ML
INJECTION, SOLUTION INTRA-ARTICULAR; INTRALESIONAL; INTRAMUSCULAR; INTRAVENOUS; SOFT TISSUE
Status: DISCONTINUED | OUTPATIENT
Start: 2023-05-15 | End: 2023-05-15

## 2023-05-15 RX ORDER — KETOROLAC TROMETHAMINE 30 MG/ML
15 INJECTION, SOLUTION INTRAMUSCULAR; INTRAVENOUS EVERY 6 HOURS PRN
Status: DISCONTINUED | OUTPATIENT
Start: 2023-05-15 | End: 2023-05-15 | Stop reason: HOSPADM

## 2023-05-15 RX ORDER — PROCHLORPERAZINE EDISYLATE 5 MG/ML
5 INJECTION INTRAMUSCULAR; INTRAVENOUS EVERY 30 MIN PRN
Status: COMPLETED | OUTPATIENT
Start: 2023-05-15 | End: 2023-05-15

## 2023-05-15 RX ORDER — MIDAZOLAM HYDROCHLORIDE 1 MG/ML
INJECTION INTRAMUSCULAR; INTRAVENOUS
Status: DISCONTINUED | OUTPATIENT
Start: 2023-05-15 | End: 2023-05-15

## 2023-05-15 RX ORDER — FENTANYL CITRATE 50 UG/ML
INJECTION, SOLUTION INTRAMUSCULAR; INTRAVENOUS
Status: DISCONTINUED | OUTPATIENT
Start: 2023-05-15 | End: 2023-05-15

## 2023-05-15 RX ORDER — MEPERIDINE HYDROCHLORIDE 25 MG/ML
12.5 INJECTION INTRAMUSCULAR; INTRAVENOUS; SUBCUTANEOUS ONCE AS NEEDED
Status: DISCONTINUED | OUTPATIENT
Start: 2023-05-15 | End: 2023-05-15 | Stop reason: HOSPADM

## 2023-05-15 RX ORDER — SODIUM CHLORIDE 0.9 % (FLUSH) 0.9 %
3 SYRINGE (ML) INJECTION
Status: DISCONTINUED | OUTPATIENT
Start: 2023-05-15 | End: 2023-05-15 | Stop reason: HOSPADM

## 2023-05-15 RX ORDER — ACETAMINOPHEN 500 MG
1000 TABLET ORAL EVERY 6 HOURS PRN
Status: DISCONTINUED | OUTPATIENT
Start: 2023-05-15 | End: 2023-05-15 | Stop reason: HOSPADM

## 2023-05-15 RX ORDER — ROCURONIUM BROMIDE 10 MG/ML
INJECTION, SOLUTION INTRAVENOUS
Status: DISCONTINUED | OUTPATIENT
Start: 2023-05-15 | End: 2023-05-15

## 2023-05-15 RX ORDER — KETOROLAC TROMETHAMINE 30 MG/ML
INJECTION, SOLUTION INTRAMUSCULAR; INTRAVENOUS
Status: DISCONTINUED | OUTPATIENT
Start: 2023-05-15 | End: 2023-05-15

## 2023-05-15 RX ORDER — LIDOCAINE HYDROCHLORIDE 10 MG/ML
1 INJECTION, SOLUTION EPIDURAL; INFILTRATION; INTRACAUDAL; PERINEURAL ONCE
Status: DISCONTINUED | OUTPATIENT
Start: 2023-05-15 | End: 2023-05-15 | Stop reason: HOSPADM

## 2023-05-15 RX ORDER — DIPHENHYDRAMINE HYDROCHLORIDE 50 MG/ML
25 INJECTION INTRAMUSCULAR; INTRAVENOUS EVERY 6 HOURS PRN
Status: DISCONTINUED | OUTPATIENT
Start: 2023-05-15 | End: 2023-05-15 | Stop reason: HOSPADM

## 2023-05-15 RX ADMIN — CARBOXYMETHYLCELLULOSE SODIUM 2 DROP: 2.5 SOLUTION/ DROPS OPHTHALMIC at 08:05

## 2023-05-15 RX ADMIN — PROCHLORPERAZINE EDISYLATE 5 MG: 5 INJECTION INTRAMUSCULAR; INTRAVENOUS at 03:05

## 2023-05-15 RX ADMIN — MIDAZOLAM HYDROCHLORIDE 2 MG: 1 INJECTION, SOLUTION INTRAMUSCULAR; INTRAVENOUS at 08:05

## 2023-05-15 RX ADMIN — LIDOCAINE HYDROCHLORIDE 100 MG: 20 INJECTION, SOLUTION INTRAVENOUS at 08:05

## 2023-05-15 RX ADMIN — KETOROLAC TROMETHAMINE 30 MG: 30 INJECTION, SOLUTION INTRAMUSCULAR; INTRAVENOUS at 10:05

## 2023-05-15 RX ADMIN — FENTANYL CITRATE 100 MCG: 50 INJECTION, SOLUTION INTRAMUSCULAR; INTRAVENOUS at 08:05

## 2023-05-15 RX ADMIN — ROCURONIUM BROMIDE 50 MG: 10 SOLUTION INTRAVENOUS at 08:05

## 2023-05-15 RX ADMIN — MUPIROCIN: 20 OINTMENT TOPICAL at 07:05

## 2023-05-15 RX ADMIN — DEXAMETHASONE SODIUM PHOSPHATE 8 MG: 4 INJECTION, SOLUTION INTRAMUSCULAR; INTRAVENOUS at 08:05

## 2023-05-15 RX ADMIN — ROCURONIUM BROMIDE 25 MG: 10 SOLUTION INTRAVENOUS at 09:05

## 2023-05-15 RX ADMIN — HYDROMORPHONE HYDROCHLORIDE 0.4 MG: 2 INJECTION INTRAMUSCULAR; INTRAVENOUS; SUBCUTANEOUS at 10:05

## 2023-05-15 RX ADMIN — SUGAMMADEX 388 MG: 100 INJECTION, SOLUTION INTRAVENOUS at 10:05

## 2023-05-15 RX ADMIN — FENTANYL CITRATE 50 MCG: 50 INJECTION, SOLUTION INTRAMUSCULAR; INTRAVENOUS at 10:05

## 2023-05-15 RX ADMIN — SODIUM CHLORIDE, SODIUM LACTATE, POTASSIUM CHLORIDE, AND CALCIUM CHLORIDE: .6; .31; .03; .02 INJECTION, SOLUTION INTRAVENOUS at 07:05

## 2023-05-15 RX ADMIN — HYDROMORPHONE HYDROCHLORIDE 0.4 MG: 2 INJECTION INTRAMUSCULAR; INTRAVENOUS; SUBCUTANEOUS at 11:05

## 2023-05-15 RX ADMIN — PROPOFOL 200 MG: 10 INJECTION, EMULSION INTRAVENOUS at 08:05

## 2023-05-15 RX ADMIN — OXYCODONE HYDROCHLORIDE 5 MG: 5 TABLET ORAL at 10:05

## 2023-05-15 NOTE — ANESTHESIA POSTPROCEDURE EVALUATION
Anesthesia Post Evaluation    Patient: Joan Wilde    Procedure(s) Performed: Procedure(s) (LRB):  LAPAROSCOPY, DIAGNOSTIC (Bilateral)  LYSIS, ADHESIONS, LAPAROSCOPIC (N/A)    Final Anesthesia Type: general      Patient location during evaluation: PACU  Patient participation: Yes- Able to Participate  Level of consciousness: awake and alert  Post-procedure vital signs: reviewed and stable  Pain management: adequate  Airway patency: patent    PONV status at discharge: No PONV  Anesthetic complications: no      Cardiovascular status: blood pressure returned to baseline and stable  Respiratory status: room air  Hydration status: euvolemic  Follow-up not needed.          Vitals Value Taken Time   /67 05/15/23 1055   Temp 36.2 °C (97.1 °F) 05/15/23 1034   Pulse 88 05/15/23 1103   Resp 17 05/15/23 1056   SpO2 100 % 05/15/23 1103   Vitals shown include unvalidated device data.      No case tracking events are documented in the log.      Pain/Mikhail Score: Pain Rating Prior to Med Admin: 6 (5/15/2023 10:56 AM)  Pain Rating Post Med Admin: 0 (5/15/2023 10:34 AM)  Mikhail Score: 8 (5/15/2023 10:34 AM)

## 2023-05-15 NOTE — DISCHARGE SUMMARY
Discharge Summary  Gynecology      Admit Date: 5/15/2023    Discharge Date and Time: 5/15/2023     Attending Physician: Raffi Gao MD    Principal Diagnoses: S/P laparoscopy    Active Hospital Problems    Diagnosis  POA    Status post laparoscopy, lysis of adhesion [Z98.890]  Not Applicable      Resolved Hospital Problems    Diagnosis Date Resolved POA    *S/P diagnostic laparoscopy, lysis of adhesion, left ovarian cyst drainage  [Z98.890] 05/15/2023 Not Applicable       Procedures: Procedure(s) (LRB):  LAPAROSCOPY, DIAGNOSTIC (Bilateral)  LYSIS, ADHESIONS, LAPAROSCOPIC (N/A)    Discharged Condition: good    Hospital Course: Patient presented for scheduled procedure. Patient was passed back to OR for diagnostic laparoscopy, possible endometriosis destruction. Please see OP note for further details. Tolerated procedure well and patient was taken to recovery in a stable condition. Prior to discharge patient was able to void, ambulate, tolerate PO and pain was well controlled with PO meds. Patient was given routine post-op instructions for which patient voiced understanding. Patient was subsequently discharged home.     Consults: None    Significant Diagnostic Studies:  No results for input(s): WBC, HGB, HCT, MCV, PLT in the last 168 hours.     Treatments:   1. Surgery as above    Disposition: Home or Self Care    Patient Instructions:   Current Discharge Medication List        START taking these medications    Details   HYDROcodone-acetaminophen (NORCO) 5-325 mg per tablet Take 1 tablet by mouth every 6 (six) hours as needed for Pain.  Qty: 5 tablet, Refills: 0    Comments: Quantity prescribed more than 7 day supply? No      ibuprofen (ADVIL,MOTRIN) 600 MG tablet Take 1 tablet (600 mg total) by mouth 3 (three) times daily.  Qty: 30 tablet, Refills: 0           CONTINUE these medications which have NOT CHANGED    Details   ascorbic acid, vitamin C, (VITAMIN C) 1000 MG tablet Take 2 tablets (2,000 mg total) by mouth  once daily.    Associated Diagnoses: Allergic rhinitis due to dust mite      conjugated estrogens (PREMARIN) vaginal cream Use dime size amount of estrogen cream in vagina nightly for 2 weeks, then twice a week thereafter.  Qty: 30 g, Refills: 2    Associated Diagnoses: Female dyspareunia      fluticasone propionate (FLONASE) 50 mcg/actuation nasal spray 2 sprays (100 mcg total) by Each Nostril route once daily.  Qty: 16 g, Refills: 11      norethindrone (AYGESTIN) 5 mg Tab Take 1 tablet (5 mg total) by mouth once daily.  Qty: 30 tablet, Refills: 6    Associated Diagnoses: Endometriosis      norethindrone (ORTHO MICRONOR) 0.35 mg tablet Take 1 tablet (0.35 mg total) by mouth once daily.  Qty: 28 tablet, Refills: 6    Associated Diagnoses: Endometriosis      olopatadine (PATANOL) 0.1 % ophthalmic solution 1 drop 2 (two) times daily.      omeprazole (PRILOSEC) 20 MG capsule Take 2 capsules (40 mg total) by mouth once daily.  Qty: 60 capsule, Refills: 4      ondansetron (ZOFRAN-ODT) 4 MG TbDL Take 1 tablet (4 mg total) by mouth every 8 (eight) hours as needed (nausea).  Qty: 10 tablet, Refills: 0    Associated Diagnoses: Nausea      clindamycin (CLEOCIN T) 1 % lotion Use every night at bedtime on face for pimples  Qty: 60 mL, Refills: 3    Associated Diagnoses: Acne vulgaris      clotrimazole-betamethasone 1-0.05% (LOTRISONE) cream Apply topically 2 (two) times daily to rash on neck x 2 weeks  Qty: 45 g, Refills: 2    Associated Diagnoses: Eczema, unspecified type      meloxicam (MOBIC) 7.5 MG tablet Take 1 tablet (7.5 mg total) by mouth once daily.  Qty: 14 tablet, Refills: 0    Associated Diagnoses: Neck strain, initial encounter             Discharge Procedure Orders   Diet general     Lifting restrictions   Order Comments: LIFTING:  No lifting greater than 15 pounds for six weeks.     PELVIC REST:  No douching, tampons, or intercourse for 6 weeks.  If prescribed, vaginal estrogen cream may be used during the  postoperative period.     Other restrictions (specify):   Order Comments: DRIVING:  No driving while on narcotics. Driving may be resumed initially with a competent passenger one to two weeks after surgery if no longer taking narcotics.     EXERCISE:  For six weeks your exercise should be limited to walking. You may walk as far as you wish, as long as you increase your level of exertion gradually and avoid slippery surfaces. You may climb stairs as needed to get around, but should not use stair climbing for exercise.     Leave dressing on - Keep it clean, dry, and intact until clinic visit     Remove dressing in 24 hours   Order Comments: If you have a bandage on wound, you may remove it the day after dismissal.  If you had steri-strips remove them once they begin to peel off (usually 2 weeks). Keep incision clean and dry.  Inspect the incision daily for signs and symptoms of infection.     Call MD for:  temperature >100.4     Call MD for:  persistent nausea and vomiting     Call MD for:  severe uncontrolled pain     Call MD for:  difficulty breathing, headache or visual disturbances     Call MD for:  redness, tenderness, or signs of infection (pain, swelling, redness, odor or green/yellow discharge around incision site)     Call MD for:  hives     Call MD for:   Order Comments: inability to void,urine is ketchup colored or you have large clots, vaginal bleeding is heavier than a period.    VAGINAL DISCHARGE: You may develop a vaginal discharge and intermittent vaginal spotting after surgery and up to 6 weeks postoperatively.  The discharge may have an odor and may change in color but it is normal.  This is due to dissolving stiches.  Contact your surgical team if you develop vaginal or vulvar irritation along with a discharge.  Also contact your surgical team if you have vaginal discharge that smells like urine or stool.    PAIN MEDICATIONS:     Take your pain medications as instructed. It is best to take pain  medications before your pain becomes severe. This will allow you to take less medication yet have better pain relief. For the first 2 or 3 days it may be helpful to take your pain medications on a regular schedule (e.g. every 4 to 6 hours). This will help you to keep your pain under better control. You should then begin to take fewer medications each day until you no longer need them. Do not take pain medication on an empty stomach. This may lead to nausea and vomiting.    CONSTIPATION REMEDIES: Patients are often constipated after surgery or with use of oral narcotic medicine. You should continue to take the stool softener, Senokot-S during the next six weeks, and consume adequate amounts of water.  If you have not had a bowel movement for 3 days after dismissal, or are uncomfortable and unable to pass stool, please try one or all of the following measures:  1.  Milk of Magnesia - 30 cc by mouth every 12 hours   2.  Dulcolax suppository - One suppository per rectum every 4-6 hours   3.  Metamucil, Fibercon or other bulk former - use as directed  4.  Fleets Enema  5.  Prunes or Prune juice    If you continue to have constipation after trying the above remedies, you should contact your surgical team using the contact information listed above     Activity as tolerated   Order Comments: Return to normal activity slowly as you feel able.  For 6 weeks your exercise should be limited to walking.  You may walk as far as you wish, as long as you increase your level of exertion gradually and avoid slippery surfaces.     Shower on day dressing removed (No bath)   Order Comments: You may shower at any time but should avoid immersing any abdominal incisions in water for at least 2 weeks after surgery or until the wound is completely healed.  If given, please shower with Hibaclens soap until bottle is completely finish        Follow-up Information       Raffi Gao MD Follow up.    Specialties: Obstetrics, Obstetrics and  Gynecology  Contact information:  [address] [address]  [phone]                           Dixie Trevino MD/MPH  OB/GYN PGY-2  [facility]

## 2023-05-15 NOTE — TRANSFER OF CARE
Anesthesia Transfer of Care Note    Patient: Joan Wilde    Procedure(s) Performed: Procedure(s) (LRB):  LAPAROSCOPY, DIAGNOSTIC (Bilateral)  LYSIS, ADHESIONS, LAPAROSCOPIC (N/A)    Patient location: PACU    Anesthesia Type: general    Transport from OR: Transported from OR on 2-3 L/min O2 by NC with adequate spontaneous ventilation    Post pain: adequate analgesia    Post assessment: no apparent anesthetic complications    Post vital signs: stable    Level of consciousness: awake    Nausea/Vomiting: no nausea/vomiting    Complications: none    Transfer of care protocol was followed      Last vitals:   Visit Vitals  /74 (BP Location: Left arm, Patient Position: Lying)   Pulse 96   Temp 36.2 °C (97.1 °F) (Temporal)   Resp 17   LMP  (LMP Unknown)   SpO2 (!) 0%   Breastfeeding No

## 2023-05-15 NOTE — PLAN OF CARE
Joan Millsubunnancy Wilde has met all discharge criteria from Phase II. Vital Signs are stable, ambulating  without difficulty. Discharge instructions given, patient verbalized understanding. To be discharged from facility via wheelchair in stable condition.

## 2023-05-15 NOTE — ANESTHESIA PROCEDURE NOTES
Intubation    Date/Time: 5/15/2023 8:41 AM  Performed by: Alban Silveira CRNA  Authorized by: Shiva Keller MD     Intubation:     Induction:  Intravenous    Intubated:  Postinduction    Mask Ventilation:  Easy mask    Attempts:  1    Attempted By:  CRNA    Method of Intubation:  Video laryngoscopy    Blade:  Morena 3    Laryngeal View Grade: Grade I - full view of cords      Difficult Airway Encountered?: No      Complications:  None    Airway Device:  Oral endotracheal tube    Airway Device Size:  7.5    Style/Cuff Inflation:  Cuffed    Inflation Amount (mL):  4    Tube secured:  22    Placement Verified By:  Capnometry    Complicating Factors:  None    Findings Post-Intubation:  BS equal bilateral

## 2023-05-15 NOTE — INTERVAL H&P NOTE
The patient has been examined and the H&P has been reviewed:    I concur with the findings and no changes have occurred since H&P was written.    Surgery risks, benefits and alternative options discussed and understood by patient/family.    To OR for Dx LSC, Excision of Endometriosis   NPO since MN   Consents reviewed and previously signed by primary Gyn  Will proceed to OR     Dixie Trevino MD/MPH  OB/GYN PGY-2  [facility]

## 2023-05-16 VITALS
TEMPERATURE: 99 F | HEART RATE: 67 BPM | OXYGEN SATURATION: 100 % | RESPIRATION RATE: 16 BRPM | SYSTOLIC BLOOD PRESSURE: 119 MMHG | DIASTOLIC BLOOD PRESSURE: 72 MMHG

## 2023-05-16 NOTE — OP NOTE
OPERATIVE REPORT FOR DIAGNOSTIC LAPAROSCOPY, EXCISION OF SCAR TISSUE                                                                                                                                 Date of Procedure: 05/16/2023                                                                               SURGEON:  Raffi Gao M.D.                                                                                                                    ASSISTANT:  Dixie Trevino MD (RES)                                                                                               PREOPERATIVE DIAGNOSIS:          1. Status post laparoscopy, lysis of adhesion    2. Endometriosis                                                                                   POSTOPERATIVE DIAGNOSIS:       1. Status post laparoscopy, lysis of adhesion    2. Fibroid uterus  Dense Scar tissue                                                                                  PROCEDURE:  Diagnostic laparoscopy, lysis of adhesions lasting for 65 min (mod 22), ureterolysis                                                                                                                   ANESTHESIA:  GETA    BLOOD LOSS:  10  mL.                                                                                                                                      URINE OUTPUT:  500 mL.                                                                                       IV FLUIDS:  1000 cc    COMPLICATIONS:  None    SPECIMINES:  n/a    DRAINS:  None      FINDINGS:  1) Fundal uterine fibroid (approx 7cm)  Dense bowel adhesions to bilateral adnexal areas  Obliterated cul-de-sac  Bowel adhesions to anterior abdominal wall at site of mesh (released)    STATEMENT OF MEDICAL NECESSITY:  Pt is a 38 y.o. year old female who has persistent pain unresponsive to medical treatment.  After discussing risks/benefits/alternatives/and indications, pt wished to proceed  with the above stated case.                                                                               PROCEDURE IN DETAIL:  After appropriate consents had been obtained and time out performed, the patient was taken to the procedure room at which time general anesthesia was administered.  The patient was then placed in the lithotomy position, prepped in the appropriate sterile fashion.  A sterile speculum was put in place and a uterine manipulator was inserted after decompression of the bladder..  Attention was then taken to the left upper quadrant at which time a Veress needle was inserted.  After confirming an opening pressure of 8 mmHg, the abdomen was insufflated to a maximum pressure of 15 mmHg.  A 5mm skin incision was then made and the trocar was inserted under direct visualization.  The patient was placed in [place] and 2 5mm trocars were placed in the bilateral lower quadrants under direct visualization to avoid injury to the underlying structures.  A thorough inspection of the abdomen and pelvis was performed with the above findings.  Pt had bowel adhesions that made pelvic visualization difficult.  Using careful dissection with laparoscopic scissors, the bowel adhesions were released from the anterior abdominal wall.  Attention was then taken to the pelvis where dense bowel adhesions to the fundus and bilateral adnexal structures were observed.  Attempts were made to release the adhesions from the adnexal structures as well as attempt to free the cul-de-sac adhesions.  Given the density of the lesions, the decision was made to stop before injuring any bowel.  After confirming hemostasis, shazia was not used.  The pneumoperitoneum was released and the trocars were removed under visualization.  The skin was closed using a 4-0 monocryl suture.  The patient was then extubated and taken to the recovery area in stable condition.  The uterine manipulator was removed.       All counts were correct x 2 at  the end of the procedure.  Antibiotics were not indicated for this case.

## 2023-05-17 ENCOUNTER — TELEPHONE (OUTPATIENT)
Dept: OBSTETRICS AND GYNECOLOGY | Facility: CLINIC | Age: 38
End: 2023-05-17
Payer: COMMERCIAL

## 2023-05-17 NOTE — TELEPHONE ENCOUNTER
Pain is well controlled having a bit of nausea. No bowel movement as of yet having gas over all patient is fine. Post op visit set

## 2023-05-18 ENCOUNTER — TELEPHONE (OUTPATIENT)
Dept: OBSTETRICS AND GYNECOLOGY | Facility: CLINIC | Age: 38
End: 2023-05-18
Payer: COMMERCIAL

## 2023-05-18 DIAGNOSIS — N80.109 ENDOMETRIOSIS, OVARY: Primary | ICD-10-CM

## 2023-05-18 RX ORDER — OXYCODONE AND ACETAMINOPHEN 5; 325 MG/1; MG/1
1 TABLET ORAL EVERY 6 HOURS PRN
Qty: 16 TABLET | Refills: 0 | Status: SHIPPED | OUTPATIENT
Start: 2023-05-18 | End: 2023-06-06

## 2023-05-18 NOTE — TELEPHONE ENCOUNTER
----- Message from Janiya Corrigan MA sent at 5/18/2023  2:34 PM CDT -----  Patient is requesting for pain medication stated that the medication she was sent home with is not working. Please advise.   ----- Message -----  From: Jen Su  Sent: 5/18/2023  12:00 PM CDT  To: Sima GALVAN Staff    Pt called to speak with the nurse or provider in regards taking naproxen for pain management. The pt states the meds given to her after surgery is not working.

## 2023-05-29 ENCOUNTER — TELEPHONE (OUTPATIENT)
Dept: OBSTETRICS AND GYNECOLOGY | Facility: CLINIC | Age: 38
End: 2023-05-29
Payer: COMMERCIAL

## 2023-05-29 NOTE — TELEPHONE ENCOUNTER
----- Message from Jen Su sent at 5/29/2023  2:48 PM CDT -----  Pt called in stating she is 2 weeks post surgery, and has a lot of dried blood around her incision. The caller states that it is dried to the point that if she pulls it, the stitches will come. The pt wants to know if she needs to come in, or if there is something she can do at home. Pls call back and advise.

## 2023-05-29 NOTE — TELEPHONE ENCOUNTER
Patient advised to let the steri strips fall off on its own, or use a damp warm compress to help break up the dried blood to get the strip off of her skin. Patient verbalized understanding. Will call the office back if needed.

## 2023-05-31 ENCOUNTER — TELEPHONE (OUTPATIENT)
Dept: OBSTETRICS AND GYNECOLOGY | Facility: CLINIC | Age: 38
End: 2023-05-31
Payer: COMMERCIAL

## 2023-06-01 ENCOUNTER — PATIENT MESSAGE (OUTPATIENT)
Dept: SLEEP MEDICINE | Facility: CLINIC | Age: 38
End: 2023-06-01
Payer: COMMERCIAL

## 2023-06-02 ENCOUNTER — HOSPITAL ENCOUNTER (OUTPATIENT)
Dept: RADIOLOGY | Facility: HOSPITAL | Age: 38
Discharge: HOME | End: 2023-06-02
Attending: INTERNAL MEDICINE
Payer: COMMERCIAL

## 2023-06-02 VITALS — HEIGHT: 67 IN | BODY MASS INDEX: 34.21 KG/M2 | WEIGHT: 218 LBS

## 2023-06-02 DIAGNOSIS — Z12.31 OTHER SCREENING MAMMOGRAM: ICD-10-CM

## 2023-06-02 PROCEDURE — 77067 SCR MAMMO BI INCL CAD: CPT | Mod: TC

## 2023-06-02 PROCEDURE — 77063 MAMMO DIGITAL SCREENING BILAT WITH TOMO: ICD-10-PCS | Mod: 26,,, | Performed by: RADIOLOGY

## 2023-06-02 PROCEDURE — 77063 BREAST TOMOSYNTHESIS BI: CPT | Mod: 26,,, | Performed by: RADIOLOGY

## 2023-06-02 PROCEDURE — 77067 SCR MAMMO BI INCL CAD: CPT | Mod: 26,,, | Performed by: RADIOLOGY

## 2023-06-02 PROCEDURE — 77067 MAMMO DIGITAL SCREENING BILAT WITH TOMO: ICD-10-PCS | Mod: 26,,, | Performed by: RADIOLOGY

## 2023-06-06 DIAGNOSIS — G47.33 OSA (OBSTRUCTIVE SLEEP APNEA): Primary | ICD-10-CM

## 2023-06-08 ENCOUNTER — CLINICAL SUPPORT (OUTPATIENT)
Dept: INTERNAL MEDICINE | Facility: CLINIC | Age: 38
End: 2023-06-08
Payer: COMMERCIAL

## 2023-06-08 VITALS — BODY MASS INDEX: 34.98 KG/M2 | HEIGHT: 67 IN | WEIGHT: 222.88 LBS

## 2023-06-08 PROCEDURE — 99999 PR PBB SHADOW E&M-EST. PATIENT-LVL I: ICD-10-PCS | Mod: PBBFAC,,,

## 2023-06-08 PROCEDURE — 99999 PR PBB SHADOW E&M-EST. PATIENT-LVL I: CPT | Mod: PBBFAC,,,

## 2023-06-08 NOTE — PROGRESS NOTES
Health  Follow-Up Note   [x] Office  [] Phone  Notes from previous session reviewed.   [x] Previous Session Goals unchanged.   [] Patient/Caregiver Change in Goals.  Goals added or changed by Patient/Caregiver since program participation:  Get back on track  Try [employer] again along with lean/ green foods        Additional/Changed support that patient/caregiver has experienced/sought?  (Indicate readiness, support from family, friends, others, community groups, etc)       Additional/Changed obstacles that could prevent patient/caregiver from reaching their goals?   Stress at work  Surgery 3 weeks ago s/p lysis of adhesions -laparoscopy    Feedback provided:   Praised for continued effort and determination    Diagnostic values/Desriptors for follow-up as needed for chronic condition(s)   Weight: 101.1 kg 222.89 lb gain 3.94 lbs    Interventions:   Health  listened reflectively, validated thoughts and feelings, offered support and encouragement.   Allowed patient to express themselves in a non-biased atmosphere.  Health  assisted pt to problem-solve obstacles such as being in a challenging environment and dealing with these challenges.   Motivational Interviewed interventions utilized (OARS).   Patient responded favorably to interventions and remained actively engaged in the session.   Health  will remain available and connected for patient by phone and/or office visits.   Positive reinforcement, emotional support and encouragement provided.   Focused Education: MI    Plan:  [x] Pt will work on goals as stated above.   [x] Pt will contact Health  for any questions, concerns or needs.  [x] Pt will follow up with Health  in office on  7.20.23.  [] Pt will follow up with Health  on phone in:        [x] Health  will remain available.   [] Health  will contact patient by phone in:        [] Health  will consult:        [] Health  will inform Provider via [employer]  messaging.     Impression:  1. Behavior is consistent with action Stage of Change.   2. Participation level:       [x] Receptive      [x] Interactive      [] Guarded and Resistant      [x] Self Motivated      [] Refused/Declined to participate   3. [x] Pt voiced understanding of all information presented.       [] Pt voiced needing further information/education. This will be arranged.       [x] Pt would benefit from further education/information as identified by this health . This will be arranged.     Lashell Pedroza RN HC

## 2023-06-15 ENCOUNTER — OFFICE VISIT (OUTPATIENT)
Dept: OBSTETRICS AND GYNECOLOGY | Facility: CLINIC | Age: 38
End: 2023-06-15
Payer: COMMERCIAL

## 2023-06-15 VITALS
HEIGHT: 67 IN | DIASTOLIC BLOOD PRESSURE: 64 MMHG | WEIGHT: 224 LBS | SYSTOLIC BLOOD PRESSURE: 138 MMHG | BODY MASS INDEX: 35.16 KG/M2

## 2023-06-15 DIAGNOSIS — Z09 POSTOPERATIVE EXAMINATION: Primary | ICD-10-CM

## 2023-06-15 PROBLEM — Z98.890 STATUS POST LAPAROSCOPY: Status: RESOLVED | Noted: 2023-05-15 | Resolved: 2023-06-15

## 2023-06-15 PROCEDURE — 3044F PR MOST RECENT HEMOGLOBIN A1C LEVEL <7.0%: ICD-10-PCS | Mod: CPTII,S$GLB,, | Performed by: OBSTETRICS & GYNECOLOGY

## 2023-06-15 PROCEDURE — 99999 PR PBB SHADOW E&M-EST. PATIENT-LVL III: CPT | Mod: PBBFAC,,, | Performed by: OBSTETRICS & GYNECOLOGY

## 2023-06-15 PROCEDURE — 1159F PR MEDICATION LIST DOCUMENTED IN MEDICAL RECORD: ICD-10-PCS | Mod: CPTII,S$GLB,, | Performed by: OBSTETRICS & GYNECOLOGY

## 2023-06-15 PROCEDURE — 99999 PR PBB SHADOW E&M-EST. PATIENT-LVL III: ICD-10-PCS | Mod: PBBFAC,,, | Performed by: OBSTETRICS & GYNECOLOGY

## 2023-06-15 PROCEDURE — 3075F PR MOST RECENT SYSTOLIC BLOOD PRESS GE 130-139MM HG: ICD-10-PCS | Mod: CPTII,S$GLB,, | Performed by: OBSTETRICS & GYNECOLOGY

## 2023-06-15 PROCEDURE — 3008F PR BODY MASS INDEX (BMI) DOCUMENTED: ICD-10-PCS | Mod: CPTII,S$GLB,, | Performed by: OBSTETRICS & GYNECOLOGY

## 2023-06-15 PROCEDURE — 99024 PR POST-OP FOLLOW-UP VISIT: ICD-10-PCS | Mod: S$GLB,,, | Performed by: OBSTETRICS & GYNECOLOGY

## 2023-06-15 PROCEDURE — 99024 POSTOP FOLLOW-UP VISIT: CPT | Mod: S$GLB,,, | Performed by: OBSTETRICS & GYNECOLOGY

## 2023-06-15 PROCEDURE — 3044F HG A1C LEVEL LT 7.0%: CPT | Mod: CPTII,S$GLB,, | Performed by: OBSTETRICS & GYNECOLOGY

## 2023-06-15 PROCEDURE — 3078F DIAST BP <80 MM HG: CPT | Mod: CPTII,S$GLB,, | Performed by: OBSTETRICS & GYNECOLOGY

## 2023-06-15 PROCEDURE — 3075F SYST BP GE 130 - 139MM HG: CPT | Mod: CPTII,S$GLB,, | Performed by: OBSTETRICS & GYNECOLOGY

## 2023-06-15 PROCEDURE — 1159F MED LIST DOCD IN RCRD: CPT | Mod: CPTII,S$GLB,, | Performed by: OBSTETRICS & GYNECOLOGY

## 2023-06-15 PROCEDURE — 3008F BODY MASS INDEX DOCD: CPT | Mod: CPTII,S$GLB,, | Performed by: OBSTETRICS & GYNECOLOGY

## 2023-06-15 PROCEDURE — 3078F PR MOST RECENT DIASTOLIC BLOOD PRESSURE < 80 MM HG: ICD-10-PCS | Mod: CPTII,S$GLB,, | Performed by: OBSTETRICS & GYNECOLOGY

## 2023-06-15 RX ORDER — NAPROXEN 250 MG/1
250 TABLET ORAL 2 TIMES DAILY
COMMUNITY

## 2023-06-15 NOTE — PROGRESS NOTES
CC: Postop visit    Joan Wilde is a 38 y.o. female  post-op from a Dx LSC, lysis of adhesions on 5/15/23.  Patient is doing well postoperatively.  No pain.  No bowel or bladder complaints.  No fever.      The pathology revealed:  N/a    Past Medical History:   Diagnosis Date    Acne vulgaris 2018    Allergic rhinitis due to dust mite 2019    Endometriosis 2018    Fibroid     Laryngopharyngeal reflux     Migraine syndrome     associated with flu vaccines     Past Surgical History:   Procedure Laterality Date    DIAGNOSTIC LAPAROSCOPY Bilateral 5/15/2023    Procedure: LAPAROSCOPY, DIAGNOSTIC;  Surgeon: Raffi Gao MD;  Location: [facility];  Service: OB/GYN;  Laterality: Bilateral;    HERNIA REPAIR      umbilical    LAPAROSCOPIC APPENDECTOMY N/A 2021    Procedure: APPENDECTOMY, LAPAROSCOPIC WITH LYSIS OF ADHESIONS.;  Surgeon: Trent Cannon MD;  Location: [address];  Service: General;  Laterality: N/A;    LAPAROSCOPIC LYSIS OF ADHESIONS N/A 5/15/2023    Procedure: LYSIS, ADHESIONS, LAPAROSCOPIC;  Surgeon: Raffi Gao MD;  Location: [facility];  Service: OB/GYN;  Laterality: N/A;     Family History   Problem Relation Age of Onset    Hypertension Mother     Diabetes Father     Hypertension Father     Stroke Father     Hyperlipidemia Father     No Known Problems Brother     No Known Problems Brother     Breast cancer Neg Hx     Colon cancer Neg Hx     Ovarian cancer Neg Hx     Melanoma Neg Hx     Blindness Neg Hx     Amblyopia Neg Hx     Cataracts Neg Hx     Glaucoma Neg Hx     Macular degeneration Neg Hx     Retinal detachment Neg Hx     Strabismus Neg Hx      labor Neg Hx     Cancer Neg Hx     Eclampsia Neg Hx      Social History     Tobacco Use    Smoking status: Never    Smokeless tobacco: Never   Substance Use Topics    Alcohol use: No    Drug use: No     OB History    Para Term  AB Living   0 0 0 0 0 0   SAB IAB Ectopic Multiple Live  "Births   0 0 0 0 0       /64   Ht 5' 7" (1.702 m)   Wt 101.6 kg (223 lb 15.8 oz)   BMI 35.08 kg/m²     ROS:  GENERAL: No fever, chills, fatigability or weight loss.  VULVAR: No pain, no lesions and no itching.  VAGINAL: No relaxation, no itching, no discharge, no abnormal bleeding and no lesions.  ABDOMEN: No abdominal pain. Denies nausea. Denies vomiting. No diarrhea. No constipation  BREAST: Denies pain. No lumps. No discharge.  URINARY: No incontinence, no nocturia, no frequency and no dysuria.  CARDIOVASCULAR: No chest pain. No shortness of breath. No leg cramps.  NEUROLOGICAL: No headaches. No vision changes.    PE:   Abdominal:  incision well healed.      ICD-10-CM ICD-9-CM    1. Postoperative examination  Z09 V67.00           We would a long discussion today about the findings at the time of surgery.  Patient has very dense pelvic adhesive disease with fibroids.  This may be related to her history of endometriosis and or her 4 prior laparoscopic procedures.  The case is complicated given her desire for future fertility.  Patient was advised that she should seek consultation with Reproductive Endocrinology where they can discuss the value of a robotic surgical approach for any possible myomectomies and or lysis of adhesions.  She is aware that her pain would likely necessitate a hysterectomy.  But she would like to attempt to get pregnant prior to that point.    Follow up in 1 year for WWE.              "

## 2023-06-22 ENCOUNTER — OFFICE VISIT (OUTPATIENT)
Dept: PSYCHIATRY | Facility: CLINIC | Age: 38
End: 2023-06-22
Payer: COMMERCIAL

## 2023-06-22 DIAGNOSIS — R69 PSYCHIATRIC DIAGNOSIS DEFERRED: Primary | ICD-10-CM

## 2023-06-22 PROCEDURE — 90834 PSYTX W PT 45 MINUTES: CPT | Mod: S$GLB,,, | Performed by: SOCIAL WORKER

## 2023-06-22 PROCEDURE — 90834 PR PSYCHOTHERAPY W/PATIENT, 45 MIN: ICD-10-PCS | Mod: S$GLB,,, | Performed by: SOCIAL WORKER

## 2023-07-27 ENCOUNTER — OFFICE VISIT (OUTPATIENT)
Dept: PSYCHIATRY | Facility: CLINIC | Age: 38
End: 2023-07-27
Payer: COMMERCIAL

## 2023-07-27 DIAGNOSIS — R69 PSYCHIATRIC DIAGNOSIS DEFERRED: Primary | ICD-10-CM

## 2023-07-27 PROCEDURE — 90834 PR PSYCHOTHERAPY W/PATIENT, 45 MIN: ICD-10-PCS | Mod: S$GLB,,, | Performed by: SOCIAL WORKER

## 2023-07-27 PROCEDURE — 90834 PSYTX W PT 45 MINUTES: CPT | Mod: S$GLB,,, | Performed by: SOCIAL WORKER

## 2023-07-31 ENCOUNTER — PATIENT MESSAGE (OUTPATIENT)
Dept: RESEARCH | Facility: HOSPITAL | Age: 38
End: 2023-07-31
Payer: COMMERCIAL

## 2023-08-08 ENCOUNTER — OFFICE VISIT (OUTPATIENT)
Dept: OBSTETRICS AND GYNECOLOGY | Facility: CLINIC | Age: 38
End: 2023-08-08
Payer: COMMERCIAL

## 2023-08-08 ENCOUNTER — LAB VISIT (OUTPATIENT)
Dept: LAB | Facility: HOSPITAL | Age: 38
End: 2023-08-08
Attending: STUDENT IN AN ORGANIZED HEALTH CARE EDUCATION/TRAINING PROGRAM
Payer: COMMERCIAL

## 2023-08-08 VITALS
HEIGHT: 67 IN | WEIGHT: 226.88 LBS | BODY MASS INDEX: 35.61 KG/M2 | DIASTOLIC BLOOD PRESSURE: 79 MMHG | SYSTOLIC BLOOD PRESSURE: 119 MMHG

## 2023-08-08 DIAGNOSIS — Z11.3 SCREENING FOR STDS (SEXUALLY TRANSMITTED DISEASES): ICD-10-CM

## 2023-08-08 DIAGNOSIS — N89.8 VAGINAL IRRITATION: Primary | ICD-10-CM

## 2023-08-08 LAB
HBV SURFACE AG SERPL QL IA: NORMAL
HIV 1+2 AB+HIV1 P24 AG SERPL QL IA: NORMAL

## 2023-08-08 PROCEDURE — 3008F PR BODY MASS INDEX (BMI) DOCUMENTED: ICD-10-PCS | Mod: CPTII,S$GLB,, | Performed by: STUDENT IN AN ORGANIZED HEALTH CARE EDUCATION/TRAINING PROGRAM

## 2023-08-08 PROCEDURE — 99999 PR PBB SHADOW E&M-EST. PATIENT-LVL III: ICD-10-PCS | Mod: PBBFAC,,, | Performed by: STUDENT IN AN ORGANIZED HEALTH CARE EDUCATION/TRAINING PROGRAM

## 2023-08-08 PROCEDURE — 99999 PR PBB SHADOW E&M-EST. PATIENT-LVL III: CPT | Mod: PBBFAC,,, | Performed by: STUDENT IN AN ORGANIZED HEALTH CARE EDUCATION/TRAINING PROGRAM

## 2023-08-08 PROCEDURE — 3044F PR MOST RECENT HEMOGLOBIN A1C LEVEL <7.0%: ICD-10-PCS | Mod: CPTII,S$GLB,, | Performed by: STUDENT IN AN ORGANIZED HEALTH CARE EDUCATION/TRAINING PROGRAM

## 2023-08-08 PROCEDURE — 99213 OFFICE O/P EST LOW 20 MIN: CPT | Mod: 24,S$GLB,, | Performed by: STUDENT IN AN ORGANIZED HEALTH CARE EDUCATION/TRAINING PROGRAM

## 2023-08-08 PROCEDURE — 3078F PR MOST RECENT DIASTOLIC BLOOD PRESSURE < 80 MM HG: ICD-10-PCS | Mod: CPTII,S$GLB,, | Performed by: STUDENT IN AN ORGANIZED HEALTH CARE EDUCATION/TRAINING PROGRAM

## 2023-08-08 PROCEDURE — 87340 HEPATITIS B SURFACE AG IA: CPT | Performed by: STUDENT IN AN ORGANIZED HEALTH CARE EDUCATION/TRAINING PROGRAM

## 2023-08-08 PROCEDURE — 3074F SYST BP LT 130 MM HG: CPT | Mod: CPTII,S$GLB,, | Performed by: STUDENT IN AN ORGANIZED HEALTH CARE EDUCATION/TRAINING PROGRAM

## 2023-08-08 PROCEDURE — 1160F PR REVIEW ALL MEDS BY PRESCRIBER/CLIN PHARMACIST DOCUMENTED: ICD-10-PCS | Mod: CPTII,S$GLB,, | Performed by: STUDENT IN AN ORGANIZED HEALTH CARE EDUCATION/TRAINING PROGRAM

## 2023-08-08 PROCEDURE — 1159F MED LIST DOCD IN RCRD: CPT | Mod: CPTII,S$GLB,, | Performed by: STUDENT IN AN ORGANIZED HEALTH CARE EDUCATION/TRAINING PROGRAM

## 2023-08-08 PROCEDURE — 3008F BODY MASS INDEX DOCD: CPT | Mod: CPTII,S$GLB,, | Performed by: STUDENT IN AN ORGANIZED HEALTH CARE EDUCATION/TRAINING PROGRAM

## 2023-08-08 PROCEDURE — 1160F RVW MEDS BY RX/DR IN RCRD: CPT | Mod: CPTII,S$GLB,, | Performed by: STUDENT IN AN ORGANIZED HEALTH CARE EDUCATION/TRAINING PROGRAM

## 2023-08-08 PROCEDURE — 99213 PR OFFICE/OUTPT VISIT, EST, LEVL III, 20-29 MIN: ICD-10-PCS | Mod: 24,S$GLB,, | Performed by: STUDENT IN AN ORGANIZED HEALTH CARE EDUCATION/TRAINING PROGRAM

## 2023-08-08 PROCEDURE — 3044F HG A1C LEVEL LT 7.0%: CPT | Mod: CPTII,S$GLB,, | Performed by: STUDENT IN AN ORGANIZED HEALTH CARE EDUCATION/TRAINING PROGRAM

## 2023-08-08 PROCEDURE — 1159F PR MEDICATION LIST DOCUMENTED IN MEDICAL RECORD: ICD-10-PCS | Mod: CPTII,S$GLB,, | Performed by: STUDENT IN AN ORGANIZED HEALTH CARE EDUCATION/TRAINING PROGRAM

## 2023-08-08 PROCEDURE — 87389 HIV-1 AG W/HIV-1&-2 AB AG IA: CPT | Performed by: STUDENT IN AN ORGANIZED HEALTH CARE EDUCATION/TRAINING PROGRAM

## 2023-08-08 PROCEDURE — 81514 NFCT DS BV&VAGINITIS DNA ALG: CPT | Performed by: STUDENT IN AN ORGANIZED HEALTH CARE EDUCATION/TRAINING PROGRAM

## 2023-08-08 PROCEDURE — 3074F PR MOST RECENT SYSTOLIC BLOOD PRESSURE < 130 MM HG: ICD-10-PCS | Mod: CPTII,S$GLB,, | Performed by: STUDENT IN AN ORGANIZED HEALTH CARE EDUCATION/TRAINING PROGRAM

## 2023-08-08 PROCEDURE — 3078F DIAST BP <80 MM HG: CPT | Mod: CPTII,S$GLB,, | Performed by: STUDENT IN AN ORGANIZED HEALTH CARE EDUCATION/TRAINING PROGRAM

## 2023-08-08 PROCEDURE — 36415 COLL VENOUS BLD VENIPUNCTURE: CPT | Performed by: STUDENT IN AN ORGANIZED HEALTH CARE EDUCATION/TRAINING PROGRAM

## 2023-08-08 PROCEDURE — 86592 SYPHILIS TEST NON-TREP QUAL: CPT | Performed by: STUDENT IN AN ORGANIZED HEALTH CARE EDUCATION/TRAINING PROGRAM

## 2023-08-08 PROCEDURE — 87591 N.GONORRHOEAE DNA AMP PROB: CPT | Performed by: STUDENT IN AN ORGANIZED HEALTH CARE EDUCATION/TRAINING PROGRAM

## 2023-08-08 NOTE — PROGRESS NOTES
"Chief Complaint: Vaginal irritation     HPI:      Joan Wilde is a 38 y.o.  who presents today for c/o vaginal irritation noticed about 7 days ago. Worse with intercourse. No discharge, itching or rash. Is currently using OTC miconazole. She is concerned that a transvaginal ultrasound probe that was used during an ultrasound at an outside clinic was not covered with the proper probe and for this reason would also like STD testing.   She is sexually active with . No other concerns at this time.    Physical Exam:      PHYSICAL EXAM:  /79   Ht 5' 7" (1.702 m)   Wt 102.9 kg (226 lb 13.7 oz)   BMI 35.53 kg/m²   Body mass index is 35.53 kg/m².     APPEARANCE: Well nourished, well developed, in no acute distress.  PELVIC:  External genitalia and urethra within normal limits. Vagina without lesions, with physiologic discharge, without erythema, without ulcers.  Cervix without cervical motion tenderness, non-friable. Uterus: normal size, mobile, non-tender.  No adnexal masses or tenderness palpated.      Assessment/Plan:     Vaginal irritation  -     C. trachomatis/N. gonorrhoeae by AMP DNA [facility]; Cervicovaginal  -     Vaginosis Screen by DNA Probe    Screening for STDs (sexually transmitted diseases)  -     HIV 1/2 Ag/Ab (4th Gen); Future; Expected date: 2023  -     C. trachomatis/N. gonorrhoeae by AMP DNA [facility]; Cervicovaginal  -     RPR; Future; Expected date: 2023  -     Hepatitis B Surface Antigen; Future; Expected date: 2023      - will follow up STD panel and swabs  - routine annual with primary GYN Dr Ambrocio in September       Jana Gillette MD  Obstetrics and Gynecology  [facility]    "

## 2023-08-09 ENCOUNTER — OFFICE VISIT (OUTPATIENT)
Dept: PSYCHIATRY | Facility: CLINIC | Age: 38
End: 2023-08-09
Payer: COMMERCIAL

## 2023-08-09 DIAGNOSIS — R69 PSYCHIATRIC DIAGNOSIS DEFERRED: Primary | ICD-10-CM

## 2023-08-09 LAB
BACTERIAL VAGINOSIS DNA: NEGATIVE
C TRACH DNA SPEC QL NAA+PROBE: NOT DETECTED
CANDIDA GLABRATA DNA: NEGATIVE
CANDIDA KRUSEI DNA: NEGATIVE
CANDIDA RRNA VAG QL PROBE: NEGATIVE
N GONORRHOEA DNA SPEC QL NAA+PROBE: NOT DETECTED
RPR SER QL: NORMAL
T VAGINALIS RRNA GENITAL QL PROBE: NEGATIVE

## 2023-08-09 PROCEDURE — 90834 PSYTX W PT 45 MINUTES: CPT | Mod: S$GLB,,, | Performed by: SOCIAL WORKER

## 2023-08-09 PROCEDURE — 90834 PR PSYCHOTHERAPY W/PATIENT, 45 MIN: ICD-10-PCS | Mod: S$GLB,,, | Performed by: SOCIAL WORKER

## 2023-08-21 ENCOUNTER — PATIENT MESSAGE (OUTPATIENT)
Dept: RESEARCH | Facility: HOSPITAL | Age: 38
End: 2023-08-21
Payer: COMMERCIAL

## 2023-08-24 ENCOUNTER — CLINICAL SUPPORT (OUTPATIENT)
Dept: INTERNAL MEDICINE | Facility: CLINIC | Age: 38
End: 2023-08-24
Payer: COMMERCIAL

## 2023-08-24 VITALS — WEIGHT: 221.81 LBS | BODY MASS INDEX: 34.74 KG/M2

## 2023-08-24 PROCEDURE — 99999 PR PBB SHADOW E&M-EST. PATIENT-LVL I: CPT | Mod: PBBFAC,,,

## 2023-08-24 PROCEDURE — 99999 PR PBB SHADOW E&M-EST. PATIENT-LVL I: ICD-10-PCS | Mod: PBBFAC,,,

## 2023-08-24 NOTE — PROGRESS NOTES
Health  Follow-Up Note   [x] Office  [] Phone  Notes from previous session reviewed.   [x] Previous Session Goals unchanged.   [x] Patient/Caregiver Change in Goals.  Goals added or changed by Patient/Caregiver since program participation:  Working out started the elliptical  Started back on smoothies  Don't eat late in evening     Decrease portions  Get back to more yoga     Additional/Changed support that patient/caregiver has experienced/sought?  (Indicate readiness, support from family, friends, others, community groups, etc)       Additional/Changed obstacles that could prevent patient/caregiver from reaching their goals?   Stress  [facility] 3 months ago    Feedback provided:   Praised for good job down 1.2 lbs    Diagnostic values/Desriptors for follow-up as needed for chronic condition(s)   Weight: 100.6 kg 221.6 lb    Interventions:   Health  listened reflectively, validated thoughts and feelings, offered support and encouragement.   Allowed patient to express themselves in a non-biased atmosphere.  Health  assisted pt to problem-solve obstacles such as being in a challenging environment and dealing with these challenges.   Motivational Interviewed interventions utilized (OARS).   Patient responded favorably to interventions and remained actively engaged in the session.   Health  will remain available and connected for patient by phone and/or office visits.   Positive reinforcement, emotional support and encouragement provided.   Focused Education: MI    Plan:  [x] Pt will work on goals as stated above.   [x] Pt will contact Health  for any questions, concerns or needs.  [x] Pt will follow up with Health  in office on  10.3.23.  [] Pt will follow up with Health  on phone in:        [x] Health  will remain available.   [] Health  will contact patient by phone in:        [] Health  will consult:        [] Health  will inform Provider via [employer]  messaging.     Impression:  1. Behavior is consistent with Action Stage of Change.   2. Participation level:       [x] Receptive      [x] Interactive      [] Guarded and Resistant      [x] Self Motivated      [] Refused/Declined to participate   3. [x] Pt voiced understanding of all information presented.       [] Pt voiced needing further information/education. This will be arranged.       [x] Pt would benefit from further education/information as identified by this health . This will be arranged.     Lashell Pedroza RN HC

## 2023-09-05 ENCOUNTER — HOSPITAL ENCOUNTER (OUTPATIENT)
Dept: RADIOLOGY | Facility: HOSPITAL | Age: 38
Discharge: HOME | End: 2023-09-05
Attending: PHYSICIAN ASSISTANT
Payer: COMMERCIAL

## 2023-09-05 ENCOUNTER — OFFICE VISIT (OUTPATIENT)
Dept: ORTHOPEDICS | Facility: CLINIC | Age: 38
End: 2023-09-05
Payer: COMMERCIAL

## 2023-09-05 DIAGNOSIS — M65.311 TRIGGER THUMB OF RIGHT HAND: Primary | ICD-10-CM

## 2023-09-05 DIAGNOSIS — M79.641 RIGHT HAND PAIN: ICD-10-CM

## 2023-09-05 DIAGNOSIS — M79.641 RIGHT HAND PAIN: Primary | ICD-10-CM

## 2023-09-05 PROCEDURE — 3044F PR MOST RECENT HEMOGLOBIN A1C LEVEL <7.0%: ICD-10-PCS | Mod: CPTII,S$GLB,, | Performed by: PHYSICIAN ASSISTANT

## 2023-09-05 PROCEDURE — 99999 PR PBB SHADOW E&M-EST. PATIENT-LVL III: ICD-10-PCS | Mod: PBBFAC,,, | Performed by: PHYSICIAN ASSISTANT

## 2023-09-05 PROCEDURE — 3044F HG A1C LEVEL LT 7.0%: CPT | Mod: CPTII,S$GLB,, | Performed by: PHYSICIAN ASSISTANT

## 2023-09-05 PROCEDURE — 1159F MED LIST DOCD IN RCRD: CPT | Mod: CPTII,S$GLB,, | Performed by: PHYSICIAN ASSISTANT

## 2023-09-05 PROCEDURE — 99213 PR OFFICE/OUTPT VISIT, EST, LEVL III, 20-29 MIN: ICD-10-PCS | Mod: S$GLB,,, | Performed by: PHYSICIAN ASSISTANT

## 2023-09-05 PROCEDURE — 73130 XR HAND COMPLETE 3 VIEW RIGHT: ICD-10-PCS | Mod: 26,RT,, | Performed by: RADIOLOGY

## 2023-09-05 PROCEDURE — 99213 OFFICE O/P EST LOW 20 MIN: CPT | Mod: S$GLB,,, | Performed by: PHYSICIAN ASSISTANT

## 2023-09-05 PROCEDURE — 73130 X-RAY EXAM OF HAND: CPT | Mod: 26,RT,, | Performed by: RADIOLOGY

## 2023-09-05 PROCEDURE — 73130 X-RAY EXAM OF HAND: CPT | Mod: TC,RT

## 2023-09-05 PROCEDURE — 99999 PR PBB SHADOW E&M-EST. PATIENT-LVL III: CPT | Mod: PBBFAC,,, | Performed by: PHYSICIAN ASSISTANT

## 2023-09-05 PROCEDURE — 1159F PR MEDICATION LIST DOCUMENTED IN MEDICAL RECORD: ICD-10-PCS | Mod: CPTII,S$GLB,, | Performed by: PHYSICIAN ASSISTANT

## 2023-09-05 NOTE — PROGRESS NOTES
[facility]  History & Physical  Orthopedics    SUBJECTIVE:      Chief Complaint: Right thumb pain    Referring Provider: No ref. provider found     Dr. Garcia is the supervising physician for this encounter/patient    History of Present Illness:  Patient is a 38 y.o. right hand dominant female who presents today with complaints of right thumb pain, off/on for about 5 months, no trauma/injury associated with this. Pain present to the base of the thumb, would radiate some into the thenar. She reports that for about 1 week, she experienced significant thumb pain that she wore a brace for, this seemed to help greatly. No numbness/tingling.  Currently the thumb is doing well.    The patient is a/an [employer] employee.    Onset of symptoms/DOI was 5 months off/on.    Symptoms are aggravated by activity.    Symptoms are alleviated by rest and immobilization.    Symptoms consist of pain and decreased ROM.    The patient rates their pain as a 9/10 during the 1 week episode.    Attempted treatment(s) and/or interventions include activity modifications, rest, immobilization.     The patient denies any fevers, chills, N/V, D/C and presents for evaluation.       Past Medical History:   Diagnosis Date    Acne vulgaris 11/20/2018    Allergic rhinitis due to dust mite 05/13/2019    Endometriosis 11/20/2018    Fibroid     Laryngopharyngeal reflux     Migraine syndrome     associated with flu vaccines     Past Surgical History:   Procedure Laterality Date    DIAGNOSTIC LAPAROSCOPY Bilateral 5/15/2023    Procedure: LAPAROSCOPY, DIAGNOSTIC;  Surgeon: Raffi Gao MD;  [facility];  Service: OB/GYN;  Laterality: Bilateral;    HERNIA REPAIR  2014    umbilical    LAPAROSCOPIC APPENDECTOMY N/A 4/21/2021    Procedure: APPENDECTOMY, LAPAROSCOPIC WITH LYSIS OF ADHESIONS.;  Surgeon: Trent Cannon MD;  Location: [address];  Service: General;  Laterality: N/A;    LAPAROSCOPIC LYSIS OF ADHESIONS N/A 5/15/2023     Procedure: LYSIS, ADHESIONS, LAPAROSCOPIC;  Surgeon: Raffi Gao MD;  Location: [facility];  Service: OB/GYN;  Laterality: N/A;     Review of patient's allergies indicates:   Allergen Reactions    Metoclopramide Other (See Comments)     Causes tongue to roll back in mouth     Social History     Social History Narrative    Performance improvement specialist at [employer]    She is single.      Family History   Problem Relation Age of Onset    Hypertension Mother     Diabetes Father     Hypertension Father     Stroke Father     Hyperlipidemia Father     No Known Problems Brother     No Known Problems Brother     Breast cancer Neg Hx     Colon cancer Neg Hx     Ovarian cancer Neg Hx     Melanoma Neg Hx     Blindness Neg Hx     Amblyopia Neg Hx     Cataracts Neg Hx     Glaucoma Neg Hx     Macular degeneration Neg Hx     Retinal detachment Neg Hx     Strabismus Neg Hx      labor Neg Hx     Cancer Neg Hx     Eclampsia Neg Hx          Current Outpatient Medications:     ascorbic acid, vitamin C, (VITAMIN C) 1000 MG tablet, Take 2 tablets (2,000 mg total) by mouth once daily., Disp: , Rfl:     clotrimazole-betamethasone 1-0.05% (LOTRISONE) cream, Apply topically 2 (two) times daily to rash on neck x 2 weeks, Disp: 45 g, Rfl: 2    conjugated estrogens (PREMARIN) vaginal cream, Use dime size amount of estrogen cream in vagina nightly for 2 weeks, then twice a week thereafter., Disp: 30 g, Rfl: 2    fluticasone propionate (FLONASE) 50 mcg/actuation nasal spray, 2 sprays (100 mcg total) by Each Nostril route once daily., Disp: 16 g, Rfl: 11    naproxen (NAPROSYN) 250 MG tablet, Take 250 mg by mouth 2 (two) times daily., Disp: , Rfl:     norethindrone (AYGESTIN) 5 mg Tab, Take 1 tablet (5 mg total) by mouth once daily., Disp: 30 tablet, Rfl: 6    norethindrone (ORTHO MICRONOR) 0.35 mg tablet, Take 1 tablet (0.35 mg total) by mouth once daily., Disp: 28 tablet, Rfl: 6    olopatadine (PATANOL) 0.1 % ophthalmic solution, 1 drop  2 (two) times daily., Disp: , Rfl:     omeprazole (PRILOSEC) 20 MG capsule, Take 2 capsules (40 mg total) by mouth once daily., Disp: 60 capsule, Rfl: 4    ondansetron (ZOFRAN-ODT) 4 MG TbDL, Take 1 tablet (4 mg total) by mouth every 8 (eight) hours as needed (nausea). (Patient not taking: Reported on 8/8/2023), Disp: 10 tablet, Rfl: 0      Review of Systems:  Constitutional: no fever or chills  Eyes: no visual changes  ENT: no nasal congestion or sore throat  Respiratory: no cough or shortness of breath  Cardiovascular: no chest pain  Gastrointestinal: no nausea or vomiting, tolerating diet  Musculoskeletal: pain and soreness    OBJECTIVE:      Vital Signs (Most Recent):  There were no vitals filed for this visit.  There is no height or weight on file to calculate BMI.      Physical Exam:  Constitutional: The patient appears well-developed and well-nourished. No distress.   Skin: No lesions appreciated  Head: Normocephalic and atraumatic.   Nose: Nose normal.   Ears: No deformities seen  Eyes: Conjunctivae and EOM are normal.   Neck: No tracheal deviation present.   Cardiovascular: Normal rate and intact distal pulses.    Pulmonary/Chest: Effort normal. No respiratory distress.   Abdominal: There is no guarding.   Neurological: The patient is alert.   Psychiatric: The patient has a normal mood and affect.     Right Hand/Wrist Examination:    Observation/Inspection:  Swelling  none    Deformity  none  Discoloration  none     Scars   none    Atrophy  none    HAND/WRIST EXAMINATION:  Finkelstein's Test   Neg  WHAT Test    Neg  Snuff box tenderness   Neg  [place]'s Test    Neg  Hook of Hamate Tenderness  Neg  CMC grind    Neg  Circumduction test   Neg  NTTP on exam today.    Neurovascular Exam:  Digits WWP, brisk CR < 3s throughout  NVI motor/LTS to M/R/U nerves, radial pulse 2+  Tinel's Test - Carpal Tunnel  Neg  Tinel's Test - Cubital Tunnel  Neg  Phalen's Test    Neg  Median Nerve Compression Test Neg    ROM hand  full, painless, no natasha trigger on exam.    ROM wrist full, painless    ROM elbow full, painless    Abdomen not guarded  Respirations nonlabored  Perfusion intact    Diagnostic Results:     Imaging - I independently viewed the patient's imaging as well as the radiology report.      FINDINGS:  No fracture.  No malalignment.  Preserved bone density.  Preserved joint spaces.  Soft tissue swelling dorsally at the level of the metacarpals and MCP articulations.     Impression:     As above      ASSESSMENT/PLAN:      38 y.o. yo female with suspected Right trigger thumb, improved today    Plan: The patient and I had a thorough discussion today.  We discussed the working diagnosis as well as several other potential alternative diagnoses.  Treatment options were discussed, both conservative and surgical.  Conservative treatment options would include things such as activity modifications, workplace modifications, a period of rest, oral vs topical OTC and prescription anti-inflammatory medications, occupational therapy, splinting/bracing, immobilization, corticosteroid injections, and others.  Surgical options were discussed as well.     At this time, the patient would like to proceed with a trial of thumb brace for comfort, we discuss voltaren gel massage, ice/heat. If pain progresses and becomes more isolated, we can perform steroid injection. RTC on prn basis.    Should the patient's symptoms worsen, persist, or fail to improve they should return for reevaluation and I would be happy to see them back anytime.           Please do not hesitate to reach out to us via email, phone, or MyChart with any questions, concerns, or feedback.

## 2023-09-08 ENCOUNTER — OFFICE VISIT (OUTPATIENT)
Dept: OBSTETRICS AND GYNECOLOGY | Facility: CLINIC | Age: 38
End: 2023-09-08
Payer: COMMERCIAL

## 2023-09-08 VITALS
WEIGHT: 224.88 LBS | SYSTOLIC BLOOD PRESSURE: 125 MMHG | DIASTOLIC BLOOD PRESSURE: 84 MMHG | HEIGHT: 67 IN | BODY MASS INDEX: 35.29 KG/M2

## 2023-09-08 DIAGNOSIS — Z01.419 WELL WOMAN EXAM WITH ROUTINE GYNECOLOGICAL EXAM: Primary | ICD-10-CM

## 2023-09-08 DIAGNOSIS — N94.10 FEMALE DYSPAREUNIA: ICD-10-CM

## 2023-09-08 DIAGNOSIS — N80.9 ENDOMETRIOSIS: ICD-10-CM

## 2023-09-08 PROCEDURE — 99396 PR PREVENTIVE VISIT,EST,40-64: ICD-10-PCS | Mod: S$GLB,,, | Performed by: OBSTETRICS & GYNECOLOGY

## 2023-09-08 PROCEDURE — 1159F MED LIST DOCD IN RCRD: CPT | Mod: CPTII,S$GLB,, | Performed by: OBSTETRICS & GYNECOLOGY

## 2023-09-08 PROCEDURE — 3074F SYST BP LT 130 MM HG: CPT | Mod: CPTII,S$GLB,, | Performed by: OBSTETRICS & GYNECOLOGY

## 2023-09-08 PROCEDURE — 3044F HG A1C LEVEL LT 7.0%: CPT | Mod: CPTII,S$GLB,, | Performed by: OBSTETRICS & GYNECOLOGY

## 2023-09-08 PROCEDURE — 99396 PREV VISIT EST AGE 40-64: CPT | Mod: S$GLB,,, | Performed by: OBSTETRICS & GYNECOLOGY

## 2023-09-08 PROCEDURE — 3079F DIAST BP 80-89 MM HG: CPT | Mod: CPTII,S$GLB,, | Performed by: OBSTETRICS & GYNECOLOGY

## 2023-09-08 PROCEDURE — 3079F PR MOST RECENT DIASTOLIC BLOOD PRESSURE 80-89 MM HG: ICD-10-PCS | Mod: CPTII,S$GLB,, | Performed by: OBSTETRICS & GYNECOLOGY

## 2023-09-08 PROCEDURE — 3008F PR BODY MASS INDEX (BMI) DOCUMENTED: ICD-10-PCS | Mod: CPTII,S$GLB,, | Performed by: OBSTETRICS & GYNECOLOGY

## 2023-09-08 PROCEDURE — 3008F BODY MASS INDEX DOCD: CPT | Mod: CPTII,S$GLB,, | Performed by: OBSTETRICS & GYNECOLOGY

## 2023-09-08 PROCEDURE — 99999 PR PBB SHADOW E&M-EST. PATIENT-LVL III: ICD-10-PCS | Mod: PBBFAC,,, | Performed by: OBSTETRICS & GYNECOLOGY

## 2023-09-08 PROCEDURE — 1159F PR MEDICATION LIST DOCUMENTED IN MEDICAL RECORD: ICD-10-PCS | Mod: CPTII,S$GLB,, | Performed by: OBSTETRICS & GYNECOLOGY

## 2023-09-08 PROCEDURE — 3074F PR MOST RECENT SYSTOLIC BLOOD PRESSURE < 130 MM HG: ICD-10-PCS | Mod: CPTII,S$GLB,, | Performed by: OBSTETRICS & GYNECOLOGY

## 2023-09-08 PROCEDURE — 3044F PR MOST RECENT HEMOGLOBIN A1C LEVEL <7.0%: ICD-10-PCS | Mod: CPTII,S$GLB,, | Performed by: OBSTETRICS & GYNECOLOGY

## 2023-09-08 PROCEDURE — 99999 PR PBB SHADOW E&M-EST. PATIENT-LVL III: CPT | Mod: PBBFAC,,, | Performed by: OBSTETRICS & GYNECOLOGY

## 2023-09-08 PROCEDURE — 1160F RVW MEDS BY RX/DR IN RCRD: CPT | Mod: CPTII,S$GLB,, | Performed by: OBSTETRICS & GYNECOLOGY

## 2023-09-08 PROCEDURE — 1160F PR REVIEW ALL MEDS BY PRESCRIBER/CLIN PHARMACIST DOCUMENTED: ICD-10-PCS | Mod: CPTII,S$GLB,, | Performed by: OBSTETRICS & GYNECOLOGY

## 2023-09-08 RX ORDER — ACETAMINOPHEN AND CODEINE PHOSPHATE 120; 12 MG/5ML; MG/5ML
1 SOLUTION ORAL DAILY
Qty: 28 TABLET | Refills: 12 | Status: SHIPPED | OUTPATIENT
Start: 2023-09-08 | End: 2024-09-07

## 2023-09-08 RX ORDER — NORETHINDRONE 5 MG/1
5 TABLET ORAL DAILY
Qty: 30 TABLET | Refills: 12 | Status: SHIPPED | OUTPATIENT
Start: 2023-09-08 | End: 2023-11-02

## 2023-09-08 NOTE — PATIENT INSTRUCTIONS
Mental Health Therapists:    Hansa Cruz [phone]    Gila Grigsby [phone]    Daija Pabon [phone]    Alyce Schulz [phone]    Meeta Blanchard [phone]    Gina Rubio [phone]    Nita Dimas [phone]    Stella Hickman [phone]    Jeff Fuentes [phone]    Nita Melissa [phone]

## 2023-09-08 NOTE — PROGRESS NOTES
History & Physical  Gynecology      SUBJECTIVE:     Chief Complaint: Well Woman       History of Present Illness:  Annual Exam-Premenopausal  Patient presents for annual exam. The patient has no complaints today. Menstrual cycles are absent with medication.  The patient is sexually active. GYN screening history: last pap: approximate date  paphpv and was normal. The patient participates in regular exercise: yes.  Smoking status:  no     Fertility: recently had surgery with Dr. Gao who found dense adhesions and fibroids.  She then met with Dr. Doherty.  AMH was lower than before.  He was concerned about the risks with egg retrieval and so he recommended at this time was egg donor.    Contraception: POP- paused currently    FH:  Breast cancer: neg  Colon cancer: neg  Ovarian cancer: neg    Review of patient's allergies indicates:   Allergen Reactions    Metoclopramide Other (See Comments)     Causes tongue to roll back in mouth       Past Medical History:   Diagnosis Date    Acne vulgaris 2018    Allergic rhinitis due to dust mite 2019    Endometriosis 2018    Fibroid     Laryngopharyngeal reflux     Migraine syndrome     associated with flu vaccines     Past Surgical History:   Procedure Laterality Date    DIAGNOSTIC LAPAROSCOPY Bilateral 5/15/2023    Procedure: LAPAROSCOPY, DIAGNOSTIC;  Surgeon: Raffi Gao MD;  Location: [facility];  Service: OB/GYN;  Laterality: Bilateral;    HERNIA REPAIR      umbilical    LAPAROSCOPIC APPENDECTOMY N/A 2021    Procedure: APPENDECTOMY, LAPAROSCOPIC WITH LYSIS OF ADHESIONS.;  Surgeon: Trent Cannon MD;  Location: [address];  Service: General;  Laterality: N/A;    LAPAROSCOPIC LYSIS OF ADHESIONS N/A 5/15/2023    Procedure: LYSIS, ADHESIONS, LAPAROSCOPIC;  Surgeon: Raffi Gao MD;  Location: [facility];  Service: OB/GYN;  Laterality: N/A;     OB History          0    Para   0    Term   0       0    AB   0    Living   0          SAB   0    IAB   0    Ectopic   0    Multiple   0    Live Births   0               Family History   Problem Relation Age of Onset    Hypertension Mother     Diabetes Father     Hypertension Father     Stroke Father     Hyperlipidemia Father     No Known Problems Brother     No Known Problems Brother     Breast cancer Neg Hx     Colon cancer Neg Hx     Ovarian cancer Neg Hx     Melanoma Neg Hx     Blindness Neg Hx     Amblyopia Neg Hx     Cataracts Neg Hx     Glaucoma Neg Hx     Macular degeneration Neg Hx     Retinal detachment Neg Hx     Strabismus Neg Hx      labor Neg Hx     Cancer Neg Hx     Eclampsia Neg Hx      Social History     Tobacco Use    Smoking status: Never    Smokeless tobacco: Never   Substance Use Topics    Alcohol use: No    Drug use: No       Current Outpatient Medications   Medication Sig    ascorbic acid, vitamin C, (VITAMIN C) 1000 MG tablet Take 2 tablets (2,000 mg total) by mouth once daily.    clotrimazole-betamethasone 1-0.05% (LOTRISONE) cream Apply topically 2 (two) times daily to rash on neck x 2 weeks    conjugated estrogens (PREMARIN) vaginal cream Use dime size amount of estrogen cream in vagina nightly for 2 weeks, then twice a week thereafter.    fluticasone propionate (FLONASE) 50 mcg/actuation nasal spray 2 sprays (100 mcg total) by Each Nostril route once daily.    naproxen (NAPROSYN) 250 MG tablet Take 250 mg by mouth 2 (two) times daily.    norethindrone (AYGESTIN) 5 mg Tab Take 1 tablet (5 mg total) by mouth once daily.    norethindrone (MICRONOR) 0.35 mg tablet Take 1 tablet (0.35 mg total) by mouth once daily.    olopatadine (PATANOL) 0.1 % ophthalmic solution 1 drop 2 (two) times daily.    omeprazole (PRILOSEC) 20 MG capsule Take 2 capsules (40 mg total) by mouth once daily.    ondansetron (ZOFRAN-ODT) 4 MG TbDL Take 1 tablet (4 mg total) by mouth every 8 (eight) hours as needed (nausea). (Patient not taking: Reported on 2023)     No current  facility-administered medications for this visit.         Review of Systems:  Review of Systems   Constitutional: Negative for appetite change, fever and unexpected weight change.   Respiratory: Negative for shortness of breath.    Cardiovascular: Negative for chest pain.   Gastrointestinal: Negative for nausea and vomiting.   Genitourinary: Positive for menstrual problem and pelvic pain. Negative for menorrhagia, vaginal bleeding, vaginal discharge and vaginal pain.   Integumentary:  Negative for breast mass.   Breast: Negative for lump and mass       OBJECTIVE:     Physical Exam:  Physical Exam  Vitals and nursing note reviewed.   Constitutional:       Appearance: She is well-developed.   Neck:      Thyroid: No thyromegaly.      Trachea: No tracheal deviation.   Chest:      Breasts: Breasts are symmetrical.         Right: No inverted nipple, mass, nipple discharge, skin change or tenderness.         Left: No inverted nipple, mass, nipple discharge, skin change or tenderness.   Abdominal:      Palpations: Abdomen is soft.   Genitourinary:     General: Normal vulva.      Labia:         Right: No rash, tenderness, lesion or injury.         Left: No rash, tenderness, lesion or injury.       Urethra: No prolapse, urethral pain, urethral swelling or urethral lesion.      Vagina: Normal. No signs of injury and foreign body. No vaginal discharge, erythema, tenderness or bleeding.      Cervix: No cervical motion tenderness, discharge or friability.      Uterus: Not deviated, not enlarged, not fixed and not tender.       Adnexa:         Right: No mass, tenderness or fullness.          Left: No mass, tenderness or fullness.        Rectum: No anal fissure or external hemorrhoid.      Comments: Urethral meatus: normal size, anterior vaginal wall with no prolapse, no lesions  Bladder: no fullness, masses or tenderness  Musculoskeletal:      Cervical back: Normal range of motion and neck supple.   Neurological:      Mental  Status: She is alert and oriented to person, place, and time.   Psychiatric:         Behavior: Behavior normal.         Thought Content: Thought content normal.         Judgment: Judgment normal.         Chaperoned by: Blank    ASSESSMENT:       ICD-10-CM ICD-9-CM    1. Well woman exam with routine gynecological exam  Z01.419 V72.31       2. Female dyspareunia  N94.10 625.0 conjugated estrogens (PREMARIN) vaginal cream      3. Endometriosis  N80.9 617.9 norethindrone (AYGESTIN) 5 mg Tab      norethindrone (MICRONOR) 0.35 mg tablet               Plan:      Joan was seen today for well woman.    Diagnoses and all orders for this visit:    Well woman exam with routine gynecological exam    Female dyspareunia  -     conjugated estrogens (PREMARIN) vaginal cream; Use dime size amount of estrogen cream in vagina nightly for 2 weeks, then twice a week thereafter.    Endometriosis  -     norethindrone (AYGESTIN) 5 mg Tab; Take 1 tablet (5 mg total) by mouth once daily.  -     norethindrone (MICRONOR) 0.35 mg tablet; Take 1 tablet (0.35 mg total) by mouth once daily.          No orders of the defined types were placed in this encounter.      Well Woman:  - Pap smear up to date  - Birth control: POP refilled; aygestin, vaginal estrogen refilled;   - GC/CT:n/a  - Mammogram: up to date  - Smoking cessation: n/a  - Labs: none required   - Vaccines: not discussed  - Exercise counseling      Follow up in  one year for annual or prn.    Rani Ambrocio

## 2023-09-28 ENCOUNTER — PATIENT MESSAGE (OUTPATIENT)
Dept: SLEEP MEDICINE | Facility: CLINIC | Age: 38
End: 2023-09-28
Payer: COMMERCIAL

## 2023-09-28 ENCOUNTER — PATIENT MESSAGE (OUTPATIENT)
Dept: OBSTETRICS AND GYNECOLOGY | Facility: CLINIC | Age: 38
End: 2023-09-28
Payer: COMMERCIAL

## 2023-09-28 NOTE — TELEPHONE ENCOUNTER
Good Morning Dr Man,    I was just about to respond to this patient. I have her name down on my list and I will make sure to call her as soon as something opens up.    Marine

## 2023-09-29 ENCOUNTER — OFFICE VISIT (OUTPATIENT)
Dept: PSYCHIATRY | Facility: CLINIC | Age: 38
End: 2023-09-29
Payer: COMMERCIAL

## 2023-09-29 DIAGNOSIS — R69 PSYCHIATRIC DIAGNOSIS DEFERRED: Primary | ICD-10-CM

## 2023-09-29 PROCEDURE — 90834 PSYTX W PT 45 MINUTES: CPT | Mod: 95,,, | Performed by: SOCIAL WORKER

## 2023-09-29 PROCEDURE — 90834 PR PSYCHOTHERAPY W/PATIENT, 45 MIN: ICD-10-PCS | Mod: 95,,, | Performed by: SOCIAL WORKER

## 2023-10-02 NOTE — PROGRESS NOTES
Fourth EAP session.  Virtual visit; patient's location: [place]  Note filed in chart.

## 2023-10-03 ENCOUNTER — CLINICAL SUPPORT (OUTPATIENT)
Dept: INTERNAL MEDICINE | Facility: CLINIC | Age: 38
End: 2023-10-03
Payer: COMMERCIAL

## 2023-10-03 VITALS — BODY MASS INDEX: 34.84 KG/M2 | WEIGHT: 222.44 LBS

## 2023-10-03 DIAGNOSIS — E66.9 OBESITY (BMI 30-39.9): Primary | ICD-10-CM

## 2023-10-03 PROCEDURE — 99999 PR PBB SHADOW E&M-EST. PATIENT-LVL I: CPT | Mod: PBBFAC,,,

## 2023-10-03 PROCEDURE — 99999 PR PBB SHADOW E&M-EST. PATIENT-LVL I: ICD-10-PCS | Mod: PBBFAC,,,

## 2023-10-03 NOTE — PROGRESS NOTES
Health  Follow-Up Note   [x] Office  [] Phone  Notes from previous session reviewed.   [x] Previous Session Goals unchanged.   [] Patient/Caregiver Change in Goals.  Goals added or changed by Patient/Caregiver since program participation:  Try IF  Doing Yoga daily    Weight lifting 3 x week     Additional/Changed support that patient/caregiver has experienced/sought?  (Indicate readiness, support from family, friends, others, community groups, etc)       Additional/Changed obstacles that could prevent patient/caregiver from reaching their goals?   Stress  Not losing weight trying/ frustrated     Feedback provided:   Praised for continued effort and determination.    Diagnostic values/Desriptors for follow-up as needed for chronic condition(s)   Weight: 100.9 kg 222.44 lb gain 0.8 lbs    Interventions:   Health  listened reflectively, validated thoughts and feelings, offered support and encouragement.   Allowed patient to express themselves in a non-biased atmosphere.  Health  assisted pt to problem-solve obstacles such as being in a challenging environment and dealing with these challenges.   Motivational Interviewed interventions utilized (OARS).   Patient responded favorably to interventions and remained actively engaged in the session.   Health  will remain available and connected for patient by phone and/or office visits.   Positive reinforcement, emotional support and encouragement provided.   Focused Education: MI    Plan:  [x] Pt will work on goals as stated above.   [x] Pt will contact Health  for any questions, concerns or needs.  [x] Pt will follow up with Health  in office on  11.14.23.  [] Pt will follow up with Health  on phone in:        [x] Health  will remain available.   [] Health  will contact patient by phone in:        [] Health  will consult:        [] Health  will inform Provider via EPIC messaging.     Impression:  1. Behavior is  consistent with Action Stage of Change.   2. Participation level:       [x] Receptive      [x] Interactive      [] Guarded and Resistant      [x] Self Motivated      [] Refused/Declined to participate   3. [x] Pt voiced understanding of all information presented.       [] Pt voiced needing further information/education. This will be arranged.       [x] Pt would benefit from further education/information as identified by this health . This will be arranged.     Lashell Pedroza RN HC

## 2023-10-09 ENCOUNTER — TELEPHONE (OUTPATIENT)
Dept: SLEEP MEDICINE | Facility: CLINIC | Age: 38
End: 2023-10-09
Payer: COMMERCIAL

## 2023-10-09 NOTE — TELEPHONE ENCOUNTER
----- Message from Stella Frankel sent at 10/9/2023  8:51 AM CDT -----  Needs advice from nurse:      Who Called:pt  Regarding:returning a call to Marine/patient's schedule opened up and if possible she would like to take the 2:00 appt that was offered today  Would the patient rather a call back or VIA [facility]?  Best Call Back number:[phone]  Additional Info:

## 2023-10-11 ENCOUNTER — OFFICE VISIT (OUTPATIENT)
Dept: SLEEP MEDICINE | Facility: CLINIC | Age: 38
End: 2023-10-11
Payer: COMMERCIAL

## 2023-10-11 VITALS
WEIGHT: 225 LBS | DIASTOLIC BLOOD PRESSURE: 78 MMHG | HEART RATE: 83 BPM | BODY MASS INDEX: 37.49 KG/M2 | HEIGHT: 65 IN | SYSTOLIC BLOOD PRESSURE: 119 MMHG

## 2023-10-11 DIAGNOSIS — G47.33 OSA (OBSTRUCTIVE SLEEP APNEA): Primary | ICD-10-CM

## 2023-10-11 PROCEDURE — 1159F PR MEDICATION LIST DOCUMENTED IN MEDICAL RECORD: ICD-10-PCS | Mod: CPTII,S$GLB,, | Performed by: PSYCHIATRY & NEUROLOGY

## 2023-10-11 PROCEDURE — 3078F PR MOST RECENT DIASTOLIC BLOOD PRESSURE < 80 MM HG: ICD-10-PCS | Mod: CPTII,S$GLB,, | Performed by: PSYCHIATRY & NEUROLOGY

## 2023-10-11 PROCEDURE — 3008F PR BODY MASS INDEX (BMI) DOCUMENTED: ICD-10-PCS | Mod: CPTII,S$GLB,, | Performed by: PSYCHIATRY & NEUROLOGY

## 2023-10-11 PROCEDURE — 3044F PR MOST RECENT HEMOGLOBIN A1C LEVEL <7.0%: ICD-10-PCS | Mod: CPTII,S$GLB,, | Performed by: PSYCHIATRY & NEUROLOGY

## 2023-10-11 PROCEDURE — 3074F SYST BP LT 130 MM HG: CPT | Mod: CPTII,S$GLB,, | Performed by: PSYCHIATRY & NEUROLOGY

## 2023-10-11 PROCEDURE — 99214 PR OFFICE/OUTPT VISIT, EST, LEVL IV, 30-39 MIN: ICD-10-PCS | Mod: S$GLB,,, | Performed by: PSYCHIATRY & NEUROLOGY

## 2023-10-11 PROCEDURE — 99999 PR PBB SHADOW E&M-EST. PATIENT-LVL III: ICD-10-PCS | Mod: PBBFAC,,, | Performed by: PSYCHIATRY & NEUROLOGY

## 2023-10-11 PROCEDURE — 3044F HG A1C LEVEL LT 7.0%: CPT | Mod: CPTII,S$GLB,, | Performed by: PSYCHIATRY & NEUROLOGY

## 2023-10-11 PROCEDURE — 3008F BODY MASS INDEX DOCD: CPT | Mod: CPTII,S$GLB,, | Performed by: PSYCHIATRY & NEUROLOGY

## 2023-10-11 PROCEDURE — 3078F DIAST BP <80 MM HG: CPT | Mod: CPTII,S$GLB,, | Performed by: PSYCHIATRY & NEUROLOGY

## 2023-10-11 PROCEDURE — 99999 PR PBB SHADOW E&M-EST. PATIENT-LVL III: CPT | Mod: PBBFAC,,, | Performed by: PSYCHIATRY & NEUROLOGY

## 2023-10-11 PROCEDURE — 3074F PR MOST RECENT SYSTOLIC BLOOD PRESSURE < 130 MM HG: ICD-10-PCS | Mod: CPTII,S$GLB,, | Performed by: PSYCHIATRY & NEUROLOGY

## 2023-10-11 PROCEDURE — 99214 OFFICE O/P EST MOD 30 MIN: CPT | Mod: S$GLB,,, | Performed by: PSYCHIATRY & NEUROLOGY

## 2023-10-11 PROCEDURE — 1159F MED LIST DOCD IN RCRD: CPT | Mod: CPTII,S$GLB,, | Performed by: PSYCHIATRY & NEUROLOGY

## 2023-10-11 NOTE — PROGRESS NOTES
"        10/11/2023     8:46 AM 4/10/2023     2:08 PM   EPWORTH SLEEPINESS SCALE TOTAL SCORE    Total score 1 0       Joan Wilde is a 38 y.o. female seen today for CPAP follow up. Last seen on 4/10/2023.    The patient reports improved sleep continuity and daytime sleepiness on PAP. ESS today is 824.  Denies break through snoring.  Reports  dry mouth.  No aerophagia or air hunger. Reports occasional occasional mask leaks and discomfort. Using a dreamwear 1 mask.  "Scarry apnea evetns" are gone        DME: [facility]  got machine in August     Joan Wilde  2023 - 10/10/2023  Patient ID: [id number]  : 1985  Age: 38 years  Gender: Female  Total MOLI  [address] [facility], [id number]  Compliance Report  Compliance  Payor Standard  Usage 2023 - 10/10/2023  Usage days 30/30 days (100%)  >= 4 hours 30 days (100%)  < 4 hours 0 days (0%)  Usage hours 240 hours 11 minutes  Average usage (total days) 8 hours 0 minutes  Average usage (days used) 8 hours 0 minutes  Median usage (days used) 8 hours 7 minutes  Total used hours (value since last reset - 10/10/2023) 885 hours  AirSense 11 AutoSet  Serial number [id number]  Mode AutoSet  Min Pressure 5 cmH2O  Max Pressure 20 cmH2O  EPR Fulltime  EPR level 3  Response Standard  Therapy  Pressure - cmH2O Median: 5.8 95th percentile: 7.5 Maximum: 8.8  Leaks - L/min Median: 0.0 95th percentile: 1.0 Maximum: 44.5  Events per hour AI: 0.5 HI: 0.0 AHI: 0.5  Apnea Index Central: 0.0 Obstructive: 0.4 Unknown: 0.1  RERA Index 0.0  Cheyne-Johnson respiration (average duration per night) 0 minutes (0%)  Sleep studies:          Joan Wilde is a 38 y.o. female is here to be evaluated for a sleep disorder; referred by No ref. provider found.    The patient reports snoring, gasping for air, choking, interrupted sleep, nocturia x2-3 and witnessed apneas since several years ago.   Joan Wilde denied  " excessive daytime sleepiness.    The patient feels rested upon awakening. Takes naps.     The patient  denies morning headaches and reports occasional  dry mouth on awakening.   Joan Wilde reports occasional  nasal congestion. Seeing an allergologist and an ENT - on Flonase and antihistamines.   At times wheezing in spring-summer - and LPR (no asthma)    Joan Wilde  denies symptoms concerning for parasomnia except for occasional somniloquy.  The patient  denies auxiliary symptoms of narcolepsy including sleep onset paralysis, hypnagogic hallucinations, sleep attacks and cataplexy.    Joan Wilde denied symptoms concerning for RLS; nocturnal leg movements have not been noticed.   The patient does not experience sleep related leg cramps.       Medications pertinent to sleep  disorders taken currently: tea at night (caroline)  Previous  medications taken  for sleep disorders:  no    FH: insomnia in her father.     Sleep studies  no    Occupation:no   Bed partner: mo  Exercise routine: no  Caffeine:  no beverages per day; green tea  Alcohol: no  Smoking:no      10/11/2023     8:46 AM 4/10/2023     2:08 PM   EPWORTH SLEEPINESS SCALE TOTAL SCORE    Total score 1 0         EPWORTH SLEEPINESS SCALE 4/10/2023   Sitting and reading 0   Watching TV 0   Sitting, inactive in a public place (e.g. a theatre or a meeting) 0   As a passenger in a car for an hour without a break 0   Lying down to rest in the afternoon when circumstances permit 0   Sitting and talking to someone 0   Sitting quietly after a lunch without alcohol 0   In a car, while stopped for a few minutes in traffic 0   Total score 0         EPWORTH SLEEPINESS SCALE 4/10/2023   Sitting and reading 0   Watching TV 0   Sitting, inactive in a public place (e.g. a theatre or a meeting) 0   As a passenger in a car for an hour without a break 0   Lying down to rest in the afternoon when circumstances permit 0   Sitting and talking  to someone 0   Sitting quietly after a lunch without alcohol 0   In a car, while stopped for a few minutes in traffic 0   Total score 0     Sleep Clinic New Patient 4/10/2023   What time do you go to bed on a week day? (Give a range) 10-11:30pm   What time do you go to bed on a day off? (Give a range) 12am-1am   How long does it take you to fall asleep? (Give a range) 30 minutes-1 hour   On average, how many times per night do you wake up? 2-3   How long does it take you to fall back into sleep? (Give a range) 5 minutes   What time do you wake up to start your day on a week day? (Give a range) 3am   What time do you wake up to start your day on a day off? (Give a range) 3am   What time do you get out of bed? (Give a range) 5 minutes   On average, how many hours do you sleep? 6-7 hours   On average, how many naps do you take per day? none   Rate your sleep quality from 0 to 5 (0-poor, 5-great). 3   Have you experienced:  N/a   How much weight have you lost or gained (in lbs.) in the last year? 0   On average, how many times per night do you go to the bathroom?  2-3   Have you ever had a sleep study/CPAP machine/surgery for sleep apnea? No   Have you ever had a CPAP machine for sleep apnea? No   Have you ever had surgery for sleep apnea? No       Sleep Clinic ROS  4/10/2023   Difficulty breathing through the nose?  No   Sore throat or dry mouth in the morning? No   Irregular or very fast heart beat?  No   Shortness of breath?  No   Acid reflux? Yes   Body aches and pains?  No   Morning headaches? No   Dizziness? No   Mood changes?  No   Do you exercise?  Yes   Do you feel like moving your legs a lot?  No       DME:         PAST MEDICAL HISTORY:    Active Ambulatory Problems     Diagnosis Date Noted    Acne vulgaris 11/20/2018    Endometriosis 11/20/2018    Allergic rhinitis due to dust mite 05/13/2019    Allergic conjunctivitis, bilateral 05/15/2020    Laryngopharyngeal reflux 12/21/2020    Adjustment disorder with  mixed anxiety and depressed mood 2022    Fatty liver 2023    Obesity (BMI 30-39.9) 2023    Sleep apnea 2023     Resolved Ambulatory Problems     Diagnosis Date Noted    Mild intermittent asthma with acute exacerbation 2018    Acute osteomyelitis of phalanx of foot, right 2018    Cough     Suspected COVID-19 virus infection 2020    Gastroesophageal reflux disease 2020    Acute appendicitis 2021    Transaminitis 2022    S/P diagnostic laparoscopy, lysis of adhesion, left ovarian cyst drainage  05/15/2023    Status post laparoscopy, lysis of adhesion 05/15/2023     Past Medical History:   Diagnosis Date    Fibroid     Migraine syndrome                 PAST SURGICAL HISTORY:    Past Surgical History:   Procedure Laterality Date    DIAGNOSTIC LAPAROSCOPY Bilateral 5/15/2023    Procedure: LAPAROSCOPY, DIAGNOSTIC;  Surgeon: Raffi Gao MD;  Location: [facility];  Service: OB/GYN;  Laterality: Bilateral;    HERNIA REPAIR      umbilical    LAPAROSCOPIC APPENDECTOMY N/A 2021    Procedure: APPENDECTOMY, LAPAROSCOPIC WITH LYSIS OF ADHESIONS.;  Surgeon: Trent Cannon MD;  Location: [address];  Service: General;  Laterality: N/A;    LAPAROSCOPIC LYSIS OF ADHESIONS N/A 5/15/2023    Procedure: LYSIS, ADHESIONS, LAPAROSCOPIC;  Surgeon: Raffi Gao MD;  Location: [facility];  Service: OB/GYN;  Laterality: N/A;         FAMILY HISTORY:                Family History   Problem Relation Age of Onset    Hypertension Mother     Diabetes Father     Hypertension Father     Stroke Father     Hyperlipidemia Father     No Known Problems Brother     No Known Problems Brother     Breast cancer Neg Hx     Colon cancer Neg Hx     Ovarian cancer Neg Hx     Melanoma Neg Hx     Blindness Neg Hx     Amblyopia Neg Hx     Cataracts Neg Hx     Glaucoma Neg Hx     Macular degeneration Neg Hx     Retinal detachment Neg Hx     Strabismus Neg Hx      labor Neg Hx     Cancer  Neg Hx     Eclampsia Neg Hx        SOCIAL HISTORY:          Tobacco:   Social History     Tobacco Use   Smoking Status Never   Smokeless Tobacco Never       alcohol use:    Social History     Substance and Sexual Activity   Alcohol Use No                   ALLERGIES:    Review of patient's allergies indicates:   Allergen Reactions    Metoclopramide Other (See Comments)     Causes tongue to roll back in mouth       CURRENT MEDICATIONS:    Current Outpatient Medications   Medication Sig Dispense Refill    ascorbic acid, vitamin C, (VITAMIN C) 1000 MG tablet Take 2 tablets (2,000 mg total) by mouth once daily.      clotrimazole-betamethasone 1-0.05% (LOTRISONE) cream Apply topically 2 (two) times daily to rash on neck x 2 weeks 45 g 2    conjugated estrogens (PREMARIN) vaginal cream Use dime size amount of estrogen cream in vagina nightly for 2 weeks, then twice a week thereafter. 30 g 2    fluticasone propionate (FLONASE) 50 mcg/actuation nasal spray 2 sprays (100 mcg total) by Each Nostril route once daily. 16 g 11    naproxen (NAPROSYN) 250 MG tablet Take 250 mg by mouth 2 (two) times daily.      norethindrone (AYGESTIN) 5 mg Tab Take 1 tablet (5 mg total) by mouth once daily. 30 tablet 6    norethindrone (AYGESTIN) 5 mg Tab Take 1 tablet (5 mg total) by mouth once daily. 30 tablet 12    norethindrone (MICRONOR) 0.35 mg tablet Take 1 tablet (0.35 mg total) by mouth once daily. 28 tablet 12    olopatadine (PATANOL) 0.1 % ophthalmic solution 1 drop 2 (two) times daily.      omeprazole (PRILOSEC) 20 MG capsule Take 2 capsules (40 mg total) by mouth once daily. 60 capsule 4    ondansetron (ZOFRAN-ODT) 4 MG TbDL Take 1 tablet (4 mg total) by mouth every 8 (eight) hours as needed (nausea). 10 tablet 0    norethindrone (MICRONOR) 0.35 mg tablet Take 1 tablet (0.35 mg total) by mouth once daily. 28 tablet 6     No current facility-administered medications for this visit.                        PHYSICAL EXAM:  /78 (BP  "Location: Right arm, Patient Position: Sitting)   Pulse 83   Ht 5' 5" (1.651 m)   Wt 102.1 kg (225 lb)   BMI 37.44 kg/m²   GENERAL: Normal development, well groomed.  HEENT:   HEENT:  Conjunctivae are non-erythematous; Pupils equal, round, and reactive to light; Nose is symmetrical; Nasal mucosa is pink and moist; Septum is midline; Inferior turbinates are normal; Nasal airflow is normal; Posterior pharynx is pink; Modified Mallampati:III; Posterior palate is  low ; Tonsils not visualized; Uvula is normal and pink;Tongue is normal; Dentition is fair; No TMJ tenderness; Jaw opening and protrusion without click and without discomfort.  NECK: Supple. Neck circumference is 16 inches. No thyromegaly. No palpable nodes.     SKIN: On face and neck: No abrasions, no rashes, no lesions.  No subcutaneous nodules are palpable.  RESPIRATORY: Chest is clear to auscultation.  Normal chest expansion and non-labored breathing at rest.  CARDIOVASCULAR: Normal S1, S2.  No murmurs, gallops or rubs. No carotid bruits bilaterally.  No edema. No clubbing. No cyanosis.    NEURO: Oriented to time, place and person. Normal attention span and concentration. Gait normal.    PSYCH: Affect is full. Mood is normal  MUSCULOSKELETAL: Moves 4 extremities. Gait normal.           ASSESSMENT:    1. AMARJIT. The patient symptomatically has  snoring, gasping for air, witnessed apneas, interrupted sleep, and nocturia  with exam findings of crowded airway, elevated BMI, and large neck size. The patient has medical co-morbidities of  LPR  (laringeo-pharyngeal reflux) which can be worsened by AMARJIT. Benefiting from CPAP use in terms of sleep continuity and daytime sleepiness. . Compliant.          PLAN:    Continue APAP at current settings.      During our discussion today, we talked about the etiology of  AMARJIT as well as the potential ramifications of untreated sleep apnea, which could include daytime sleepiness, hypertension, heart disease and/or stroke.  We " discussed potential treatment options, which could include weight loss, body positioning, continuous positive airway pressure (CPAP), or referral for surgical consideration. Meanwhile, she  is urged to avoid supine sleep, weight gain and alcoholic beverages since all of these can worsen AMARJIT.     The patient was given open opportunity to ask questions and/or express concerns about treatment plan. Two point patient identifier confirmed.       Precautions: The patient was advised to abstain from driving should he feel sleepy or drowsy.    Follow up: MD after the sleep study has been completed.     46-minute visit. >50% spent counseling patient and coordination of care.  The patient was  cautioned against drowsy driving.

## 2023-10-13 ENCOUNTER — PATIENT OUTREACH (OUTPATIENT)
Dept: ADMINISTRATIVE | Facility: HOSPITAL | Age: 38
End: 2023-10-13
Payer: COMMERCIAL

## 2023-10-13 NOTE — PROGRESS NOTES
Health Maintenance Due   Topic Date Due    Pneumococcal Vaccines (Age 0-64) (1 - PCV) Never done    COVID-19 Vaccine (5 - 2023-24 season) 09/01/2023      updated. Triggered LINKS and Care Everywhere. Chart reviewed.     Brittni Vaz LPN   Clinical Care Coordinator  Primary Care and Wellness

## 2023-10-18 ENCOUNTER — OFFICE VISIT (OUTPATIENT)
Dept: INTERNAL MEDICINE | Facility: CLINIC | Age: 38
End: 2023-10-18
Payer: COMMERCIAL

## 2023-10-18 ENCOUNTER — LAB VISIT (OUTPATIENT)
Dept: LAB | Facility: HOSPITAL | Age: 38
End: 2023-10-18
Attending: INTERNAL MEDICINE
Payer: COMMERCIAL

## 2023-10-18 VITALS
SYSTOLIC BLOOD PRESSURE: 110 MMHG | BODY MASS INDEX: 37.43 KG/M2 | HEART RATE: 75 BPM | HEIGHT: 65 IN | OXYGEN SATURATION: 99 % | WEIGHT: 224.63 LBS | DIASTOLIC BLOOD PRESSURE: 80 MMHG

## 2023-10-18 DIAGNOSIS — R79.89 ELEVATED LFTS: ICD-10-CM

## 2023-10-18 DIAGNOSIS — Z00.00 ANNUAL PHYSICAL EXAM: Primary | ICD-10-CM

## 2023-10-18 DIAGNOSIS — D50.9 MICROCYTIC ANEMIA: ICD-10-CM

## 2023-10-18 DIAGNOSIS — D57.3 SICKLE CELL TRAIT: ICD-10-CM

## 2023-10-18 LAB
ALBUMIN SERPL BCP-MCNC: 4 G/DL (ref 3.5–5.2)
ALP SERPL-CCNC: 57 U/L (ref 55–135)
ALT SERPL W/O P-5'-P-CCNC: 24 U/L (ref 10–44)
AST SERPL-CCNC: 27 U/L (ref 10–40)
BASOPHILS # BLD AUTO: 0.03 K/UL (ref 0–0.2)
BASOPHILS NFR BLD: 0.7 % (ref 0–1.9)
BILIRUB DIRECT SERPL-MCNC: 0.1 MG/DL (ref 0.1–0.3)
BILIRUB SERPL-MCNC: 0.2 MG/DL (ref 0.1–1)
DIFFERENTIAL METHOD: ABNORMAL
EOSINOPHIL # BLD AUTO: 0 K/UL (ref 0–0.5)
EOSINOPHIL NFR BLD: 0.5 % (ref 0–8)
ERYTHROCYTE [DISTWIDTH] IN BLOOD BY AUTOMATED COUNT: 18.3 % (ref 11.5–14.5)
HCT VFR BLD AUTO: 35.3 % (ref 37–48.5)
HGB BLD-MCNC: 11.1 G/DL (ref 12–16)
IMM GRANULOCYTES # BLD AUTO: 0.01 K/UL (ref 0–0.04)
IMM GRANULOCYTES NFR BLD AUTO: 0.2 % (ref 0–0.5)
IRON SERPL-MCNC: 41 UG/DL (ref 30–160)
LYMPHOCYTES # BLD AUTO: 1.9 K/UL (ref 1–4.8)
LYMPHOCYTES NFR BLD: 46.5 % (ref 18–48)
MCH RBC QN AUTO: 21.9 PG (ref 27–31)
MCHC RBC AUTO-ENTMCNC: 31.4 G/DL (ref 32–36)
MCV RBC AUTO: 70 FL (ref 82–98)
MONOCYTES # BLD AUTO: 0.4 K/UL (ref 0.3–1)
MONOCYTES NFR BLD: 8.7 % (ref 4–15)
NEUTROPHILS # BLD AUTO: 1.8 K/UL (ref 1.8–7.7)
NEUTROPHILS NFR BLD: 43.4 % (ref 38–73)
NRBC BLD-RTO: 0 /100 WBC
PLATELET # BLD AUTO: 373 K/UL (ref 150–450)
PMV BLD AUTO: 10 FL (ref 9.2–12.9)
PROT SERPL-MCNC: 7.3 G/DL (ref 6–8.4)
RBC # BLD AUTO: 5.08 M/UL (ref 4–5.4)
SATURATED IRON: 6 % (ref 20–50)
TOTAL IRON BINDING CAPACITY: 641 UG/DL (ref 250–450)
TRANSFERRIN SERPL-MCNC: 433 MG/DL (ref 200–375)
WBC # BLD AUTO: 4.13 K/UL (ref 3.9–12.7)

## 2023-10-18 PROCEDURE — 84466 ASSAY OF TRANSFERRIN: CPT | Performed by: INTERNAL MEDICINE

## 2023-10-18 PROCEDURE — 3074F PR MOST RECENT SYSTOLIC BLOOD PRESSURE < 130 MM HG: ICD-10-PCS | Mod: CPTII,S$GLB,, | Performed by: INTERNAL MEDICINE

## 2023-10-18 PROCEDURE — 36415 COLL VENOUS BLD VENIPUNCTURE: CPT | Performed by: INTERNAL MEDICINE

## 2023-10-18 PROCEDURE — 3008F PR BODY MASS INDEX (BMI) DOCUMENTED: ICD-10-PCS | Mod: CPTII,S$GLB,, | Performed by: INTERNAL MEDICINE

## 2023-10-18 PROCEDURE — 3044F HG A1C LEVEL LT 7.0%: CPT | Mod: CPTII,S$GLB,, | Performed by: INTERNAL MEDICINE

## 2023-10-18 PROCEDURE — 83020 HEMOGLOBIN ELECTROPHORESIS: CPT | Mod: 91 | Performed by: INTERNAL MEDICINE

## 2023-10-18 PROCEDURE — 83540 ASSAY OF IRON: CPT | Performed by: INTERNAL MEDICINE

## 2023-10-18 PROCEDURE — 3079F PR MOST RECENT DIASTOLIC BLOOD PRESSURE 80-89 MM HG: ICD-10-PCS | Mod: CPTII,S$GLB,, | Performed by: INTERNAL MEDICINE

## 2023-10-18 PROCEDURE — 3008F BODY MASS INDEX DOCD: CPT | Mod: CPTII,S$GLB,, | Performed by: INTERNAL MEDICINE

## 2023-10-18 PROCEDURE — 3074F SYST BP LT 130 MM HG: CPT | Mod: CPTII,S$GLB,, | Performed by: INTERNAL MEDICINE

## 2023-10-18 PROCEDURE — 3044F PR MOST RECENT HEMOGLOBIN A1C LEVEL <7.0%: ICD-10-PCS | Mod: CPTII,S$GLB,, | Performed by: INTERNAL MEDICINE

## 2023-10-18 PROCEDURE — 83020 HEMOGLOBIN ELECTROPHORESIS: CPT | Performed by: INTERNAL MEDICINE

## 2023-10-18 PROCEDURE — 85025 COMPLETE CBC W/AUTO DIFF WBC: CPT | Performed by: INTERNAL MEDICINE

## 2023-10-18 PROCEDURE — 80076 HEPATIC FUNCTION PANEL: CPT | Performed by: INTERNAL MEDICINE

## 2023-10-18 PROCEDURE — 99396 PR PREVENTIVE VISIT,EST,40-64: ICD-10-PCS | Mod: S$GLB,,, | Performed by: INTERNAL MEDICINE

## 2023-10-18 PROCEDURE — 99396 PREV VISIT EST AGE 40-64: CPT | Mod: S$GLB,,, | Performed by: INTERNAL MEDICINE

## 2023-10-18 PROCEDURE — 1159F MED LIST DOCD IN RCRD: CPT | Mod: CPTII,S$GLB,, | Performed by: INTERNAL MEDICINE

## 2023-10-18 PROCEDURE — 3079F DIAST BP 80-89 MM HG: CPT | Mod: CPTII,S$GLB,, | Performed by: INTERNAL MEDICINE

## 2023-10-18 PROCEDURE — 99999 PR PBB SHADOW E&M-EST. PATIENT-LVL IV: ICD-10-PCS | Mod: PBBFAC,,, | Performed by: INTERNAL MEDICINE

## 2023-10-18 PROCEDURE — 1159F PR MEDICATION LIST DOCUMENTED IN MEDICAL RECORD: ICD-10-PCS | Mod: CPTII,S$GLB,, | Performed by: INTERNAL MEDICINE

## 2023-10-18 PROCEDURE — 99999 PR PBB SHADOW E&M-EST. PATIENT-LVL IV: CPT | Mod: PBBFAC,,, | Performed by: INTERNAL MEDICINE

## 2023-10-18 NOTE — PROGRESS NOTES
CC annual exam    HPI:  The patient is a 38-year-old female with obstructive sleep apnea on CPAP, endometriosis, migraine headaches, mild intermittent asthma and sickle cell trait who presents today for annual exam.    Answers submitted by the patient for this visit:  Review of Systems Questionnaire (Submitted on 10/16/2023)  activity change: No  unexpected weight change: Yes  neck pain: No  hearing loss: No  rhinorrhea: No  trouble swallowing: No  eye discharge: No  visual disturbance: No  chest tightness: No  wheezing: No  chest pain: No  palpitations: No  blood in stool: No  constipation: No  vomiting: No  diarrhea: No  polydipsia: No  polyuria: No  difficulty urinating: No  hematuria: No  menstrual problem: No  dysuria: No  joint swelling: No  arthralgias: No  headaches: No  weakness: No  confusion: No  dysphoric mood: No  Patient reports some weight gain she is exercising plus watching carbohydrate intake.  The patient states she is always slightly anemic.  She reports being told she had sickle cell trait.  Her last mammogram was in June.  Her last gyn visit was in September.    Exam:   General appearance:  No acute distress   HEENT: Conjunctiva is clear.  Pupils equal reactive.  TMs clear.  Nasal septum is midline without discharge.  Oropharynx is without erythema.  Trachea is midline without JVD.  Without thyromegaly.    Pulmonary:  Good inspiratory, expiratory breath sounds are heard.  Lungs are clear to auscultation.    Cardiovascular:  S1-S2, rhythm is regular.  Extremities without edema.    GI: Abdomen is nontender, nondistended without hepatosplenomegaly    Assessment:  1. Annual exam   2.  Microcytic anemia the patient has history of sickle cell trait   3. History of elevated LFTs  4. Obstructive sleep apnea on CPAP  5. Mild intermittent asthma  6.  History of migraine headaches    Plan:   1. Will schedule a CBC, serum iron, hemoglobin electrophoresis an LFT.

## 2023-10-19 ENCOUNTER — TELEPHONE (OUTPATIENT)
Dept: OTOLARYNGOLOGY | Facility: CLINIC | Age: 38
End: 2023-10-19
Payer: COMMERCIAL

## 2023-10-19 ENCOUNTER — PATIENT MESSAGE (OUTPATIENT)
Dept: INTERNAL MEDICINE | Facility: CLINIC | Age: 38
End: 2023-10-19
Payer: COMMERCIAL

## 2023-10-19 ENCOUNTER — OFFICE VISIT (OUTPATIENT)
Dept: PSYCHIATRY | Facility: CLINIC | Age: 38
End: 2023-10-19
Payer: COMMERCIAL

## 2023-10-19 DIAGNOSIS — R69 PSYCHIATRIC DIAGNOSIS DEFERRED: Primary | ICD-10-CM

## 2023-10-19 LAB
HGB FRACT BLD ELPH-IMP: NORMAL
HGB S MFR BLD ELPH: 36 %

## 2023-10-19 PROCEDURE — 90834 PSYTX W PT 45 MINUTES: CPT | Mod: S$GLB,,, | Performed by: SOCIAL WORKER

## 2023-10-19 PROCEDURE — 90834 PR PSYCHOTHERAPY W/PATIENT, 45 MIN: ICD-10-PCS | Mod: S$GLB,,, | Performed by: SOCIAL WORKER

## 2023-10-19 PROCEDURE — 99999 PR PBB SHADOW E&M-EST. PATIENT-LVL I: ICD-10-PCS | Mod: PBBFAC,,, | Performed by: SOCIAL WORKER

## 2023-10-19 PROCEDURE — 99999 PR PBB SHADOW E&M-EST. PATIENT-LVL I: CPT | Mod: PBBFAC,,, | Performed by: SOCIAL WORKER

## 2023-10-20 ENCOUNTER — OFFICE VISIT (OUTPATIENT)
Dept: OTOLARYNGOLOGY | Facility: CLINIC | Age: 38
End: 2023-10-20
Payer: COMMERCIAL

## 2023-10-20 VITALS
BODY MASS INDEX: 36.92 KG/M2 | SYSTOLIC BLOOD PRESSURE: 120 MMHG | WEIGHT: 221.88 LBS | DIASTOLIC BLOOD PRESSURE: 78 MMHG | HEART RATE: 88 BPM

## 2023-10-20 DIAGNOSIS — K21.9 LARYNGOPHARYNGEAL REFLUX (LPR): ICD-10-CM

## 2023-10-20 DIAGNOSIS — J30.9 CHRONIC ALLERGIC RHINITIS: Primary | ICD-10-CM

## 2023-10-20 DIAGNOSIS — G47.33 OSA (OBSTRUCTIVE SLEEP APNEA): ICD-10-CM

## 2023-10-20 PROCEDURE — 1160F RVW MEDS BY RX/DR IN RCRD: CPT | Mod: CPTII,S$GLB,, | Performed by: OTOLARYNGOLOGY

## 2023-10-20 PROCEDURE — 3008F BODY MASS INDEX DOCD: CPT | Mod: CPTII,S$GLB,, | Performed by: OTOLARYNGOLOGY

## 2023-10-20 PROCEDURE — 3074F PR MOST RECENT SYSTOLIC BLOOD PRESSURE < 130 MM HG: ICD-10-PCS | Mod: CPTII,S$GLB,, | Performed by: OTOLARYNGOLOGY

## 2023-10-20 PROCEDURE — 99214 OFFICE O/P EST MOD 30 MIN: CPT | Mod: 25,S$GLB,, | Performed by: OTOLARYNGOLOGY

## 2023-10-20 PROCEDURE — 31575 DIAGNOSTIC LARYNGOSCOPY: CPT | Mod: S$GLB,,, | Performed by: OTOLARYNGOLOGY

## 2023-10-20 PROCEDURE — 3044F HG A1C LEVEL LT 7.0%: CPT | Mod: CPTII,S$GLB,, | Performed by: OTOLARYNGOLOGY

## 2023-10-20 PROCEDURE — 3008F PR BODY MASS INDEX (BMI) DOCUMENTED: ICD-10-PCS | Mod: CPTII,S$GLB,, | Performed by: OTOLARYNGOLOGY

## 2023-10-20 PROCEDURE — 1159F MED LIST DOCD IN RCRD: CPT | Mod: CPTII,S$GLB,, | Performed by: OTOLARYNGOLOGY

## 2023-10-20 PROCEDURE — 99999 PR PBB SHADOW E&M-EST. PATIENT-LVL IV: ICD-10-PCS | Mod: PBBFAC,,, | Performed by: OTOLARYNGOLOGY

## 2023-10-20 PROCEDURE — 99999 PR PBB SHADOW E&M-EST. PATIENT-LVL IV: CPT | Mod: PBBFAC,,, | Performed by: OTOLARYNGOLOGY

## 2023-10-20 PROCEDURE — 1160F PR REVIEW ALL MEDS BY PRESCRIBER/CLIN PHARMACIST DOCUMENTED: ICD-10-PCS | Mod: CPTII,S$GLB,, | Performed by: OTOLARYNGOLOGY

## 2023-10-20 PROCEDURE — 3078F PR MOST RECENT DIASTOLIC BLOOD PRESSURE < 80 MM HG: ICD-10-PCS | Mod: CPTII,S$GLB,, | Performed by: OTOLARYNGOLOGY

## 2023-10-20 PROCEDURE — 3074F SYST BP LT 130 MM HG: CPT | Mod: CPTII,S$GLB,, | Performed by: OTOLARYNGOLOGY

## 2023-10-20 PROCEDURE — 99214 PR OFFICE/OUTPT VISIT, EST, LEVL IV, 30-39 MIN: ICD-10-PCS | Mod: 25,S$GLB,, | Performed by: OTOLARYNGOLOGY

## 2023-10-20 PROCEDURE — 1159F PR MEDICATION LIST DOCUMENTED IN MEDICAL RECORD: ICD-10-PCS | Mod: CPTII,S$GLB,, | Performed by: OTOLARYNGOLOGY

## 2023-10-20 PROCEDURE — 3044F PR MOST RECENT HEMOGLOBIN A1C LEVEL <7.0%: ICD-10-PCS | Mod: CPTII,S$GLB,, | Performed by: OTOLARYNGOLOGY

## 2023-10-20 PROCEDURE — 3078F DIAST BP <80 MM HG: CPT | Mod: CPTII,S$GLB,, | Performed by: OTOLARYNGOLOGY

## 2023-10-20 PROCEDURE — 31575 LARYNGOSCOPY: ICD-10-PCS | Mod: S$GLB,,, | Performed by: OTOLARYNGOLOGY

## 2023-10-20 RX ORDER — AZELASTINE 1 MG/ML
1 SPRAY, METERED NASAL 2 TIMES DAILY
Qty: 30 ML | Refills: 3 | Status: SHIPPED | OUTPATIENT
Start: 2023-10-20 | End: 2024-10-19

## 2023-10-20 RX ORDER — FAMOTIDINE 40 MG/1
40 TABLET, FILM COATED ORAL DAILY
Qty: 30 TABLET | Refills: 4 | Status: SHIPPED | OUTPATIENT
Start: 2023-10-20 | End: 2024-02-20

## 2023-10-20 NOTE — PROGRESS NOTES
Chief Complaint   Patient presents with    Follow-up   .       Interval HPI 1/9/2023:  Follow up visit. Reports that she has been feeling very well. She feels throat pain and globus sensation are overall better. She has been on Protonix 40mg PO daily. She has noted in the last month that coffee seemed to be a trigger for heartburn.   She has been using Gaviscon advance daily.  She notes that recently she has been snoring more throughout the last year.  She states that her  has noted this has been more of a problem- he has noticed episodes which concerned him for apnea. She lost 38 lbs in 2020 and reports that she has gained most of  this weight back over the last 2 years. She thinks that the snoring has been worse with the weight gain. She notes that she has trouble waking in the morning, decreased energy, and daytime somnolence. She states that her watch has shown decreased oxygen levels while sleeping.     Interval HPI10/20/2023:  Follow up visit. She did note that she had some episodes of reflux last month and not sure what triggered it. She felt it was severe. She did note increased stress at work that week. The symptoms were present for a few days and then resolved.    She is still taking gaviscon advance and Prilosec 40mg PO daily. She is now on CPAP for AMARJIT and has been on it for last 4 months with great improvement.   She has been on Flonase.  She has been using Claritin intermittently.       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      Social Determinants of Health     Financial Resource Strain: Low Risk  (10/16/2023)    Overall Financial Resource Strain (CARDIA)     Difficulty of Paying Living Expenses: Not hard at all   Food Insecurity: No Food Insecurity (10/16/2023)    Hunger Vital Sign     Worried About Running Out of Food in the Last Year: Never true     Ran Out of Food in the Last Year: Never true   Transportation Needs: No Transportation Needs (10/16/2023)    PRAPARE - Transportation     Lack of Transportation (Medical): No     Lack of Transportation (Non-Medical): No   Physical Activity: Insufficiently Active (10/16/2023)    Exercise Vital Sign     Days of Exercise per Week: 6 days     Minutes of Exercise per Session: 20 min   Stress: Stress Concern Present (10/16/2023)    [place] [employer] - Occupational Stress Questionnaire     Feeling of Stress : To some extent   Social Connections: Unknown (10/16/2023)    Social Connection and Isolation Panel [NHANES]     Frequency of Communication with Friends and Family: More than three times a week     Frequency of Social Gatherings with Friends and Family: Never     Active Member of Clubs or Organizations: No     Attends Club or Organization Meetings: 1 to 4 times per year     Marital Status:    Housing Stability: Low Risk  (10/16/2023)    Housing Stability Vital Sign     Unable to Pay for Housing in the Last Year: No     Number of Places Lived in the Last Year: 1     Unstable Housing in the Last Year: No     Past Surgical History:   Procedure Laterality Date    DIAGNOSTIC LAPAROSCOPY Bilateral 5/15/2023    Procedure: LAPAROSCOPY, DIAGNOSTIC;  Surgeon: Raffi Gao MD;  Location: [facility];  Service: OB/GYN;  Laterality: Bilateral;    HERNIA REPAIR  2014    umbilical    LAPAROSCOPIC APPENDECTOMY N/A 4/21/2021    Procedure: APPENDECTOMY, LAPAROSCOPIC WITH LYSIS OF ADHESIONS.;  Surgeon:  Trent Cannon MD;  [address];  Service: General;  Laterality: N/A;    LAPAROSCOPIC LYSIS OF ADHESIONS N/A 5/15/2023    Procedure: LYSIS, ADHESIONS, LAPAROSCOPIC;  Surgeon: Raffi Gao MD;  Location: [facility];  Service: OB/GYN;  Laterality: N/A;     Family History   Problem Relation Age of Onset    Hypertension Mother     Diabetes Father     Hypertension Father     Stroke Father     Hyperlipidemia Father     No Known Problems Brother     No Known Problems Brother     Breast cancer Neg Hx     Colon cancer Neg Hx     Ovarian cancer Neg Hx     Melanoma Neg Hx     Blindness Neg Hx     Amblyopia Neg Hx     Cataracts Neg Hx     Glaucoma Neg Hx     Macular degeneration Neg Hx     Retinal detachment Neg Hx     Strabismus Neg Hx      labor Neg Hx     Cancer Neg Hx     Eclampsia Neg Hx            Review of Systems  General: negative for chills, fever or weight loss  Psychological: negative for mood changes or depression  Ophthalmic: negative for blurry vision, photophobia or eye pain  ENT: see HPI  Respiratory: no cough, shortness of breath, or wheezing  Cardiovascular: no chest pain or dyspnea on exertion  Gastrointestinal: no abdominal pain, change in bowel habits, or black/ bloody stools  Musculoskeletal: negative for gait disturbance or muscular weakness  Neurological: no syncope or seizures; no ataxia  Dermatological: negative for puritis,  rash and jaundice  Hematologic/lymphatic: no easy bruising, no new lumps or bumps      Physical Exam:    Vitals:    10/20/23 1442   BP: 120/78   Pulse: 88         Constitutional: Well appearing / communicating without difficutly.  NAD.  Eyes: EOM I Bilaterally  Head/Face: Normocephalic.  Negative paranasal sinus pressure/tenderness.  Salivary glands WNL.  House Brackmann I Bilaterally.    Right Ear: Auricle normal appearance. External Auditory Canal within normal limits no lesions or masses,TM w/o masses/lesions/perforations. TM mobility noted.   Left Ear:  Auricle normal appearance. External Auditory Canal within normal limits no lesions or masses,TM w/o masses/lesions/perforations. TM mobility noted.  Nose: No gross nasal septal deviation. Inferior Turbinates 3+ bilaterally. No septal perforation. No masses/lesions. External nasal skin appears normal without masses/lesions.  Oral Cavity: Gingiva/lips within normal limits.  Dentition/gingiva healthy appearing. Mucus membranes moist. Floor of mouth soft, no masses palpated. Oral Tongue mobile. Hard Palate appears normal.    Oropharynx: Base of tongue appears normal. No masses/lesions noted. Tonsillar fossa/pharyngeal wall without lesions. Posterior oropharynx WNL.  Soft palate without masses. Midline uvula.   Neck/Lymphatic: No LAD I-VI bilaterally.  No thyromegaly.  No masses noted on exam.      See separate procedure note for FFL.    Diagnostic studies reviewed:   Laboratory 04/15/2019:  IgE level:  Less than 35.  ImmunoCAP:  Negative.     Spirometry 04/16/2019:  Normal spirometry. Airflow not improved after bronchodilator.     Inhalant skin tests 05/13/2019:  3+ histamine control.  3+ dust mites.    Assessment:    ICD-10-CM ICD-9-CM    1. Chronic allergic rhinitis  J30.9 477.9       2. Laryngopharyngeal reflux (LPR)  K21.9 478.79       3. AMARJIT (obstructive sleep apnea)  G47.33 327.23               The primary encounter diagnosis was Chronic allergic rhinitis. Diagnoses of Laryngopharyngeal reflux (LPR) and AMARJIT (obstructive sleep apnea) were also pertinent to this visit.      Plan:  No orders of the defined types were placed in this encounter.      Continue  nasal saline rinses BID  Continue Flonase 2 sprays per nostril daily  Continue Allegra daily   Discontinue Prilosec  Start famotidine 40mg PO qhs  Continue refrain from eating within 3 hours of going to bed, to elevate the head of bed very subtly and optimize the impact of gravity on the potential reflux, and to avoid alcohol, caffeine, tobacco, tomato sauce,  spicy foods, fried food, and chocolate.   Continue CPAP therapy for AMARJIT     Follow up in approximately 4-6  months to reassess progress with treatment regimen.     Paige Grant MD

## 2023-10-20 NOTE — PROCEDURES
Joan Wilde is a 38 y.o. female patient.    Pulse: 88 (10/20/23 1442)  BP: 120/78 (10/20/23 1442)  Weight: 100.7 kg (221 lb 14.3 oz) (10/20/23 1442)       Laryngoscopy    Date/Time: 10/20/2023 2:20 PM    Performed by: Paige Hercules MD  Authorized by: Paige Hercules MD    Consent Done?:  Yes (Verbal)  Anesthesia:     Local anesthetic:  Lidocaine 2% and Nikita-Synephrine 1/2%  Laryngoscopy:     Areas examined:  Nasal cavities, nasopharynx, oropharynx, hypopharynx, larynx and vocal cords  Nose External:      No external nasal deformity  Nose Intranasal:      Mucosa no polyps     Mucosa ulcers not present     No mucosa lesions present     No septum gross deformity     Turbinates not enlarged  Nasopharynx:      No mucosa lesions     Adenoids not present     Posterior choanae patent     Eustachian tube patent  Larynx/hypopharynx:      No epiglottis lesions     No epiglottis edema     No AE folds lesions     No vocal cord polyps     Equal and normal bilateral     No hypopharynx lesions     No piriform sinus pooling     No piriform sinus lesions     Post cricoid edema (mild)     Post cricoid erythema (mild)            10/20/2023

## 2023-10-25 LAB — HGB FRACT BLD ELPH PH6.0-IMP: NORMAL

## 2023-11-02 ENCOUNTER — OFFICE VISIT (OUTPATIENT)
Dept: GASTROENTEROLOGY | Facility: CLINIC | Age: 38
End: 2023-11-02
Payer: COMMERCIAL

## 2023-11-02 VITALS — BODY MASS INDEX: 38.28 KG/M2 | HEIGHT: 65 IN | WEIGHT: 229.75 LBS

## 2023-11-02 DIAGNOSIS — R12 HEARTBURN: ICD-10-CM

## 2023-11-02 DIAGNOSIS — K21.9 LARYNGOPHARYNGEAL REFLUX (LPR): Primary | ICD-10-CM

## 2023-11-02 PROCEDURE — 3044F HG A1C LEVEL LT 7.0%: CPT | Mod: CPTII,S$GLB,, | Performed by: NURSE PRACTITIONER

## 2023-11-02 PROCEDURE — 99204 OFFICE O/P NEW MOD 45 MIN: CPT | Mod: S$GLB,,, | Performed by: NURSE PRACTITIONER

## 2023-11-02 PROCEDURE — 3008F PR BODY MASS INDEX (BMI) DOCUMENTED: ICD-10-PCS | Mod: CPTII,S$GLB,, | Performed by: NURSE PRACTITIONER

## 2023-11-02 PROCEDURE — 99204 PR OFFICE/OUTPT VISIT, NEW, LEVL IV, 45-59 MIN: ICD-10-PCS | Mod: S$GLB,,, | Performed by: NURSE PRACTITIONER

## 2023-11-02 PROCEDURE — 99999 PR PBB SHADOW E&M-EST. PATIENT-LVL IV: ICD-10-PCS | Mod: PBBFAC,,, | Performed by: NURSE PRACTITIONER

## 2023-11-02 PROCEDURE — 3044F PR MOST RECENT HEMOGLOBIN A1C LEVEL <7.0%: ICD-10-PCS | Mod: CPTII,S$GLB,, | Performed by: NURSE PRACTITIONER

## 2023-11-02 PROCEDURE — 99999 PR PBB SHADOW E&M-EST. PATIENT-LVL IV: CPT | Mod: PBBFAC,,, | Performed by: NURSE PRACTITIONER

## 2023-11-02 PROCEDURE — 1159F MED LIST DOCD IN RCRD: CPT | Mod: CPTII,S$GLB,, | Performed by: NURSE PRACTITIONER

## 2023-11-02 PROCEDURE — 3008F BODY MASS INDEX DOCD: CPT | Mod: CPTII,S$GLB,, | Performed by: NURSE PRACTITIONER

## 2023-11-02 PROCEDURE — 1159F PR MEDICATION LIST DOCUMENTED IN MEDICAL RECORD: ICD-10-PCS | Mod: CPTII,S$GLB,, | Performed by: NURSE PRACTITIONER

## 2023-11-02 NOTE — PATIENT INSTRUCTIONS
EGD Instructions    [facility] [address]  Clinic Office [phone]  Endoscopy Lab [phone]    You are scheduled for an EGD with Dr. Abreu  on  Dec. 7, 2023  at [facility] in [place].    Check in at the Hospital -1st floor, Information desk.   Call [phone] to reschedule.    You cannot have anything to eat or drink after Midnight. You can brush your teeth with a sip of water.     An adult friend/family member must come with you to drive you home.  You cannot drive, take a taxi, Uber/Lyft or bus to leave the Endoscopy Center alone.  If you do not have someone to drive you home, your test will be cancelled.     Please follow the directions of your doctor if you take any pills that thin your blood. If you take these meds: Aggrenox, Brilinta, Effient, Eliquis, Lovenox, Plavix, Pletal, Pradaxa, Ticilid, Xarelto or Coumadin, let the doctor's office know.    Please hold any GLP-1 medications prior to the procedure: Dulaglutide Trulicity(hold week prior), Exenatide Byetta (hold the morning of procedure), Semaglutide Ozempic (hold week prior), Liraglutide Victoza, Saxenda(hold week prior), Lixisenatide Adlyxin (hold the morning of procedure), Semaglutide Rybelsus (hold the morning of procedure), Tirzepatide Mounjaro (hold week prior)     DON'T: On the morning of the test do not take insulin or pills for diabetes.     DO: On the morning of the test, do take any pills for blood pressure, heart, anti-rejection and or seizures with a small sip of water. Bring any inhalers with you.    Leave all valuables and jewelry at home. You will be at the hospital for 2-4 hours.    Call the Endoscopy department at [phone] with any questions about your procedure.    Thank you for choosing [facility].

## 2023-11-02 NOTE — PROGRESS NOTES
GASTROENTEROLOGY CLINIC NOTE    Chief Complaint: The primary encounter diagnosis was Laryngopharyngeal reflux (LPR). A diagnosis of Heartburn was also pertinent to this visit.  Referring provider/PCP: Walter Phipps MD    Joan Wilde is a 38 y.o. female who is a new patient to me with a PMH that's significant for LPR and AMARJIT.  She was referred to us by ENT and is here today to establish care for LRP and heartburn.     Diagnosed with LPR few years ago by her allergist. Referred to ENT who agreed with LPR. Working to reduce allergic triggers which has helped. She is also using Flonase and Astelin and has undergone sleep study. Diagnosed with AMARJIT and wears CPAP.   September had episode of heartburn few days; unsure of triggers. Was taking omeprazole daily but felt not helping. At this time, she was also under increased stress at work.   Saw ENT 10/2023 laryngoscopy with mild post cricoid edema and erythema. Omeprazole stopped and Pepcid 40mg daily at night started. Feels Pepcid is helping.     GERD  How Long: few years  When do symptoms occur: mostly after eating and occasionally in morning  Nocturnal Symptoms: no  Nausea/Vomiting: no  Abdominal Pain:no  Typical Symptoms    Pyrosis yes   Regurgitation Regurgitation of acid   Water Brash Occasional    Dysphagia/Odynophagia no     Atypical Symptoms    Hoarseness/Cough no   Throat Clearing Yes mostly with eating; working with speech, better than before   Chest Pain no   Asthma no     Treatments: Omeprazole 40 mg daily, Gaviscon at bedtime and PRN, Famotidine at night before dinner (helping symptoms)  Lifestyle modifications  Caffeine: once a week green tea  ETOH: no  NSAIDs: no  GLP-1s: No  NSAIDs: No  Anticoagulation or Antiplatelet: No    History of H.pylori: no  H.pylori Treatment:  Prior Upper Endoscopy: no  Prior Colonoscopy: no  Family h/o Colon Cancer: No  Family h/o Crohn's Disease or Ulcerative Colitis: No  Family h/o Celiac Sprue:  No  Abdominal Surgeries: appendectomy, umbilical hernia repair, laparoscopic lysis of adhesions    Review of Systems   Constitutional:  Negative for weight loss.   HENT:  Negative for sore throat.    Eyes:  Negative for blurred vision.   Respiratory:  Negative for cough.    Cardiovascular:  Negative for chest pain.   Gastrointestinal:  Positive for heartburn. Negative for abdominal pain, blood in stool, constipation, diarrhea, melena, nausea and vomiting.   Genitourinary:  Negative for dysuria.   Musculoskeletal:  Negative for myalgias.   Skin:  Negative for rash.   Neurological:  Negative for headaches.   Endo/Heme/Allergies:  Negative for environmental allergies.   Psychiatric/Behavioral:  Negative for suicidal ideas. The patient is not nervous/anxious.        Past Medical History: has a past medical history of Acne vulgaris, Allergic rhinitis due to dust mite, Endometriosis, Fibroid, Laryngopharyngeal reflux, and Migraine syndrome.    Past Surgical History: has a past surgical history that includes Hernia repair (2014); Laparoscopic appendectomy (N/A, 4/21/2021); Diagnostic laparoscopy (Bilateral, 5/15/2023); and Laparoscopic lysis of adhesions (N/A, 5/15/2023).    Family History:family history includes Diabetes in her father; Hyperlipidemia in her father; Hypertension in her father and mother; No Known Problems in her brother and brother; Stroke in her father.    Allergies:   Review of patient's allergies indicates:   Allergen Reactions    Metoclopramide Other (See Comments)     Causes tongue to roll back in mouth       Social History: reports that she has never smoked. She has never used smokeless tobacco. She reports that she does not drink alcohol and does not use drugs.    Home medications:   Current Outpatient Medications on File Prior to Visit   Medication Sig Dispense Refill    ascorbic acid, vitamin C, (VITAMIN C) 1000 MG tablet Take 2 tablets (2,000 mg total) by mouth once daily.      azelastine  "(ASTELIN) 137 mcg (0.1 %) nasal spray 1 spray (137 mcg total) by Nasal route 2 (two) times daily. 30 mL 3    conjugated estrogens (PREMARIN) vaginal cream Use dime size amount of estrogen cream in vagina nightly for 2 weeks, then twice a week thereafter. 30 g 2    famotidine (PEPCID) 40 MG tablet Take 1 tablet (40 mg total) by mouth once daily. 30 tablet 4    fluticasone propionate (FLONASE) 50 mcg/actuation nasal spray 2 sprays (100 mcg total) by Each Nostril route once daily. 16 g 11    norethindrone (AYGESTIN) 5 mg Tab Take 1 tablet (5 mg total) by mouth once daily. 30 tablet 6    olopatadine (PATANOL) 0.1 % ophthalmic solution 1 drop 2 (two) times daily.      naproxen (NAPROSYN) 250 MG tablet Take 250 mg by mouth 2 (two) times daily.      [DISCONTINUED] clotrimazole-betamethasone 1-0.05% (LOTRISONE) cream Apply topically 2 (two) times daily to rash on neck x 2 weeks (Patient not taking: Reported on 10/18/2023) 45 g 2    [DISCONTINUED] norethindrone (AYGESTIN) 5 mg Tab Take 1 tablet (5 mg total) by mouth once daily. (Patient not taking: Reported on 10/18/2023) 30 tablet 12    [DISCONTINUED] norethindrone (MICRONOR) 0.35 mg tablet Take 1 tablet (0.35 mg total) by mouth once daily. (Patient not taking: Reported on 10/18/2023) 28 tablet 6    [DISCONTINUED] norethindrone (MICRONOR) 0.35 mg tablet Take 1 tablet (0.35 mg total) by mouth once daily. (Patient not taking: Reported on 10/20/2023) 28 tablet 12    [DISCONTINUED] ondansetron (ZOFRAN-ODT) 4 MG TbDL Take 1 tablet (4 mg total) by mouth every 8 (eight) hours as needed (nausea). 10 tablet 0     No current facility-administered medications on file prior to visit.       Vital signs:  Ht 5' 5" (1.651 m)   Wt 104.2 kg (229 lb 11.5 oz)   BMI 38.23 kg/m²     Physical Exam  Vitals reviewed.   Constitutional:       Appearance: Normal appearance.   HENT:      Head: Normocephalic.   Pulmonary:      Effort: Pulmonary effort is normal. No respiratory distress.   Abdominal: " "     General: There is no distension.      Palpations: Abdomen is soft.      Tenderness: There is no abdominal tenderness.   Skin:     General: Skin is warm.   Neurological:      Mental Status: She is alert and oriented to person, place, and time.   Psychiatric:         Behavior: Behavior normal.         Thought Content: Thought content normal.         Routine labs:  Lab Results   Component Value Date    WBC 4.13 10/18/2023    HGB 11.1 (L) 10/18/2023    HCT 35.3 (L) 10/18/2023    MCV 70 (L) 10/18/2023     10/18/2023     No results found for: "INR"  Lab Results   Component Value Date    IRON 41 10/18/2023    TIBC 641 (H) 10/18/2023    FESATURATED 6 (L) 10/18/2023     Lab Results   Component Value Date     03/03/2023    K 4.4 03/03/2023     03/03/2023    CO2 23 03/03/2023    BUN 13 03/03/2023    CREATININE 1.0 03/03/2023     Lab Results   Component Value Date    ALBUMIN 4.0 10/18/2023    ALT 24 10/18/2023    AST 27 10/18/2023    ALKPHOS 57 10/18/2023    BILITOT 0.2 10/18/2023     No results found for: "GLUCOSE"  Lab Results   Component Value Date    TSH 1.296 03/03/2023     Lab Results   Component Value Date    CALCIUM 9.4 03/03/2023       Imaging:      I have reviewed prior labs, imaging, and notes.      Assessment:  1. Laryngopharyngeal reflux (LPR)    2. Heartburn      Diagnosed with LPR few years ago by allergist. Also followed with ENT who agrees with LPR. Has reduced allergens and using Flonase and Astelin.   Has h/o heartburn that was controlled with omeprazole 40 mg daily. Had episode of significant heartburn in September lasting few days. Omeprazole and gaviscon not helping.   Reports she was under increased stress d/t her job at this time.   Laryngoscopy with post cricoid edema and erythema noted. ENT switched from omeprazole to famotidine 40mg daily at night. Feels famotidine is helping.     Plan:  Orders Placed This Encounter    Case Request Endoscopy: EGD (ESOPHAGOGASTRODUODENOSCOPY) "     EGD  Consider biopsy for H.pylori  Consider trial of Carafate or class switch PPI pending symptoms and workup    Plan of care discussed with patient who is in agreement and verbalized understanding.     I have explained the planned procedures to the patient.The risks, benefits and alternatives of the procedure were also explained in detail. Patient verbalized understanding, all questions were answered. The patient agrees to proceed as planned    Follow Up: YAJAIRA Guerrero, LESLIE,FNP-BC  [facility]    (Portions of this note were dictated using voice recognition software and may contain dictation related errors in spelling/grammar/syntax not found on text review)

## 2023-12-05 ENCOUNTER — TELEPHONE (OUTPATIENT)
Dept: ENDOSCOPY | Facility: HOSPITAL | Age: 38
End: 2023-12-05
Payer: COMMERCIAL

## 2023-12-05 NOTE — TELEPHONE ENCOUNTER
Spoke with patient about arrival time @ 1015.   EGD    NPO status reviewed: Patient must have nothing to eat after midnight.      Medications: Do not take Insulin or oral diabetic medications the day of the procedure.  Take as prescribed: heart, seizure and blood pressure medication in the morning with a sip of water (less than an ounce).  Take any breathing medications and bring inhalers to hospital with you Leave all valuables and jewelry at home.     Wear comfortable clothes to procedure to change into hospital gown You cannot drive for 24 hours after your procedure because you will receive sedation for your procedure to make you comfortable.  A ride must be provided at discharge.

## 2023-12-06 ENCOUNTER — CLINICAL SUPPORT (OUTPATIENT)
Dept: INTERNAL MEDICINE | Facility: CLINIC | Age: 38
End: 2023-12-06
Payer: COMMERCIAL

## 2023-12-06 VITALS — HEIGHT: 67 IN | BODY MASS INDEX: 35.61 KG/M2 | WEIGHT: 226.88 LBS

## 2023-12-06 DIAGNOSIS — E66.9 OBESITY (BMI 30-39.9): Primary | ICD-10-CM

## 2023-12-06 PROCEDURE — 99999 PR PBB SHADOW E&M-EST. PATIENT-LVL II: CPT | Mod: PBBFAC,,,

## 2023-12-06 PROCEDURE — 99999 PR PBB SHADOW E&M-EST. PATIENT-LVL II: ICD-10-PCS | Mod: PBBFAC,,,

## 2023-12-06 NOTE — PROGRESS NOTES
Health  Follow-Up Note   [x] Office  [] Phone  Notes from previous session reviewed.   [x] Previous Session Goals unchanged.   [] Patient/Caregiver Change in Goals.  Goals added or changed by Patient/Caregiver since program participation:  Read Obesity code  Try Bok sandra       Additional/Changed support that patient/caregiver has experienced/sought?  (Indicate readiness, support from family, friends, others, community groups, etc)       Additional/Changed obstacles that could prevent patient/caregiver from reaching their goals?   Eating the right foods and gaining weight  Stress    Feedback provided:   Praised for continued effort and determination    Diagnostic values/Desriptors for follow-up as needed for chronic condition(s)   Weight: 102.9 kg 226.85 lb Gain 4.4 lbs    Interventions:   Health  listened reflectively, validated thoughts and feelings, offered support and encouragement.   Allowed patient to express themselves in a non-biased atmosphere.  Health  assisted pt to problem-solve obstacles such as being in a challenging environment and dealing with these challenges.   Motivational Interviewed interventions utilized (OARS).   Patient responded favorably to interventions and remained actively engaged in the session.   Health  will remain available and connected for patient by phone and/or office visits.   Positive reinforcement, emotional support and encouragement provided.   Focused Education: MI    Plan:  [x] Pt will work on goals as stated above.   [x] Pt will contact Health  for any questions, concerns or needs.  [x] Pt will follow up with Health  in office on  1.4.23.  [] Pt will follow up with Health  on phone in:        [x] Health  will remain available.   [] Health  will contact patient by phone in:        [] Health  will consult:        [] Health  will inform Provider via EPIC messaging.     Impression:  1. Behavior is consistent with  Action Stage of Change.   2. Participation level:       [x] Receptive      [x] Interactive      [] Guarded and Resistant      [x] Self Motivated      [] Refused/Declined to participate   3. [x] Pt voiced understanding of all information presented.       [] Pt voiced needing further information/education. This will be arranged.       [x] Pt would benefit from further education/information as identified by this health . This will be arranged.     Lashell Pedroza RN HC

## 2023-12-07 ENCOUNTER — ANESTHESIA EVENT (OUTPATIENT)
Dept: ENDOSCOPY | Facility: HOSPITAL | Age: 38
End: 2023-12-07
Payer: COMMERCIAL

## 2023-12-07 ENCOUNTER — HOSPITAL ENCOUNTER (OUTPATIENT)
Facility: HOSPITAL | Age: 38
Discharge: HOME | End: 2023-12-07
Attending: INTERNAL MEDICINE | Admitting: INTERNAL MEDICINE
Payer: COMMERCIAL

## 2023-12-07 ENCOUNTER — ANESTHESIA (OUTPATIENT)
Dept: ENDOSCOPY | Facility: HOSPITAL | Age: 38
End: 2023-12-07
Payer: COMMERCIAL

## 2023-12-07 VITALS
DIASTOLIC BLOOD PRESSURE: 72 MMHG | WEIGHT: 221 LBS | SYSTOLIC BLOOD PRESSURE: 113 MMHG | TEMPERATURE: 99 F | RESPIRATION RATE: 15 BRPM | HEIGHT: 67 IN | HEART RATE: 70 BPM | OXYGEN SATURATION: 99 % | BODY MASS INDEX: 34.69 KG/M2

## 2023-12-07 DIAGNOSIS — K21.9 GERD (GASTROESOPHAGEAL REFLUX DISEASE): ICD-10-CM

## 2023-12-07 LAB
B-HCG UR QL: NEGATIVE
CTP QC/QA: YES

## 2023-12-07 PROCEDURE — 43239 EGD BIOPSY SINGLE/MULTIPLE: CPT | Performed by: INTERNAL MEDICINE

## 2023-12-07 PROCEDURE — 88305 TISSUE EXAM BY PATHOLOGIST: CPT | Performed by: PATHOLOGY

## 2023-12-07 PROCEDURE — D9220A PRA ANESTHESIA: ICD-10-PCS | Mod: CRNA,,, | Performed by: NURSE ANESTHETIST, CERTIFIED REGISTERED

## 2023-12-07 PROCEDURE — 25000003 PHARM REV CODE 250: Performed by: INTERNAL MEDICINE

## 2023-12-07 PROCEDURE — 27201012 HC FORCEPS, HOT/COLD, DISP: Performed by: INTERNAL MEDICINE

## 2023-12-07 PROCEDURE — D9220A PRA ANESTHESIA: ICD-10-PCS | Mod: ANES,,, | Performed by: ANESTHESIOLOGY

## 2023-12-07 PROCEDURE — 43239 PR EGD, FLEX, W/BIOPSY, SGL/MULTI: ICD-10-PCS | Mod: ,,, | Performed by: INTERNAL MEDICINE

## 2023-12-07 PROCEDURE — 88342 IMHCHEM/IMCYTCHM 1ST ANTB: CPT | Mod: 26,,, | Performed by: PATHOLOGY

## 2023-12-07 PROCEDURE — 88305 TISSUE EXAM BY PATHOLOGIST: ICD-10-PCS | Mod: 26,,, | Performed by: PATHOLOGY

## 2023-12-07 PROCEDURE — D9220A PRA ANESTHESIA: Mod: ANES,,, | Performed by: ANESTHESIOLOGY

## 2023-12-07 PROCEDURE — 81025 URINE PREGNANCY TEST: CPT | Performed by: INTERNAL MEDICINE

## 2023-12-07 PROCEDURE — 37000009 HC ANESTHESIA EA ADD 15 MINS: Performed by: INTERNAL MEDICINE

## 2023-12-07 PROCEDURE — 88342 IMHCHEM/IMCYTCHM 1ST ANTB: CPT | Performed by: PATHOLOGY

## 2023-12-07 PROCEDURE — D9220A PRA ANESTHESIA: Mod: CRNA,,, | Performed by: NURSE ANESTHETIST, CERTIFIED REGISTERED

## 2023-12-07 PROCEDURE — 43239 EGD BIOPSY SINGLE/MULTIPLE: CPT | Mod: ,,, | Performed by: INTERNAL MEDICINE

## 2023-12-07 PROCEDURE — 25000003 PHARM REV CODE 250: Performed by: NURSE ANESTHETIST, CERTIFIED REGISTERED

## 2023-12-07 PROCEDURE — 88342 CHG IMMUNOCYTOCHEMISTRY: ICD-10-PCS | Mod: 26,,, | Performed by: PATHOLOGY

## 2023-12-07 PROCEDURE — 88305 TISSUE EXAM BY PATHOLOGIST: CPT | Mod: 26,,, | Performed by: PATHOLOGY

## 2023-12-07 PROCEDURE — 37000008 HC ANESTHESIA 1ST 15 MINUTES: Performed by: INTERNAL MEDICINE

## 2023-12-07 RX ORDER — PROPOFOL 10 MG/ML
VIAL (ML) INTRAVENOUS
Status: DISCONTINUED | OUTPATIENT
Start: 2023-12-07 | End: 2023-12-07

## 2023-12-07 RX ORDER — LIDOCAINE HYDROCHLORIDE 20 MG/ML
INJECTION, SOLUTION EPIDURAL; INFILTRATION; INTRACAUDAL; PERINEURAL
Status: DISCONTINUED | OUTPATIENT
Start: 2023-12-07 | End: 2023-12-07

## 2023-12-07 RX ORDER — SODIUM CHLORIDE 9 MG/ML
INJECTION, SOLUTION INTRAVENOUS CONTINUOUS
Status: DISCONTINUED | OUTPATIENT
Start: 2023-12-07 | End: 2023-12-07 | Stop reason: HOSPADM

## 2023-12-07 RX ADMIN — SODIUM CHLORIDE: 9 INJECTION, SOLUTION INTRAVENOUS at 11:12

## 2023-12-07 RX ADMIN — LIDOCAINE HYDROCHLORIDE 80 MG: 20 INJECTION, SOLUTION EPIDURAL; INFILTRATION; INTRACAUDAL; PERINEURAL at 12:12

## 2023-12-07 RX ADMIN — Medication 100 MG: at 12:12

## 2023-12-07 RX ADMIN — Medication 50 MG: at 12:12

## 2023-12-07 RX ADMIN — SODIUM CHLORIDE: 0.9 INJECTION, SOLUTION INTRAVENOUS at 11:12

## 2023-12-07 NOTE — ANESTHESIA POSTPROCEDURE EVALUATION
Anesthesia Post Evaluation    Patient: Joan Wilde    Procedure(s) Performed: Procedure(s) (LRB):  EGD (ESOPHAGOGASTRODUODENOSCOPY) (N/A)    Final Anesthesia Type: general      Patient location during evaluation: GI PACU  Patient participation: Yes- Able to Participate  Level of consciousness: awake and alert, oriented and awake  Post-procedure vital signs: reviewed and stable  Pain management: adequate  Airway patency: patent    PONV status at discharge: No PONV  Anesthetic complications: no      Cardiovascular status: stable  Respiratory status: unassisted and room air  Hydration status: euvolemic  Follow-up not needed.              Vitals Value Taken Time   /72 12/07/23 1237   Temp 37.2 °C (98.9 °F) 12/07/23 1210   Pulse 70 12/07/23 1237   Resp 15 12/07/23 1237   SpO2 99 % 12/07/23 1237         Event Time   Out of Recovery 12:40:00         Pain/Mikhail Score: Mikhail Score: 10 (12/7/2023 12:37 PM)

## 2023-12-07 NOTE — H&P
Short Stay Endoscopy History and Physical    PCP - Walter Phipps MD    Procedure - EGD  ASA - III  Mallampati - per anesthesia  History of Anesthesia problems - no  Family history Anesthesia problems - no     HPI:  This is a 38 y.o. female here for evaluation of : GERD      ROS:  Constitutional: No fevers, chills, No weight loss  ENT: No allergies  CV: No chest pain  Pulm: No shortness of breath  GI: see HPI  Derm: No rash    Medical History:  has a past medical history of Acne vulgaris (11/20/2018), Allergic rhinitis due to dust mite (05/13/2019), Endometriosis (11/20/2018), Fibroid, Laryngopharyngeal reflux, and Migraine syndrome.    Surgical History:  has a past surgical history that includes Hernia repair (2014); Laparoscopic appendectomy (N/A, 4/21/2021); Diagnostic laparoscopy (Bilateral, 5/15/2023); and Laparoscopic lysis of adhesions (N/A, 5/15/2023).    Family History: family history includes Diabetes in her father; Hyperlipidemia in her father; Hypertension in her father and mother; No Known Problems in her brother and brother; Stroke in her father.. Otherwise no colon cancer, inflammatory bowel disease, or GI malignancies.    Social History:  reports that she has never smoked. She has never used smokeless tobacco. She reports that she does not drink alcohol and does not use drugs.    Review of patient's allergies indicates:   Allergen Reactions    Metoclopramide Other (See Comments)     Causes tongue to roll back in mouth       Medications:   Medications Prior to Admission   Medication Sig Dispense Refill Last Dose    ascorbic acid, vitamin C, (VITAMIN C) 1000 MG tablet Take 2 tablets (2,000 mg total) by mouth once daily.       azelastine (ASTELIN) 137 mcg (0.1 %) nasal spray 1 spray (137 mcg total) by Nasal route 2 (two) times daily. 30 mL 3     conjugated estrogens (PREMARIN) vaginal cream Use dime size amount of estrogen cream in vagina nightly for 2 weeks, then twice a week thereafter. 30 g 2      famotidine (PEPCID) 40 MG tablet Take 1 tablet (40 mg total) by mouth once daily. 30 tablet 4     fluticasone propionate (FLONASE) 50 mcg/actuation nasal spray 2 sprays (100 mcg total) by Each Nostril route once daily. 16 g 11     naproxen (NAPROSYN) 250 MG tablet Take 250 mg by mouth 2 (two) times daily.       norethindrone (AYGESTIN) 5 mg Tab Take 1 tablet (5 mg total) by mouth once daily. 30 tablet 6     norethindrone (MICRONOR) 0.35 mg tablet Take 1 tablet (0.35 mg total) by mouth once daily. (Patient not taking: Reported on 10/20/2023) 28 tablet 12     olopatadine (PATANOL) 0.1 % ophthalmic solution 1 drop 2 (two) times daily.            Objective Findings:    Vital Signs: see nursing notes  Physical Exam:  General Appearance: Well appearing in no acute distress  Eyes:    No scleral icterus  ENT: Neck supple  Lungs: CTA anteriorly  Heart:  S1, S2 normal, no murmurs heard  Abdomen: Soft, non tender, non distended with positive bowel sounds. No hepatosplenomegaly, ascites, or mass  Extremities: no edema  Skin: No rash      Labs:  Lab Results   Component Value Date    WBC 4.13 10/18/2023    HGB 11.1 (L) 10/18/2023    HCT 35.3 (L) 10/18/2023     10/18/2023    CHOL 151 03/03/2023    TRIG 48 03/03/2023    HDL 43 03/03/2023    ALT 24 10/18/2023    AST 27 10/18/2023     03/03/2023    K 4.4 03/03/2023     03/03/2023    CREATININE 1.0 03/03/2023    BUN 13 03/03/2023    CO2 23 03/03/2023    TSH 1.296 03/03/2023    HGBA1C 5.5 03/03/2023       I have explained the risks and benefits of endoscopy procedures to the patient including but not limited to bleeding, perforation, infection, and death.    Mitchel Abreu MD

## 2023-12-07 NOTE — TRANSFER OF CARE
"Anesthesia Transfer of Care Note    Patient: Joan Wilde    Procedure(s) Performed: Procedure(s) (LRB):  EGD (ESOPHAGOGASTRODUODENOSCOPY) (N/A)    Patient location: GI    Anesthesia Type: general    Transport from OR: Transported from OR on room air with adequate spontaneous ventilation    Post pain: adequate analgesia    Post assessment: no apparent anesthetic complications    Post vital signs: stable    Level of consciousness: responds to stimulation    Nausea/Vomiting: no nausea/vomiting    Complications: none    Transfer of care protocol was followed      Last vitals: Visit Vitals  /70 (BP Location: Left arm, Patient Position: Lying)   Pulse 83   Temp 37.1 °C (98.8 °F) (Skin)   Resp 18   Ht 5' 7" (1.702 m)   Wt 100.2 kg (221 lb)   SpO2 98%   Breastfeeding No   BMI 34.61 kg/m²     "

## 2023-12-07 NOTE — PROVATION PATIENT INSTRUCTIONS
Discharge Summary/Instructions after an Endoscopic Procedure  Patient Name: Joan Wilde  Patient MRN: [id number]  Patient YOB: 1985  Thursday, December 7, 2023  Mitchel Abreu MD  Dear patient,  As a result of recent federal legislation (The Federal Cures Act), you may   receive lab or pathology results from your procedure in your [facility]   account before your physician is able to contact you. Your physician or   their representative will relay the results to you with their   recommendations at their soonest availability.  Thank you,  Your health is very important to us during the Covid Crisis. Following your   procedure today, you will receive a daily text for 2 weeks asking about   signs or symptoms of Covid 19.  Please respond to this text when you   receive it so we can follow up and keep you as safe as possible.   RESTRICTIONS:  During your procedure today, you received medications for sedation.  These   medications may affect your judgment, balance and coordination.  Therefore,   for 24 hours, you have the following restrictions:   - DO NOT drive a car, operate machinery, make legal/financial decisions,   sign important papers or drink alcohol.    ACTIVITY:  Today: no heavy lifting, straining or running due to procedural   sedation/anesthesia.  The following day: return to full activity including work.  DIET:  Eat and drink normally unless instructed otherwise.     TREATMENT FOR COMMON SIDE EFFECTS:  - Mild abdominal pain, nausea, belching, bloating or excessive gas:  rest,   eat lightly and use a heating pad.  - Sore Throat: treat with throat lozenges and/or gargle with warm salt   water.  - Because air was used during the procedure, expelling large amounts of air   from your rectum or belching is normal.  - If a bowel prep was taken, you may not have a bowel movement for 1-3 days.    This is normal.  SYMPTOMS TO WATCH FOR AND REPORT TO YOUR PHYSICIAN:  1. Abdominal pain or  bloating, other than gas cramps.  2. Chest pain.  3. Back pain.  4. Signs of infection such as: chills or fever occurring within 24 hours   after the procedure.  5. Rectal bleeding, which would show as bright red, maroon, or black stools.   (A tablespoon of blood from the rectum is not serious, especially if   hemorrhoids are present.)  6. Vomiting.  7. Weakness or dizziness.  GO DIRECTLY TO THE NEAREST EMERGENCY ROOM IF YOU HAVE ANY OF THE FOLLOWING:      Difficulty breathing              Chills and/or fever over 101 F   Persistent vomiting and/or vomiting blood   Severe abdominal pain   Severe chest pain   Black, tarry stools   Bleeding- more than one tablespoon   Any other symptom or condition that you feel may need urgent attention  Your doctor recommends these additional instructions:  If any biopsies were taken, your doctors clinic will contact you in 1 to 2   weeks with any results.  - Discharge patient to home.   - Resume previous diet.   - Continue present medications.   - Await pathology results.   - Return to nurse practitioner PRN.  For questions, problems or results please call your physician - Mitchel Abreu MD.  EMERGENCY PHONE NUMBER: [phone],  LAB RESULTS: [phone]  IF A COMPLICATION OR EMERGENCY SITUATION ARISES AND YOU ARE UNABLE TO REACH   YOUR PHYSICIAN - GO DIRECTLY TO THE EMERGENCY ROOM.  Mitchel Abreu MD  12/7/2023 12:48:27 PM  This report has been verified and signed electronically.  Dear patient,  As a result of recent federal legislation (The Federal Cures Act), you may   receive lab or pathology results from your procedure in your [facility]   account before your physician is able to contact you. Your physician or   their representative will relay the results to you with their   recommendations at their soonest availability.  Thank you,  PROVATION

## 2023-12-07 NOTE — ANESTHESIA PREPROCEDURE EVALUATION
12/07/2023  Joan Wilde is a 38 y.o., female.      Pre-op Assessment    I have reviewed the Patient Summary Reports.    I have reviewed the NPO Status.   I have reviewed the Medications.     Review of Systems  Anesthesia Hx:              Personal Hx of Anesthesia complications                    Social:  Non-Smoker       Hematology/Oncology:    Oncology Normal    -- Anemia:                                  EENT/Dental:  EENT/Dental Normal           Cardiovascular:  Cardiovascular Normal                                            Pulmonary:        Sleep Apnea, CPAP     Obstructive Sleep Apnea (AMARJIT).           Renal/:  Renal/ Normal                 Hepatic/GI:     GERD   Laryngopharyngeal reflux       Liver Disease        Musculoskeletal:  Musculoskeletal Normal                OB/GYN/PEDS:  Endometriosis           Neurological:      Headaches      Dx of Headaches                           Endocrine:  Endocrine Normal            Psych:  Psychiatric History                  Physical Exam  General: Well nourished, Cooperative, Alert and Oriented    Airway:  Mallampati: II   Mouth Opening: Normal  TM Distance: Normal  Tongue: Normal  Neck ROM: Normal ROM    Dental:  Intact        Anesthesia Plan  Type of Anesthesia, risks & benefits discussed:    Anesthesia Type: Gen Natural Airway  Intra-op Monitoring Plan: Standard ASA Monitors  Post Op Pain Control Plan: multimodal analgesia  Induction:  IV  Informed Consent: Informed consent signed with the Patient and all parties understand the risks and agree with anesthesia plan.  All questions answered.   ASA Score: 2    Ready For Surgery From Anesthesia Perspective.   States that with her endometriosis surgery, she had palpitations intra-op that continued post op and required her to see a cardiologist. No true pathology was found and the cardiologist said  this was likely a reaction to the anesthesia. Chart review of this anesthetic showed no significant intra-op tachycardic events.   .

## 2023-12-12 LAB
FINAL PATHOLOGIC DIAGNOSIS: NORMAL
GROSS: NORMAL
Lab: NORMAL

## 2023-12-14 ENCOUNTER — PATIENT MESSAGE (OUTPATIENT)
Dept: OBSTETRICS AND GYNECOLOGY | Facility: CLINIC | Age: 38
End: 2023-12-14
Payer: COMMERCIAL

## 2023-12-14 DIAGNOSIS — N80.9 ENDOMETRIOSIS: ICD-10-CM

## 2023-12-14 RX ORDER — NORETHINDRONE 5 MG/1
5 TABLET ORAL DAILY
Qty: 30 TABLET | Refills: 6 | Status: SHIPPED | OUTPATIENT
Start: 2023-12-14 | End: 2024-12-13

## 2023-12-14 NOTE — PROGRESS NOTES
Pathologic findings for recent EGD reviewed.  No evidence of H pylori infection identified.  Some mild inflammation was noted.  Continue current medications.  Follow up as needed with [facility]

## 2024-01-14 ENCOUNTER — OFFICE VISIT (OUTPATIENT)
Dept: URGENT CARE | Facility: CLINIC | Age: 39
End: 2024-01-14
Payer: COMMERCIAL

## 2024-01-14 VITALS
SYSTOLIC BLOOD PRESSURE: 125 MMHG | HEART RATE: 97 BPM | TEMPERATURE: 99 F | OXYGEN SATURATION: 99 % | HEIGHT: 67 IN | WEIGHT: 221 LBS | DIASTOLIC BLOOD PRESSURE: 87 MMHG | RESPIRATION RATE: 18 BRPM | BODY MASS INDEX: 34.69 KG/M2

## 2024-01-14 DIAGNOSIS — R06.02 SHORTNESS OF BREATH: ICD-10-CM

## 2024-01-14 DIAGNOSIS — J45.901 EXACERBATION OF ASTHMA, UNSPECIFIED ASTHMA SEVERITY, UNSPECIFIED WHETHER PERSISTENT: Primary | ICD-10-CM

## 2024-01-14 LAB
CTP QC/QA: YES
SARS-COV-2 AG RESP QL IA.RAPID: NEGATIVE

## 2024-01-14 PROCEDURE — 87811 SARS-COV-2 COVID19 W/OPTIC: CPT | Mod: QW,S$GLB,,

## 2024-01-14 PROCEDURE — 94640 AIRWAY INHALATION TREATMENT: CPT | Mod: S$GLB,,, | Performed by: FAMILY MEDICINE

## 2024-01-14 PROCEDURE — 99213 OFFICE O/P EST LOW 20 MIN: CPT | Mod: 25,S$GLB,,

## 2024-01-14 RX ORDER — PREDNISONE 20 MG/1
20 TABLET ORAL DAILY
Qty: 5 TABLET | Refills: 0 | Status: SHIPPED | OUTPATIENT
Start: 2024-01-14 | End: 2024-01-20

## 2024-01-14 RX ORDER — ALBUTEROL SULFATE 90 UG/1
2 AEROSOL, METERED RESPIRATORY (INHALATION) EVERY 6 HOURS PRN
Qty: 6.7 G | Refills: 0 | Status: SHIPPED | OUTPATIENT
Start: 2024-01-14 | End: 2025-01-13

## 2024-01-14 RX ORDER — ALBUTEROL SULFATE 0.83 MG/ML
2.5 SOLUTION RESPIRATORY (INHALATION)
Status: COMPLETED | OUTPATIENT
Start: 2024-01-14 | End: 2024-01-14

## 2024-01-14 RX ADMIN — ALBUTEROL SULFATE 2.5 MG: 0.83 SOLUTION RESPIRATORY (INHALATION) at 02:01

## 2024-01-14 NOTE — PATIENT INSTRUCTIONS
Albuterol nebulizer treatment provided improvement of symptoms in clinic.  I have prescribed albuterol inhaler to use as needed.  Oral prednisone, which is a steroid, has been prescribed for the next few days. Please take unless you cannot tolerate side effects.  Please follow up with your allergist for continued asthma care.  If you develop worsening of shortness of breath, difficulty breathing, gasping for air, blue color of lips or nails, please go to the emergency room.  Otherwise, follow up with your primary care doctor.

## 2024-01-14 NOTE — PROGRESS NOTES
"Subjective:      Patient ID: Joan Wilde is a 38 y.o. female.    Vitals:  height is 5' 7" (1.702 m) and weight is 100.2 kg (221 lb). Her oral temperature is 99.1 °F (37.3 °C). Her blood pressure is 125/87 and her pulse is 97. Her respiration is 18 and oxygen saturation is 99%.     Chief Complaint: Shortness of Breath    This is a 38 y.o. female who presents today with a chief complaint of shortness of breath. Patient states she been dealing with shortness of breath since 2024.     Provider note starts below:  Patient w/ a PMHx of asthma presents to clinic for evaluation of shortness of breath. Symptoms onset 9 days ago and have been worsening. States she is unable to walk up the stairs without feeling short of breath. States she has had trouble sleeping despite CPAP use for the past few nights due to symptoms. No prior treatment. She has an albuterol inhaler at home but it was  so she did not use it. States her symptoms feel similar to previous asthma exacerbation. Last asthma exacerbation was "many years ago". Denies fever, chills, chest pain, cough, body aches, headaches, ear pain, sore throat.    Shortness of Breath  This is a new problem. The current episode started 1 to 4 weeks ago. The problem has been gradually worsening. Pertinent negatives include no abdominal pain, chest pain, ear pain, fever, headaches, hemoptysis, neck pain, rash, sore throat, sputum production, vomiting or wheezing. The symptoms are aggravated by lying flat (walking). She has tried nothing for the symptoms. Her past medical history is significant for asthma.       Constitution: Negative for chills, fatigue and fever.   HENT:  Negative for ear pain, congestion, sinus pain and sore throat.    Neck: Negative for neck pain and neck stiffness.   Cardiovascular:  Negative for chest pain and palpitations.   Eyes:  Negative for eye discharge and eye itching.   Respiratory:  Positive for sleep apnea, chest tightness, " shortness of breath and asthma. Negative for cough, sputum production, bloody sputum, stridor and wheezing.    Gastrointestinal:  Negative for abdominal pain, nausea and vomiting.   Musculoskeletal:  Negative for muscle cramps and muscle ache.   Skin:  Negative for pale and rash.   Allergic/Immunologic: Positive for asthma. Negative for itching and sneezing.   Neurological:  Negative for dizziness, headaches and numbness.      Objective:     Physical Exam   Constitutional: She is oriented to person, place, and time.  Non-toxic appearance. She does not appear ill. No distress.   HENT:   Head: Normocephalic and atraumatic.   Ears:   Right Ear: Tympanic membrane normal.   Left Ear: Tympanic membrane normal.   Nose: Nose normal.   Mouth/Throat: Mucous membranes are moist. Oropharynx is clear.   Eyes: Conjunctivae are normal. Extraocular movement intact   Neck: Neck supple. No neck rigidity present.   Cardiovascular: Normal rate, regular rhythm, normal heart sounds and normal pulses.   Pulmonary/Chest: Effort normal. She has wheezes (left sided). She has no rhonchi. She has no rales.   Abdominal: Normal appearance.   Musculoskeletal: Normal range of motion.         General: Normal range of motion.   Lymphadenopathy:     She has no cervical adenopathy.   Neurological: She is alert, oriented to person, place, and time and at baseline.   Skin: Skin is warm and dry.   Psychiatric: Her behavior is normal. Mood normal.   Nursing note and vitals reviewed.      Assessment:     Results for orders placed or performed in visit on 01/14/24   SARS Coronavirus 2 Antigen, POCT Manual Read   Result Value Ref Range    SARS Coronavirus 2 Antigen Negative Negative     Acceptable Yes        1. Exacerbation of asthma, unspecified asthma severity, unspecified whether persistent    2. Shortness of breath        Plan:     Exacerbation of asthma, unspecified asthma severity, unspecified whether persistent  -     albuterol  (VENTOLIN HFA) 90 mcg/actuation inhaler; Inhale 2 puffs into the lungs every 6 (six) hours as needed for Wheezing. Rescue  Dispense: 18 g; Refill: 0  -     predniSONE (DELTASONE) 20 MG tablet; Take 1 tablet (20 mg total) by mouth once daily. for 5 days  Dispense: 5 tablet; Refill: 0    Shortness of breath  -     albuterol nebulizer solution 2.5 mg  -     SARS Coronavirus 2 Antigen, POCT Manual Read      Medical Decision Making:   Urgent Care Management:  Patient reports significant improvement of symptoms after breathing treatment.       Patient Instructions   Albuterol nebulizer treatment provided improvement of symptoms in clinic.  I have prescribed albuterol inhaler to use as needed.  Oral prednisone, which is a steroid, has been prescribed for the next few days. Please take unless you cannot tolerate side effects.  Please follow up with your allergist for continued asthma care.  If you develop worsening of shortness of breath, difficulty breathing, gasping for air, blue color of lips or nails, please go to the emergency room.  Otherwise, follow up with your primary care doctor.

## 2024-01-18 ENCOUNTER — OFFICE VISIT (OUTPATIENT)
Dept: ALLERGY | Facility: CLINIC | Age: 39
End: 2024-01-18
Payer: COMMERCIAL

## 2024-01-18 ENCOUNTER — HOSPITAL ENCOUNTER (OUTPATIENT)
Dept: PULMONOLOGY | Facility: CLINIC | Age: 39
Discharge: HOME | End: 2024-01-18
Payer: COMMERCIAL

## 2024-01-18 VITALS — HEIGHT: 67 IN | WEIGHT: 233.69 LBS | BODY MASS INDEX: 36.68 KG/M2

## 2024-01-18 DIAGNOSIS — J30.89 ALLERGIC RHINITIS DUE TO DUST MITE: ICD-10-CM

## 2024-01-18 DIAGNOSIS — H10.13 ALLERGIC CONJUNCTIVITIS, BILATERAL: ICD-10-CM

## 2024-01-18 DIAGNOSIS — J45.20 ASTHMA IN ADULT, MILD INTERMITTENT, UNCOMPLICATED: Primary | ICD-10-CM

## 2024-01-18 DIAGNOSIS — J45.20 ASTHMA IN ADULT, MILD INTERMITTENT, UNCOMPLICATED: ICD-10-CM

## 2024-01-18 DIAGNOSIS — K21.9 LARYNGOPHARYNGEAL REFLUX: ICD-10-CM

## 2024-01-18 PROCEDURE — 1160F RVW MEDS BY RX/DR IN RCRD: CPT | Mod: CPTII,S$GLB,, | Performed by: ALLERGY & IMMUNOLOGY

## 2024-01-18 PROCEDURE — 99214 OFFICE O/P EST MOD 30 MIN: CPT | Mod: S$GLB,,, | Performed by: ALLERGY & IMMUNOLOGY

## 2024-01-18 PROCEDURE — 94060 EVALUATION OF WHEEZING: CPT | Mod: S$GLB,,, | Performed by: INTERNAL MEDICINE

## 2024-01-18 PROCEDURE — 1159F MED LIST DOCD IN RCRD: CPT | Mod: CPTII,S$GLB,, | Performed by: ALLERGY & IMMUNOLOGY

## 2024-01-18 PROCEDURE — 94726 PLETHYSMOGRAPHY LUNG VOLUMES: CPT | Mod: S$GLB,,, | Performed by: INTERNAL MEDICINE

## 2024-01-18 PROCEDURE — 99999 PR PBB SHADOW E&M-EST. PATIENT-LVL III: CPT | Mod: PBBFAC,,, | Performed by: ALLERGY & IMMUNOLOGY

## 2024-01-18 PROCEDURE — 3008F BODY MASS INDEX DOCD: CPT | Mod: CPTII,S$GLB,, | Performed by: ALLERGY & IMMUNOLOGY

## 2024-01-18 PROCEDURE — 94729 DIFFUSING CAPACITY: CPT | Mod: S$GLB,,, | Performed by: INTERNAL MEDICINE

## 2024-01-18 NOTE — PROGRESS NOTES
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  She is here alone.  She was last seen October 25, 2021.    Since her last visit, she has been followed by Dr. Paige Watkins in ENT for LPR.  She last saw her October 20, 2023.  She changed her from Prilosec to famotidine 40 milligrams a day.     She says that her LPR is well-controlled on this. She has not had any increase in heartburn.    She was also having abdominal pain on antihistamines including Zyrtec.  Dr. Sandra Grant advised her to discontinue all antihistamines.     She has continued to take Flonase and azelastine as needed.     Her abdominal pain did resolve.     She did have an EGD done on December 7, 2023 that was normal. She thinks that it showed some gastritis.    Over the last several weeks she has had increasing shortness of breath with exertion.  Some of the exercise has been walking with her  in the cold air.  She has been needing more albuterol usually twice a day.  This does relieve her symptoms.    She did have increased cold exposure on January 13 in her bedroom.     On January 14 she had increased wheezing and was seen in Urgent Care. She was given nebulized albuterol with an improvement in her symptoms.  She was also given prednisone 20 milligrams a day for five days.    Her symptoms have improved but have not completely resolved.        1/18/2024     8:44 AM   [facility] ALLERGY QUESTIONNAIRE SHORT   Head or facial pain: No symptoms   Ears: No symptoms   Nose: No symptoms   Throat: No symptoms   Eyes: No symptoms   cough No   wheezing Yes   shortness of breath Yes   apnea No   choking No   chest tightness Yes   rash Yes   color change  No      Physical Examination:  General: Well-developed, well-nourished, no acute distress.  Head: No sinus tenderness.  Eyes: Conjunctivae:  No bulbar or palpebral conjunctival injection.  Ears: EAC's clear.  TM's  clear.  No pre-auricular nodes.  Nose: Nasal Mucosa:  Pink.  Septum: No apparent deviation.  Turbinates:  No significant edema.  Polyps/Mass:  None visible.  Teeth/Gums:  No bleeding noted.  Oropharynx: No exudates.  Neck: Supple without thyromegaly. No cervical lymphadenopathy.    Respiratory/Chest: Effort: Good.  Auscultation:  Clear bilaterally.  Skin: Good turgor.  No urticaria or angioedema.  Neuro/Psych: Oriented x 3.    Laboratory 04/15/2019:  IgE level:  Less than 35.  ImmunoCAP:  Negative.    Spirometry 04/16/2019:  Normal spirometry. Airflow not improved after bronchodilator.    Inhalant skin tests 05/13/2019:  3+ histamine control.  3+ dust mites.    Assessment:  1.  Allergic rhinitis, controlled.  2.  Allergic conjunctivitis, controlled.  3.  Asthma with exacerbation of uncertain etiology.  4.  Chronic dermatitis, improved.  5.  GERD.  6.  LPR, controlled.  7.  Probable esophageal spasm, resolved.  8.  Snoring.  9.  Abdominal bloating probably secondary to PPI, resolved.    Recommendations:  1. Continue house dust mite avoidance.    2. Complete PFT today.  3. Albuterol as needed.  4. Continue Pepcid daily.  5. Continue Flonase daily.  6. Continue azelastine daily.   7. Consider restarting antihistamines.   8. Return to clinic in five days.    Patient education:  April 20, 2020:  LPR and web site were reviewed.    Patient education:  October 29, 2019:  Allergic mechanisms and treatment options were reviewed in detail.    May 13, 2019:  House dust mite avoidance was reviewed.  Handouts were given.

## 2024-01-23 ENCOUNTER — OFFICE VISIT (OUTPATIENT)
Dept: ALLERGY | Facility: CLINIC | Age: 39
End: 2024-01-23
Payer: COMMERCIAL

## 2024-01-23 VITALS — BODY MASS INDEX: 36.85 KG/M2 | WEIGHT: 234.81 LBS | HEIGHT: 67 IN

## 2024-01-23 DIAGNOSIS — J30.89 ALLERGIC RHINITIS DUE TO DUST MITE: Primary | ICD-10-CM

## 2024-01-23 DIAGNOSIS — J45.20 ASTHMA IN ADULT, MILD INTERMITTENT, UNCOMPLICATED: ICD-10-CM

## 2024-01-23 DIAGNOSIS — K21.9 LARYNGOPHARYNGEAL REFLUX: ICD-10-CM

## 2024-01-23 DIAGNOSIS — H10.13 ALLERGIC CONJUNCTIVITIS, BILATERAL: ICD-10-CM

## 2024-01-23 LAB
DLCO SINGLE BREATH LLN: 21.18
DLCO SINGLE BREATH PRE REF: 80.1 %
DLCO SINGLE BREATH REF: 26.91
DLCOC SBVA LLN: 3.65
DLCOC SBVA REF: 5.1
DLCOC SINGLE BREATH LLN: 21.18
DLCOC SINGLE BREATH REF: 26.91
DLCOCSBVAULN: 6.55
DLCOCSINGLEBREATHULN: 32.65
DLCOSINGLEBREATHULN: 32.65
DLCOVA LLN: 3.65
DLCOVA PRE REF: 102.7 %
DLCOVA PRE: 5.24 ML/(MIN*MMHG*L) (ref 3.65–6.55)
DLCOVA REF: 5.1
DLCOVAULN: 6.55
ERV LLN: -16448.88
ERV PRE REF: 71.2 %
ERV REF: 1.12
ERVULN: ABNORMAL
FEF 25 75 LLN: 1.55
FEF 25 75 PRE REF: 63.4 %
FEF 25 75 REF: 2.9
FET100 CHG: -6.7 %
FEV05 LLN: 1.36
FEV05 REF: 2.22
FEV1 CHG: 6.2 %
FEV1 FVC LLN: 72
FEV1 FVC PRE REF: 90.8 %
FEV1 FVC REF: 82
FEV1 LLN: 2.13
FEV1 PRE REF: 87.7 %
FEV1 REF: 2.76
FEV1 VOL CHG: 0.15
FRCPLETH LLN: 1.97
FRCPLETH PREREF: 65.7 %
FRCPLETH REF: 2.8
FRCPLETHULN: 3.62
FVC CHG: 4.3 %
FVC LLN: 2.62
FVC PRE REF: 96.2 %
FVC REF: 3.36
FVC VOL CHG: 0.14
IVC PRE: 3.02 L (ref 2.62–4.13)
IVC SINGLE BREATH LLN: 2.62
IVC SINGLE BREATH PRE REF: 89.7 %
IVC SINGLE BREATH REF: 3.36
IVCSINGLEBREATHULN: 4.13
LLN IC: -16447.55
PEF LLN: 4.81
PEF PRE REF: 95.4 %
PEF REF: 6.94
PHYSICIAN COMMENT: ABNORMAL
POST FEF 25 75: 1.98 L/S (ref 1.55–4.61)
POST FET 100: 6.41 SEC
POST FEV1 FVC: 76.08 % (ref 71.67–91.16)
POST FEV1: 2.57 L (ref 2.13–3.36)
POST FEV5: 1.94 L (ref 1.36–3.07)
POST FVC: 3.38 L (ref 2.62–4.13)
POST PEF: 6.29 L/S (ref 4.81–9.07)
PRE DLCO: 21.56 ML/(MIN*MMHG) (ref 21.18–32.65)
PRE ERV: 0.8 L (ref -16448.88–16451.12)
PRE FEF 25 75: 1.84 L/S (ref 1.55–4.61)
PRE FET 100: 6.87 SEC
PRE FEV05 REF: 82.2 %
PRE FEV1 FVC: 74.72 % (ref 71.67–91.16)
PRE FEV1: 2.42 L (ref 2.13–3.36)
PRE FEV5: 1.82 L (ref 1.36–3.07)
PRE FRC PL: 1.84 L (ref 1.97–3.62)
PRE FVC: 3.24 L (ref 2.62–4.13)
PRE IC: 2.44 L (ref -16447.55–16452.45)
PRE PEF: 6.62 L/S (ref 4.81–9.07)
PRE REF IC: 99.5 %
PRE RV: 1.04 L (ref 1.1–2.25)
PRE TLC: 4.28 L (ref 4.29–6.26)
RAW PRE REF: 139.3 %
RAW PRE: 4.26 CMH2O*S/L (ref 3.06–3.06)
RAW REF: 3.06
REF IC: 2.45
RV LLN: 1.1
RV PRE REF: 62 %
RV REF: 1.67
RVTLC LLN: 23
RVTLC PRE REF: 74.6 %
RVTLC PRE: 24.28 % (ref 22.97–42.15)
RVTLC REF: 33
RVTLCULN: 42
RVULN: 2.25
SGAW PRE REF: 106.4 %
SGAW PRE: 0.11 1/(CMH2O*S) (ref 0.1–0.1)
SGAW REF: 0.1
TLC LLN: 4.29
TLC PRE REF: 81.1 %
TLC REF: 5.27
TLC ULN: 6.26
ULN IC: ABNORMAL
VA PRE: 4.12 L (ref 5.12–5.12)
VA SINGLE BREATH LLN: 5.12
VA SINGLE BREATH PRE REF: 80.3 %
VA SINGLE BREATH REF: 5.12
VASINGLEBREATHULN: 5.12
VC LLN: 2.62
VC PRE REF: 96.2 %
VC PRE: 3.24 L (ref 2.62–4.13)
VC REF: 3.36
VC ULN: 4.13

## 2024-01-23 PROCEDURE — 99214 OFFICE O/P EST MOD 30 MIN: CPT | Mod: S$GLB,,, | Performed by: ALLERGY & IMMUNOLOGY

## 2024-01-23 PROCEDURE — 3008F BODY MASS INDEX DOCD: CPT | Mod: CPTII,S$GLB,, | Performed by: ALLERGY & IMMUNOLOGY

## 2024-01-23 PROCEDURE — 99999 PR PBB SHADOW E&M-EST. PATIENT-LVL III: CPT | Mod: PBBFAC,,, | Performed by: ALLERGY & IMMUNOLOGY

## 2024-01-23 PROCEDURE — 1160F RVW MEDS BY RX/DR IN RCRD: CPT | Mod: CPTII,S$GLB,, | Performed by: ALLERGY & IMMUNOLOGY

## 2024-01-23 PROCEDURE — 1159F MED LIST DOCD IN RCRD: CPT | Mod: CPTII,S$GLB,, | Performed by: ALLERGY & IMMUNOLOGY

## 2024-01-23 NOTE — PROGRESS NOTES
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  She is here alone.  She was last seen January 18, 2024.     After her last visit, she completed five days of prednisone 20 milligrams a day.  On the four her symptoms resolved and she felt well until last day or two.    Her exercise tolerance improved and she was not having shortness of breath.     Today she had some chest tightness without wheezing and would have used her albuterol. She new she was coming here.    She has been having some eye itching and redness.    At her last visit with Dr. Paige Watkins she advised discontinuing her Allegra because of abdominal pain. After she did this her discomfort did resolve.     She also changed her from Prilosec to famotidine.    She has not been having any heartburn or indigestion.     She is using CPAP now for sleep apnea.        1/23/2024     1:50 PM   [facility] ALLERGY QUESTIONNAIRE SHORT   Head or facial pain: No symptoms   Ears: No symptoms   Nose: No symptoms   Throat: No symptoms   eye itching Yes   eye redness Yes   eye discharge No   eye pain No   Light sensitivity / light hurts the eyes? No   cough No   wheezing No   shortness of breath No   apnea No   choking No   chest tightness Yes   rash No   color change  Yes      Physical Examination:  General: Well-developed, well-nourished, no acute distress.  Head: No sinus tenderness.  Eyes: Conjunctivae:  No bulbar or palpebral conjunctival injection.  Ears: EAC's clear.  TM's clear.  No pre-auricular nodes.  Nose: Nasal Mucosa:  Pink.  Septum: No apparent deviation.  Turbinates:  No significant edema.  Polyps/Mass:  None visible.  Teeth/Gums:  No bleeding noted.  Oropharynx: No exudates.  Neck: Supple without thyromegaly. No cervical lymphadenopathy.    Respiratory/Chest: Effort: Good.  Auscultation:  Clear bilaterally.  No wheezing.  Skin: Good turgor.  No urticaria or  angioedema.  Neuro/Psych: Oriented x 3.    Laboratory 04/15/2019:  IgE level:  Less than 35.  ImmunoCAP:  Negative.    Spirometry 04/16/2019:  Normal spirometry. Airflow not improved after bronchodilator.    Inhalant skin tests 05/13/2019:  3+ histamine control.  3+ dust mites.    Complete PFT 01/18/2024: Pending.    Assessment:  1.  Allergic rhinitis, controlled.  2.  Allergic conjunctivitis, not controlled.  3.  Asthma with exacerbation of uncertain etiology.  4.  Chronic dermatitis, improved.  5.  GERD.  6.  LPR, controlled.  7.  Probable esophageal spasm, resolved.  8.  Sleep apnea.  9.  Abdominal bloating probably secondary to PPI, resolved.    Recommendations:  1. Continue house dust mite avoidance.    2. Restart Allegra and follow abdominal symptoms.  3. Albuterol as needed.  4. Continue Pepcid daily.  5. Continue Flonase daily.  6. Continue azelastine daily.   7. Consider changing back to a PPI.    8. Return to clinic in 1 to 2 weeks or sooner if needed.  9. Safety of PPIs were reviewed.    Patient education:  April 20, 2020:  LPR and web site were reviewed.    Patient education:  October 29, 2019:  Allergic mechanisms and treatment options were reviewed in detail.    May 13, 2019:  House dust mite avoidance was reviewed.  Handouts were given.

## 2024-02-02 ENCOUNTER — OFFICE VISIT (OUTPATIENT)
Dept: ALLERGY | Facility: CLINIC | Age: 39
End: 2024-02-02
Payer: COMMERCIAL

## 2024-02-02 VITALS — BODY MASS INDEX: 36.85 KG/M2 | WEIGHT: 234.81 LBS | HEIGHT: 67 IN

## 2024-02-02 DIAGNOSIS — H10.13 ALLERGIC CONJUNCTIVITIS, BILATERAL: ICD-10-CM

## 2024-02-02 DIAGNOSIS — J30.89 ALLERGIC RHINITIS DUE TO DUST MITE: Primary | ICD-10-CM

## 2024-02-02 DIAGNOSIS — K21.9 LARYNGOPHARYNGEAL REFLUX: ICD-10-CM

## 2024-02-02 DIAGNOSIS — J45.20 ASTHMA IN ADULT, MILD INTERMITTENT, UNCOMPLICATED: ICD-10-CM

## 2024-02-02 DIAGNOSIS — J20.9 ACUTE BRONCHITIS, UNSPECIFIED ORGANISM: ICD-10-CM

## 2024-02-02 DIAGNOSIS — J06.9 VIRAL URI WITH COUGH: ICD-10-CM

## 2024-02-02 PROCEDURE — 99214 OFFICE O/P EST MOD 30 MIN: CPT | Mod: S$GLB,,, | Performed by: ALLERGY & IMMUNOLOGY

## 2024-02-02 PROCEDURE — 3008F BODY MASS INDEX DOCD: CPT | Mod: CPTII,S$GLB,, | Performed by: ALLERGY & IMMUNOLOGY

## 2024-02-02 PROCEDURE — 1160F RVW MEDS BY RX/DR IN RCRD: CPT | Mod: CPTII,S$GLB,, | Performed by: ALLERGY & IMMUNOLOGY

## 2024-02-02 PROCEDURE — 99999 PR PBB SHADOW E&M-EST. PATIENT-LVL III: CPT | Mod: PBBFAC,,, | Performed by: ALLERGY & IMMUNOLOGY

## 2024-02-02 PROCEDURE — 1159F MED LIST DOCD IN RCRD: CPT | Mod: CPTII,S$GLB,, | Performed by: ALLERGY & IMMUNOLOGY

## 2024-02-02 RX ORDER — AZITHROMYCIN 250 MG/1
250 TABLET, FILM COATED ORAL DAILY
Qty: 6 TABLET | Refills: 0 | Status: SHIPPED | OUTPATIENT
Start: 2024-02-02 | End: 2024-03-21

## 2024-02-02 NOTE — PROGRESS NOTES
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  She is here alone.  She was last seen January 23, 2024.    After her last visit, she developed an upper respiratory infection that may have been influenza. She had headaches, cough, nasal congestion, clear rhinorrhea, myalgias, and low-grade fever.  Most of the symptoms have resolved but she continues to cough. She does have discolored sputum.     Her asthma has been controlled though the spring she had some chest tightness and wheezing.    She restarted her Allegra and does feel that this has improved her symptoms. She has not had any abdominal pain.     She continues to take famotidine rather than Prilosec.  This does control her heartburn.     Physical Examination:  General: Well-developed, well-nourished, no acute distress.  Head: No sinus tenderness.  Eyes: Conjunctivae:  No bulbar or palpebral conjunctival injection.  Ears: EAC's clear.  TM's clear.  No pre-auricular nodes.  Nose: Nasal Mucosa:  Pink.  Septum: No apparent deviation.  Turbinates:  No significant edema.  Polyps/Mass:  None visible.  Teeth/Gums:  No bleeding noted.  Oropharynx: No exudates.  Neck: Supple without thyromegaly. No cervical lymphadenopathy.    Respiratory/Chest: Effort: Good.  Auscultation:  Clear bilaterally.  No wheezing.  Skin: Good turgor.  No urticaria or angioedema.  Neuro/Psych: Oriented x 3.    Laboratory 04/15/2019:  IgE level:  Less than 35.  ImmunoCAP:  Negative.    Spirometry 04/16/2019:  Normal spirometry. Airflow not improved after bronchodilator.    Inhalant skin tests 05/13/2019:  3+ histamine control.  3+ dust mites.    Complete PFT 01/18/2024:   Spirometry is normal. Spirometry remains unimproved following bronchodilator. Lung volumes show mild restriction is present. Airway mechanics show airway resistance is increased. Specific conductance remains normal. DLCO is normal.     Assessment:  1.   Allergic rhinitis, controlled.  2.  Allergic conjunctivitis, not controlled.  3.  Asthma with exacerbation of uncertain etiology, improved.  4.  Chronic dermatitis, improved.  5.  GERD.  6.  LPR, controlled.  7.  Probable esophageal spasm, resolved.  8.  Sleep apnea.  9.  Viral URI with bronchitis, resolving.    Recommendations:  1. Continue house dust mite avoidance.    2. Continue Allegra and follow abdominal symptoms.  3. Albuterol as needed.  4. Continue Pepcid daily.  5. Continue Flonase daily.  6. Continue azelastine daily.   7. Consider changing back to a PPI.    8. Zpak if needed.  9. Return to clinic if symptoms persist or in several months.    Patient education:  April 20, 2020:  LPR and web site were reviewed.    Patient education:  October 29, 2019:  Allergic mechanisms and treatment options were reviewed in detail.    May 13, 2019:  House dust mite avoidance was reviewed.  Handouts were given.

## 2024-02-12 ENCOUNTER — PATIENT MESSAGE (OUTPATIENT)
Dept: ALLERGY | Facility: CLINIC | Age: 39
End: 2024-02-12
Payer: COMMERCIAL

## 2024-02-20 ENCOUNTER — OFFICE VISIT (OUTPATIENT)
Dept: OTOLARYNGOLOGY | Facility: CLINIC | Age: 39
End: 2024-02-20
Payer: COMMERCIAL

## 2024-02-20 VITALS
HEART RATE: 85 BPM | WEIGHT: 236.88 LBS | SYSTOLIC BLOOD PRESSURE: 116 MMHG | BODY MASS INDEX: 37.1 KG/M2 | DIASTOLIC BLOOD PRESSURE: 75 MMHG

## 2024-02-20 DIAGNOSIS — K21.9 LARYNGOPHARYNGEAL REFLUX (LPR): ICD-10-CM

## 2024-02-20 DIAGNOSIS — R49.0 MUSCLE TENSION DYSPHONIA: ICD-10-CM

## 2024-02-20 DIAGNOSIS — J30.9 CHRONIC ALLERGIC RHINITIS: Primary | ICD-10-CM

## 2024-02-20 DIAGNOSIS — G47.33 OSA (OBSTRUCTIVE SLEEP APNEA): ICD-10-CM

## 2024-02-20 PROCEDURE — 1160F RVW MEDS BY RX/DR IN RCRD: CPT | Mod: CPTII,S$GLB,, | Performed by: OTOLARYNGOLOGY

## 2024-02-20 PROCEDURE — 99999 PR PBB SHADOW E&M-EST. PATIENT-LVL III: CPT | Mod: PBBFAC,,, | Performed by: OTOLARYNGOLOGY

## 2024-02-20 PROCEDURE — 99214 OFFICE O/P EST MOD 30 MIN: CPT | Mod: 25,S$GLB,, | Performed by: OTOLARYNGOLOGY

## 2024-02-20 PROCEDURE — 31575 DIAGNOSTIC LARYNGOSCOPY: CPT | Mod: S$GLB,,, | Performed by: OTOLARYNGOLOGY

## 2024-02-20 PROCEDURE — 1159F MED LIST DOCD IN RCRD: CPT | Mod: CPTII,S$GLB,, | Performed by: OTOLARYNGOLOGY

## 2024-02-20 PROCEDURE — 3008F BODY MASS INDEX DOCD: CPT | Mod: CPTII,S$GLB,, | Performed by: OTOLARYNGOLOGY

## 2024-02-20 PROCEDURE — 3074F SYST BP LT 130 MM HG: CPT | Mod: CPTII,S$GLB,, | Performed by: OTOLARYNGOLOGY

## 2024-02-20 PROCEDURE — 3078F DIAST BP <80 MM HG: CPT | Mod: CPTII,S$GLB,, | Performed by: OTOLARYNGOLOGY

## 2024-02-20 RX ORDER — METHYLPREDNISOLONE 4 MG/1
TABLET ORAL
Qty: 21 TABLET | Refills: 0 | Status: SHIPPED | OUTPATIENT
Start: 2024-02-20 | End: 2024-03-21

## 2024-02-20 RX ORDER — OMEPRAZOLE 40 MG/1
40 CAPSULE, DELAYED RELEASE ORAL EVERY MORNING
Qty: 30 CAPSULE | Refills: 3 | Status: SHIPPED | OUTPATIENT
Start: 2024-02-20 | End: 2024-05-20 | Stop reason: SDUPTHER

## 2024-02-20 NOTE — PROCEDURES
Laryngoscopy    Date/Time: 2/20/2024 2:20 PM    Performed by: Paige Hercules MD  Authorized by: Paige Hercules MD    Consent Done?:  Yes (Verbal)  Anesthesia:     Local anesthetic:  Lidocaine 2% and Nikita-Synephrine 1/2%  Laryngoscopy:     Areas examined:  Nasal cavities, nasopharynx, oropharynx, hypopharynx, larynx and vocal cords  Nose External:      No external nasal deformity  Nose Intranasal:      Mucosa no polyps     Mucosa ulcers not present     No mucosa lesions present     No septum gross deformity     Turbinates not enlarged  Nasopharynx:      No mucosa lesions     Adenoids not present     Posterior choanae patent     Eustachian tube patent  Larynx/hypopharynx:      No epiglottis lesions     No epiglottis edema     No AE folds lesions     No vocal cord polyps     Equal and normal bilateral     No hypopharynx lesions     No piriform sinus pooling     No piriform sinus lesions     Post cricoid edema (mild)     Post cricoid erythema (mild)     Increased false vocal fold sqeeze on adduction

## 2024-02-20 NOTE — PROGRESS NOTES
Chief Complaint   Patient presents with    Follow-up     Little Improvement    .       Interval HPI 1/9/2023:  Follow up visit. Reports that she has been feeling very well. She feels throat pain and globus sensation are overall better. She has been on Protonix 40mg PO daily. She has noted in the last month that coffee seemed to be a trigger for heartburn.   She has been using Gaviscon advance daily.  She notes that recently she has been snoring more throughout the last year.  She states that her  has noted this has been more of a problem- he has noticed episodes which concerned him for apnea. She lost 38 lbs in 2020 and reports that she has gained most of  this weight back over the last 2 years. She thinks that the snoring has been worse with the weight gain. She notes that she has trouble waking in the morning, decreased energy, and daytime somnolence. She states that her watch has shown decreased oxygen levels while sleeping.     Interval HPI10/20/2023:  Follow up visit. She did note that she had some episodes of reflux last month and not sure what triggered it. She felt it was severe. She did note increased stress at work that week. The symptoms were present for a few days and then resolved.    She is still taking gaviscon advance and Prilosec 40mg PO daily. She is now on CPAP for AMARJIT and has been on it for last 4 months with great improvement.   She has been on Flonase.  She has been using Claritin intermittently.     Interval HPI 2/20/2024:  Follow up visit. Reports that she has had increased throat clearing and more soreness in her throat. She did start doing some SLP exercises have been helpful. She feels that her allergic symptoms have been worse in the last 2 months.  She feels that the Pepcid has not been helping as well.  She has been using nasal saline rinses and feels this has been helpful.She has been using Flonase, Astelin, and allegra. She did see Dr. Mendoza who prescribed albuterol and  antibiotics on 2/2.       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      Social Determinants of Health     Financial Resource Strain: Low Risk  (12/5/2023)    Overall Financial Resource Strain (CARDIA)     Difficulty of Paying Living Expenses: Not hard at all   Food Insecurity: No Food Insecurity (12/5/2023)    Hunger Vital Sign     Worried About Running Out of Food in the Last Year: Never true     Ran Out of Food in the Last Year: Never true   Transportation Needs: No Transportation Needs (12/5/2023)    PRAPARE - Transportation     Lack of Transportation (Medical): No     Lack of Transportation (Non-Medical): No   Physical Activity: Insufficiently Active (12/5/2023)    Exercise Vital Sign     Days of Exercise per Week: 5 days     Minutes of Exercise per Session: 20 min   Stress: Stress Concern Present (12/5/2023)    [employer] [place] [employer] - Occupational Stress Questionnaire     Feeling of Stress : To some extent   Social Connections: Unknown (12/5/2023)    Social Connection and Isolation Panel [NHANES]     Frequency of Communication with Friends and Family: Three times a week     Frequency of Social Gatherings with Friends and Family: Never     Active Member of Clubs or Organizations: No     Attends Club or Organization Meetings: Never     Marital Status:    Housing Stability: Low Risk   (2023)    Housing Stability Vital Sign     Unable to Pay for Housing in the Last Year: No     Number of Places Lived in the Last Year: 1     Unstable Housing in the Last Year: No     Past Surgical History:   Procedure Laterality Date    DIAGNOSTIC LAPAROSCOPY Bilateral 5/15/2023    Procedure: LAPAROSCOPY, DIAGNOSTIC;  Surgeon: Raffi Gao MD;  Location: [facility];  Service: OB/GYN;  Laterality: Bilateral;    ESOPHAGOGASTRODUODENOSCOPY N/A 2023    Procedure: EGD (ESOPHAGOGASTRODUODENOSCOPY);  Surgeon: Mitchel Abreu MD;  Location: [facility] ENDO;  Service: Endoscopy;  Laterality: N/A;    HERNIA REPAIR      umbilical    LAPAROSCOPIC APPENDECTOMY N/A 2021    Procedure: APPENDECTOMY, LAPAROSCOPIC WITH LYSIS OF ADHESIONS.;  Surgeon: Trent Cannon MD;  Location: [address];  Service: General;  Laterality: N/A;    LAPAROSCOPIC LYSIS OF ADHESIONS N/A 5/15/2023    Procedure: LYSIS, ADHESIONS, LAPAROSCOPIC;  Surgeon: Raffi Gao MD;  Location: [facility];  Service: OB/GYN;  Laterality: N/A;     Family History   Problem Relation Age of Onset    Hypertension Mother     Allergic rhinitis Father     Allergies Father     Diabetes Father     Hypertension Father     Stroke Father     Hyperlipidemia Father     Allergic rhinitis Brother     Eczema Brother     Conjunctivitis Brother     No Known Problems Brother     Breast cancer Neg Hx     Colon cancer Neg Hx     Ovarian cancer Neg Hx     Melanoma Neg Hx     Blindness Neg Hx     Amblyopia Neg Hx     Cataracts Neg Hx     Glaucoma Neg Hx     Macular degeneration Neg Hx     Retinal detachment Neg Hx     Strabismus Neg Hx      labor Neg Hx     Cancer Neg Hx     Eclampsia Neg Hx            Review of Systems  General: negative for chills, fever or weight loss  Psychological: negative for mood changes or depression  Ophthalmic: negative for blurry vision, photophobia or eye pain  ENT: see HPI  Respiratory: no cough, shortness of breath, or  wheezing  Cardiovascular: no chest pain or dyspnea on exertion  Gastrointestinal: no abdominal pain, change in bowel habits, or black/ bloody stools  Musculoskeletal: negative for gait disturbance or muscular weakness  Neurological: no syncope or seizures; no ataxia  Dermatological: negative for puritis,  rash and jaundice  Hematologic/lymphatic: no easy bruising, no new lumps or bumps      Physical Exam:    Vitals:    02/20/24 1427   BP: 116/75   Pulse: 85         Constitutional: Well appearing / communicating without difficutly.  NAD.  Eyes: EOM I Bilaterally  Head/Face: Normocephalic.  Negative paranasal sinus pressure/tenderness.  Salivary glands WNL.  House Brackmann I Bilaterally.    Right Ear: Auricle normal appearance. External Auditory Canal within normal limits no lesions or masses,TM w/o masses/lesions/perforations. TM mobility noted.   Left Ear: Auricle normal appearance. External Auditory Canal within normal limits no lesions or masses,TM w/o masses/lesions/perforations. TM mobility noted.  Nose: No gross nasal septal deviation. Inferior Turbinates 3+ bilaterally. No septal perforation. No masses/lesions. External nasal skin appears normal without masses/lesions.  Oral Cavity: Gingiva/lips within normal limits.  Dentition/gingiva healthy appearing. Mucus membranes moist. Floor of mouth soft, no masses palpated. Oral Tongue mobile. Hard Palate appears normal.    Oropharynx: Base of tongue appears normal. No masses/lesions noted. Tonsillar fossa/pharyngeal wall without lesions. Posterior oropharynx WNL.  Soft palate without masses. Midline uvula.   Neck/Lymphatic: No LAD I-VI bilaterally.  No thyromegaly.  No masses noted on exam.      See separate procedure note for FFL.    Diagnostic studies reviewed:   Laboratory 04/15/2019:  IgE level:  Less than 35.  ImmunoCAP:  Negative.     Spirometry 04/16/2019:  Normal spirometry. Airflow not improved after bronchodilator.     Inhalant skin tests 05/13/2019:  3+  histamine control.  3+ dust mites.    Assessment:    ICD-10-CM ICD-9-CM    1. Chronic allergic rhinitis  J30.9 477.9       2. Laryngopharyngeal reflux (LPR)  K21.9 478.79       3. Muscle tension dysphonia  R49.0 784.42       4. AMARJIT (obstructive sleep apnea)  G47.33 327.23                 The primary encounter diagnosis was Chronic allergic rhinitis. Diagnoses of Laryngopharyngeal reflux (LPR), Muscle tension dysphonia, and AMARJIT (obstructive sleep apnea) were also pertinent to this visit.      Plan:  No orders of the defined types were placed in this encounter.    Start medrol dose lalo  Continue  nasal saline rinses BID  Continue Flonase 2 sprays per nostril daily  Continue Allegra daily   Start  Prilosec 40mg PO daily.  Discontinue famotidine 40mg PO qhs  Continue refrain from eating within 3 hours of going to bed, to elevate the head of bed very subtly and optimize the impact of gravity on the potential reflux, and to avoid alcohol, caffeine, tobacco, tomato sauce, spicy foods, fried food, and chocolate.   Continue CPAP therapy for AMARJIT     Follow up in approximately 4-6  months to reassess progress with treatment regimen.     Paige Grant MD

## 2024-02-22 NOTE — PROCEDURES
FibroScan [place] (Vibration Controlled Transient Elastography)    Date/Time: 3/8/2023 9:30 AM  Performed by: Deepali Orozco NP  Authorized by: Deepali Orozco NP     Diagnosis:  NAFLD    Probe:  M    Universal Protocol: Patient's identity, procedure and site were verified, confirmatory pause was performed.  Discussed procedure including risks and potential complications.  Questions answered.  Patient verbalizes understanding and wishes to proceed with VCTE.     Procedure: After providing explanations of the procedure, patient was placed in the supine position with right arm in maximum abduction to allow optimal exposure of right lateral abdomen.  Patient was briefly assessed, Testing was performed in the mid-axillary location, 50Hz Shear Wave pulses were applied and the resulting Shear Wave and Propagation Speed detected with a 3.5 MHz ultrasonic signal, using the FibroScan probe, Skin to liver capsule distance and liver parenchyma were accessed during the entire examination with the FibroScan probe, Patient was instructed to breathe normally and to abstain from sudden movements during the procedure, allowing for random measurements of liver stiffness. At least 10 Shear Waves were produced, Individual measurements of each Shear Wave were calculated.  Patient tolerated the procedure well with no complications.  Meets discharge criteria as was dismissed.  Rates pain 0 out of 10.  Patient will follow up with ordering provider to review results.    Findings  Median liver stiffness score:  4  CAP Reading: dB/m:  296    IQR/med %:  8  Interpretation  Fibrosis interpretation is based on medial liver stiffness - Kilopascal (kPa).    Fibrosis Stage:  F 0-1  Steatosis interpretation is based on controlled attenuation parameter - (dB/m).    Steatosis Grade:  S3  
BMP/CBC/EKG

## 2024-03-14 ENCOUNTER — OFFICE VISIT (OUTPATIENT)
Dept: OPTOMETRY | Facility: CLINIC | Age: 39
End: 2024-03-14
Payer: COMMERCIAL

## 2024-03-14 ENCOUNTER — HOSPITAL ENCOUNTER (OUTPATIENT)
Dept: RADIOLOGY | Facility: HOSPITAL | Age: 39
Discharge: HOME | End: 2024-03-14
Attending: NURSE PRACTITIONER
Payer: COMMERCIAL

## 2024-03-14 ENCOUNTER — PATIENT MESSAGE (OUTPATIENT)
Dept: OPTOMETRY | Facility: CLINIC | Age: 39
End: 2024-03-14

## 2024-03-14 ENCOUNTER — PATIENT MESSAGE (OUTPATIENT)
Dept: ALLERGY | Facility: CLINIC | Age: 39
End: 2024-03-14
Payer: COMMERCIAL

## 2024-03-14 DIAGNOSIS — H11.433 CONJUNCTIVAL HYPEREMIA OF BOTH EYES: Primary | ICD-10-CM

## 2024-03-14 DIAGNOSIS — K76.0 FATTY LIVER: ICD-10-CM

## 2024-03-14 PROCEDURE — 76700 US EXAM ABDOM COMPLETE: CPT | Mod: TC

## 2024-03-14 PROCEDURE — 99999 PR PBB SHADOW E&M-EST. PATIENT-LVL III: CPT | Mod: PBBFAC,,, | Performed by: OPTOMETRIST

## 2024-03-14 PROCEDURE — 1159F MED LIST DOCD IN RCRD: CPT | Mod: CPTII,S$GLB,, | Performed by: OPTOMETRIST

## 2024-03-14 PROCEDURE — 92012 INTRM OPH EXAM EST PATIENT: CPT | Mod: S$GLB,,, | Performed by: OPTOMETRIST

## 2024-03-14 PROCEDURE — 76700 US EXAM ABDOM COMPLETE: CPT | Mod: 26,,, | Performed by: RADIOLOGY

## 2024-03-18 ENCOUNTER — PATIENT MESSAGE (OUTPATIENT)
Dept: ADMINISTRATIVE | Facility: HOSPITAL | Age: 39
End: 2024-03-18
Payer: COMMERCIAL

## 2024-03-20 NOTE — PROGRESS NOTES
HPI    Patient is here today for concerns about ocular health. Patient states   that since this weekend, she has been having symptoms of conjunctivitis   including swelling of lids, itching, tearing, burning, redness, dryness   and reports that she feels as though OS has a gritty sandpaper feeling.   Eye meds: Pataday once daily OU                    Refresh PRN OU   Last edited by Blank Dolan, OD on 3/14/2024  1:39 PM.            Assessment /Plan     For exam results, see Encounter Report.    Conjunctival hyperemia of both eyes      Educated pt on today's findings and non-infectious nature of condition however (+) ocular inflammation. Pt to begin short course of FML and RTC if symptoms persist, worsen, or recur.    RTC x 1 month for annual eye exam

## 2024-03-21 ENCOUNTER — PROCEDURE VISIT (OUTPATIENT)
Dept: HEPATOLOGY | Facility: CLINIC | Age: 39
End: 2024-03-21
Payer: COMMERCIAL

## 2024-03-21 ENCOUNTER — OFFICE VISIT (OUTPATIENT)
Dept: HEPATOLOGY | Facility: CLINIC | Age: 39
End: 2024-03-21
Payer: COMMERCIAL

## 2024-03-21 VITALS — WEIGHT: 234.56 LBS | HEIGHT: 67 IN | BODY MASS INDEX: 36.81 KG/M2

## 2024-03-21 DIAGNOSIS — E66.01 SEVERE OBESITY (BMI 35.0-39.9) WITH COMORBIDITY: ICD-10-CM

## 2024-03-21 DIAGNOSIS — E66.09 CLASS 2 OBESITY DUE TO EXCESS CALORIES WITHOUT SERIOUS COMORBIDITY WITH BODY MASS INDEX (BMI) OF 36.0 TO 36.9 IN ADULT: ICD-10-CM

## 2024-03-21 DIAGNOSIS — K76.0 FATTY LIVER: ICD-10-CM

## 2024-03-21 DIAGNOSIS — K76.0 FATTY LIVER: Primary | ICD-10-CM

## 2024-03-21 PROBLEM — E66.9 OBESITY (BMI 30-39.9): Status: RESOLVED | Noted: 2023-03-08 | Resolved: 2024-03-21

## 2024-03-21 PROBLEM — E66.812 CLASS 2 OBESITY DUE TO EXCESS CALORIES WITHOUT SERIOUS COMORBIDITY WITH BODY MASS INDEX (BMI) OF 36.0 TO 36.9 IN ADULT: Status: ACTIVE | Noted: 2024-03-21

## 2024-03-21 PROCEDURE — 1160F RVW MEDS BY RX/DR IN RCRD: CPT | Mod: CPTII,S$GLB,, | Performed by: NURSE PRACTITIONER

## 2024-03-21 PROCEDURE — 1159F MED LIST DOCD IN RCRD: CPT | Mod: CPTII,S$GLB,, | Performed by: NURSE PRACTITIONER

## 2024-03-21 PROCEDURE — 91200 LIVER ELASTOGRAPHY: CPT | Mod: S$GLB,,, | Performed by: NURSE PRACTITIONER

## 2024-03-21 PROCEDURE — 99999 PR PBB SHADOW E&M-EST. PATIENT-LVL III: CPT | Mod: PBBFAC,,, | Performed by: NURSE PRACTITIONER

## 2024-03-21 PROCEDURE — 99214 OFFICE O/P EST MOD 30 MIN: CPT | Mod: S$GLB,,, | Performed by: NURSE PRACTITIONER

## 2024-03-21 PROCEDURE — 3008F BODY MASS INDEX DOCD: CPT | Mod: CPTII,S$GLB,, | Performed by: NURSE PRACTITIONER

## 2024-03-21 NOTE — PROGRESS NOTES
[facility] VISIT FOLLOW UP NOTE    PCP: Walter Phipps MD     CHIEF COMPLAINT: fatty liver, elevated liver enzymes     HPI: This is a 39 y.o. Black or  female with PMH noted below, presenting for follow up of above    Previous serologic w/u negative for hemochromatosis and viral hepatitis A, B and C     Prior serologic workup:   Lab Results   Component Value Date    FESATURATED 6 (L) 10/18/2023    HEPBSAG Non-reactive 08/08/2023    HEPCAB Non-reactive 03/03/2023    HEPAIGM Non-reactive 03/03/2023       Liver fibrosis staging:  -- fibroscan 3/2023 noted F0, S3 (kPA 4, )  -- fibroscan 3/2024 noted F0, S1 (kPA 3.5, )    Risk factors for NAFLD include obesity    Interval HPI: Presents today alone. No alcohol use   Having a hard time with weight loss despite Following Low carb (<100 grams per day), exercising often  Does note some improvement on fibroscan  Current wt 234 lbs, 15 lb weight gain since last visit   Interested in GLP-1 medication for weight loss     Labs done 3/2024 show mildly elevated transaminase levels   Platelets WNL, alk phos WNL  Synthetic liver functioning WNL    Lab Results   Component Value Date    ALT 46 (H) 03/14/2024    AST 28 03/14/2024    ALKPHOS 59 03/14/2024    BILITOT 0.2 03/14/2024    ALBUMIN 3.6 03/14/2024     10/18/2023       Abd U/S done 3/2024 hepatomegaly    Denies family history of liver disease . Denies alcohol consumption currently or in the past-     +Immunity to Hep A and B          Allergy and medication list reviewed and updated     PMHX:  has a past medical history of Acne vulgaris (11/20/2018), Allergic rhinitis due to dust mite (05/13/2019), Asthma, Eczema, Endometriosis (11/20/2018), Fibroid, Laryngopharyngeal reflux, Migraine syndrome, and Sleep apnea.    PSHX:  has a past surgical history that includes Hernia repair (2014); Laparoscopic appendectomy (N/A, 4/21/2021); Diagnostic laparoscopy (Bilateral, 5/15/2023);  "Laparoscopic lysis of adhesions (N/A, 5/15/2023); and Esophagogastroduodenoscopy (N/A, 12/7/2023).    FAMILY HISTORY: Updated and reviewed in [facility]    SOCIAL HISTORY:   Social History     Substance and Sexual Activity   Alcohol Use No       Social History     Substance and Sexual Activity   Drug Use No       ROS:   GENERAL: Denies fatigue  CARDIOVASCULAR: Denies edema  GI: Denies abdominal pain  SKIN: Denies rash, itching   NEURO: Denies confusion, memory loss, or mood changes    PHYSICAL EXAM:   Friendly Black or  female, in no acute distress; alert and oriented to person, place and time  VITALS: Ht 5' 7" (1.702 m)   Wt 106.4 kg (234 lb 9.1 oz)   BMI 36.74 kg/m²   EYES: Sclerae anicteric  GI: Soft, non-tender, non-distended. No ascites.  EXTREMITIES:  No edema.  SKIN: Warm and dry. No jaundice. No telangectasias noted. No palmar erythema.  NEURO:  No asterixis.  PSYCH:  Thought and speech pattern appropriate. Behavior normal      EDUCATION:  See instructions discussed with patient in Instructions section of the After Visit Summary     ASSESSMENT & PLAN:  39 y.o. Black or  female with:  1.  Fatty liver, likely related to metabolic risk factors   - see HPI  -- Fibroscan 3/2024 noted F0, S1 - will repeat yearly   -- Recommendations discussed with patient:  Limit alcohol consumption (pt does not drink currently)  2 Weight loss goal of 25 lbs  3. Low carb/sugar, high fiber and protein diet, limit your carb intake to LESS than 30-45 grams of carbs with a meal or LESS than 5-10 grams with any snack   4. Exercise, 5 days per week, 30 minutes per day, as tolerated  5. Recommend good cholesterol, blood pressure, blood sugar levels     Discussed new medication for treatment of F2-F3 liver scarring due to fatty liver. Limited information currently but can consider it in the future if meets indications. Will continue to assess     2.  Obesity  -- Body mass index is 36.74 kg/m².   -- increases " risk for fatty liver        Follow up in about 1 year (around 3/21/2025). With labs and fibroscan before   Orders Placed This Encounter   Procedures    FibroScan Transplant Hepatology(Vibration Controlled Transient Elastography)    Hepatic Function Panel        Thank you for allowing me to participate in the care of Joan Millsublu Ordonezkryson    BARRETT Casarez    I spent a total of 30 minutes on the day of the visit.This includes face to face time and non-face to face time preparing to see the patient (eg, review of tests), obtaining and/or reviewing separately obtained history, documenting clinical information in the electronic or other health record, independently interpreting results and communicating results to the patient/family/caregiver, and coordinating care.         CC'ed note to:

## 2024-03-21 NOTE — PROCEDURES
FibroScan [place] (Vibration Controlled Transient Elastography)    Date/Time: 3/21/2024 9:15 AM    Performed by: Deepali Orozco NP  Authorized by: Deepali Orozco, DAVIDE    Diagnosis:  NAFLD    Probe:  XL    Universal Protocol: Patient's identity, procedure and site were verified, confirmatory pause was performed.  Discussed procedure including risks and potential complications.  Questions answered.  Patient verbalizes understanding and wishes to proceed with VCTE.     Procedure: After providing explanations of the procedure, patient was placed in the supine position with right arm in maximum abduction to allow optimal exposure of right lateral abdomen.  Patient was briefly assessed, Testing was performed in the mid-axillary location, 50Hz Shear Wave pulses were applied and the resulting Shear Wave and Propagation Speed detected with a 3.5 MHz ultrasonic signal, using the FibroScan probe, Skin to liver capsule distance and liver parenchyma were accessed during the entire examination with the FibroScan probe, Patient was instructed to breathe normally and to abstain from sudden movements during the procedure, allowing for random measurements of liver stiffness. At least 10 Shear Waves were produced, Individual measurements of each Shear Wave were calculated.  Patient tolerated the procedure well with no complications.  Meets discharge criteria as was dismissed.  Rates pain 0 out of 10.  Patient will follow up with ordering provider to review results.    Findings  Median liver stiffness score:  3.5  CAP Reading: dB/m:  238    IQR/med %:  9  Interpretation  Fibrosis interpretation is based on medial liver stiffness - Kilopascal (kPa).    Fibrosis Stage:  F 0-1  Steatosis interpretation is based on controlled attenuation parameter - (dB/m).    Steatosis Grade:  S1

## 2024-03-21 NOTE — PATIENT INSTRUCTIONS
1. Fibroscan to look for fat or scar tissue in the liver showed ~30% fat in the liver, no scar tissue damage   2.  Follow up in 1 year with labs a few days before, fibroscan same day     These things are important to improve fatty liver:  Limit alcohol consumption  2 Weight loss goal of 25 lbs. [facility] offers benefits to patients so let me know if you want a referral to the [facility] gym. Also, [facility] has dieticians that can create a weight loss plan. If you are interested let me know and I can place a referral. If so, contact the dietician team at [facility] at email [email or web address] or call [phone] to get scheduled. They do offer video visits   3. Low carb/sugar and high protein diet (~1 g/kg/day of protein).Try to limit your carb intake to LESS than 30-45 grams of carbs with a meal or LESS than 5-10 grams with any snack (total of any snack foods eaten during that time). Use [place] [employer] Pal or Lose It angelika to add up your carbs through the day. Do NOT drink any beverages with calories or carbs (this can lead to high blood sugar and weight gain). The main thing to focus on is low calorie, high protein, low carb)  4. Exercise, 5 days per week, 30 minutes per day, as tolerated  5. Recommend good cholesterol, blood pressure, blood sugar levels   6. There is a new medication called Rezdiffra for the treatment of F2-F3 liver scarring due to fatty liver. We have limited information currently but we can consider it in the future if you have stage 2-3 scar tissue and meet indications for treatment        In some people, fatty liver can progress to steatohepatitis (inflamatory fatty liver) and possibly to cirrhosis, increasing the risk for liver cancer, liver failure, and death. Therefore, the lifestyle changes are very important to decrease this risk.       WEIGHT LOSS  I recommend looking into medication to help you lose weight    Call your insurance and ask if  they cover weight loss or cover the 2 more effective  medications for weight loss: Wegovy or Zepbound. To better figure out if the weight loss medications are covered, Call your insurance company and ask them to run a test claim for the 2 more effective medications FDA approved for weight loss to see if either one is covered. It is Wegovy 0.25 mg weekly or Zepbound 2.5 mg weekly (they just need an example dose to run a test claim).   If your insurance covers these meds for weight loss, you can ask your PCP if they will prescribe it or you can establish care with the [facility] (let me know if you want a referral)  I recommend using an [facility] pharmacy if you use your PCP or the [facility] weight loss clinic (through your insurance) because they will do any prior authorization or appeal paperwork that your insurance may require   If your insurance company does not cover weight loss, you can call your work HR department (if it is a work policy) and ask for a weight loss rider to be added to the company's insurance plan (I have had people successful with this)  5. If your insurance does not cover the above/weight loss meds, then you can look into local weight loss clinics who prescribe those 2 mesd from compound pharmacies without using your insurance (cash pay). The price ranges from $175-400 per month depending on the med and dose (some places our patients have gone is [facility] in [place], [facility] in [place], but there are many others if you search online)     Let me know if you want me to put in the referral for bariatric medicine, their phone number is   [phone] to schedule an appt

## 2024-03-27 ENCOUNTER — LAB VISIT (OUTPATIENT)
Dept: LAB | Facility: HOSPITAL | Age: 39
End: 2024-03-27
Payer: COMMERCIAL

## 2024-03-27 ENCOUNTER — OFFICE VISIT (OUTPATIENT)
Dept: GASTROENTEROLOGY | Facility: CLINIC | Age: 39
End: 2024-03-27
Payer: COMMERCIAL

## 2024-03-27 VITALS — HEIGHT: 67 IN | BODY MASS INDEX: 36.88 KG/M2 | WEIGHT: 235 LBS

## 2024-03-27 DIAGNOSIS — K62.5 BRBPR (BRIGHT RED BLOOD PER RECTUM): Primary | ICD-10-CM

## 2024-03-27 DIAGNOSIS — K62.5 BRBPR (BRIGHT RED BLOOD PER RECTUM): ICD-10-CM

## 2024-03-27 DIAGNOSIS — K21.9 GASTROESOPHAGEAL REFLUX DISEASE, UNSPECIFIED WHETHER ESOPHAGITIS PRESENT: ICD-10-CM

## 2024-03-27 LAB
ERYTHROCYTE [DISTWIDTH] IN BLOOD BY AUTOMATED COUNT: 16.5 % (ref 11.5–14.5)
HCT VFR BLD AUTO: 38.1 % (ref 37–48.5)
HGB BLD-MCNC: 12.7 G/DL (ref 12–16)
MCH RBC QN AUTO: 25 PG (ref 27–31)
MCHC RBC AUTO-ENTMCNC: 33.3 G/DL (ref 32–36)
MCV RBC AUTO: 75 FL (ref 82–98)
PLATELET # BLD AUTO: 293 K/UL (ref 150–450)
PMV BLD AUTO: 10.8 FL (ref 9.2–12.9)
RBC # BLD AUTO: 5.08 M/UL (ref 4–5.4)
WBC # BLD AUTO: 4.92 K/UL (ref 3.9–12.7)

## 2024-03-27 PROCEDURE — 85027 COMPLETE CBC AUTOMATED: CPT | Performed by: NURSE PRACTITIONER

## 2024-03-27 PROCEDURE — 36415 COLL VENOUS BLD VENIPUNCTURE: CPT | Performed by: NURSE PRACTITIONER

## 2024-03-27 PROCEDURE — 1159F MED LIST DOCD IN RCRD: CPT | Mod: CPTII,S$GLB,, | Performed by: NURSE PRACTITIONER

## 2024-03-27 PROCEDURE — 99999 PR PBB SHADOW E&M-EST. PATIENT-LVL III: CPT | Mod: PBBFAC,,, | Performed by: NURSE PRACTITIONER

## 2024-03-27 PROCEDURE — 99214 OFFICE O/P EST MOD 30 MIN: CPT | Mod: S$GLB,,, | Performed by: NURSE PRACTITIONER

## 2024-03-27 PROCEDURE — 3008F BODY MASS INDEX DOCD: CPT | Mod: CPTII,S$GLB,, | Performed by: NURSE PRACTITIONER

## 2024-03-27 NOTE — PROGRESS NOTES
GASTROENTEROLOGY CLINIC NOTE    Chief Complaint: The primary encounter diagnosis was BRBPR (bright red blood per rectum). A diagnosis of Gastroesophageal reflux disease, unspecified whether esophagitis present was also pertinent to this visit.  Referring provider/PCP: Walter Phipps MD Ayoyinka Olubunnancy Wilde is a 39 y.o. female who is a new patient to me with a PMH that's significant for LPR and AMARJIT.  She was referred to us by ENT and is here today to establish care for LRP and heartburn.     Diagnosed with LPR few years ago by her allergist. Referred to ENT who agreed with LPR. Working to reduce allergic triggers which has helped. She is also using Flonase and Astelin and has undergone sleep study. Diagnosed with AMARJIT and wears CPAP.   September had episode of heartburn few days; unsure of triggers. Was taking omeprazole daily but felt not helping. At this time, she was also under increased stress at work.   Saw ENT 10/2023 laryngoscopy with mild post cricoid edema and erythema. Omeprazole stopped and Pepcid 40mg daily at night started. Feels Pepcid is helping.     GERD  How Long: few years  When do symptoms occur: mostly after eating and occasionally in morning  Nocturnal Symptoms: no  Nausea/Vomiting: no  Abdominal Pain:no  Typical Symptoms    Pyrosis yes   Regurgitation Regurgitation of acid   Water Brash Occasional    Dysphagia/Odynophagia no     Atypical Symptoms    Hoarseness/Cough no   Throat Clearing Yes mostly with eating; working with speech, better than before   Chest Pain no   Asthma no     ____________________________________________________________  Interval Note 3/27/2024    Ms. Wilde who is known to me presents for post procedure follow up and BRBPR. Following last office visit underwent EGD which was unrevealing. She was placed back on omeprazole as pepcid was becoming less effective at controlling symptoms. Since restarting omeprazole heartburn and reflux have improved. Tolerating  omeprazole well.     About 5-6 weeks ago, noted bright red blood in toilet bowl with bowel movement. This occurred with two bowel movements. She was having constipation at that time and straining with bowel movement. She has since increased water intake and started fiber supplement. Bowel movements regular now and no further episodes of blood noted.     Blood in Stool    How Lon-6 weeks ago  Constipated at time; was straining with bowel movements   Frequency: Twice    Melena/Hematochezia: no   Blood in Stool/Toilet Paper/Toilet Bowel: Blood in water, not mixed in stool   Straining with Bowel Movements: Straining at that time   Bowel Movements: daily   Changes in Bowel Habits/Unexplained Weight Loss: no   Abdominal/Rectal Pain: no   H/O Hemorrhoids:  unknown   Iron Suppleents/Pepto Bismol/Blood Thinners: no       Treatments: Omeprazole 40 mg daily, Gaviscon at bedtime and PRN, Famotidine at night before dinner (helping symptoms)  Lifestyle modifications  Caffeine: once a week green tea  ETOH: no  NSAIDs: no  GLP-1s: No  NSAIDs: No  Anticoagulation or Antiplatelet: No    History of H.pylori: no  H.pylori Treatment:  Prior Upper Endoscopy: 2023 with Dr. Abreu   Impression:            - Normal esophagus.                          - Normal stomach. Biopsied.                          - Normal examined duodenum.     Pathology:  Stomach, random, biopsy:   - Antral and oxyntic mucosa with mild chronic gastritis.    - Immunostain for H.pylori is negative,     Prior Colonoscopy: no  Family h/o Colon Cancer: No  Family h/o Crohn's Disease or Ulcerative Colitis: No  Family h/o Celiac Sprue: No  Abdominal Surgeries: appendectomy, umbilical hernia repair, laparoscopic lysis of adhesions    Review of Systems   Constitutional:  Negative for weight loss.   HENT:  Negative for sore throat.    Eyes:  Negative for blurred vision.   Respiratory:  Negative for cough.    Cardiovascular:  Negative for chest pain.    Gastrointestinal:  Negative for abdominal pain, blood in stool, constipation, diarrhea, heartburn, melena, nausea and vomiting.   Genitourinary:  Negative for dysuria.   Musculoskeletal:  Negative for myalgias.   Skin:  Negative for rash.   Neurological:  Negative for headaches.   Endo/Heme/Allergies:  Negative for environmental allergies.   Psychiatric/Behavioral:  Negative for suicidal ideas. The patient is not nervous/anxious.        Past Medical History: has a past medical history of Acne vulgaris, Allergic rhinitis due to dust mite, Asthma, Eczema, Endometriosis, Fibroid, Laryngopharyngeal reflux, Migraine syndrome, and Sleep apnea.    Past Surgical History: has a past surgical history that includes Hernia repair (2014); Laparoscopic appendectomy (N/A, 4/21/2021); Diagnostic laparoscopy (Bilateral, 5/15/2023); Laparoscopic lysis of adhesions (N/A, 5/15/2023); and Esophagogastroduodenoscopy (N/A, 12/7/2023).    Family History:family history includes Allergic rhinitis in her brother and father; Allergies in her father; Conjunctivitis in her brother; Diabetes in her father; Eczema in her brother; Hyperlipidemia in her father; Hypertension in her father and mother; No Known Problems in her brother; Stroke in her father.    Allergies:   Review of patient's allergies indicates:   Allergen Reactions    Metoclopramide Other (See Comments)     Causes tongue to roll back in mouth       Social History: reports that she has never smoked. She has never been exposed to tobacco smoke. She has never used smokeless tobacco. She reports that she does not drink alcohol and does not use drugs.    Home medications:   Current Outpatient Medications on File Prior to Visit   Medication Sig Dispense Refill    albuterol (VENTOLIN HFA) 90 mcg/actuation inhaler Inhale 2 puffs into the lungs every 6 (six) hours as needed for Wheezing. Rescue 6.7 g 0    ascorbic acid, vitamin C, (VITAMIN C) 1000 MG tablet Take 2 tablets (2,000 mg total)  "by mouth once daily.      azelastine (ASTELIN) 137 mcg (0.1 %) nasal spray 1 spray (137 mcg total) by Nasal route 2 (two) times daily. 30 mL 3    conjugated estrogens (PREMARIN) vaginal cream Use dime size amount of estrogen cream in vagina nightly for 2 weeks, then twice a week thereafter. 30 g 2    fluticasone propionate (FLONASE) 50 mcg/actuation nasal spray 2 sprays (100 mcg total) by Each Nostril route once daily. 16 g 11    naproxen (NAPROSYN) 250 MG tablet Take 250 mg by mouth 2 (two) times daily.      norethindrone (AYGESTIN) 5 mg Tab Take 1 tablet (5 mg total) by mouth once daily. 30 tablet 6    norethindrone (MICRONOR) 0.35 mg tablet Take 1 tablet (0.35 mg total) by mouth once daily. 28 tablet 12    olopatadine (PATANOL) 0.1 % ophthalmic solution 1 drop 2 (two) times daily.      omeprazole (PRILOSEC) 40 MG capsule Take 1 capsule (40 mg total) by mouth every morning. 30 capsule 3     No current facility-administered medications on file prior to visit.       Vital signs:  Ht 5' 7" (1.702 m)   Wt 106.6 kg (235 lb 0.2 oz)   BMI 36.81 kg/m²     Physical Exam  Vitals reviewed.   Constitutional:       Appearance: Normal appearance.   HENT:      Head: Normocephalic.   Pulmonary:      Effort: Pulmonary effort is normal. No respiratory distress.   Abdominal:      General: There is no distension.      Palpations: Abdomen is soft.      Tenderness: There is no abdominal tenderness.   Skin:     General: Skin is warm.   Neurological:      Mental Status: She is alert and oriented to person, place, and time.   Psychiatric:         Behavior: Behavior normal.         Thought Content: Thought content normal.         Routine labs:  Lab Results   Component Value Date    WBC 4.92 03/27/2024    HGB 12.7 03/27/2024    HCT 38.1 03/27/2024    MCV 75 (L) 03/27/2024     03/27/2024     No results found for: "INR"  Lab Results   Component Value Date    IRON 41 10/18/2023    TIBC 641 (H) 10/18/2023    FESATURATED 6 (L) " "10/18/2023     Lab Results   Component Value Date     03/03/2023    K 4.4 03/03/2023     03/03/2023    CO2 23 03/03/2023    BUN 13 03/03/2023    CREATININE 1.0 03/03/2023     Lab Results   Component Value Date    ALBUMIN 3.6 03/14/2024    ALT 46 (H) 03/14/2024    AST 28 03/14/2024    ALKPHOS 59 03/14/2024    BILITOT 0.2 03/14/2024     No results found for: "GLUCOSE"  Lab Results   Component Value Date    TSH 1.296 03/03/2023     Lab Results   Component Value Date    CALCIUM 9.4 03/03/2023       Imaging:      I have reviewed prior labs, imaging, and notes.      Assessment:  1. BRBPR (bright red blood per rectum)    2. Gastroesophageal reflux disease, unspecified whether esophagitis present      One episode of BRBPR about 5-6 weeks ago No further episodes.   Increased fiber and water intake.   Reflux controlled with omeprazole.     Plan:  Orders Placed This Encounter    CBC Without Differential     CBC  Continue daily fiber supplement  Continue omeprazole as previously prescribed.     Consider colonoscopy if bleeding recurs.     Plan of care discussed with patient who is in agreement and verbalized understanding.     I have explained the planned procedures to the patient.The risks, benefits and alternatives of the procedure were also explained in detail. Patient verbalized understanding, all questions were answered. The patient agrees to proceed as planned    Follow Up: YAJAIRA Guerrero, APRN,FNP-BC  [facility] [place]    (Portions of this note were dictated using voice recognition software and may contain dictation related errors in spelling/grammar/syntax not found on text review)    "

## 2024-04-09 ENCOUNTER — CLINICAL SUPPORT (OUTPATIENT)
Dept: OTHER | Facility: CLINIC | Age: 39
End: 2024-04-09

## 2024-04-09 DIAGNOSIS — Z00.8 ENCOUNTER FOR OTHER GENERAL EXAMINATION: ICD-10-CM

## 2024-04-10 VITALS
DIASTOLIC BLOOD PRESSURE: 82 MMHG | HEIGHT: 66 IN | SYSTOLIC BLOOD PRESSURE: 128 MMHG | BODY MASS INDEX: 36.8 KG/M2 | WEIGHT: 229 LBS

## 2024-04-10 LAB
HDLC SERPL-MCNC: 29 MG/DL
POC CHOLESTEROL, LDL (DOCK): 103 MG/DL
POC CHOLESTEROL, TOTAL: 146 MG/DL
POC GLUCOSE, FASTING: 101 MG/DL (ref 60–110)
TRIGL SERPL-MCNC: 67 MG/DL

## 2024-04-11 ENCOUNTER — OFFICE VISIT (OUTPATIENT)
Dept: INTERNAL MEDICINE | Facility: CLINIC | Age: 39
End: 2024-04-11
Payer: COMMERCIAL

## 2024-04-11 ENCOUNTER — LAB VISIT (OUTPATIENT)
Dept: LAB | Facility: HOSPITAL | Age: 39
End: 2024-04-11
Attending: INTERNAL MEDICINE
Payer: COMMERCIAL

## 2024-04-11 ENCOUNTER — TELEPHONE (OUTPATIENT)
Dept: PHARMACY | Facility: CLINIC | Age: 39
End: 2024-04-11
Payer: COMMERCIAL

## 2024-04-11 ENCOUNTER — PATIENT MESSAGE (OUTPATIENT)
Dept: HEPATOLOGY | Facility: CLINIC | Age: 39
End: 2024-04-11
Payer: COMMERCIAL

## 2024-04-11 VITALS
WEIGHT: 233.44 LBS | DIASTOLIC BLOOD PRESSURE: 78 MMHG | SYSTOLIC BLOOD PRESSURE: 122 MMHG | BODY MASS INDEX: 37.52 KG/M2 | HEIGHT: 66 IN

## 2024-04-11 DIAGNOSIS — D57.3 SICKLE CELL TRAIT: ICD-10-CM

## 2024-04-11 DIAGNOSIS — Z00.00 ANNUAL PHYSICAL EXAM: ICD-10-CM

## 2024-04-11 DIAGNOSIS — Z00.00 ANNUAL PHYSICAL EXAM: Primary | ICD-10-CM

## 2024-04-11 DIAGNOSIS — R73.09 ELEVATED GLUCOSE: ICD-10-CM

## 2024-04-11 DIAGNOSIS — K21.9 LARYNGOPHARYNGEAL REFLUX: ICD-10-CM

## 2024-04-11 LAB
ANION GAP SERPL CALC-SCNC: 9 MMOL/L (ref 8–16)
BACTERIA #/AREA URNS AUTO: NORMAL /HPF
BILIRUB UR QL STRIP: NEGATIVE
BUN SERPL-MCNC: 12 MG/DL (ref 6–20)
CALCIUM SERPL-MCNC: 9.7 MG/DL (ref 8.7–10.5)
CHLORIDE SERPL-SCNC: 106 MMOL/L (ref 95–110)
CLARITY UR REFRACT.AUTO: CLEAR
CO2 SERPL-SCNC: 22 MMOL/L (ref 23–29)
COLOR UR AUTO: COLORLESS
CREAT SERPL-MCNC: 1 MG/DL (ref 0.5–1.4)
EST. GFR  (NO RACE VARIABLE): >60 ML/MIN/1.73 M^2
ESTIMATED AVG GLUCOSE: 111 MG/DL (ref 68–131)
GLUCOSE SERPL-MCNC: 82 MG/DL (ref 70–110)
GLUCOSE UR QL STRIP: NEGATIVE
HBA1C MFR BLD: 5.5 % (ref 4–5.6)
HGB UR QL STRIP: NEGATIVE
KETONES UR QL STRIP: NEGATIVE
LEUKOCYTE ESTERASE UR QL STRIP: NEGATIVE
MICROSCOPIC COMMENT: NORMAL
NITRITE UR QL STRIP: NEGATIVE
PH UR STRIP: 7 [PH] (ref 5–8)
POTASSIUM SERPL-SCNC: 4 MMOL/L (ref 3.5–5.1)
PROT UR QL STRIP: NEGATIVE
RBC #/AREA URNS AUTO: 1 /HPF (ref 0–4)
SODIUM SERPL-SCNC: 137 MMOL/L (ref 136–145)
SP GR UR STRIP: 1.01 (ref 1–1.03)
SQUAMOUS #/AREA URNS AUTO: 0 /HPF
URN SPEC COLLECT METH UR: ABNORMAL
WBC #/AREA URNS AUTO: 0 /HPF (ref 0–5)

## 2024-04-11 PROCEDURE — 1160F RVW MEDS BY RX/DR IN RCRD: CPT | Mod: CPTII,S$GLB,, | Performed by: INTERNAL MEDICINE

## 2024-04-11 PROCEDURE — 1159F MED LIST DOCD IN RCRD: CPT | Mod: CPTII,S$GLB,, | Performed by: INTERNAL MEDICINE

## 2024-04-11 PROCEDURE — 3044F HG A1C LEVEL LT 7.0%: CPT | Mod: CPTII,S$GLB,, | Performed by: INTERNAL MEDICINE

## 2024-04-11 PROCEDURE — 3074F SYST BP LT 130 MM HG: CPT | Mod: CPTII,S$GLB,, | Performed by: INTERNAL MEDICINE

## 2024-04-11 PROCEDURE — 3008F BODY MASS INDEX DOCD: CPT | Mod: CPTII,S$GLB,, | Performed by: INTERNAL MEDICINE

## 2024-04-11 PROCEDURE — 3078F DIAST BP <80 MM HG: CPT | Mod: CPTII,S$GLB,, | Performed by: INTERNAL MEDICINE

## 2024-04-11 PROCEDURE — 99396 PREV VISIT EST AGE 40-64: CPT | Mod: S$GLB,,, | Performed by: INTERNAL MEDICINE

## 2024-04-11 PROCEDURE — 99999 PR PBB SHADOW E&M-EST. PATIENT-LVL IV: CPT | Mod: PBBFAC,,, | Performed by: INTERNAL MEDICINE

## 2024-04-11 PROCEDURE — 83036 HEMOGLOBIN GLYCOSYLATED A1C: CPT | Performed by: INTERNAL MEDICINE

## 2024-04-11 PROCEDURE — 36415 COLL VENOUS BLD VENIPUNCTURE: CPT | Performed by: INTERNAL MEDICINE

## 2024-04-11 PROCEDURE — 81001 URINALYSIS AUTO W/SCOPE: CPT | Performed by: INTERNAL MEDICINE

## 2024-04-11 PROCEDURE — 80048 BASIC METABOLIC PNL TOTAL CA: CPT | Performed by: INTERNAL MEDICINE

## 2024-04-11 RX ORDER — SEMAGLUTIDE 0.25 MG/.5ML
0.25 INJECTION, SOLUTION SUBCUTANEOUS
Qty: 2 ML | Refills: 3 | Status: SHIPPED | OUTPATIENT
Start: 2024-04-11 | End: 2024-04-12

## 2024-04-11 NOTE — PROGRESS NOTES
CC:  The patient is a 39-year-old female the obstructive sleep apnea on CPAP, endometriosis, migraine headaches, mild intermittent asthma, sickle cell trait and elevated BMI of 37.6 8 who presents today for annual exam.  She was seen by hepatology for fatty liver.  She has not been able to lose weight despite lifestyle modification.  She was interested in trying Wegovy.      ROS:  Weight is staying the same.  No visual changes.  She has a follow-up with Optometry on the 23rd.  No auditory changes.  No dysphagia.  No chest pain.  No shortness of breath.  She does do yoga plus walks does lift weights.  She does have laryngopharyngeal reflux which is controlled with the omeprazole.  No nausea vomiting.  No bowel changes.  She was seen by OBGYN September 23.  No weakness in arms or legs.  She does have several tiny black spots her left and right hands.  Last mammogram was in June of 2023.  Physical exam:   General appearance:  No acute distress   HEENT: Conjunctiva is clear.  Pupils equal.  TMs are clear.  Nasal septum is midline without discharge.  Oropharynx is without erythema.  Trachea is midline without JVD or thyromegaly.    Pulmonary:  Good inspiratory, expiratory breath sounds are heard.  Lungs are clear to auscultation.    Cardiovascular:  S1-S2, rhythm is regular.  2+ carotid pulse of bruits.  Extremities without edema.    GI:  Abdomen is nontender, nondistended without hepatosplenomegaly    Assessment:  1. Annual exam   2.  Sickle cell trait  3.  Laryngopharyngeal reflux   4. Elevated BMI    Plan:  1. Will check a BMP, A1c, UA  2. Will start the patient on Wegovy

## 2024-04-12 ENCOUNTER — OFFICE VISIT (OUTPATIENT)
Dept: PRIMARY CARE CLINIC | Facility: CLINIC | Age: 39
End: 2024-04-12
Attending: INTERNAL MEDICINE
Payer: COMMERCIAL

## 2024-04-12 VITALS
SYSTOLIC BLOOD PRESSURE: 130 MMHG | OXYGEN SATURATION: 97 % | WEIGHT: 233.94 LBS | RESPIRATION RATE: 18 BRPM | HEART RATE: 71 BPM | BODY MASS INDEX: 37.6 KG/M2 | DIASTOLIC BLOOD PRESSURE: 82 MMHG | HEIGHT: 66 IN

## 2024-04-12 DIAGNOSIS — K76.0 FATTY LIVER: ICD-10-CM

## 2024-04-12 DIAGNOSIS — J45.20 ASTHMA IN ADULT, MILD INTERMITTENT, UNCOMPLICATED: Primary | ICD-10-CM

## 2024-04-12 DIAGNOSIS — F43.23 ADJUSTMENT DISORDER WITH MIXED ANXIETY AND DEPRESSED MOOD: ICD-10-CM

## 2024-04-12 DIAGNOSIS — E66.09 CLASS 2 OBESITY DUE TO EXCESS CALORIES WITHOUT SERIOUS COMORBIDITY WITH BODY MASS INDEX (BMI) OF 36.0 TO 36.9 IN ADULT: ICD-10-CM

## 2024-04-12 PROCEDURE — 1160F RVW MEDS BY RX/DR IN RCRD: CPT | Mod: CPTII,S$GLB,, | Performed by: INTERNAL MEDICINE

## 2024-04-12 PROCEDURE — 3008F BODY MASS INDEX DOCD: CPT | Mod: CPTII,S$GLB,, | Performed by: INTERNAL MEDICINE

## 2024-04-12 PROCEDURE — 99214 OFFICE O/P EST MOD 30 MIN: CPT | Mod: S$GLB,,, | Performed by: INTERNAL MEDICINE

## 2024-04-12 PROCEDURE — 3075F SYST BP GE 130 - 139MM HG: CPT | Mod: CPTII,S$GLB,, | Performed by: INTERNAL MEDICINE

## 2024-04-12 PROCEDURE — 1159F MED LIST DOCD IN RCRD: CPT | Mod: CPTII,S$GLB,, | Performed by: INTERNAL MEDICINE

## 2024-04-12 PROCEDURE — 3079F DIAST BP 80-89 MM HG: CPT | Mod: CPTII,S$GLB,, | Performed by: INTERNAL MEDICINE

## 2024-04-12 PROCEDURE — 3044F HG A1C LEVEL LT 7.0%: CPT | Mod: CPTII,S$GLB,, | Performed by: INTERNAL MEDICINE

## 2024-04-12 RX ORDER — PHENTERMINE HYDROCHLORIDE 15 MG/1
15 CAPSULE ORAL EVERY MORNING
Qty: 30 CAPSULE | Refills: 1 | Status: SHIPPED | OUTPATIENT
Start: 2024-04-12 | End: 2024-06-10 | Stop reason: SDUPTHER

## 2024-04-12 RX ORDER — TOPIRAMATE 25 MG/1
25 TABLET ORAL NIGHTLY
Qty: 30 TABLET | Refills: 5 | Status: SHIPPED | OUTPATIENT
Start: 2024-04-12 | End: 2025-04-12

## 2024-04-12 NOTE — ASSESSMENT & PLAN NOTE
"Reports that here workplace is a very "toxic" environment  But her work [employer] ties her to her current job in order to remain here  She has been seeing psychology and undergoing therapy  Has not been prescribed medications, because it is situational, but she is unable to extricate herself from the situation  "

## 2024-04-12 NOTE — PROGRESS NOTES
CHIEF COMPLAINT     Chief Complaint   Patient presents with    Weight Loss       HPI     Joan Wilde is a 39 y.o. female who presents for initial visit.    Referred by Lashell Pedroza, who has been working with her as a health  for the past 5 years.  She lost 40 lbs, but regained the weight.  She reports high amounts of stress at work that both the patient and Lashell feel are contributing to her weight gain.  She has been restricting her carbohydrate intake, but that approach is no longer working for her.  She also tried going back to Optivia, but that is also no longer working for her (she had lost weight on Optivia back in 2018.     Her primary objective is to lose weight.    She has never been on any specific weight loss medications.  She works as a clinical analyst in performance improvement department, working to try and improve care delivery and clinical outcomes.  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     To help her cope with work related stress she would typically do her 5 EAP visits per year with an LCSW, and does yoga.  She has not been on any pharmacotherapy.      She has fatty liver disease diagnosed last year.  Her hepatologist told her she needed to get her weight down and told her that her PCP needed to prescribe Wegovy.  But she could only get it at full price.      She does yoga, walking, weight lifting, and exercises on an almost daily basis.         Atherosclerotic Cardiovascular Disease (ASCVD) Risk Score  The 10-year ASCVD risk score (Carlos Manuel PIMENTEL, et al., 2019) is: 1%    Values used to calculate the score:      Age: 39 years      Sex: Female      Is Non- : Yes      Diabetic: No      Tobacco smoker: No      Systolic Blood Pressure: 130 mmHg      Is BP treated: No      HDL Cholesterol: 43 mg/dL      Total Cholesterol: 151 mg/dL    Past Medical History:  Past Medical History:   Diagnosis Date    Acne  vulgaris 2018    Allergic rhinitis due to dust mite 2019    Asthma     Eczema     Endometriosis 2018    Fibroid     Laryngopharyngeal reflux     Migraine syndrome     associated with flu vaccines    Sleep apnea        Past Surgical History:  Past Surgical History:   Procedure Laterality Date    DIAGNOSTIC LAPAROSCOPY Bilateral 5/15/2023    Procedure: LAPAROSCOPY, DIAGNOSTIC;  Surgeon: Raffi Gao MD;  Location: [facility];  Service: OB/GYN;  Laterality: Bilateral;    ESOPHAGOGASTRODUODENOSCOPY N/A 2023    Procedure: EGD (ESOPHAGOGASTRODUODENOSCOPY);  Surgeon: Mitchel Abreu MD;  Location: [facility];  Service: Endoscopy;  Laterality: N/A;    HERNIA REPAIR      umbilical    LAPAROSCOPIC APPENDECTOMY N/A 2021    Procedure: APPENDECTOMY, LAPAROSCOPIC WITH LYSIS OF ADHESIONS.;  Surgeon: Trent Cannon MD;  Location: [address];  Service: General;  Laterality: N/A;    LAPAROSCOPIC LYSIS OF ADHESIONS N/A 5/15/2023    Procedure: LYSIS, ADHESIONS, LAPAROSCOPIC;  Surgeon: Raffi Gao MD;  Location: [facility];  Service: OB/GYN;  Laterality: N/A;       Allergies:  Review of patient's allergies indicates:   Allergen Reactions    Metoclopramide Other (See Comments)     Causes tongue to roll back in mouth       Family History:  Family History   Problem Relation Age of Onset    Hypertension Mother     Allergic rhinitis Father     Allergies Father     Diabetes Father     Hypertension Father     Stroke Father     Hyperlipidemia Father     Allergic rhinitis Brother     Eczema Brother     Conjunctivitis Brother     No Known Problems Brother     Breast cancer Neg Hx     Colon cancer Neg Hx     Ovarian cancer Neg Hx     Melanoma Neg Hx     Blindness Neg Hx     Amblyopia Neg Hx     Cataracts Neg Hx     Glaucoma Neg Hx     Macular degeneration Neg Hx     Retinal detachment Neg Hx     Strabismus Neg Hx      labor Neg Hx     Cancer Neg Hx     Eclampsia Neg Hx        Social  History:  Social History     Tobacco Use    Smoking status: Never     Passive exposure: Never    Smokeless tobacco: Never   Substance Use Topics    Alcohol use: No    Drug use: No     Social History     Socioeconomic History    Marital status:    Tobacco Use    Smoking status: Never     Passive exposure: Never    Smokeless tobacco: Never   Substance and Sexual Activity    Alcohol use: No    Drug use: No    Sexual activity: Not Currently     Partners: Male   Social History Narrative    Performance improvement specialist at [employer]    She is single.      Social Determinants of Health     Financial Resource Strain: Low Risk  (12/5/2023)    Overall Financial Resource Strain (CARDIA)     Difficulty of Paying Living Expenses: Not hard at all   Food Insecurity: No Food Insecurity (12/5/2023)    Hunger Vital Sign     Worried About Running Out of Food in the Last Year: Never true     Ran Out of Food in the Last Year: Never true   Transportation Needs: No Transportation Needs (12/5/2023)    PRAPARE - Transportation     Lack of Transportation (Medical): No     Lack of Transportation (Non-Medical): No   Physical Activity: Insufficiently Active (12/5/2023)    Exercise Vital Sign     Days of Exercise per Week: 5 days     Minutes of Exercise per Session: 20 min   Stress: Stress Concern Present (12/5/2023)    [employer] - Occupational Stress Questionnaire     Feeling of Stress : To some extent   Social Connections: Unknown (12/5/2023)    Social Connection and Isolation Panel [NHANES]     Frequency of Communication with Friends and Family: Three times a week     Frequency of Social Gatherings with Friends and Family: Never     Active Member of Clubs or Organizations: No     Attends Club or Organization Meetings: Never     Marital Status:    Housing Stability: Low Risk  (12/5/2023)    Housing Stability Vital Sign     Unable to Pay for Housing in the Last Year: No     Number of Places Lived in  "the Last Year: 1     Unstable Housing in the Last Year: No       Review of Systems:  Review of Systems   Respiratory:  Negative for cough and shortness of breath.    Cardiovascular:  Negative for chest pain and palpitations.   Gastrointestinal:  Negative for abdominal pain, heartburn, nausea and vomiting.   Genitourinary:  Negative for dysuria and hematuria.   Musculoskeletal:  Negative for joint pain and myalgias.   Neurological:  Negative for headaches.         Mental Health Screening  PHQ-9       CARINE-7  Generalized Anxiety Disorder 7-item (CARINE-7) Scale. HOW OFTEN DURING THE PAST 2 WEEKS HAVE YOU FELT BOTHERED BY:  1. Feeling nervous, anxious, or on edge?: Several days  2. Not being able to stop or control worrying?: Several days  3. Worrying too much about different things?: More than half the days  4. Trouble relaxing?: Several days  5. Being so restless that it is hard to sit still?: Several days  6. Becoming easily annoyed or irritable?: Several days  7. Feeling afraid as if something awful might happen?: Several days  CARINE-7 Score: 8  Number answered (out of first 7): 7  Interpretation: Mild Anxiety    PHYSICAL EXAM   Vital Signs  /82 (BP Location: Right arm, Patient Position: Sitting, BP Method: Medium (Manual))   Pulse 71   Resp 18   Ht 5' 6" (1.676 m)   Wt 106.1 kg (233 lb 14.5 oz)   SpO2 97%   BMI 37.75 kg/m²   Physical Exam  Physical Exam  Vitals and nursing note reviewed.   Constitutional:       Appearance: Normal appearance.   HENT:      Head: Normocephalic.      Right Ear: Tympanic membrane normal.      Left Ear: Tympanic membrane normal.   Eyes:      General: No scleral icterus.     Conjunctiva/sclera: Conjunctivae normal.   Neck:      Thyroid: No thyromegaly.   Cardiovascular:      Rate and Rhythm: Normal rate and regular rhythm.      Heart sounds: Normal heart sounds.   Pulmonary:      Effort: Pulmonary effort is normal.      Breath sounds: Normal breath sounds.   Abdominal:      " "Palpations: Abdomen is soft.      Tenderness: There is no abdominal tenderness.   Musculoskeletal:      Right lower leg: No edema.      Left lower leg: No edema.   Skin:     General: Skin is warm and dry.   Neurological:      Mental Status: She is alert and oriented to person, place, and time. Mental status is at baseline.   Psychiatric:         Mood and Affect: Mood normal.         Behavior: Behavior normal.         Thought Content: Thought content normal.         Lab Results   Component Value Date    WBC 4.92 03/27/2024    HGB 12.7 03/27/2024    HCT 38.1 03/27/2024     03/27/2024    CHOL 151 03/03/2023    TRIG 48 03/03/2023    HDL 43 03/03/2023    ALT 46 (H) 03/14/2024    AST 28 03/14/2024     04/11/2024    K 4.0 04/11/2024     04/11/2024    CREATININE 1.0 04/11/2024    BUN 12 04/11/2024    CO2 22 (L) 04/11/2024    TSH 1.296 03/03/2023    HGBA1C 5.5 04/11/2024     ASSESSMENT/PLAN     Joan Wilde is a 39 y.o. female with    1. Asthma in adult, mild intermittent, uncomplicated  Assessment & Plan:  Has laryngopharyngeal reflux >> started omeprazole  Has never required a controller medication  Rather her symptoms have been managed with occasional JOSE J, needing to use albuterol perhaps 6 times in the past year      2. Adjustment disorder with mixed anxiety and depressed mood  Overview:  GAD7 score 8 on 4/12/24, but anxiety symptoms not present for more than 6 months (most of her anxiety is situational and work-related)    Assessment & Plan:  Reports that here workplace is a very "toxic" environment  But her work [employer] ties her to her current job in order to remain here  She has been seeing psychology and undergoing therapy  Has not been prescribed medications, because it is situational, but she is unable to extricate herself from the situation    Orders:  -     Ambulatory referral/consult to [facility]; Future; Expected date: 04/19/2024    3. Class 2 " obesity due to excess calories without serious comorbidity with body mass index (BMI) of 36.0 to 36.9 in adult  Overview:  Baseline weight upon entry to [facility] is 233 lbs (106.1 kg) which is the heaviest she has ever been    Assessment & Plan:  **refer to LCSW  **she can certainly continue to see Lashell Pedroza, but would like to get another perspective from a dietitian as well >> refer to dietitian, Jovanna, and see what she can offer  **start keeping photographic food log using Bitesnap  **phentermine 15 mg q AM  **topiramate 25 mg q PM  **discussed the digital weight loss solution that should make Wegovy available by 6/1/24    Orders:  -     Ambulatory referral/consult to [facility]; Future; Expected date: 04/19/2024  -     Ambulatory referral/consult to Nutrition Services; Future; Expected date: 04/19/2024  -     phentermine 15 MG capsule; Take 1 capsule (15 mg total) by mouth every morning.  Dispense: 30 capsule; Refill: 1  -     topiramate (TOPAMAX) 25 MG tablet; Take 1 tablet (25 mg total) by mouth every evening.  Dispense: 30 tablet; Refill: 5    4. Fatty liver  Assessment & Plan:  Fibroscan 4/12/24:  Findings  Median liver stiffness score:  3.5  CAP Reading: dB/m:  238  IQR/med %:  9  Interpretation  Fibrosis interpretation is based on medial liver stiffness - Kilopascal (kPa).  Fibrosis Stage:  F 0-1  Steatosis interpretation is based on controlled attenuation parameter - (dB/m).  Steatosis Grade:  S1    Lab Results   Component Value Date    ALT 46 (H) 03/14/2024    AST 28 03/14/2024    ALKPHOS 59 03/14/2024    BILITOT 0.2 03/14/2024     Lab Results   Component Value Date    WBC 4.92 03/27/2024    HGB 12.7 03/27/2024    HCT 38.1 03/27/2024    MCV 75 (L) 03/27/2024     03/27/2024     FIB-4 Calculation: 0.58 at 4/12/2024  2:30 PM     FIB-4 below 1.30 is considered as low-risk for advanced fibrosis  FIB-4 over 2.67 is considered as high-risk for advanced  fibrosis  FIB-4 values between 1.30 and 2.67 are considered as intermediate-risk of advanced fibrosis for ages 36-64.     For ages > 64 the cut-off for low-risk goes to < 2.  This is a screening tool and clinical judgement should be used in the interpretation of these results.               Follow up in about 3 months (around 7/12/2024).    Kvng Tay MD/KEVAN  Internal Medicine  [facility]

## 2024-04-12 NOTE — ASSESSMENT & PLAN NOTE
Fibroscan 4/12/24:  Findings  Median liver stiffness score:  3.5  CAP Reading: dB/m:  238  IQR/med %:  9  Interpretation  Fibrosis interpretation is based on medial liver stiffness - Kilopascal (kPa).  Fibrosis Stage:  F 0-1  Steatosis interpretation is based on controlled attenuation parameter - (dB/m).  Steatosis Grade:  S1    Lab Results   Component Value Date    ALT 46 (H) 03/14/2024    AST 28 03/14/2024    ALKPHOS 59 03/14/2024    BILITOT 0.2 03/14/2024     Lab Results   Component Value Date    WBC 4.92 03/27/2024    HGB 12.7 03/27/2024    HCT 38.1 03/27/2024    MCV 75 (L) 03/27/2024     03/27/2024     FIB-4 Calculation: 0.58 at 4/12/2024  2:30 PM     FIB-4 below 1.30 is considered as low-risk for advanced fibrosis  FIB-4 over 2.67 is considered as high-risk for advanced fibrosis  FIB-4 values between 1.30 and 2.67 are considered as intermediate-risk of advanced fibrosis for ages 36-64.     For ages > 64 the cut-off for low-risk goes to < 2.  This is a screening tool and clinical judgement should be used in the interpretation of these results.

## 2024-04-12 NOTE — ASSESSMENT & PLAN NOTE
**refer to LCSW  **she can certainly continue to see Lashell Pedroza, but would like to get another perspective from a dietitian as well >> refer to dietitian, Jovanna, and see what she can offer  **start keeping photographic food log using Bitesnap  **phentermine 15 mg q AM  **topiramate 25 mg q PM  **discussed the digital weight loss solution that should make Wegovy available by 6/1/24

## 2024-04-12 NOTE — PATIENT INSTRUCTIONS
Keeping a food and activity log    Successfully losing significant weight and keeping it off is one of the hardest things that a person can do.  Difficulty losing weight is complex and influenced by multiple variables including genetic, environmental, social, cultural, metabolic, and behavioral factors.  Our goal will be to understand these factors as they relate to you and your health improvement goals as deeply and completely as possible.      To that end it will be very important for us to understand 2 of the most important variables that can impact your success in achieving any weight loss goals:  the number of calories you take in daily through food and drink versus the number of calories expended daily through activity.  It will be incredibly helpful for us to know exactly what you are eating/drinking, how much you are eating/drinking, and how often for each.  In this case both the types and the amounts of calories matter.       You could keep a written food log of this information where you write down everything you eat or drink, as well as how often and how much.  But we find that it may be even easier for you to use your phone to take a photo of everything you or drink.  There is an angelika known as TryLife (available from the [employer] store) that enables you take photos of everything you eat or drink, and it will automatically record the time of the photo.  Another angelika is My Fitness Pal, which can sync to your smart watch or other wearable device (and enable you to keep track of how active you have been).  Your health  or dietitian can work with you to help you learn how to use these apps to help provide as much information as possible for our team to help you achieve your health improvement goals.             [employer] angelika  My Fitness Pal angelika

## 2024-04-12 NOTE — ASSESSMENT & PLAN NOTE
Has laryngopharyngeal reflux >> started omeprazole  Has never required a controller medication  Rather her symptoms have been managed with occasional JOSE J, needing to use albuterol perhaps 6 times in the past year

## 2024-04-16 ENCOUNTER — PATIENT MESSAGE (OUTPATIENT)
Dept: PRIMARY CARE CLINIC | Facility: CLINIC | Age: 39
End: 2024-04-16
Payer: COMMERCIAL

## 2024-04-16 DIAGNOSIS — E66.09 CLASS 2 OBESITY DUE TO EXCESS CALORIES WITHOUT SERIOUS COMORBIDITY WITH BODY MASS INDEX (BMI) OF 36.0 TO 36.9 IN ADULT: Primary | ICD-10-CM

## 2024-04-16 DIAGNOSIS — R00.2 PALPITATIONS: ICD-10-CM

## 2024-04-16 NOTE — ASSESSMENT & PLAN NOTE
Started having palpitations and an elevated HR after starting phentermine  --advised her to hold the medication for now  --she does not drink coffee, and drinks only green tea in the AM  --she has intentionally trying to reduce her caffeine intake  **check baseline ECG to make sure there are no baseline abnormalities  **can consider trying the phentermine again in a week or so after ensuring that she is well hydrated and possibly in conjunction with a beta-blocker

## 2024-04-17 ENCOUNTER — PATIENT MESSAGE (OUTPATIENT)
Dept: PRIMARY CARE CLINIC | Facility: CLINIC | Age: 39
End: 2024-04-17
Payer: COMMERCIAL

## 2024-04-17 ENCOUNTER — HOSPITAL ENCOUNTER (OUTPATIENT)
Dept: CARDIOLOGY | Facility: CLINIC | Age: 39
Discharge: HOME | End: 2024-04-17
Attending: INTERNAL MEDICINE
Payer: COMMERCIAL

## 2024-04-17 DIAGNOSIS — R00.2 PALPITATIONS: ICD-10-CM

## 2024-04-17 LAB
OHS QRS DURATION: 74 MS
OHS QTC CALCULATION: 426 MS

## 2024-04-17 PROCEDURE — 93005 ELECTROCARDIOGRAM TRACING: CPT | Mod: S$GLB,,, | Performed by: INTERNAL MEDICINE

## 2024-04-17 PROCEDURE — 93010 ELECTROCARDIOGRAM REPORT: CPT | Mod: S$GLB,,, | Performed by: INTERNAL MEDICINE

## 2024-04-23 ENCOUNTER — PATIENT MESSAGE (OUTPATIENT)
Dept: OPTOMETRY | Facility: CLINIC | Age: 39
End: 2024-04-23

## 2024-04-23 ENCOUNTER — OFFICE VISIT (OUTPATIENT)
Dept: OPTOMETRY | Facility: CLINIC | Age: 39
End: 2024-04-23
Payer: COMMERCIAL

## 2024-04-23 DIAGNOSIS — H52.13 MYOPIA OF BOTH EYES: Primary | ICD-10-CM

## 2024-04-23 PROCEDURE — 92014 COMPRE OPH EXAM EST PT 1/>: CPT | Mod: S$GLB,,, | Performed by: OPTOMETRIST

## 2024-04-23 PROCEDURE — 92015 DETERMINE REFRACTIVE STATE: CPT | Mod: S$GLB,,, | Performed by: OPTOMETRIST

## 2024-04-23 PROCEDURE — 99999 PR PBB SHADOW E&M-EST. PATIENT-LVL III: CPT | Mod: PBBFAC,,, | Performed by: OPTOMETRIST

## 2024-04-23 NOTE — PROGRESS NOTES
HPI    Pt is here today for routine eye exam. Patient denies pain/discomfort.  DLS: 3/2024 Dr. Dolan  (-)Flashes   (-)Floaters   (-)Diplopia   (-)Headaches   (+)Itching   (+)Tearing  (+)Burning  (+)Dryness: over the past two months  (-)Photophobia  (-)Glare   (+)Blurred VA  Past Eye Sx: (-)  Eye Meds: Refresh Gel BID OU                      Pataday once daily OU  Last edited by Blank Dolan, OD on 4/23/2024 10:40 AM.            Assessment /Plan     For exam results, see Encounter Report.    Myopia of both eyes      Eyeglass Final Rx       Eyeglass Final Rx         Sphere Cylinder Add    Right -0.75 Sphere +1.00    Left -0.75 Sphere +1.00      Type: PAL    Expiration Date: 4/23/2025                   RTC in 1 year for annual eye exam unless changes noted sooner.

## 2024-04-26 ENCOUNTER — CLINICAL SUPPORT (OUTPATIENT)
Dept: PRIMARY CARE CLINIC | Facility: CLINIC | Age: 39
End: 2024-04-26
Attending: INTERNAL MEDICINE
Payer: COMMERCIAL

## 2024-04-26 ENCOUNTER — PATIENT MESSAGE (OUTPATIENT)
Dept: PRIMARY CARE CLINIC | Facility: CLINIC | Age: 39
End: 2024-04-26

## 2024-04-26 DIAGNOSIS — E66.09 CLASS 2 OBESITY DUE TO EXCESS CALORIES WITHOUT SERIOUS COMORBIDITY WITH BODY MASS INDEX (BMI) OF 36.0 TO 36.9 IN ADULT: ICD-10-CM

## 2024-04-26 DIAGNOSIS — F43.23 ADJUSTMENT DISORDER WITH MIXED ANXIETY AND DEPRESSED MOOD: ICD-10-CM

## 2024-04-26 NOTE — PROGRESS NOTES
"Individual Psychotherapy (LCSW/PhD)  Joan Wilde,  4/26/2024    Site:  [facility]         Therapeutic Intervention: Met with patient for individual psychotherapy.    Chief complaint/reason for encounter: interpersonal     Interval history and content of current session: Pt provided background regarding challenges at work. Pt states they have been experiencing judgement and bias from other staff and administrators. Pt states they feel like they are not supported by their and fully supported.    LCSW inquired about specific situations and triggers for pt. LCSW and pt discussed pt's efforts to communicate needs in addition to barriers to better support. LCSW inquired about pt's perception of  challenges as well as goals. Pt states they were recently allowed to work from home which has greatly helped their feelings of stress. Pt states their focus is too just "do a good job" and focus on their work.    Pt reports that their personal life is going well. Pt feels supported by . Pt states they are concerned about their physical health but are frustrated by lack of progress.   Treatment plan:  Target symptoms: anxiety , work stress  Why chosen therapy is appropriate versus another modality: relevant to diagnosis, evidence based practice  Outcome monitoring methods: self-report, checklist/rating scale  Therapeutic intervention type: behavior modifying psychotherapy, supportive psychotherapy    Risk parameters:  Patient reports no suicidal ideation  Patient reports no homicidal ideation  Patient reports no self-injurious behavior  Patient reports no violent behavior    Verbal deficits: None    Patient's response to intervention:  The patient's response to intervention is accepting.    Progress toward goals and other mental status changes:  The patient's progress toward goals is not progressing.    Diagnosis:     ICD-10-CM ICD-9-CM   1. Adjustment disorder with mixed anxiety and depressed mood  " F43.23 309.28   2. Class 2 obesity due to excess calories without serious comorbidity with body mass index (BMI) of 36.0 to 36.9 in adult  E66.09 278.00    Z68.36 V85.36       Plan: Pt plans to continue individual psychotherapy    Return to clinic: as needed    Length of Service (minutes): 60

## 2024-04-30 ENCOUNTER — NUTRITION (OUTPATIENT)
Dept: INTERNAL MEDICINE | Facility: CLINIC | Age: 39
End: 2024-04-30
Payer: COMMERCIAL

## 2024-04-30 DIAGNOSIS — E66.9 OBESITY (BMI 30-39.9): ICD-10-CM

## 2024-04-30 DIAGNOSIS — Z71.3 DIETARY COUNSELING: Primary | ICD-10-CM

## 2024-04-30 PROCEDURE — 99999 PR PBB SHADOW E&M-EST. PATIENT-LVL I: CPT | Mod: PBBFAC,,, | Performed by: DIETITIAN, REGISTERED

## 2024-04-30 PROCEDURE — 97802 MEDICAL NUTRITION INDIV IN: CPT | Mod: S$GLB,,, | Performed by: DIETITIAN, REGISTERED

## 2024-05-01 NOTE — PROGRESS NOTES
Nutrition Assessment  Session Time:  60 minutes      Client name:  Joan Wilde  :  1985  Age:  39 y.o.  Gender:  female    Client states:  Very pleasant OTC patient here for her initial nutrition assessment.  Referred by Dr. Tay secondary to weight loss.  PMH outlined below.  Previously met with [facility]  for the past five years and thereafter, lost >40# as a result of dietary modifications and physical activity.  Has adhered to various dietary regimens, including but not limited to Intermittent Fasting, Keto, low CHO, etc.  Continues to follow a low CHO meal plan and remain physically active, practicing yoga in addition to walking 25-30 minutes daily and weight training 2-3x/week.  Incorporates interval training when walking on the weekends.  Despite such efforts, however, has since regained weight, sharing difficulty of overcoming current weight plateau (~230#).  Finds that previous weight loss methods are no longer effective for her, noting that stress is likely a contributor.  Experiences heightened stress, specifically at work.  Shares that [place] yet she is in a position where she cannot be promoted or move departments.  Her family considers her to be worrisome.  Finds that her mind is racing before bed and so, often has trouble falling asleep.  Drinks herbal, lavender, or chamomile tea to assist with sleep.  Is mindful of food choices and PO intake, recalling intake of two meals daily (lunch and dinner).  Does not typically eat breakfast.  Started Phentermine and Topamax a few weeks ago and has since noticed weight loss and improved satiety.  Considers her appetite to be suppressed and food noise to be better overall.  Is no longer snacking at night or thinking about next meal.  Inquired about the necessity of eating a meal if not hungry as she has experienced increased satiety since starting medication.  Overall, remains  "committed to weight loss and improved health outcomes.     Anthropometrics  Height:  5' 6"     Weight:  231.6  BMI:  37.3  % Body Fat:  Unable to assess    Clinical Signs/Symptoms  N/V/D:  None  Appetite:  good       Past Medical History:   Diagnosis Date    Acne vulgaris 11/20/2018    Allergic rhinitis due to dust mite 05/13/2019    Asthma     Eczema     Endometriosis 11/20/2018    Fibroid     Laryngopharyngeal reflux     Migraine syndrome     associated with flu vaccines    Sleep apnea        Past Surgical History:   Procedure Laterality Date    DIAGNOSTIC LAPAROSCOPY Bilateral 5/15/2023    Procedure: LAPAROSCOPY, DIAGNOSTIC;  Surgeon: Raffi Gao MD;  Location: [facility];  Service: OB/GYN;  Laterality: Bilateral;    ESOPHAGOGASTRODUODENOSCOPY N/A 12/7/2023    Procedure: EGD (ESOPHAGOGASTRODUODENOSCOPY);  Surgeon: Mitchel Abreu MD;  Location: [facility];  Service: Endoscopy;  Laterality: N/A;    HERNIA REPAIR  2014    umbilical    LAPAROSCOPIC APPENDECTOMY N/A 4/21/2021    Procedure: APPENDECTOMY, LAPAROSCOPIC WITH LYSIS OF ADHESIONS.;  Surgeon: Trent Cannon MD;  Location: [address];  Service: General;  Laterality: N/A;    LAPAROSCOPIC LYSIS OF ADHESIONS N/A 5/15/2023    Procedure: LYSIS, ADHESIONS, LAPAROSCOPIC;  Surgeon: Raffi Gao MD;  Location: [facility];  Service: OB/GYN;  Laterality: N/A;       Medications    has a current medication list which includes the following prescription(s): albuterol, ascorbic acid (vitamin c), azelastine, conjugated estrogens, fluticasone propionate, naproxen, norethindrone, norethindrone, olopatadine, omeprazole, phentermine, and topiramate.    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[employer] [place]     Social History "     Tobacco Use    Smoking status: Never     Passive exposure: Never    Smokeless tobacco: Never   Substance Use Topics    Alcohol use: No        Lab Reports   Sodium   Date Value Ref Range Status   04/11/2024 137 136 - 145 mmol/L Final     Potassium   Date Value Ref Range Status   04/11/2024 4.0 3.5 - 5.1 mmol/L Final     Chloride   Date Value Ref Range Status   04/11/2024 106 95 - 110 mmol/L Final     CO2   Date Value Ref Range Status   04/11/2024 22 (L) 23 - 29 mmol/L Final     Glucose   Date Value Ref Range Status   04/11/2024 82 70 - 110 mg/dL Final     BUN   Date Value Ref Range Status   04/11/2024 12 6 - 20 mg/dL Final     Creatinine   Date Value Ref Range Status   04/11/2024 1.0 0.5 - 1.4 mg/dL Final     Calcium   Date Value Ref Range Status   04/11/2024 9.7 8.7 - 10.5 mg/dL Final     Total Protein   Date Value Ref Range Status   03/14/2024 6.8 6.0 - 8.4 g/dL Final     Albumin   Date Value Ref Range Status   03/14/2024 3.6 3.5 - 5.2 g/dL Final     Total Bilirubin   Date Value Ref Range Status   03/14/2024 0.2 0.1 - 1.0 mg/dL Final     Comment:     For infants and newborns, interpretation of results should be based  on gestational age, weight and in agreement with clinical  observations.    Premature Infant recommended reference ranges:  Up to 24 hours.............<8.0 mg/dL  Up to 48 hours............<12.0 mg/dL  3-5 days..................<15.0 mg/dL  6-29 days.................<15.0 mg/dL       Alkaline Phosphatase   Date Value Ref Range Status   03/14/2024 59 55 - 135 U/L Final     AST   Date Value Ref Range Status   03/14/2024 28 10 - 40 U/L Final     ALT   Date Value Ref Range Status   03/14/2024 46 (H) 10 - 44 U/L Final     Anion Gap   Date Value Ref Range Status   04/11/2024 9 8 - 16 mmol/L Final     eGFR if    Date Value Ref Range Status   05/30/2022 >60.0 >60 mL/min/1.73 m^2 Final     eGFR if non    Date Value Ref Range Status   05/30/2022 >60.0 >60 mL/min/1.73 m^2  Final     Comment:     Calculation used to obtain the estimated glomerular filtration  rate (eGFR) is the CKD-EPI equation.         Lab Results   Component Value Date    WBC 4.92 03/27/2024    HGB 12.7 03/27/2024    HCT 38.1 03/27/2024    MCV 75 (L) 03/27/2024     03/27/2024        Lab Results   Component Value Date    CHOL 151 03/03/2023     Lab Results   Component Value Date    HDL 43 03/03/2023     Lab Results   Component Value Date    LDLCALC 98.4 03/03/2023     Lab Results   Component Value Date    TRIG 48 03/03/2023     Lab Results   Component Value Date    CHOLHDL 28.5 03/03/2023     Lab Results   Component Value Date    HGBA1C 5.5 04/11/2024     BP Readings from Last 1 Encounters:   04/12/24 130/82       Food History  Breakfast:  24 oz water + green tea  Mid-morning Snack:  None  Lunch:  Homemade smoothie comprised of 1.25 cups almond milk, ¾ cup kefir (8 gm RPO), 1 Tbsp psyllium husk, 1 scoop collagen powder (10 gm PRO), ½ Tbsp honey, ½ Tbsp almond butter (2 gm PRO), handful of blueberries, and 2 strawberries  Mid-afternoon Snack:  1 cup green tea  Dinner:  Avocado salad/riced cauliflower with basil pesto sauce + broccoli + 5 oz baked salmon or sauteed shrimp  Snack:  +/- Sunchips/orange + herbal tea (or chamomile or lavender tea)/1 cup milk with 1 tsp honey   *Fluid intake:  Water, various teas, almond milk    Exercise History:  10-30 minutes yoga daily + 25-30 minutes walking daily + 12 minutes weight training 2-3x/week    Cultural/Spiritual/Personal Preferences:  None identified    Support System:  spouse    State of Change:  Action    Barriers to Change:  None    Diagnosis    Overweight related to imbalanced meal pattern and lack of exercise variation as evidenced by BMI:  37.3.    Intervention    RMR (Method:  Giuseppe Davila):  1737 kcal  Activity Factor:  1.4    LYNDON:  2431 - 500 = 1931 kcal    Goals:  1.  Achieve 0.5-1# weight loss/week  2.  Consider journaling thoughts prior to bed to assist  with sleep hygiene  3.  Avoid skipping meals, incorporating a source of protein and CHO/fiber in the morning (examples discussed)  4.  Continue efforts toward reduced night time snacking frequency  5.  Incorporate interval training when walking every other week day to increase HR and calorie burn  6.  Focus energy on what is within your control, letting go of what is not in your control    Nutrition Education  The following education was provided to the patient:  Complimented patient on proactive role in health maintenance.  Complimented patient on dietary compliance/modifications and resulting health improvements.  Complimented patient on physical activity efforts.  Discussed meal planning/[employer] My Plate design.  Discussed weight management.  Suggested dietary modifications based on current dietary behaviors and individual food preferences.  Discussed macronutrient distribution among meals and snacks, including CHO, protein, and fat recommendations and its importance/benefits.  Discussed physical activity guidelines and its associated benefits.  Discussed vitamin/mineral recommendations (may include but not limited to MVI, Vitamin D, Calcium, and/or Iron) and associated food sources.  Discussed importance of small behavior/habit changes in improving long-term adherence and sustainability.  Discussed goal setting.  Provided ongoing support, encouragement, and guidance toward improved health efforts.  Discussed the importance of protein distribution in relation to LBM and weight loss.  Discussed stress management techniques.    Patient verbalized understanding of nutrition education and recommendations received.    Handouts Provided  Meal Planning Guide  Eat Fit Shopping List  Fueling Well On-The-Go    Monitoring/Evaluation    Monitor the following:  Weight  BMI  Caloric intake    Follow Up Plan:  Communication with referring healthcare provider is unnecessary at this time.  However, will follow up with patient in 4  weeks.

## 2024-05-07 ENCOUNTER — PATIENT MESSAGE (OUTPATIENT)
Dept: OBSTETRICS AND GYNECOLOGY | Facility: CLINIC | Age: 39
End: 2024-05-07
Payer: COMMERCIAL

## 2024-05-09 ENCOUNTER — PATIENT MESSAGE (OUTPATIENT)
Dept: PRIMARY CARE CLINIC | Facility: CLINIC | Age: 39
End: 2024-05-09
Payer: COMMERCIAL

## 2024-05-09 ENCOUNTER — PATIENT MESSAGE (OUTPATIENT)
Dept: GASTROENTEROLOGY | Facility: CLINIC | Age: 39
End: 2024-05-09
Payer: COMMERCIAL

## 2024-05-09 DIAGNOSIS — K62.5 RECTAL BLEEDING: Primary | ICD-10-CM

## 2024-05-10 ENCOUNTER — CLINICAL SUPPORT (OUTPATIENT)
Dept: PRIMARY CARE CLINIC | Facility: CLINIC | Age: 39
End: 2024-05-10
Payer: COMMERCIAL

## 2024-05-10 ENCOUNTER — PATIENT MESSAGE (OUTPATIENT)
Dept: PRIMARY CARE CLINIC | Facility: CLINIC | Age: 39
End: 2024-05-10

## 2024-05-10 DIAGNOSIS — F32.A MODERATELY SEVERE DEPRESSION: Primary | ICD-10-CM

## 2024-05-10 PROCEDURE — 90837 PSYTX W PT 60 MINUTES: CPT | Mod: S$GLB,,, | Performed by: SOCIAL WORKER

## 2024-05-10 NOTE — PROGRESS NOTES
Individual Psychotherapy (LCSW/PhD)  Joan Rodriguezon,  5/10/2024    Site:  [facility]         Therapeutic Intervention: Met with patient for individual psychotherapy.    Chief complaint/reason for encounter: interpersonal and adjustment     Interval history and content of current session: Pt reports ongoing struggles with coworker. Pt reports concerns over job status and issues regarding visa. LCSW and pt discussed concerns about VISA status and possible impacts on pt's future. Pt stated they are feeling overwhelmed because of past delays. LCSW supported pt in processing feelings and identifying helpful vs. Unhelpful thoughts.     Treatment plan:  Target symptoms: adjustment, work stress  Why chosen therapy is appropriate versus another modality: relevant to diagnosis, evidence based practice  Outcome monitoring methods: self-report, checklist/rating scale  Therapeutic intervention type: insight oriented psychotherapy, behavior modifying psychotherapy, supportive psychotherapy    Risk parameters:  Patient reports no suicidal ideation  Patient reports no homicidal ideation  Patient reports no self-injurious behavior  Patient reports no violent behavior    Verbal deficits: None    Patient's response to intervention:  The patient's response to intervention is accepting.    Progress toward goals and other mental status changes:  The patient's progress toward goals is limited.    Diagnosis:   No diagnosis found.    Plan: Pt plans to continue individual psychotherapy    Return to clinic: as scheduled    Length of Service (minutes): 60    Answers submitted by the patient for this visit:  Review of Systems Questionnaire (Submitted on 5/10/2024)  activity change: No  unexpected weight change: No  neck pain: No  hearing loss: No  rhinorrhea: No  trouble swallowing: No  eye discharge: No  visual disturbance: No  chest tightness: No  wheezing: No  chest pain: No  palpitations: No  blood in stool:  No  constipation: No  vomiting: No  diarrhea: No  polydipsia: No  polyuria: No  difficulty urinating: No  hematuria: No  menstrual problem: No  dysuria: No  joint swelling: No  arthralgias: No  headaches: No  weakness: No  confusion: No  dysphoric mood: Yes

## 2024-05-20 ENCOUNTER — PATIENT MESSAGE (OUTPATIENT)
Dept: OTOLARYNGOLOGY | Facility: CLINIC | Age: 39
End: 2024-05-20
Payer: COMMERCIAL

## 2024-05-20 RX ORDER — OMEPRAZOLE 40 MG/1
40 CAPSULE, DELAYED RELEASE ORAL EVERY MORNING
Qty: 30 CAPSULE | Refills: 3 | Status: SHIPPED | OUTPATIENT
Start: 2024-05-20 | End: 2025-05-20

## 2024-05-24 ENCOUNTER — CLINICAL SUPPORT (OUTPATIENT)
Dept: PRIMARY CARE CLINIC | Facility: CLINIC | Age: 39
End: 2024-05-24
Payer: COMMERCIAL

## 2024-05-24 DIAGNOSIS — F43.23 ADJUSTMENT DISORDER WITH MIXED ANXIETY AND DEPRESSED MOOD: Primary | ICD-10-CM

## 2024-05-24 PROCEDURE — 90837 PSYTX W PT 60 MINUTES: CPT | Mod: 95,,, | Performed by: SOCIAL WORKER

## 2024-05-24 NOTE — PROGRESS NOTES
Individual Psychotherapy (LCSW/PhD)  Joan Wilde,  5/24/2024    Site:  [facility]         Therapeutic Intervention: Met with patient for individual psychotherapy.    Chief complaint/reason for encounter: interpersonal     Interval history and content of current session: Pt provided update on work and VISA situation. Pt states they are still concerned about VISA status and have been making an effort to communicate their ends better. Pt expressed concerns over job status and issues with VISA job status. LCSW and pt discussed interpersonal issues with manger and difficulties communicating with them.    LCSW used CBT principles to help pt identify helpful thoughts.Pt states they feel like their work is often scrutinized and feels like they have issues trusting supervisors. LCSW and pt discussed importance of externalizing problems and connecting concerns to team goals. LCSW supported pt in identifying all or nothing thinking and finding balance.        Treatment plan:  Target symptoms: work stress, interpersonal  Why chosen therapy is appropriate versus another modality: relevant to diagnosis, evidence based practice  Outcome monitoring methods: self-report, checklist/rating scale  Therapeutic intervention type: supportive psychotherapy    Risk parameters:  Patient reports no suicidal ideation  Patient reports no homicidal ideation  Patient reports no self-injurious behavior  Patient reports no violent behavior    Verbal deficits: None    Patient's response to intervention:  The patient's response to intervention is accepting.    Progress toward goals and other mental status changes:  The patient's progress toward goals is limited.    Diagnosis:   No diagnosis found.    Plan: Pt plans to continue individual psychotherapy    Return to clinic: as scheduled    Length of Service (minutes): 60

## 2024-05-28 ENCOUNTER — NUTRITION (OUTPATIENT)
Dept: INTERNAL MEDICINE | Facility: CLINIC | Age: 39
End: 2024-05-28
Payer: COMMERCIAL

## 2024-05-28 DIAGNOSIS — E66.9 OBESITY (BMI 30-39.9): ICD-10-CM

## 2024-05-28 DIAGNOSIS — Z71.3 DIETARY COUNSELING: Primary | ICD-10-CM

## 2024-05-28 PROCEDURE — 97803 MED NUTRITION INDIV SUBSEQ: CPT | Mod: S$GLB,,, | Performed by: DIETITIAN, REGISTERED

## 2024-05-28 PROCEDURE — 99999 PR PBB SHADOW E&M-EST. PATIENT-LVL I: CPT | Mod: PBBFAC,,, | Performed by: DIETITIAN, REGISTERED

## 2024-05-28 NOTE — PROGRESS NOTES
"Nutrition Assessment  Session Time:  30 minutes      Client name:  Joan Wilde  :  1985  Age:  39 y.o.  Gender:  female    Client states:  Very pleasant OTC patient here for nutrition reassessment.  Originally referred from Dr. Tay secondary to weight management.  Happy to share that since previous consult, she overcame weight plateau and lost ~5#!  Finds that she is sleeping better; eating more fruit and vegetables; increasing protein intake; increasing physical activity duration; no longer snacking as routinely; and experiencing less joint pain.  Does not suffer from N/V yet has encountered episodes of diarrhea and constipation, which she is working through with MD team.  Continues to walk 4x/week yet increased duration to 30 minutes/session.  Finds, however, that her weight training frequency has been less consistent, which she wishes to improve for improved LBM.  Has noticed suppressed appetite as a result of rx medications and so, at times, forces herself to eat, specifically high protein foods as she understands the importance of adequate energy and protein intake.  Strives to limit CHO intake although finds her energy levels to be low at times.  Skips breakfast, noting reduced likelihood of consumption in the future.  Overall, possesses great self-awareness and wishes to continue losing weight, achieving ultimate goal of 200#.  Wishes to focus on protein intake and weight training frequency at this time.    Anthropometrics  Height:  5' 6"     Weight:  226.4  BMI:  36.5  % Body Fat:  Unable to assess    Clinical Signs/Symptoms  N/V/D:  Occasional diarrhea + constipation  Appetite:  fair       Past Medical History:   Diagnosis Date    Acne vulgaris 2018    Allergic rhinitis due to dust mite 2019    Asthma     Eczema     Endometriosis 2018    Fibroid     Laryngopharyngeal reflux     Migraine syndrome     associated with flu vaccines    Sleep apnea        Past Surgical " History:   Procedure Laterality Date    DIAGNOSTIC LAPAROSCOPY Bilateral 5/15/2023    Procedure: LAPAROSCOPY, DIAGNOSTIC;  Surgeon: Raffi Gao MD;  Location: [facility];  Service: OB/GYN;  Laterality: Bilateral;    ESOPHAGOGASTRODUODENOSCOPY N/A 12/7/2023    Procedure: EGD (ESOPHAGOGASTRODUODENOSCOPY);  Surgeon: Mitchel Abreu MD;  Location: [facility];  Service: Endoscopy;  Laterality: N/A;    HERNIA REPAIR  2014    umbilical    LAPAROSCOPIC APPENDECTOMY N/A 4/21/2021    Procedure: APPENDECTOMY, LAPAROSCOPIC WITH LYSIS OF ADHESIONS.;  Surgeon: Trent Cannon MD;  Location: [address];  Service: General;  Laterality: N/A;    LAPAROSCOPIC LYSIS OF ADHESIONS N/A 5/15/2023    Procedure: LYSIS, ADHESIONS, LAPAROSCOPIC;  Surgeon: Raffi Gao MD;  Location: [facility];  Service: OB/GYN;  Laterality: N/A;       Medications    has a current medication list which includes the following prescription(s): albuterol, ascorbic acid (vitamin c), azelastine, conjugated estrogens, fluticasone propionate, naproxen, norethindrone, norethindrone, olopatadine, omeprazole, phentermine, and topiramate.    Vitamins, Minerals, and/or Supplements:  *Please see list above     Food/Medication Interactions:  Reviewed     Food Allergies or Intolerances:  NKFA     Social History    Marital status:    Employment:  Clinical Analyst [employer]    Social History     Tobacco Use    Smoking status: Never     Passive exposure: Never    Smokeless tobacco: Never   Substance Use Topics    Alcohol use: No        Lab Reports   Sodium   Date Value Ref Range Status   04/11/2024 137 136 - 145 mmol/L Final     Potassium   Date Value Ref Range Status   04/11/2024 4.0 3.5 - 5.1 mmol/L Final     Chloride   Date Value Ref Range Status   04/11/2024 106 95 - 110 mmol/L Final     CO2   Date Value Ref Range Status   04/11/2024 22 (L) 23 - 29 mmol/L Final     Glucose   Date Value Ref Range Status   04/11/2024 82 70 - 110 mg/dL Final     BUN    Date Value Ref Range Status   04/11/2024 12 6 - 20 mg/dL Final     Creatinine   Date Value Ref Range Status   04/11/2024 1.0 0.5 - 1.4 mg/dL Final     Calcium   Date Value Ref Range Status   04/11/2024 9.7 8.7 - 10.5 mg/dL Final     Total Protein   Date Value Ref Range Status   03/14/2024 6.8 6.0 - 8.4 g/dL Final     Albumin   Date Value Ref Range Status   03/14/2024 3.6 3.5 - 5.2 g/dL Final     Total Bilirubin   Date Value Ref Range Status   03/14/2024 0.2 0.1 - 1.0 mg/dL Final     Comment:     For infants and newborns, interpretation of results should be based  on gestational age, weight and in agreement with clinical  observations.    Premature Infant recommended reference ranges:  Up to 24 hours.............<8.0 mg/dL  Up to 48 hours............<12.0 mg/dL  3-5 days..................<15.0 mg/dL  6-29 days.................<15.0 mg/dL       Alkaline Phosphatase   Date Value Ref Range Status   03/14/2024 59 55 - 135 U/L Final     AST   Date Value Ref Range Status   03/14/2024 28 10 - 40 U/L Final     ALT   Date Value Ref Range Status   03/14/2024 46 (H) 10 - 44 U/L Final     Anion Gap   Date Value Ref Range Status   04/11/2024 9 8 - 16 mmol/L Final     eGFR if    Date Value Ref Range Status   05/30/2022 >60.0 >60 mL/min/1.73 m^2 Final     eGFR if non    Date Value Ref Range Status   05/30/2022 >60.0 >60 mL/min/1.73 m^2 Final     Comment:     Calculation used to obtain the estimated glomerular filtration  rate (eGFR) is the CKD-EPI equation.         Lab Results   Component Value Date    WBC 4.92 03/27/2024    HGB 12.7 03/27/2024    HCT 38.1 03/27/2024    MCV 75 (L) 03/27/2024     03/27/2024        Lab Results   Component Value Date    CHOL 151 03/03/2023     Lab Results   Component Value Date    HDL 43 03/03/2023     Lab Results   Component Value Date    LDLCALC 98.4 03/03/2023     Lab Results   Component Value Date    TRIG 48 03/03/2023     Lab Results   Component Value Date     CHOLHDL 28.5 03/03/2023     Lab Results   Component Value Date    HGBA1C 5.5 04/11/2024     BP Readings from Last 1 Encounters:   04/12/24 130/82       Food History  Breakfast:  Skips  Mid-morning Snack:  None  Lunch:  Homemade smoothie comprised of 1.25 cups almond milk, ¾ cup kefir (8 gm RPO), 1 Tbsp psyllium husk, 1 scoop collagen powder (10 gm PRO), ½ Tbsp honey, ½ Tbsp almond butter (2 gm PRO), handful of blueberries, and 2 strawberries  Mid-afternoon Snack:  +/- boiled egg  Dinner:  Riced cauliflower with basil pesto sauce + broccoli + 6-8 oz baked salmon or sauteed shrimp  Snack:  None  *Fluid intake:  120 oz water, almond milk, tea    Exercise History:  Walking 30 minutes 4x/week (weight training less consistent)    Cultural/Spiritual/Personal Preferences:  None identified    Support System:  spouse    State of Change:  Action    Barriers to Change:  None    Diagnosis    Overweight related to imbalanced meal pattern and lack of exercise variation as evidenced by BMI:  36.5.    Intervention    RMR (Method:  [place] [address] Jeor):  1713 kcal  Activity Factor:  1.4    LYNDON:  2398 - 500 = 1898 kcal    Goals:  1.  Continue 0.5-1# weight loss/week  2.  Incorporate a planned mid-afternoon snack inclusive of CHO/fiber and lean protein (ex. 1 serving fruit + walnuts; 1 serving fruit + boiled egg; edamame)  3.  Continue cardio 4x/week as tolerated, incorporating short intervals of increased speed   4.  Resume weight training activities 2x/week as tolerated    Nutrition Education  The following education was provided to the patient:  Complimented patient on proactive role in health maintenance.  Complimented patient on dietary compliance/modifications and resulting health improvements.  Complimented patient on physical activity efforts.  Discussed meal planning/[employer] My Plate design.  Discussed healthy snacking.   Discussed weight management.  Suggested dietary modifications based on current dietary behaviors and  individual food preferences.  Discussed macronutrient distribution among meals and snacks, including CHO, protein, and fat recommendations and its importance/benefits.  Discussed physical activity guidelines and its associated benefits.  Discussed recommended protein intake (may include but not limited to recommended food sources, benefits of adequate protein intake, importance of protein distribution, etc.)   Discussed recommended servings of fruit/day and food sources of such.  Discussed recommended fiber intake and food sources of such.  Discussed benefits of adequate hydration and recommended fluid intake.    Patient verbalized understanding of nutrition education and recommendations received.    Handouts Provided  No new handouts were provided.    Monitoring/Evaluation    Monitor the following:  Weight  BMI  Caloric intake    Follow Up Plan:  Communication with referring healthcare provider is unnecessary at this time.  However, will follow up with patient in 4 weeks.

## 2024-06-04 ENCOUNTER — HOSPITAL ENCOUNTER (OUTPATIENT)
Dept: RADIOLOGY | Facility: HOSPITAL | Age: 39
Discharge: HOME | End: 2024-06-04
Attending: INTERNAL MEDICINE
Payer: COMMERCIAL

## 2024-06-04 VITALS — WEIGHT: 233 LBS | BODY MASS INDEX: 37.45 KG/M2 | HEIGHT: 66 IN

## 2024-06-04 DIAGNOSIS — Z12.31 ENCOUNTER FOR SCREENING MAMMOGRAM FOR BREAST CANCER: ICD-10-CM

## 2024-06-04 PROCEDURE — 77067 SCR MAMMO BI INCL CAD: CPT | Mod: TC

## 2024-06-04 PROCEDURE — 77063 BREAST TOMOSYNTHESIS BI: CPT | Mod: 26,,, | Performed by: RADIOLOGY

## 2024-06-04 PROCEDURE — 77067 SCR MAMMO BI INCL CAD: CPT | Mod: 26,,, | Performed by: RADIOLOGY

## 2024-06-07 ENCOUNTER — CLINICAL SUPPORT (OUTPATIENT)
Dept: PRIMARY CARE CLINIC | Facility: CLINIC | Age: 39
End: 2024-06-07
Payer: COMMERCIAL

## 2024-06-07 DIAGNOSIS — F43.23 ADJUSTMENT DISORDER WITH MIXED ANXIETY AND DEPRESSED MOOD: Primary | ICD-10-CM

## 2024-06-07 PROCEDURE — 90837 PSYTX W PT 60 MINUTES: CPT | Mod: 95,,, | Performed by: SOCIAL WORKER

## 2024-06-07 RX ORDER — FLUTICASONE PROPIONATE 50 MCG
2 SPRAY, SUSPENSION (ML) NASAL DAILY
Qty: 16 G | Refills: 11 | Status: SHIPPED | OUTPATIENT
Start: 2024-06-07

## 2024-06-07 NOTE — PROGRESS NOTES
"Individual Psychotherapy (LCSW/PhD)  Joan Wilde,  6/7/2024    Site:  Telemed         Therapeutic Intervention: Met with patient for individual psychotherapy.    Chief complaint/reason for encounter: depression, anxiety, and interpersonal     Interval history and content of current session: Pt states they have been feeling "better". Pt states they have been able to "let go" of some of their worries and concerns. Pt states they have been communicate more often and "better" with co-worker. Pt feels more understood and feels like they are feeling more confident.    Pt states they have been being more mindful of their reactions and triggers especially related to work. Pt states they have been journaling to help them process feelings and thoughts. Pt states they are still trying to not take things personally. LCSW supported pt in processing feelings and building self-worth. LCSW and pt discussed pt's career goals and impact of VISA status on their progress.    Treatment plan:  Target symptoms: depression, anxiety , adjustment, work stress  Why chosen therapy is appropriate versus another modality: relevant to diagnosis, evidence based practice  Outcome monitoring methods: self-report, checklist/rating scale  Therapeutic intervention type: behavior modifying psychotherapy, supportive psychotherapy    Risk parameters:  Patient reports no suicidal ideation  Patient reports no homicidal ideation  Patient reports no self-injurious behavior  Patient reports no violent behavior    Verbal deficits: None    Patient's response to intervention:  The patient's response to intervention is accepting.    Progress toward goals and other mental status changes:  The patient's progress toward goals is good.    Diagnosis:       Plan: Pt plans to continue individual psychotherapy    Return to clinic: as scheduled    Length of Service (minutes): 60      Answers submitted by the patient for this visit:  Review of Systems " Questionnaire (Submitted on 6/7/2024)  activity change: No  unexpected weight change: No  neck pain: No  hearing loss: No  rhinorrhea: No  trouble swallowing: No  eye discharge: No  visual disturbance: No  chest tightness: No  wheezing: No  chest pain: No  palpitations: No  blood in stool: No  constipation: No  vomiting: No  diarrhea: No  polydipsia: No  polyuria: No  difficulty urinating: No  hematuria: No  menstrual problem: No  dysuria: No  joint swelling: No  arthralgias: No  headaches: No  weakness: No  confusion: No  dysphoric mood: No

## 2024-06-10 ENCOUNTER — PATIENT MESSAGE (OUTPATIENT)
Dept: PRIMARY CARE CLINIC | Facility: CLINIC | Age: 39
End: 2024-06-10
Payer: COMMERCIAL

## 2024-06-10 ENCOUNTER — PATIENT MESSAGE (OUTPATIENT)
Dept: INTERNAL MEDICINE | Facility: CLINIC | Age: 39
End: 2024-06-10
Payer: COMMERCIAL

## 2024-06-10 DIAGNOSIS — E66.09 CLASS 2 OBESITY DUE TO EXCESS CALORIES WITHOUT SERIOUS COMORBIDITY WITH BODY MASS INDEX (BMI) OF 36.0 TO 36.9 IN ADULT: ICD-10-CM

## 2024-06-10 RX ORDER — PHENTERMINE HYDROCHLORIDE 15 MG/1
15 CAPSULE ORAL EVERY MORNING
Qty: 30 CAPSULE | Refills: 1 | Status: SHIPPED | OUTPATIENT
Start: 2024-06-10 | End: 2024-08-09

## 2024-06-11 ENCOUNTER — OFFICE VISIT (OUTPATIENT)
Dept: SURGERY | Facility: CLINIC | Age: 39
End: 2024-06-11
Payer: COMMERCIAL

## 2024-06-11 VITALS
HEIGHT: 66 IN | WEIGHT: 225.31 LBS | DIASTOLIC BLOOD PRESSURE: 81 MMHG | HEART RATE: 94 BPM | BODY MASS INDEX: 36.21 KG/M2 | SYSTOLIC BLOOD PRESSURE: 123 MMHG

## 2024-06-11 DIAGNOSIS — K62.5 RECTAL BLEEDING: ICD-10-CM

## 2024-06-11 DIAGNOSIS — K60.2 ANAL FISSURE: Primary | ICD-10-CM

## 2024-06-11 PROCEDURE — 1160F RVW MEDS BY RX/DR IN RCRD: CPT | Mod: CPTII,S$GLB,, | Performed by: NURSE PRACTITIONER

## 2024-06-11 PROCEDURE — 99999 PR PBB SHADOW E&M-EST. PATIENT-LVL V: CPT | Mod: PBBFAC,,, | Performed by: NURSE PRACTITIONER

## 2024-06-11 PROCEDURE — 3044F HG A1C LEVEL LT 7.0%: CPT | Mod: CPTII,S$GLB,, | Performed by: NURSE PRACTITIONER

## 2024-06-11 PROCEDURE — 1159F MED LIST DOCD IN RCRD: CPT | Mod: CPTII,S$GLB,, | Performed by: NURSE PRACTITIONER

## 2024-06-11 PROCEDURE — 3008F BODY MASS INDEX DOCD: CPT | Mod: CPTII,S$GLB,, | Performed by: NURSE PRACTITIONER

## 2024-06-11 PROCEDURE — 3079F DIAST BP 80-89 MM HG: CPT | Mod: CPTII,S$GLB,, | Performed by: NURSE PRACTITIONER

## 2024-06-11 PROCEDURE — 99204 OFFICE O/P NEW MOD 45 MIN: CPT | Mod: S$GLB,,, | Performed by: NURSE PRACTITIONER

## 2024-06-11 PROCEDURE — 3074F SYST BP LT 130 MM HG: CPT | Mod: CPTII,S$GLB,, | Performed by: NURSE PRACTITIONER

## 2024-06-11 NOTE — PATIENT INSTRUCTIONS
Continue with high fiber and plenty of water  Warm water soaks in tub twice a day  Dilt cream three times a day for the next 4 weeks. If bleeding and pain return call Trudi

## 2024-06-11 NOTE — PROGRESS NOTES
CRS Office Visit History and Physical    Referring Md:   Alix Sánchez, Lissette  [address] [facility]  [address]  [place] [address]    SUBJECTIVE:     Chief Complaint: BRBPR, rectal pain    History of Present Illness:  The patient is new patient to this practice.   Course is as follows:  Patient is a 39 y.o. female presents with BRBPR, rectal pain. Started around march, got better then returned in April. Continued until two weeks ago, resolved after she stopped AZO urinary tablets 2 weeks ago.  Reports high fiber diet, plus psyllium and 120 oz water daily  Soft daily BM  She did start some PO weight loss meds and has had bouts of constipation here and there  Reports >5 mins on toilet sometimes      Last Colonoscopy: never  Family history of colorectal cancer or IBD: no.    Review of patient's allergies indicates:   Allergen Reactions    Metoclopramide Other (See Comments)     Causes tongue to roll back in mouth       Past Medical History:   Diagnosis Date    Acne vulgaris 11/20/2018    Allergic rhinitis due to dust mite 05/13/2019    Asthma     Eczema     Endometriosis 11/20/2018    Fibroid     Laryngopharyngeal reflux     Migraine syndrome     associated with flu vaccines    Sleep apnea      Past Surgical History:   Procedure Laterality Date    DIAGNOSTIC LAPAROSCOPY Bilateral 5/15/2023    Procedure: LAPAROSCOPY, DIAGNOSTIC;  Surgeon: Raffi Gao MD;  Location: [facility];  Service: OB/GYN;  Laterality: Bilateral;    ESOPHAGOGASTRODUODENOSCOPY N/A 12/7/2023    Procedure: EGD (ESOPHAGOGASTRODUODENOSCOPY);  Surgeon: Mitchel Abreu MD;  Location: [facility];  Service: Endoscopy;  Laterality: N/A;    HERNIA REPAIR  2014    umbilical    LAPAROSCOPIC APPENDECTOMY N/A 4/21/2021    Procedure: APPENDECTOMY, LAPAROSCOPIC WITH LYSIS OF ADHESIONS.;  Surgeon: Trent Cannon MD;  Location: [address];  Service: General;  Laterality: N/A;    LAPAROSCOPIC LYSIS OF ADHESIONS N/A 5/15/2023    Procedure: LYSIS,  "ADHESIONS, LAPAROSCOPIC;  Surgeon: Raffi Gao MD;  Location: [facility] OR;  Service: OB/GYN;  Laterality: N/A;     Family History   Problem Relation Name Age of Onset    Hypertension Mother      Allergic rhinitis Father      Allergies Father      Diabetes Father      Hypertension Father      Stroke Father      Hyperlipidemia Father      Allergic rhinitis Brother      Eczema Brother      Conjunctivitis Brother      No Known Problems Brother      Breast cancer Neg Hx      Colon cancer Neg Hx      Ovarian cancer Neg Hx      Melanoma Neg Hx      Blindness Neg Hx      Amblyopia Neg Hx      Cataracts Neg Hx      Glaucoma Neg Hx      Macular degeneration Neg Hx      Retinal detachment Neg Hx      Strabismus Neg Hx       labor Neg Hx      Cancer Neg Hx      Eclampsia Neg Hx       Social History     Tobacco Use    Smoking status: Never     Passive exposure: Never    Smokeless tobacco: Never   Substance Use Topics    Alcohol use: No    Drug use: No        Review of Systems:  Review of Systems   Constitutional:  Negative for chills and fever.       OBJECTIVE:     Vital Signs (Most Recent)  /81 (BP Location: Right arm, Patient Position: Sitting, BP Method: Medium (Automatic))   Pulse 94   Ht 5' 6" (1.676 m)   Wt 102.2 kg (225 lb 5 oz)   BMI 36.37 kg/m²     Physical Exam:  General: Black or  female in no distress   Neuro: Alert and oriented to person, place, and time.  Moves all extremities.     HEENT: No icterus.  Trachea midline  Respiratory: Respirations are even and unlabored, no cough or audible wheezing  Skin: Warm dry and intact, No visible rashes, no jaundice    Labs reviewed today:  Lab Results   Component Value Date    WBC 4.92 2024    HGB 12.7 2024    HCT 38.1 2024     2024    CHOL 151 2023    TRIG 48 2023    HDL 43 2023    ALT 46 (H) 2024    AST 28 2024     2024    K 4.0 2024     2024    " CREATININE 1.0 04/11/2024    BUN 12 04/11/2024    CO2 22 (L) 04/11/2024    TSH 1.296 03/03/2023    HGBA1C 5.5 04/11/2024       Anorectal Exam:    Anal Skin:  MLP anal fissure, non ttp    Digital Rectal Exam:  deferred      ASSESSMENT/PLAN:     Diagnoses and all orders for this visit:    Rectal bleeding  -     Ambulatory referral/consult to Colorectal Surgery    40 yo f here w pain and bleeding of rectum. It has been better for the past 2 weeks. But fissure on exam today. Fist occurence      The patient was instructed to:  Continue with high fiber and plenty of water  Warm water soaks in tub twice a day  Dilt cream three times a day for the next 4 weeks. If bleeding and pain return call SUSHMA Guadalupe  Colon and Rectal Surgery

## 2024-06-20 ENCOUNTER — PATIENT OUTREACH (OUTPATIENT)
Dept: ADMINISTRATIVE | Facility: HOSPITAL | Age: 39
End: 2024-06-20
Payer: COMMERCIAL

## 2024-06-20 DIAGNOSIS — Z12.4 ENCOUNTER FOR SCREENING FOR CERVICAL CANCER: Primary | ICD-10-CM

## 2024-06-20 NOTE — PROGRESS NOTES
Health Maintenance Due   Topic Date Due    Pneumococcal Vaccines (Age 0-64) (1 of 2 - PCV) Never done    COVID-19 Vaccine (5 - 2023-24 season) 09/01/2023    Cervical Cancer Screening  08/19/2024       Chart reviewed and updated from campaigns outreach. Placed referral for Cervical cancer screening .    Brittni Vaz LPN   Clinical Care Coordinator  Primary Care and Wellness

## 2024-06-21 ENCOUNTER — TELEPHONE (OUTPATIENT)
Dept: HEPATOLOGY | Facility: CLINIC | Age: 39
End: 2024-06-21
Payer: COMMERCIAL

## 2024-06-21 NOTE — TELEPHONE ENCOUNTER
Reached out to pt in regards to r/s appt due to provider being out of the office. Pt vu and appt r/s. No further questions

## 2024-06-24 ENCOUNTER — OFFICE VISIT (OUTPATIENT)
Dept: OTOLARYNGOLOGY | Facility: CLINIC | Age: 39
End: 2024-06-24
Payer: COMMERCIAL

## 2024-06-24 VITALS
WEIGHT: 225.63 LBS | BODY MASS INDEX: 36.42 KG/M2 | HEART RATE: 84 BPM | SYSTOLIC BLOOD PRESSURE: 117 MMHG | DIASTOLIC BLOOD PRESSURE: 82 MMHG

## 2024-06-24 DIAGNOSIS — J30.9 CHRONIC ALLERGIC RHINITIS: Primary | Chronic | ICD-10-CM

## 2024-06-24 DIAGNOSIS — K21.9 LARYNGOPHARYNGEAL REFLUX (LPR): Chronic | ICD-10-CM

## 2024-06-24 DIAGNOSIS — G47.33 OSA (OBSTRUCTIVE SLEEP APNEA): Chronic | ICD-10-CM

## 2024-06-24 PROCEDURE — 99214 OFFICE O/P EST MOD 30 MIN: CPT | Mod: 25,S$GLB,, | Performed by: OTOLARYNGOLOGY

## 2024-06-24 PROCEDURE — 1160F RVW MEDS BY RX/DR IN RCRD: CPT | Mod: CPTII,S$GLB,, | Performed by: OTOLARYNGOLOGY

## 2024-06-24 PROCEDURE — 3074F SYST BP LT 130 MM HG: CPT | Mod: CPTII,S$GLB,, | Performed by: OTOLARYNGOLOGY

## 2024-06-24 PROCEDURE — 99999 PR PBB SHADOW E&M-EST. PATIENT-LVL III: CPT | Mod: PBBFAC,,, | Performed by: OTOLARYNGOLOGY

## 2024-06-24 PROCEDURE — 3008F BODY MASS INDEX DOCD: CPT | Mod: CPTII,S$GLB,, | Performed by: OTOLARYNGOLOGY

## 2024-06-24 PROCEDURE — 31575 DIAGNOSTIC LARYNGOSCOPY: CPT | Mod: S$GLB,,, | Performed by: OTOLARYNGOLOGY

## 2024-06-24 PROCEDURE — 3044F HG A1C LEVEL LT 7.0%: CPT | Mod: CPTII,S$GLB,, | Performed by: OTOLARYNGOLOGY

## 2024-06-24 PROCEDURE — 1159F MED LIST DOCD IN RCRD: CPT | Mod: CPTII,S$GLB,, | Performed by: OTOLARYNGOLOGY

## 2024-06-24 PROCEDURE — 3079F DIAST BP 80-89 MM HG: CPT | Mod: CPTII,S$GLB,, | Performed by: OTOLARYNGOLOGY

## 2024-06-24 RX ORDER — OMEPRAZOLE 40 MG/1
40 CAPSULE, DELAYED RELEASE ORAL EVERY MORNING
Qty: 30 CAPSULE | Refills: 3 | Status: SHIPPED | OUTPATIENT
Start: 2024-06-24 | End: 2025-06-24

## 2024-06-24 RX ORDER — AZELASTINE 1 MG/ML
1 SPRAY, METERED NASAL 2 TIMES DAILY
Qty: 30 ML | Refills: 3 | Status: SHIPPED | OUTPATIENT
Start: 2024-06-24 | End: 2025-06-24

## 2024-06-24 NOTE — PROCEDURES
Laryngoscopy    Date/Time: 6/24/2024 11:00 AM    Performed by: Paige Hercules MD  Authorized by: Paige Hercules MD    Consent Done?:  Yes (Verbal)  Anesthesia:     Local anesthetic:  Lidocaine 2% and Nikita-Synephrine 1/2%  Laryngoscopy:     Areas examined:  Nasal cavities, nasopharynx, oropharynx, hypopharynx, larynx and vocal cords  Nose External:      No external nasal deformity  Nose Intranasal:      Mucosa no polyps     Mucosa ulcers not present     No mucosa lesions present     No septum gross deformity     Turbinates not enlarged  Nasopharynx:      No mucosa lesions     Adenoids not present     Posterior choanae patent     Eustachian tube patent  Larynx/hypopharynx:      No epiglottis lesions     No epiglottis edema     No AE folds lesions     No vocal cord polyps     Equal and normal bilateral     No hypopharynx lesions     No piriform sinus pooling     No piriform sinus lesions     Post cricoid edema (mild)     Post cricoid erythema (mild)

## 2024-06-24 NOTE — PROGRESS NOTES
Chief Complaint   Patient presents with    Follow-up     Improvement Reflux, sinuses, throat clearing     .       Interval HPI 1/9/2023:  Follow up visit. Reports that she has been feeling very well. She feels throat pain and globus sensation are overall better. She has been on Protonix 40mg PO daily. She has noted in the last month that coffee seemed to be a trigger for heartburn.   She has been using Gaviscon advance daily.  She notes that recently she has been snoring more throughout the last year.  She states that her  has noted this has been more of a problem- he has noticed episodes which concerned him for apnea. She lost 38 lbs in 2020 and reports that she has gained most of  this weight back over the last 2 years. She thinks that the snoring has been worse with the weight gain. She notes that she has trouble waking in the morning, decreased energy, and daytime somnolence. She states that her watch has shown decreased oxygen levels while sleeping.     Interval HPI10/20/2023:  Follow up visit. She did note that she had some episodes of reflux last month and not sure what triggered it. She felt it was severe. She did note increased stress at work that week. The symptoms were present for a few days and then resolved.    She is still taking gaviscon advance and Prilosec 40mg PO daily. She is now on CPAP for AMARJIT and has been on it for last 4 months with great improvement.   She has been on Flonase.  She has been using Claritin intermittently.     Interval HPI 6/24/24:   Follow up visit. Reports that she has had interval improvement in  throat clearing and more soreness in her throat.  She feels that omeprazole has been more helpful than famotidine.  She has been using nasal saline rinses and feels this has been helpful. She has been using Flonase, Astelin, and allegra.       Past Medical History:   Diagnosis Date    Acne vulgaris 11/20/2018    Allergic rhinitis due to dust mite 05/13/2019    Asthma      Eczema     Endometriosis 11/20/2018    Fibroid     Laryngopharyngeal reflux     Migraine syndrome     associated with flu vaccines    Sleep apnea      Social History     Socioeconomic History    Marital status:    Tobacco Use    Smoking status: Never     Passive exposure: Never    Smokeless tobacco: Never   Substance and Sexual Activity    Alcohol use: No    Drug use: No    Sexual activity: Not Currently     Partners: Male   Social History Narrative    Performance improvement specialist at [employer]    She is single.      Social Determinants of Health     Financial Resource Strain: Low Risk  (12/5/2023)    Overall Financial Resource Strain (CARDIA)     Difficulty of Paying Living Expenses: Not hard at all   Food Insecurity: No Food Insecurity (12/5/2023)    Hunger Vital Sign     Worried About Running Out of Food in the Last Year: Never true     Ran Out of Food in the Last Year: Never true   Transportation Needs: No Transportation Needs (12/5/2023)    PRAPARE - Transportation     Lack of Transportation (Medical): No     Lack of Transportation (Non-Medical): No   Physical Activity: Insufficiently Active (12/5/2023)    Exercise Vital Sign     Days of Exercise per Week: 5 days     Minutes of Exercise per Session: 20 min   Stress: Stress Concern Present (12/5/2023)    [employer] [place] [employer] - Occupational Stress Questionnaire     Feeling of Stress : To some extent   Housing Stability: Low Risk  (12/5/2023)    Housing Stability Vital Sign     Unable to Pay for Housing in the Last Year: No     Number of Places Lived in the Last Year: 1     Unstable Housing in the Last Year: No     Past Surgical History:   Procedure Laterality Date    DIAGNOSTIC LAPAROSCOPY Bilateral 5/15/2023    Procedure: LAPAROSCOPY, DIAGNOSTIC;  Surgeon: Raffi Gao MD;  Location: [facility];  Service: OB/GYN;  Laterality: Bilateral;    ESOPHAGOGASTRODUODENOSCOPY N/A 12/7/2023    Procedure: EGD (ESOPHAGOGASTRODUODENOSCOPY);  Surgeon:  Mitchel Abreu MD;  Location: [facility];  Service: Endoscopy;  Laterality: N/A;    HERNIA REPAIR      umbilical    LAPAROSCOPIC APPENDECTOMY N/A 2021    Procedure: APPENDECTOMY, LAPAROSCOPIC WITH LYSIS OF ADHESIONS.;  Surgeon: Trent Cannon MD;  [facility];  Service: General;  Laterality: N/A;    LAPAROSCOPIC LYSIS OF ADHESIONS N/A 5/15/2023    Procedure: LYSIS, ADHESIONS, LAPAROSCOPIC;  Surgeon: Raffi Gao MD;  Location: [facility] OR;  Service: OB/GYN;  Laterality: N/A;     Family History   Problem Relation Name Age of Onset    Hypertension Mother      Allergic rhinitis Father      Allergies Father      Diabetes Father      Hypertension Father      Stroke Father      Hyperlipidemia Father      Allergic rhinitis Brother      Eczema Brother      Conjunctivitis Brother      No Known Problems Brother      Breast cancer Neg Hx      Colon cancer Neg Hx      Ovarian cancer Neg Hx      Melanoma Neg Hx      Blindness Neg Hx      Amblyopia Neg Hx      Cataracts Neg Hx      Glaucoma Neg Hx      Macular degeneration Neg Hx      Retinal detachment Neg Hx      Strabismus Neg Hx       labor Neg Hx      Cancer Neg Hx      Eclampsia Neg Hx             Review of Systems  General: negative for chills, fever or weight loss  Psychological: negative for mood changes or depression  Ophthalmic: negative for blurry vision, photophobia or eye pain  ENT: see HPI  Respiratory: no cough, shortness of breath, or wheezing  Cardiovascular: no chest pain or dyspnea on exertion  Gastrointestinal: no abdominal pain, change in bowel habits, or black/ bloody stools  Musculoskeletal: negative for gait disturbance or muscular weakness  Neurological: no syncope or seizures; no ataxia  Dermatological: negative for puritis,  rash and jaundice  Hematologic/lymphatic: no easy bruising, no new lumps or bumps      Physical Exam:    Vitals:    24 1117   BP: 117/82   Pulse: 84         Constitutional: Well  appearing / communicating without difficutly.  NAD.  Eyes: EOM I Bilaterally  Head/Face: Normocephalic.  Negative paranasal sinus pressure/tenderness.  Salivary glands WNL.  House Brackmann I Bilaterally.    Right Ear: Auricle normal appearance. External Auditory Canal within normal limits no lesions or masses,TM w/o masses/lesions/perforations. TM mobility noted.   Left Ear: Auricle normal appearance. External Auditory Canal within normal limits no lesions or masses,TM w/o masses/lesions/perforations. TM mobility noted.  Nose: No gross nasal septal deviation. Inferior Turbinates 3+ bilaterally. No septal perforation. No masses/lesions. External nasal skin appears normal without masses/lesions.  Oral Cavity: Gingiva/lips within normal limits.  Dentition/gingiva healthy appearing. Mucus membranes moist. Floor of mouth soft, no masses palpated. Oral Tongue mobile. Hard Palate appears normal.    Oropharynx: Base of tongue appears normal. No masses/lesions noted. Tonsillar fossa/pharyngeal wall without lesions. Posterior oropharynx WNL.  Soft palate without masses. Midline uvula.   Neck/Lymphatic: No LAD I-VI bilaterally.  No thyromegaly.  No masses noted on exam.      See separate procedure note for FFL.    Diagnostic studies reviewed:   Laboratory 04/15/2019:  IgE level:  Less than 35.  ImmunoCAP:  Negative.     Spirometry 04/16/2019:  Normal spirometry. Airflow not improved after bronchodilator.     Inhalant skin tests 05/13/2019:  3+ histamine control.  3+ dust mites.    Assessment:    ICD-10-CM ICD-9-CM    1. Chronic allergic rhinitis  J30.9 477.9       2. Laryngopharyngeal reflux (LPR)  K21.9 478.79       3. AMARJIT (obstructive sleep apnea)  G47.33 327.23                   The primary encounter diagnosis was Chronic allergic rhinitis. Diagnoses of Laryngopharyngeal reflux (LPR) and AMARJIT (obstructive sleep apnea) were also pertinent to this visit.      Plan:  No orders of the defined types were placed in this  encounter.    Continue  nasal saline rinses BID  Continue Flonase 2 sprays per nostril daily  Continue Allegra daily   Continue Prilosec 40mg PO daily.  Continue refrain from eating within 3 hours of going to bed, to elevate the head of bed very subtly and optimize the impact of gravity on the potential reflux, and to avoid alcohol, caffeine, tobacco, tomato sauce, spicy foods, fried food, and chocolate.     Continue CPAP therapy for AMARJIT     Follow up in approximately 4-6  months to reassess progress with treatment regimen.     Paige Grant MD

## 2024-06-25 ENCOUNTER — HOSPITAL ENCOUNTER (OUTPATIENT)
Dept: RADIOLOGY | Facility: HOSPITAL | Age: 39
Discharge: HOME | End: 2024-06-25
Attending: INTERNAL MEDICINE
Payer: COMMERCIAL

## 2024-06-25 DIAGNOSIS — R92.8 ABNORMAL MAMMOGRAM: ICD-10-CM

## 2024-06-25 PROCEDURE — 77061 BREAST TOMOSYNTHESIS UNI: CPT | Mod: TC,RT

## 2024-06-25 PROCEDURE — 77061 BREAST TOMOSYNTHESIS UNI: CPT | Mod: 26,RT,, | Performed by: RADIOLOGY

## 2024-06-25 PROCEDURE — 77065 DX MAMMO INCL CAD UNI: CPT | Mod: 26,RT,, | Performed by: RADIOLOGY

## 2024-07-08 PROBLEM — Z00.00 HEALTHCARE MAINTENANCE: Status: ACTIVE | Noted: 2024-07-08

## 2024-07-08 NOTE — PROGRESS NOTES
Total [facility] Provider Appointment  [facility])    Subjective:     History of Present Illness    Joan presents today for follow up.    She reports weight loss of approximately 13 lbs since starting medication, from 233 lbs in April to 220 lbs currently. She experienced some weight gain due to a soy milk allergy causing bloating, but this resolved after meeting with dietitian and making dietary changes.    She engages in regular exercise, doing cardio at least 5 days a week and weightlifting 2 days a week. She is also attempting to incorporate daily yoga, though some days are more challenging to maintain this practice.    She has been checking her blood pressure at home, with readings ranging from 115/74 to 125/87.    Fibroscan results indicate S1 steatosis, representing a mild amount of fat in the liver, which is the mildest grade on a scale of S0 to S3. The scan revealed no significant liver fibrosis. She was advised that these results are indicative of metabolic or fatty liver disease, but continued weight loss should lead to improvement.    She sees  every 2 weeks which has been beneficial in helping avoid and address workplace triggers more effectively. The frequent sessions have been very helpful in managing workplace issues. CARINE-7 anxiety screen score today is 7, a slight improvement from previous score of 8. Over the last two weeks, she reports feeling nervous, anxious, or on edge on several days, worrying too much about different things, becoming easily annoyed or irritable on several days, and being unable to stop or control worrying more than half the days. She denies feeling as if something awful might happen.    She experienced epigastric pain after consuming noodles that resolved after eating high fiber meals. She denies any current epigastric discomfort.    In May, she experienced sustained chest pain for 2 hours following an argument with a childhood friend. The pain resolved after  taking 10mg of prednisone. She denies associated dyspnea, sweating, or radiation to left arm during the episode. She has a history of esophageal spasm connected to laryngeal pharyngeal reflux (LPR). Weight loss medications have made omeprazole and other LPR medications less effective, but she is still able to manage her symptoms.    ROS:  General: no fever, no chills, no fatigue, no weight gain, reports weight loss  Eyes: no vision changes, no redness, no discharge  ENT: no ear pain, no nasal congestion, no sore throat  Cardiovascular: reports chest pain, no palpitations, no lower extremity edema  Respiratory: no cough, no shortness of breath  Gastrointestinal: no abdominal pain, no nausea, no vomiting, no diarrhea, no constipation, no blood in stool, no bloating  Genitourinary: no dysuria, no hematuria, no frequency  Musculoskeletal: no joint pain, no muscle pain  Skin: no rash, no lesion  Neurological: no headache, no dizziness, no numbness, no tingling  Psychiatric: reports anxiety, no depression, no sleep difficulty               4/11/2024   PHQ-9 Depression Patient Health Questionnaire   Over the last two weeks how often have you been bothered by little interest or pleasure in doing things 0   Over the last two weeks how often have you been bothered by feeling down, depressed or hopeless 0         7/12/2024     3:50 PM 4/12/2024     2:31 PM 2/11/2022     1:39 PM   GAD7   1. Feeling nervous, anxious, or on edge? 1 1 1   2. Not being able to stop or control worrying? 2 1 2   3. Worrying too much about different things? 1 2 3   4. Trouble relaxing? 1 1 0   5. Being so restless that it is hard to sit still? 1 1 0   6. Becoming easily annoyed or irritable? 1 1 0   7. Feeling afraid as if something awful might happen? 0 1 0   8. If you checked off any problems, how difficult have these problems made it for you to do your work, take care of things at home, or get along with other people?   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      Social Determinants of Health     Financial Resource Strain: Low Risk  (12/5/2023)    Overall Financial Resource Strain (CARDIA)     Difficulty of Paying Living Expenses: Not hard at all   Food Insecurity: No Food Insecurity (12/5/2023)    Hunger Vital Sign     Worried About Running Out of Food in the Last Year: Never true     Ran Out of Food in the Last Year: Never true   Transportation Needs: No Transportation Needs (12/5/2023)    PRAPARE - Transportation     Lack of Transportation (Medical): No     Lack of Transportation (Non-Medical): No   Physical Activity: Insufficiently Active (12/5/2023)    Exercise Vital Sign     Days of Exercise per Week: 5 days     Minutes of Exercise per Session: 20 min   Stress: Stress Concern Present (12/5/2023)    [employer] - Occupational Stress Questionnaire     Feeling of Stress : To some extent   Housing Stability: Low Risk  (12/5/2023)    Housing Stability Vital Sign     Unable to Pay for Housing in the Last Year: No     Number of Places Lived in the Last Year: 1     Unstable Housing in the Last Year: No         Objective:   Mental Health Screening  PHQ-9       CARINE-7  Generalized Anxiety Disorder 7-item (CARINE-7) Scale. HOW OFTEN DURING THE PAST 2 WEEKS HAVE YOU FELT BOTHERED BY:  1. Feeling nervous, anxious, or on edge?: Several days  2. Not being able to stop or control worrying?: More than half the days  3. Worrying too much about different things?: Several days  4. Trouble relaxing?: Several days  5. Being so restless that it is hard to sit still?: Several days  6.  Becoming easily annoyed or irritable?: Several days  7. Feeling afraid as if something awful might happen?: Not at all  CARINE-7 Score: 7  Number answered (out of first 7): 7  Interpretation: Mild Anxiety    Vital Signs  /86   Pulse 98   Temp 99.4 °F (37.4 °C)   SpO2 99%     Physical Exam    Vitals: Weight: 102.35 kilograms. BP ranging from 115/74 to 125/87.  General: No acute distress. Well-developed. Well-nourished.  Eyes: EOMI. Sclerae anicteric.  HENT: Normocephalic. Atraumatic. Nares patent. Moist oral mucosa.  Cardiovascular: Regular rate. Regular rhythm. No murmurs. No rubs. No gallops. Normal S1, S2.  Respiratory: Normal respiratory effort. Clear to auscultation bilaterally. No rales. No rhonchi. No wheezing.  Musculoskeletal: No  obvious deformity.  Extremities: No lower extremity edema.  Neurological: Alert & oriented x3. No slurred speech. Normal gait.  Psychiatric: Normal mood. Normal affect. Good insight. Good judgment.  Skin: Warm. Dry. No rash.           Assessment:   39 y.o. female here for primary care visit       Plan:   1. Class 2 obesity due to excess calories without serious comorbidity with body mass index (BMI) of 36.0 to 36.9 in adult  Overview:  Baseline weight upon entry to [facility] is 233 lbs (106.1 kg) which is the heaviest she has ever been  Weight as of 7/12/24 = 225 lbs (102.3 kg)    Current regimen:  --phentermine 15 mg daily  --topiramate 25 mg daily  Started this regimen 4/12/24    Assessment & Plan:  Has been tolerating current doses of phentermine 15 mg and topiramate 25 mg without difficulty  --we discussed remote weight management program, but she wants to continue her current regimen as a more affordable option      2. Fatty liver  Overview:  Fibroscan 4/12/24:  Findings  Median liver stiffness score:  3.5  CAP Reading: dB/m:  238  IQR/med %:  9  Interpretation  Fibrosis interpretation is based on medial liver stiffness - Kilopascal (kPa).  Fibrosis Stage:  F  0-1  Steatosis interpretation is based on controlled attenuation parameter - (dB/m).  Steatosis Grade:  S1           Lab Results   Component Value Date     ALT 46 (H) 03/14/2024     AST 28 03/14/2024     ALKPHOS 59 03/14/2024     BILITOT 0.2 03/14/2024            Lab Results   Component Value Date     WBC 4.92 03/27/2024     HGB 12.7 03/27/2024     HCT 38.1 03/27/2024     MCV 75 (L) 03/27/2024      03/27/2024      FIB-4 Calculation: 0.58 at 4/12/2024  2:30 PM      FIB-4 below 1.30 is considered as low-risk for advanced fibrosis  FIB-4 over 2.67 is considered as high-risk for advanced fibrosis  FIB-4 values between 1.30 and 2.67 are considered as intermediate-risk of advanced fibrosis for ages 36-64.       3. Asthma in adult, mild intermittent, uncomplicated  Overview:  Has laryngopharyngeal reflux >> started omeprazole  Has never required a controller medication  Rather her symptoms have been managed with occasional JOSE J, needing to use albuterol perhaps 6 times in the past year      4. Healthcare maintenance  Assessment & Plan:  --due for cervical CA screening  **refer to GYN      5. Adjustment disorder with mixed anxiety and depressed mood  Overview:  GAD7 score 8 on 4/12/24, but anxiety symptoms not present for more than 6 months (most of her anxiety is situational and work-related)  GAD7 score 7 on 7/12/24        Follow up in about 3 months (around 10/12/2024).    [place]vng Tay MD/[facility] [place]  [place]  [facility]

## 2024-07-09 ENCOUNTER — NUTRITION (OUTPATIENT)
Dept: INTERNAL MEDICINE | Facility: CLINIC | Age: 39
End: 2024-07-09
Payer: COMMERCIAL

## 2024-07-09 DIAGNOSIS — Z71.3 DIETARY COUNSELING: Primary | ICD-10-CM

## 2024-07-09 DIAGNOSIS — E66.9 OBESITY (BMI 30-39.9): ICD-10-CM

## 2024-07-09 PROCEDURE — 97803 MED NUTRITION INDIV SUBSEQ: CPT | Mod: S$GLB,,, | Performed by: DIETITIAN, REGISTERED

## 2024-07-09 PROCEDURE — 99999 PR PBB SHADOW E&M-EST. PATIENT-LVL I: CPT | Mod: PBBFAC,,, | Performed by: DIETITIAN, REGISTERED

## 2024-07-09 NOTE — PROGRESS NOTES
"Nutrition Assessment  Session Time:  30 minutes      Client name:  Joan Wilde  :  1985  Age:  39 y.o.  Gender:  female    Client states:  Very pleasant OTC patient here for follow up secondary to weight management.  Lost 1.3# since last consult (24).  Expresses frustration as she believes she has hit a weight plateau.  Weighs herself daily despite recommendations to weigh less frequently.  Notices weight gain after weekends despite attempts to maintain healthy eating habits and physical activity.  Continues to take Phentermine and Topiramate and reports constipation has resolved.  Over the past three days, changed her lunch meal to include a high fiber muffin (see recall below) and since noticed reduced abdominal bloating yet increased BM's (3/day).  Describes BM's as solid and not painful to pass.  Continues to drink 120 oz water daily in addition to various teas (some assist with sleep and stress management).  Remains conscious of PO intake as well as the fiber and protein content of foods she is consuming.  Has also made great efforts toward stress management and cognitive restructuring in addition to focusing on factors within her control.  Shares recent encounter in which she "ran away" from known triggers. Notices BP improving as a result of stress management, exercise, and weight loss.  Wishes to be proactive in managing night time snacking/sugar temptations, particularly after discontinuing medications.  Currently not snacking after dinner although realizes habit may return once medications are discontinued.  Reports first meal around 11 AM - 12 PM and recently incorporated balanced mid-afternoon snack as previously advised.  Adds that her cardio frequency has been less over the past few weeks (~2x/week), yet she has resumed weight training 2x/week.  Overall, remains committed to weight loss and improved health outcomes.      Anthropometrics  Height:  5' 6"     Weight:  225.1# " (-1.3# since 5/28/24)  BMI:  36.3  % Body Fat:  Unable to assess    Clinical Signs/Symptoms  N/V/D:  Constipation - resolved   Appetite:  fair       Past Medical History:   Diagnosis Date    Acne vulgaris 11/20/2018    Allergic rhinitis due to dust mite 05/13/2019    Asthma     Eczema     Endometriosis 11/20/2018    Fibroid     Laryngopharyngeal reflux     Migraine syndrome     associated with flu vaccines    Sleep apnea        Past Surgical History:   Procedure Laterality Date    DIAGNOSTIC LAPAROSCOPY Bilateral 5/15/2023    Procedure: LAPAROSCOPY, DIAGNOSTIC;  Surgeon: Raffi Gao MD;  Location: [facility];  Service: OB/GYN;  Laterality: Bilateral;    ESOPHAGOGASTRODUODENOSCOPY N/A 12/7/2023    Procedure: EGD (ESOPHAGOGASTRODUODENOSCOPY);  Surgeon: Mitchel Abreu MD;  Location: [facility];  Service: Endoscopy;  Laterality: N/A;    HERNIA REPAIR  2014    umbilical    LAPAROSCOPIC APPENDECTOMY N/A 4/21/2021    Procedure: APPENDECTOMY, LAPAROSCOPIC WITH LYSIS OF ADHESIONS.;  Surgeon: Trent Cannon MD;  Location: [address];  Service: General;  Laterality: N/A;    LAPAROSCOPIC LYSIS OF ADHESIONS N/A 5/15/2023    Procedure: LYSIS, ADHESIONS, LAPAROSCOPIC;  Surgeon: Raffi Gao MD;  Location: [facility];  Service: OB/GYN;  Laterality: N/A;       Medications    has a current medication list which includes the following prescription(s): albuterol, ascorbic acid (vitamin c), azelastine, conjugated estrogens, diltiazem hcl, fluticasone propionate, naproxen, norethindrone, norethindrone, olopatadine, omeprazole, phentermine, and topiramate.    Vitamins, Minerals, and/or Supplements:  *Please see list above     Food/Medication Interactions:  Reviewed     Food Allergies or Intolerances:  NKFA     Social History    Marital status:    Employment:  Clinical Analyst [employer]    Social History     Tobacco Use    Smoking status: Never     Passive exposure: Never    Smokeless tobacco: Never   Substance  Use Topics    Alcohol use: No        Lab Reports   Sodium   Date Value Ref Range Status   04/11/2024 137 136 - 145 mmol/L Final     Potassium   Date Value Ref Range Status   04/11/2024 4.0 3.5 - 5.1 mmol/L Final     Chloride   Date Value Ref Range Status   04/11/2024 106 95 - 110 mmol/L Final     CO2   Date Value Ref Range Status   04/11/2024 22 (L) 23 - 29 mmol/L Final     Glucose   Date Value Ref Range Status   04/11/2024 82 70 - 110 mg/dL Final     BUN   Date Value Ref Range Status   04/11/2024 12 6 - 20 mg/dL Final     Creatinine   Date Value Ref Range Status   04/11/2024 1.0 0.5 - 1.4 mg/dL Final     Calcium   Date Value Ref Range Status   04/11/2024 9.7 8.7 - 10.5 mg/dL Final     Total Protein   Date Value Ref Range Status   03/14/2024 6.8 6.0 - 8.4 g/dL Final     Albumin   Date Value Ref Range Status   03/14/2024 3.6 3.5 - 5.2 g/dL Final     Total Bilirubin   Date Value Ref Range Status   03/14/2024 0.2 0.1 - 1.0 mg/dL Final     Comment:     For infants and newborns, interpretation of results should be based  on gestational age, weight and in agreement with clinical  observations.    Premature Infant recommended reference ranges:  Up to 24 hours.............<8.0 mg/dL  Up to 48 hours............<12.0 mg/dL  3-5 days..................<15.0 mg/dL  6-29 days.................<15.0 mg/dL       Alkaline Phosphatase   Date Value Ref Range Status   03/14/2024 59 55 - 135 U/L Final     AST   Date Value Ref Range Status   03/14/2024 28 10 - 40 U/L Final     ALT   Date Value Ref Range Status   03/14/2024 46 (H) 10 - 44 U/L Final     Anion Gap   Date Value Ref Range Status   04/11/2024 9 8 - 16 mmol/L Final     eGFR if    Date Value Ref Range Status   05/30/2022 >60.0 >60 mL/min/1.73 m^2 Final     eGFR if non    Date Value Ref Range Status   05/30/2022 >60.0 >60 mL/min/1.73 m^2 Final     Comment:     Calculation used to obtain the estimated glomerular filtration  rate (eGFR) is the CKD-EPI  equation.         Lab Results   Component Value Date    WBC 4.92 03/27/2024    HGB 12.7 03/27/2024    HCT 38.1 03/27/2024    MCV 75 (L) 03/27/2024     03/27/2024        Lab Results   Component Value Date    CHOL 151 03/03/2023     Lab Results   Component Value Date    HDL 43 03/03/2023     Lab Results   Component Value Date    LDLCALC 98.4 03/03/2023     Lab Results   Component Value Date    TRIG 48 03/03/2023     Lab Results   Component Value Date    CHOLHDL 28.5 03/03/2023     Lab Results   Component Value Date    HGBA1C 5.5 04/11/2024     BP Readings from Last 1 Encounters:   06/24/24 117/82       Food History  Breakfast:  Skips  Mid-morning Snack:  None  Lunch:  Homemade muffin comprised of  ¼ cup ground flax seeds, 1 tsp vanilla, 1 tsp cinnamon, 1 tsp baking soda, walnuts, blueberries, 1.5 tsp Stevia, 1 Tbsp EVOO, and 1 egg/homemade smoothie comprised of 1.25 cups pea protein milk, ¾ cup kefir (8 gm RPO), 1 Tbsp psyllium husk, 1 scoop collagen powder (10 gm PRO), ½ Tbsp honey, ½ Tbsp almond butter (2 gm PRO), and handful of blueberries  Mid-afternoon Snack:  Orange slices/edamame/0% Greek yogurt + walnuts + 1 tsp honey   Dinner:  6-8 oz baked salmon or sauteed shrimp + avocado salad comprised of cucumbers, avocado, tomato, red onion, cilantro, lemon, and 2-3 Tbsp EVOO   Snack:  None  *Fluid intake:  120 oz water, tea (tultulsi tea, green tea, black or lemon grass tea)    Exercise History:  15 minutes weight training 2x/week + cardio ~2x/week (walking + elliptical)    Cultural/Spiritual/Personal Preferences:  None identified    Support System:  spouse    State of Change:  Action    Barriers to Change:  None    Diagnosis    Overweight related to imbalanced meal pattern and lack of exercise variation as evidenced by BMI:  36.3.    Intervention    RMR (Method:  [place] St. Jeor):  1713 kcal  Activity Factor:  1.4    LYNDON:  2398 - 500 = 1898 kcal    Goals:  1.  Aim 0.5-1# weight loss/week  2.  Consider  inclusion of 1-2 cups of leafy greens in lunch smoothie  3.  Aim for 150 minutes of cardio/week   4.  Continue weight training activities 2x/week as tolerated    Nutrition Education  The following education was provided to the patient:  Complimented patient on proactive role in health maintenance.  Complimented patient on dietary compliance/modifications and resulting health improvements.  Complimented patient on physical activity efforts.  Discussed meal planning/[employer]'s My Plate design.  Discussed healthy snacking.   Discussed weight management.  Suggested dietary modifications based on current dietary behaviors and individual food preferences.  Discussed macronutrient distribution among meals and snacks, including CHO, protein, and fat recommendations and its importance/benefits.  Discussed physical activity guidelines and its associated benefits.  Discussed recommended protein intake (may include but not limited to recommended food sources, benefits of adequate protein intake, importance of protein distribution, etc.)   Discussed sugary foods and/or beverages (potential health consequences of excessive sugar intake, ways to reduce sugar intake, healthy beverage alternatives, etc.).  Discussed recommended servings of non-starchy vegetables/day and food sources of such.  Discussed recommended fiber intake and food sources of such.  Discussed benefits of adequate hydration and recommended fluid intake.  Discussed importance of small behavior/habit changes in improving long-term adherence and sustainability.  Discussed goal setting.  Provided ongoing support, encouragement, and guidance toward improved health efforts.    Patient verbalized understanding of nutrition education and recommendations received.    Handouts Provided  No new handouts were provided.    Monitoring/Evaluation    Monitor the following:  Weight  BMI  Caloric intake    Follow Up Plan:  Communication with referring healthcare provider is  unnecessary at this time.  However, will follow up with patient in 4 weeks.

## 2024-07-12 ENCOUNTER — OFFICE VISIT (OUTPATIENT)
Dept: PRIMARY CARE CLINIC | Facility: CLINIC | Age: 39
End: 2024-07-12
Attending: INTERNAL MEDICINE
Payer: COMMERCIAL

## 2024-07-12 VITALS
OXYGEN SATURATION: 99 % | HEART RATE: 98 BPM | DIASTOLIC BLOOD PRESSURE: 86 MMHG | SYSTOLIC BLOOD PRESSURE: 124 MMHG | TEMPERATURE: 99 F

## 2024-07-12 DIAGNOSIS — K76.0 FATTY LIVER: ICD-10-CM

## 2024-07-12 DIAGNOSIS — J45.20 ASTHMA IN ADULT, MILD INTERMITTENT, UNCOMPLICATED: ICD-10-CM

## 2024-07-12 DIAGNOSIS — E66.09 CLASS 2 OBESITY DUE TO EXCESS CALORIES WITHOUT SERIOUS COMORBIDITY WITH BODY MASS INDEX (BMI) OF 36.0 TO 36.9 IN ADULT: Primary | ICD-10-CM

## 2024-07-12 DIAGNOSIS — Z00.00 HEALTHCARE MAINTENANCE: ICD-10-CM

## 2024-07-12 DIAGNOSIS — F43.23 ADJUSTMENT DISORDER WITH MIXED ANXIETY AND DEPRESSED MOOD: ICD-10-CM

## 2024-07-12 NOTE — ASSESSMENT & PLAN NOTE
Has been tolerating current doses of phentermine 15 mg and topiramate 25 mg without difficulty  --we discussed remote weight management program, but she wants to continue her current regimen as a more affordable option

## 2024-07-12 NOTE — PATIENT INSTRUCTIONS
PLEASE REMEMBER TO [facility] US FIRST with any medical questions or concerns. We try very hard to keep you out of the emergency room if we are able to see you and help with your concern. It is one of our many ways to keep our patients healthy.   For now the best way to reach us is by calling [facility]'s direct number:  CALL OUR OFFICE AT: [phone]    You can also schedule with us through the portal.   Dr. Meliton Ibrahim and DAVIDE Jc are both here and available most days of the week if you need to be seen in person.  I am here every Friday but can also make myself available on other days if necessary as long as I have enough notice.  Please do not ever hesitate to reach to me via the patient portal.      Please let us know if you have any challenges getting scheduled with our behavioral therapist/ or dietitian or if you have any difficulty with any new medication(s) that were prescribed.     Dr. KUMAR

## 2024-07-18 ENCOUNTER — OFFICE VISIT (OUTPATIENT)
Dept: OBSTETRICS AND GYNECOLOGY | Facility: CLINIC | Age: 39
End: 2024-07-18
Payer: COMMERCIAL

## 2024-07-18 VITALS
SYSTOLIC BLOOD PRESSURE: 124 MMHG | BODY MASS INDEX: 36.07 KG/M2 | HEIGHT: 66 IN | DIASTOLIC BLOOD PRESSURE: 76 MMHG | WEIGHT: 224.44 LBS

## 2024-07-18 DIAGNOSIS — Z12.4 ENCOUNTER FOR SCREENING FOR CERVICAL CANCER: ICD-10-CM

## 2024-07-18 DIAGNOSIS — Z01.419 WELL WOMAN EXAM WITH ROUTINE GYNECOLOGICAL EXAM: Primary | ICD-10-CM

## 2024-07-18 PROCEDURE — 3008F BODY MASS INDEX DOCD: CPT | Mod: CPTII,S$GLB,, | Performed by: OBSTETRICS & GYNECOLOGY

## 2024-07-18 PROCEDURE — 87624 HPV HI-RISK TYP POOLED RSLT: CPT | Performed by: OBSTETRICS & GYNECOLOGY

## 2024-07-18 PROCEDURE — 3044F HG A1C LEVEL LT 7.0%: CPT | Mod: CPTII,S$GLB,, | Performed by: OBSTETRICS & GYNECOLOGY

## 2024-07-18 PROCEDURE — 3074F SYST BP LT 130 MM HG: CPT | Mod: CPTII,S$GLB,, | Performed by: OBSTETRICS & GYNECOLOGY

## 2024-07-18 PROCEDURE — 88175 CYTOPATH C/V AUTO FLUID REDO: CPT | Performed by: OBSTETRICS & GYNECOLOGY

## 2024-07-18 PROCEDURE — 3078F DIAST BP <80 MM HG: CPT | Mod: CPTII,S$GLB,, | Performed by: OBSTETRICS & GYNECOLOGY

## 2024-07-18 PROCEDURE — 1159F MED LIST DOCD IN RCRD: CPT | Mod: CPTII,S$GLB,, | Performed by: OBSTETRICS & GYNECOLOGY

## 2024-07-18 PROCEDURE — 99999 PR PBB SHADOW E&M-EST. PATIENT-LVL IV: CPT | Mod: PBBFAC,,, | Performed by: OBSTETRICS & GYNECOLOGY

## 2024-07-18 PROCEDURE — 99396 PREV VISIT EST AGE 40-64: CPT | Mod: S$GLB,,, | Performed by: OBSTETRICS & GYNECOLOGY

## 2024-07-18 NOTE — PROGRESS NOTES
History & Physical  Gynecology      SUBJECTIVE:     Chief Complaint: Well Woman       History of Present Illness:  Annual Exam-Premenopausal  Patient presents for annual exam. Patient is taking phentermine and topiramate for weightloss.  Because of this she had a cycle with pain recently.  She is taking aygestin, micronor, and premarin. The patient is sexually active. GYN screening history: last pap: approximate date 2019 paphpv and was normal. The patient participates in regular exercise: yes.  Smoking status:  no     Fertility: recently had surgery with Dr. Gao who found dense adhesions and fibroids.  She then met with Dr. Doherty.  AMH was lower than before.  He was concerned about the risks with egg retrieval and so he recommended at this time was egg donor.  At this point, she is hoping to start fertility next year once she has a different job.  The egg donor is going to increase the cost.  She is also considering adoption.    Contraception: POP  MM2024    FH:  Breast cancer: pat aunt  Colon cancer: neg  Ovarian cancer: neg    Review of patient's allergies indicates:   Allergen Reactions    Metoclopramide Other (See Comments)     Causes tongue to roll back in mouth       Past Medical History:   Diagnosis Date    Acne vulgaris 2018    Allergic rhinitis due to dust mite 2019    Asthma     Eczema     Endometriosis 2018    Fibroid     Laryngopharyngeal reflux     Migraine syndrome     associated with flu vaccines    Sleep apnea      Past Surgical History:   Procedure Laterality Date    DIAGNOSTIC LAPAROSCOPY Bilateral 5/15/2023    Procedure: LAPAROSCOPY, DIAGNOSTIC;  Surgeon: Raffi Gao MD;  Location: [facility];  Service: OB/GYN;  Laterality: Bilateral;    ESOPHAGOGASTRODUODENOSCOPY N/A 2023    Procedure: EGD (ESOPHAGOGASTRODUODENOSCOPY);  Surgeon: Mitchel Abreu MD;  Location: [facility];  Service: Endoscopy;  Laterality: N/A;    HERNIA REPAIR      umbilical     LAPAROSCOPIC APPENDECTOMY N/A 2021    Procedure: APPENDECTOMY, LAPAROSCOPIC WITH LYSIS OF ADHESIONS.;  Surgeon: Trent Cannon MD;  Location: [facility] OR [address] [place] [address];  Service: General;  Laterality: N/A;    LAPAROSCOPIC LYSIS OF ADHESIONS N/A 5/15/2023    Procedure: LYSIS, ADHESIONS, LAPAROSCOPIC;  Surgeon: Raffi Gao MD;  Location: [facility] OR;  Service: OB/GYN;  Laterality: N/A;     OB History          0    Para   0    Term   0       0    AB   0    Living   0         SAB   0    IAB   0    Ectopic   0    Multiple   0    Live Births   0               Family History   Problem Relation Name Age of Onset    Hypertension Mother      Allergic rhinitis Father      Allergies Father      Diabetes Father      Hypertension Father      Stroke Father      Hyperlipidemia Father      Allergic rhinitis Brother      Eczema Brother      Conjunctivitis Brother      No Known Problems Brother      Breast cancer Neg Hx      Colon cancer Neg Hx      Ovarian cancer Neg Hx      Melanoma Neg Hx      Blindness Neg Hx      Amblyopia Neg Hx      Cataracts Neg Hx      Glaucoma Neg Hx      Macular degeneration Neg Hx      Retinal detachment Neg Hx      Strabismus Neg Hx       labor Neg Hx      Cancer Neg Hx      Eclampsia Neg Hx       Social History     Tobacco Use    Smoking status: Never     Passive exposure: Never    Smokeless tobacco: Never   Substance Use Topics    Alcohol use: No    Drug use: No       Current Outpatient Medications   Medication Sig    albuterol (VENTOLIN HFA) 90 mcg/actuation inhaler Inhale 2 puffs into the lungs every 6 (six) hours as needed for Wheezing. Rescue    ascorbic acid, vitamin C, (VITAMIN C) 1000 MG tablet Take 2 tablets (2,000 mg total) by mouth once daily.    azelastine (ASTELIN) 137 mcg (0.1 %) nasal spray 1 spray (137 mcg total) by Nasal route 2 (two) times daily.    conjugated estrogens (PREMARIN) vaginal cream Use dime size amount of estrogen cream in vagina nightly for 2 weeks, then  twice a week thereafter.    diltiazem HCl (DILTIAZEM 2% CREAM) Apply TO THE anal AREA THREE TIMES DAILY    fluticasone propionate (FLONASE) 50 mcg/actuation nasal spray 2 sprays (100 mcg total) by Each Nostril route once daily.    naproxen (NAPROSYN) 250 MG tablet Take 250 mg by mouth 2 (two) times daily.    norethindrone (AYGESTIN) 5 mg Tab Take 1 tablet (5 mg total) by mouth once daily.    norethindrone (MICRONOR) 0.35 mg tablet Take 1 tablet (0.35 mg total) by mouth once daily.    olopatadine (PATANOL) 0.1 % ophthalmic solution 1 drop 2 (two) times daily.    omeprazole (PRILOSEC) 40 MG capsule Take 1 capsule (40 mg total) by mouth every morning.    phentermine 15 MG capsule Take 1 capsule (15 mg total) by mouth every morning Check blood pressure and heart rate three times weekly while taking this medication and notify MD if BP > 140/90 or HR > 100    topiramate (TOPAMAX) 25 MG tablet Take 1 tablet (25 mg total) by mouth every evening.     No current facility-administered medications for this visit.         Review of Systems:  Review of Systems   Constitutional: Negative for appetite change, fever and unexpected weight change.   Respiratory: Negative for shortness of breath.    Cardiovascular: Negative for chest pain.   Gastrointestinal: Negative for nausea and vomiting.   Genitourinary: Positive for menstrual problem and pelvic pain. Negative for menorrhagia, vaginal bleeding, vaginal discharge and vaginal pain.   Integumentary:  Negative for breast mass.   Breast: Negative for lump and mass       OBJECTIVE:     Physical Exam:  Physical Exam  Vitals and nursing note reviewed.   Constitutional:       Appearance: She is well-developed.   Neck:      Thyroid: No thyromegaly.      Trachea: No tracheal deviation.   Chest:      Breasts: Breasts are symmetrical.         Right: No inverted nipple, mass, nipple discharge, skin change or tenderness.         Left: No inverted nipple, mass, nipple discharge, skin change or  tenderness.   Abdominal:      Palpations: Abdomen is soft.   Genitourinary:     General: Normal vulva.      Labia:         Right: No rash, tenderness, lesion or injury.         Left: No rash, tenderness, lesion or injury.       Urethra: No prolapse, urethral pain, urethral swelling or urethral lesion.      Vagina: Normal. No signs of injury and foreign body. No vaginal discharge, erythema, tenderness or bleeding.      Cervix: No cervical motion tenderness, discharge or friability.      Uterus: Not deviated, not enlarged, not fixed and not tender.       Adnexa:         Right: No mass, tenderness or fullness.          Left: No mass, tenderness or fullness.        Rectum: No anal fissure or external hemorrhoid.      Comments: Urethral meatus: normal size, anterior vaginal wall with no prolapse, no lesions  Bladder: no fullness, masses or tenderness  Musculoskeletal:      Cervical back: Normal range of motion and neck supple.   Neurological:      Mental Status: She is alert and oriented to person, place, and time.   Psychiatric:         Behavior: Behavior normal.         Thought Content: Thought content normal.         Judgment: Judgment normal.         Chaperoned by: Vera    ASSESSMENT:       ICD-10-CM ICD-9-CM    1. Well woman exam with routine gynecological exam  Z01.419 V72.31 CANCELED: Liquid-Based Pap Smear, Screening      CANCELED: HPV High Risk Genotypes, PCR      2. Encounter for screening for cervical cancer  Z12.4 V76.2 Ambulatory referral/consult to Gynecology               Plan:      Joan was seen today for well woman.    Diagnoses and all orders for this visit:    Well woman exam with routine gynecological exam  -     Cancel: Liquid-Based Pap Smear, Screening  -     Cancel: HPV High Risk Genotypes, PCR    Encounter for screening for cervical cancer  -     Ambulatory referral/consult to Gynecology    Other orders  -     Pap Smear, Thin Prep  -     HPV DNA, High Risk with Reflex to Genotype  16,18          Orders Placed This Encounter   Procedures    HPV DNA, High Risk with Reflex to Genotype 16,18       Well Woman:  - Pap smear up to date  - Birth control: POP refilled; aygestin, vaginal estrogen refilled;   - GC/CT:n/a  - Mammogram: up to date  - Smoking cessation: n/a  - Labs: none required   - Vaccines: not discussed  - Exercise counseling      Follow up in  one year for annual or prn.    Rani Ambrocio

## 2024-07-23 LAB
CLINICAL INFO: NORMAL
CYTO CVX: NORMAL
CYTOLOGIST CVX/VAG CYTO: NORMAL
CYTOLOGIST CVX/VAG CYTO: NORMAL
CYTOLOGY CMNT CVX/VAG CYTO-IMP: NORMAL
CYTOLOGY PAP THIN PREP EXPLANATION: NORMAL
DATE OF PREVIOUS PAP: NO
DATE PREVIOUS BX: NO
GEN CATEG CVX/VAG CYTO-IMP: NORMAL
HPV I/H RISK 4 DNA CVX QL NAA+PROBE: NOT DETECTED
LMP START DATE: NORMAL
MICROORGANISM CVX/VAG CYTO: NORMAL
PATHOLOGIST CVX/VAG CYTO: NORMAL
SERVICE CMNT-IMP: NORMAL
SPECIMEN SOURCE CVX/VAG CYTO: NORMAL
STAT OF ADQ CVX/VAG CYTO-IMP: NORMAL

## 2024-07-26 ENCOUNTER — CLINICAL SUPPORT (OUTPATIENT)
Dept: PRIMARY CARE CLINIC | Facility: CLINIC | Age: 39
End: 2024-07-26
Payer: COMMERCIAL

## 2024-07-26 DIAGNOSIS — F43.23 ADJUSTMENT DISORDER WITH MIXED ANXIETY AND DEPRESSED MOOD: Primary | ICD-10-CM

## 2024-07-26 NOTE — PROGRESS NOTES
"Individual Psychotherapy (LCSW/PhD)  Joan Wilde,  7/26/2024    Site:  Telemed         Therapeutic Intervention: Met with patient for individual psychotherapy.    Chief complaint/reason for encounter: adjustment     Interval history and content of current session: Pt states they have been doing well overall. Pt reports overall "ok" mood. LCSW and pt discussed barriers to further improvement in mood particularly interpersonal issues at work. Pt and LCSW discussed pt's frustrations with fellow staff and feelings of worry related to work boundaries.    LCSW supported pt in processing feelings of frustration and worry related. Pt states they feel "tired" and feel like they are overwhelmed with interactions between they and staff. LCSW and pt discussed strategies for engaging with in difficult conversations. Pt expressed frustration at perceived lack of support and issues of trust at work. LCSW and pt discussed pt's goals and possible need for career change.    Treatment plan:  Target symptoms: adjustment, work stress  Why chosen therapy is appropriate versus another modality: relevant to diagnosis, evidence based practice  Outcome monitoring methods: self-report, checklist/rating scale  Therapeutic intervention type: behavior modifying psychotherapy, supportive psychotherapy    Risk parameters:  Patient reports no suicidal ideation  Patient reports no homicidal ideation  Patient reports no self-injurious behavior  Patient reports no violent behavior    Verbal deficits: None    Patient's response to intervention:  The patient's response to intervention is accepting.    Progress toward goals and other mental status changes:  The patient's progress toward goals is good.    Diagnosis:   No diagnosis found.    Plan: Pt plans to continue individual psychotherapy    Return to clinic: as scheduled    Length of Service (minutes): 30      Answers submitted by the patient for this visit:  Review of Systems " Questionnaire (Submitted on 7/26/2024)  activity change: No  unexpected weight change: No  neck pain: No  hearing loss: No  rhinorrhea: No  trouble swallowing: No  eye discharge: No  visual disturbance: No  chest tightness: No  wheezing: No  chest pain: No  palpitations: No  blood in stool: No  constipation: No  vomiting: No  diarrhea: No  polydipsia: No  polyuria: No  difficulty urinating: No  hematuria: No  menstrual problem: No  dysuria: No  joint swelling: No  arthralgias: No  headaches: No  weakness: No  confusion: No  dysphoric mood: No

## 2024-07-30 ENCOUNTER — CLINICAL SUPPORT (OUTPATIENT)
Dept: PRIMARY CARE CLINIC | Facility: CLINIC | Age: 39
End: 2024-07-30
Payer: COMMERCIAL

## 2024-07-30 DIAGNOSIS — F43.23 ADJUSTMENT DISORDER WITH MIXED ANXIETY AND DEPRESSED MOOD: Primary | ICD-10-CM

## 2024-07-30 PROCEDURE — 90837 PSYTX W PT 60 MINUTES: CPT | Mod: 95,,, | Performed by: SOCIAL WORKER

## 2024-07-30 NOTE — PROGRESS NOTES
"Individual Psychotherapy (LCSW/PhD)  Joan Wilde,  7/30/2024    Site:  Telemed         Therapeutic Intervention: Met with patient for individual psychotherapy.    Chief complaint/reason for encounter: depression and anxiety     Interval history and content of current session: Pt provided update on work issues including issues with supervisor. Pt states they feel frustrated by meeting with supervisor. LCSW supported pt in processing feelings related to work. Pt states that they feel like they feel targeted by others at work. LCSW and pt discussed triggers for these feelings and thoughts about their future at work. LCSW asked about pt's communication with current supervisor. LCSW inquired about pt addressing concerns with current supervisor. Pt states "why would I do that?". LCSW and pt discussed pt's apprehension about doing so also including creating more conflict. LCSW discussed pt advocating for their role despite feelings.Pt states they are not comfortable doing so right now.    Pt states their  is worried about issues at work and the impact on the pt's future. LCSW supported pt in processing feelings of frustration.    Treatment plan:  Target symptoms: adjustment  Why chosen therapy is appropriate versus another modality: relevant to diagnosis, evidence based practice  Outcome monitoring methods: self-report, checklist/rating scale  Therapeutic intervention type: behavior modifying psychotherapy, supportive psychotherapy    Risk parameters:  Patient reports no suicidal ideation  Patient reports no homicidal ideation  Patient reports no self-injurious behavior  Patient reports no violent behavior    Verbal deficits: None    Patient's response to intervention:  The patient's response to intervention is accepting.    Progress toward goals and other mental status changes:  The patient's progress toward goals is good.    Diagnosis:   No diagnosis found.    Plan: Pt plans to continue individual " psychotherapy    Return to clinic: as scheduled    Length of Service (minutes): 30    Answers submitted by the patient for this visit:  Review of Systems Questionnaire (Submitted on 7/30/2024)  activity change: No  unexpected weight change: No  neck pain: No  hearing loss: No  rhinorrhea: No  trouble swallowing: No  eye discharge: No  visual disturbance: No  chest tightness: No  wheezing: No  chest pain: No  palpitations: No  blood in stool: No  constipation: No  vomiting: No  diarrhea: No  polydipsia: No  polyuria: No  difficulty urinating: No  hematuria: No  menstrual problem: No  dysuria: No  joint swelling: No  arthralgias: No  headaches: No  weakness: No  confusion: No  dysphoric mood: No

## 2024-08-02 ENCOUNTER — CLINICAL SUPPORT (OUTPATIENT)
Dept: PRIMARY CARE CLINIC | Facility: CLINIC | Age: 39
End: 2024-08-02
Payer: COMMERCIAL

## 2024-08-02 ENCOUNTER — PATIENT MESSAGE (OUTPATIENT)
Dept: PRIMARY CARE CLINIC | Facility: CLINIC | Age: 39
End: 2024-08-02

## 2024-08-02 DIAGNOSIS — F43.23 ADJUSTMENT DISORDER WITH MIXED ANXIETY AND DEPRESSED MOOD: Primary | ICD-10-CM

## 2024-08-02 NOTE — PROGRESS NOTES
Individual Psychotherapy (LCSW/PhD)  oJan Wilde,  8/2/2024    Site:  Telemed         Therapeutic Intervention: Met with patient for individual psychotherapy.    Chief complaint/reason for encounter: interpersonal     Interval history and content of current session: Pt states they have made the decision to move on from the current position. Pt is working to try to move to another department as they feel unsupported. Pt states they feel supported by  and feel like they are understanding.     LCSW supported pt in processing frustration. LCSW and pt discussed barriers to pt advocating for their self. Pt reports they feel like it is difficult to trust their leadership. LCSW and pt discussed possible impact of situation on pt's VISA status.    Treatment plan:  Target symptoms: work stress  Why chosen therapy is appropriate versus another modality: relevant to diagnosis, evidence based practice  Outcome monitoring methods: self-report, checklist/rating scale  Therapeutic intervention type: supportive psychotherapy    Risk parameters:  Patient reports no suicidal ideation  Patient reports no homicidal ideation  Patient reports no self-injurious behavior  Patient reports no violent behavior    Verbal deficits: None    Patient's response to intervention:  The patient's response to intervention is accepting.    Progress toward goals and other mental status changes:  The patient's progress toward goals is fair .    Diagnosis:   No diagnosis found.    Plan: Pt plans to continue individual psychotherapy    Return to clinic: as scheduled    Length of Service (minutes): 30      Answers submitted by the patient for this visit:  Review of Systems Questionnaire (Submitted on 8/2/2024)  activity change: No  unexpected weight change: No  neck pain: No  hearing loss: No  rhinorrhea: No  trouble swallowing: No  eye discharge: No  visual disturbance: No  chest tightness: No  wheezing: No  chest pain: No  palpitations:  No  blood in stool: No  constipation: No  vomiting: No  diarrhea: No  polydipsia: No  polyuria: No  difficulty urinating: No  hematuria: No  menstrual problem: No  dysuria: No  joint swelling: No  arthralgias: No  headaches: No  weakness: No  confusion: No  dysphoric mood: No

## 2024-08-06 ENCOUNTER — NUTRITION (OUTPATIENT)
Dept: INTERNAL MEDICINE | Facility: CLINIC | Age: 39
End: 2024-08-06
Payer: COMMERCIAL

## 2024-08-06 ENCOUNTER — PATIENT MESSAGE (OUTPATIENT)
Dept: OBSTETRICS AND GYNECOLOGY | Facility: CLINIC | Age: 39
End: 2024-08-06
Payer: COMMERCIAL

## 2024-08-06 DIAGNOSIS — N80.9 ENDOMETRIOSIS: ICD-10-CM

## 2024-08-06 DIAGNOSIS — E66.9 OBESITY (BMI 30-39.9): ICD-10-CM

## 2024-08-06 DIAGNOSIS — Z71.3 DIETARY COUNSELING: Primary | ICD-10-CM

## 2024-08-06 DIAGNOSIS — N94.10 FEMALE DYSPAREUNIA: ICD-10-CM

## 2024-08-06 PROCEDURE — 99999 PR PBB SHADOW E&M-EST. PATIENT-LVL I: CPT | Mod: PBBFAC,,, | Performed by: DIETITIAN, REGISTERED

## 2024-08-06 PROCEDURE — 97803 MED NUTRITION INDIV SUBSEQ: CPT | Mod: S$GLB,,, | Performed by: DIETITIAN, REGISTERED

## 2024-08-07 RX ORDER — NORETHINDRONE 0.35 MG/1
1 TABLET ORAL DAILY
Qty: 28 TABLET | Refills: 12 | Status: SHIPPED | OUTPATIENT
Start: 2024-08-07 | End: 2025-08-07

## 2024-08-08 ENCOUNTER — PATIENT MESSAGE (OUTPATIENT)
Dept: OBSTETRICS AND GYNECOLOGY | Facility: CLINIC | Age: 39
End: 2024-08-08
Payer: COMMERCIAL

## 2024-08-08 DIAGNOSIS — N80.9 ENDOMETRIOSIS: ICD-10-CM

## 2024-08-08 RX ORDER — NORETHINDRONE 5 MG/1
5 TABLET ORAL DAILY
Qty: 30 TABLET | Refills: 11 | Status: SHIPPED | OUTPATIENT
Start: 2024-08-08 | End: 2025-08-08

## 2024-08-09 ENCOUNTER — PATIENT MESSAGE (OUTPATIENT)
Dept: PRIMARY CARE CLINIC | Facility: CLINIC | Age: 39
End: 2024-08-09
Payer: COMMERCIAL

## 2024-08-09 DIAGNOSIS — E66.09 CLASS 2 OBESITY DUE TO EXCESS CALORIES WITHOUT SERIOUS COMORBIDITY WITH BODY MASS INDEX (BMI) OF 36.0 TO 36.9 IN ADULT: ICD-10-CM

## 2024-08-09 RX ORDER — PHENTERMINE HYDROCHLORIDE 15 MG/1
15 CAPSULE ORAL EVERY MORNING
Qty: 30 CAPSULE | Refills: 1 | Status: SHIPPED | OUTPATIENT
Start: 2024-08-09 | End: 2024-10-08

## 2024-08-09 RX ORDER — PHENTERMINE HYDROCHLORIDE 15 MG/1
15 CAPSULE ORAL EVERY MORNING
Qty: 30 CAPSULE | Refills: 1 | Status: CANCELLED | OUTPATIENT
Start: 2024-08-09 | End: 2024-10-08

## 2024-08-16 ENCOUNTER — CLINICAL SUPPORT (OUTPATIENT)
Dept: PRIMARY CARE CLINIC | Facility: CLINIC | Age: 39
End: 2024-08-16
Payer: COMMERCIAL

## 2024-08-16 DIAGNOSIS — F43.23 ADJUSTMENT DISORDER WITH MIXED ANXIETY AND DEPRESSED MOOD: Primary | ICD-10-CM

## 2024-08-16 NOTE — PROGRESS NOTES
Individual Psychotherapy (LCSW/PhD)  Joan Wilde,  8/16/2024    Site:  Telemed         Therapeutic Intervention: Met with patient for individual psychotherapy.    Chief complaint/reason for encounter: anxiety and interpersonal     Interval history and content of current session: Pt provided update on work stressors. Pt has been thinking about changing departments as they don't feel supported. LCSW supported pt in processing feelings and discussing areas of concerns. LCSW and pt discussed VISA status and impact of work stress on pt's home life and relationship. LCSW and pt discussed pt's triggers including feeling disrespected. LCSW and pt discussed more helpful ways to manage triggers and reactions.    Treatment plan:  Target symptoms: work stress  Why chosen therapy is appropriate versus another modality: relevant to diagnosis, evidence based practice  Outcome monitoring methods: self-report, checklist/rating scale  Therapeutic intervention type: supportive psychotherapy    Risk parameters:  Patient reports no suicidal ideation  Patient reports no homicidal ideation  Patient reports no self-injurious behavior  Patient reports no violent behavior    Verbal deficits: None    Patient's response to intervention:  The patient's response to intervention is accepting.    Progress toward goals and other mental status changes:  The patient's progress toward goals is good.    Diagnosis:   No diagnosis found.    Plan: Pt plans to continue individual psychotherapy    Return to clinic: as scheduled    Length of Service (minutes): 30

## 2024-09-03 ENCOUNTER — NUTRITION (OUTPATIENT)
Dept: INTERNAL MEDICINE | Facility: CLINIC | Age: 39
End: 2024-09-03
Payer: COMMERCIAL

## 2024-09-03 DIAGNOSIS — E66.9 OBESITY (BMI 30-39.9): ICD-10-CM

## 2024-09-03 DIAGNOSIS — Z71.3 DIETARY COUNSELING: Primary | ICD-10-CM

## 2024-09-03 PROCEDURE — 99999 PR PBB SHADOW E&M-EST. PATIENT-LVL I: CPT | Mod: PBBFAC,,, | Performed by: DIETITIAN, REGISTERED

## 2024-09-03 PROCEDURE — 97803 MED NUTRITION INDIV SUBSEQ: CPT | Mod: S$GLB,,, | Performed by: DIETITIAN, REGISTERED

## 2024-09-03 NOTE — PROGRESS NOTES
"Nutrition Assessment  Session Time:  45 minutes      Client name:  Joan Wilde  :  1985  Age:  39 y.o.  Gender:  female    Client states:  Very pleasant OTC patient here for nutrition reassessment.  [facility] scale revealed weight maintenance since last visit although home scale represents weight loss (216-218#).  Since previous visit, continues to walk for 30 minutes 5x/week in addition to performing strength training for 15 minutes 2x/week.  Is mindful of physical activity, sharing recent encounter in which she worked late and thereafter, increased exercise the next day to compensate for increased sedentary time.  With such exercise consistency, has since noticed improvements in body composition, specifically waist circumference.  Is also making intentional efforts to incorporate more vegetables with meals for increased fiber intake.  Expresses more self-awareness regarding nutrition and physical activity and feels stronger, both physically and mentally.  Although stress persists at work, finds that she is coping with it better.  BP and chest pain have both improved and/or resolved.  Wishes to eat dinner earlier, noting timeframe from when she gets off followed by exercise and then, cooking.  Also, wishes to increase walking to an hour 5x/week once the weather cools down.        Anthropometrics  Height:  5' 6"     Weight:  221.8#   BMI:  35.7  % Body Fat:  Unable to assess    Clinical Signs/Symptoms  N/V/D:  None  Appetite:  good       Past Medical History:   Diagnosis Date    Acne vulgaris 2018    Allergic rhinitis due to dust mite 2019    Asthma     Eczema     Endometriosis 2018    Fibroid     Laryngopharyngeal reflux     Migraine syndrome     associated with flu vaccines    Sleep apnea        Past Surgical History:   Procedure Laterality Date    DIAGNOSTIC LAPAROSCOPY Bilateral 5/15/2023    Procedure: LAPAROSCOPY, DIAGNOSTIC;  Surgeon: Raffi Gao MD;  Location: [facility] OR; "  Service: OB/GYN;  Laterality: Bilateral;    ESOPHAGOGASTRODUODENOSCOPY N/A 12/7/2023    Procedure: EGD (ESOPHAGOGASTRODUODENOSCOPY);  Surgeon: Mitchel Abreu MD;  Location: [facility];  Service: Endoscopy;  Laterality: N/A;    HERNIA REPAIR  2014    umbilical    LAPAROSCOPIC APPENDECTOMY N/A 4/21/2021    Procedure: APPENDECTOMY, LAPAROSCOPIC WITH LYSIS OF ADHESIONS.;  Surgeon: Trent Cannon MD;  Location: [address];  Service: General;  Laterality: N/A;    LAPAROSCOPIC LYSIS OF ADHESIONS N/A 5/15/2023    Procedure: LYSIS, ADHESIONS, LAPAROSCOPIC;  Surgeon: Raffi Gao MD;  Location: [facility];  Service: OB/GYN;  Laterality: N/A;       Medications    has a current medication list which includes the following prescription(s): albuterol, ascorbic acid (vitamin c), azelastine, conjugated estrogens, diltiazem hcl, fluticasone propionate, naproxen, norethindrone, norethindrone, olopatadine, omeprazole, phentermine, and topiramate.    Vitamins, Minerals, and/or Supplements:  *Please see list above     Food/Medication Interactions:  Reviewed     Food Allergies or Intolerances:  NKFA     Social History    Marital status:    Employment:  Clinical Analyst [employer]    Social History     Tobacco Use    Smoking status: Never     Passive exposure: Never    Smokeless tobacco: Never   Substance Use Topics    Alcohol use: No        Lab Reports   Sodium   Date Value Ref Range Status   04/11/2024 137 136 - 145 mmol/L Final     Potassium   Date Value Ref Range Status   04/11/2024 4.0 3.5 - 5.1 mmol/L Final     Chloride   Date Value Ref Range Status   04/11/2024 106 95 - 110 mmol/L Final     CO2   Date Value Ref Range Status   04/11/2024 22 (L) 23 - 29 mmol/L Final     Glucose   Date Value Ref Range Status   04/11/2024 82 70 - 110 mg/dL Final     BUN   Date Value Ref Range Status   04/11/2024 12 6 - 20 mg/dL Final     Creatinine   Date Value Ref Range Status   04/11/2024 1.0 0.5 - 1.4 mg/dL Final      Calcium   Date Value Ref Range Status   04/11/2024 9.7 8.7 - 10.5 mg/dL Final     Total Protein   Date Value Ref Range Status   03/14/2024 6.8 6.0 - 8.4 g/dL Final     Albumin   Date Value Ref Range Status   03/14/2024 3.6 3.5 - 5.2 g/dL Final     Total Bilirubin   Date Value Ref Range Status   03/14/2024 0.2 0.1 - 1.0 mg/dL Final     Comment:     For infants and newborns, interpretation of results should be based  on gestational age, weight and in agreement with clinical  observations.    Premature Infant recommended reference ranges:  Up to 24 hours.............<8.0 mg/dL  Up to 48 hours............<12.0 mg/dL  3-5 days..................<15.0 mg/dL  6-29 days.................<15.0 mg/dL       Alkaline Phosphatase   Date Value Ref Range Status   03/14/2024 59 55 - 135 U/L Final     AST   Date Value Ref Range Status   03/14/2024 28 10 - 40 U/L Final     ALT   Date Value Ref Range Status   03/14/2024 46 (H) 10 - 44 U/L Final     Anion Gap   Date Value Ref Range Status   04/11/2024 9 8 - 16 mmol/L Final     eGFR if    Date Value Ref Range Status   05/30/2022 >60.0 >60 mL/min/1.73 m^2 Final     eGFR if non    Date Value Ref Range Status   05/30/2022 >60.0 >60 mL/min/1.73 m^2 Final     Comment:     Calculation used to obtain the estimated glomerular filtration  rate (eGFR) is the CKD-EPI equation.         Lab Results   Component Value Date    WBC 4.92 03/27/2024    HGB 12.7 03/27/2024    HCT 38.1 03/27/2024    MCV 75 (L) 03/27/2024     03/27/2024        Lab Results   Component Value Date    CHOL 151 03/03/2023     Lab Results   Component Value Date    HDL 43 03/03/2023     Lab Results   Component Value Date    LDLCALC 98.4 03/03/2023     Lab Results   Component Value Date    TRIG 48 03/03/2023     Lab Results   Component Value Date    CHOLHDL 28.5 03/03/2023     Lab Results   Component Value Date    HGBA1C 5.5 04/11/2024     BP Readings from Last 1 Encounters:   07/18/24 124/76        Food History  Breakfast:  Skips  Mid-morning Snack:  None  Lunch:  Homemade muffin comprised of  ¼ cup ground flax seeds, 1 tsp vanilla, 1 tsp cinnamon, 1 tsp baking soda, walnuts, blueberries, 1.5 tsp Stevia, 1 Tbsp EVOO, and 1 egg/homemade smoothie comprised of 1.25 cups pea protein milk, ¾ cup kefir (8 gm RPO), 1 Tbsp psyllium husk, 1 scoop collagen powder (10 gm PRO), ½ Tbsp honey, ½ Tbsp almond butter (2 gm PRO), and handful of blueberries/eggs + kale + 1 slice Carlos Alberto toast  Mid-afternoon Snack:  None    Dinner:  6-8 oz baked salmon + fries + broccoli/homemade fried rice made with onions, green onions, corned beef, boiled eggs, etc.  Snack:  None  *Fluid intake:  120 oz water + tea (tultulsi tea, green tea, black or lemon grass tea)    Exercise History:  15 minutes weight training 2x/week + 30 minutes walking 5x/week    Cultural/Spiritual/Personal Preferences:  None identified    Support System:  spouse    State of Change:  Action    Barriers to Change:  None    Diagnosis    Overweight related to imbalanced meal pattern and [previous] lack of exercise variation as evidenced by BMI:  35.7.    Intervention    RMR (Method:  Giuseppe Davila):  1713 kcal  Activity Factor:  1.4    LYNDON:  2398 - 500 = 1898 kcal    Goals:  1.  Aim for 0.5-1# weight loss/week  2.  Increase walking to 35 minutes 5x/week as tolerated  3.  Continue strength training 2x/week as tolerated  4.  Eat dinner earlier via meal planning/prepping on weekends and delegating tasks to spouse    Nutrition Education  The following education was provided to the patient:  Complimented patient on proactive role in health maintenance.  Complimented patient on dietary compliance/modifications and resulting health improvements.  Complimented patient on physical activity efforts.  Discussed meal planning/USDA's My Plate design.  Discussed weight management.  Discussed HTN Nutrition Therapy.  Suggested dietary modifications based on current dietary  behaviors and individual food preferences.  Discussed physical activity guidelines and its associated benefits.  Discussed nutrition-related lab values and dietary and/or lifestyle factors affecting them.  Discussed recommended servings of non-starchy vegetables/day and food sources of such.  Discussed goal setting.  Provided ongoing support, encouragement, and guidance toward improved health efforts.    Patient verbalized understanding of nutrition education and recommendations received.    Handouts Provided  No new handouts were provided.    Monitoring/Evaluation    Monitor the following:  Weight  BMI  Caloric intake    Follow Up Plan:  Communication with referring healthcare provider is unnecessary at this time.  However, will follow up with patient in 4 weeks.

## 2024-09-04 ENCOUNTER — PATIENT MESSAGE (OUTPATIENT)
Dept: GASTROENTEROLOGY | Facility: CLINIC | Age: 39
End: 2024-09-04
Payer: COMMERCIAL

## 2024-09-04 DIAGNOSIS — K21.9 GASTROESOPHAGEAL REFLUX DISEASE, UNSPECIFIED WHETHER ESOPHAGITIS PRESENT: Primary | ICD-10-CM

## 2024-09-05 RX ORDER — FAMOTIDINE 40 MG/1
40 TABLET, FILM COATED ORAL NIGHTLY
Qty: 90 TABLET | Refills: 3 | Status: SHIPPED | OUTPATIENT
Start: 2024-09-05 | End: 2025-09-05

## 2024-09-12 ENCOUNTER — DOCUMENTATION ONLY (OUTPATIENT)
Dept: PRIMARY CARE CLINIC | Facility: CLINIC | Age: 39
End: 2024-09-12
Payer: COMMERCIAL

## 2024-09-12 NOTE — PROGRESS NOTES
"Individual Psychotherapy (LCSW/PhD)  Joan Wilde,  9/12/2024    Site:  Telemed         Therapeutic Intervention: Met with patient for individual psychotherapy.    Chief complaint/reason for encounter: anxiety and interpersonal     Interval history and content of current session: Pt provided update regarding challenges at work. Pt states they often feel unsupported by manager and feel anxious about seeking support from manager. Pt also reports somatic feelings of anxiety especially when attending meetings. Pt reports feelings of mistrust regarding superiors at work. LCSW used CBT concepts to help pt identify unhelpful feelings and thoughts. LCSW supported pt in processing feelings of anxiety.    LCSW and pt discussed possible approaches for pt to increase engagement and communication with manager. LCSW encouraged pt to monitor negative thoughts and "black and white" thinking. LCSW and pt discussed anxious thoughts and importance of acknowledging anxious thoughts and reflecting on helpful thoughts vs. Unhelpful thoughts. LCSW supported pt in processing feelings and emotions to better cope with work situation.     Pt states they have alos been reaching out to their father for support. Pt states that they do not feel support from father and feel judgement. LCSW and pt discussed triggers between they and father and barriers to better support. LCSW and pt discussed trauma and impact of traumatic experiences on family functioning.  Treatment plan:  Target symptoms: anxiety , work stress  Why chosen therapy is appropriate versus another modality: relevant to diagnosis, evidence based practice  Outcome monitoring methods: self-report, checklist/rating scale  Therapeutic intervention type: supportive psychotherapy    Risk parameters:  Patient reports no suicidal ideation  Patient reports no homicidal ideation  Patient reports no self-injurious behavior  Patient reports no violent behavior    Verbal deficits: " None    Patient's response to intervention:  The patient's response to intervention is accepting.    Progress toward goals and other mental status changes:  The patient's progress toward goals is good.    Diagnosis:   No diagnosis found.    Plan: Pt plans to continue individual psychotherapy    Return to clinic: as scheduled    Length of Service (minutes): 60

## 2024-09-20 ENCOUNTER — CLINICAL SUPPORT (OUTPATIENT)
Dept: PRIMARY CARE CLINIC | Facility: CLINIC | Age: 39
End: 2024-09-20
Payer: COMMERCIAL

## 2024-09-20 DIAGNOSIS — F43.23 ADJUSTMENT DISORDER WITH MIXED ANXIETY AND DEPRESSED MOOD: Primary | ICD-10-CM

## 2024-09-20 NOTE — PROGRESS NOTES
"Individual Psychotherapy (LCSW/PhD)  Joan Wilde,  9/20/2024    Site:  Telemed         Therapeutic Intervention: Met with patient for individual psychotherapy.    Chief complaint/reason for encounter: anxiety and interpersonal     Interval history and content of current session: Pt provided update on work issues. Pt states they feel like they are being "shut out" by manager. Pt feels like their colleagues are treating them differently. LCSW supported pt in processing feelings of anxiety and frustration.     LCSW used empathic listening to help pt identify unhelpful  vs. Helpful thoughts. LCSW supported pt in exploring possible solutions. Pt states they feel like they are trying to communicate with manager with no help. LCSW used empathic listening to help pt process emotions. LCSW and pt explored possible areas of compromise with colleagues etc. Pt expressed disagreement and states they do not have trust in their leadership.    LCSW inquired about support from family. Pt reports good support from  and mother but frustrations with father's repsonse. LCSW and pt discussed importance of self-care during this time and setting aside time for mental breaks and using exercise to reduce anxiety.        Treatment plan:  Target symptoms: anxiety , work stress  Why chosen therapy is appropriate versus another modality: relevant to diagnosis, evidence based practice  Outcome monitoring methods: self-report, checklist/rating scale  Therapeutic intervention type: supportive psychotherapy    Risk parameters:  Patient reports no suicidal ideation  Patient reports no homicidal ideation  Patient reports no self-injurious behavior  Patient reports no violent behavior    Verbal deficits: None    Patient's response to intervention:  The patient's response to intervention is accepting.    Progress toward goals and other mental status changes:  The patient's progress toward goals is fair .    Diagnosis:   No diagnosis " found.    Plan: Pt plans to continue individual psychotherapy    Return to clinic: as scheduled    Length of Service (minutes): 60      Answers submitted by the patient for this visit:  Review of Systems Questionnaire (Submitted on 9/19/2024)  activity change: No  unexpected weight change: No  neck pain: No  hearing loss: No  rhinorrhea: No  trouble swallowing: No  eye discharge: No  visual disturbance: No  chest tightness: No  wheezing: No  chest pain: No  palpitations: No  blood in stool: No  constipation: No  vomiting: No  diarrhea: No  polydipsia: No  polyuria: No  difficulty urinating: No  hematuria: No  menstrual problem: No  dysuria: No  joint swelling: No  arthralgias: No  headaches: No  weakness: No  confusion: No  dysphoric mood: Yes

## 2024-09-24 ENCOUNTER — CLINICAL SUPPORT (OUTPATIENT)
Dept: PRIMARY CARE CLINIC | Facility: CLINIC | Age: 39
End: 2024-09-24
Payer: COMMERCIAL

## 2024-09-24 DIAGNOSIS — F43.23 ADJUSTMENT DISORDER WITH MIXED ANXIETY AND DEPRESSED MOOD: Primary | ICD-10-CM

## 2024-09-24 NOTE — PROGRESS NOTES
"Individual Psychotherapy (LCSW/PhD)  Joan Wilde,  9/24/2024    Site:  Telemed         Therapeutic Intervention: Met with patient for individual psychotherapy.    Chief complaint/reason for encounter: adjustment     Interval history and content of current session: Pt had meeting with manager and director that went better than expected. Pt has been requested to return to office full-time. Pt is also being asked to help train a new hire. LCSW supported pt in processing feelings related to changes at work. Pt states they feel "relieved' about meeting outcome. Pt states they still have concerns about manager's trust.    LCSW supported pt in identifying triggers for anxiety and automatic negative thoughts. Despite feedback pt says they don't trust their manager and feel overwhelmed by remaining in department. LCSW and pt discussed compromises with they and supervisor. Pt states they are not willing to "nunn" with supervisor. LCSW and pt explored ways in which pt could set appropriate boundaries with supervisor.      Treatment plan:  Target symptoms: adjustment, work stress  Why chosen therapy is appropriate versus another modality: relevant to diagnosis, evidence based practice  Outcome monitoring methods: self-report, checklist/rating scale  Therapeutic intervention type: supportive psychotherapy    Risk parameters:  Patient reports no suicidal ideation  Patient reports no homicidal ideation  Patient reports no self-injurious behavior  Patient reports no violent behavior    Verbal deficits: None    Patient's response to intervention:  The patient's response to intervention is accepting.    Progress toward goals and other mental status changes:  The patient's progress toward goals is good.    Diagnosis:   No diagnosis found.    Plan: Pt plans to continue individual psychotherapy    Return to clinic: as scheduled    Length of Service (minutes): 60      Answers submitted by the patient for this " visit:  Review of Systems Questionnaire (Submitted on 9/23/2024)  activity change: No  unexpected weight change: No  neck pain: No  hearing loss: No  rhinorrhea: No  trouble swallowing: No  eye discharge: No  visual disturbance: No  chest tightness: No  wheezing: No  chest pain: No  palpitations: No  blood in stool: No  constipation: No  vomiting: No  diarrhea: No  polydipsia: No  polyuria: No  difficulty urinating: No  hematuria: No  menstrual problem: No  dysuria: No  joint swelling: No  arthralgias: No  headaches: No  weakness: No  confusion: No  dysphoric mood: Yes

## 2024-10-07 ENCOUNTER — CLINICAL SUPPORT (OUTPATIENT)
Dept: PRIMARY CARE CLINIC | Facility: CLINIC | Age: 39
End: 2024-10-07
Payer: COMMERCIAL

## 2024-10-07 DIAGNOSIS — F43.23 ADJUSTMENT DISORDER WITH MIXED ANXIETY AND DEPRESSED MOOD: Primary | ICD-10-CM

## 2024-10-07 PROBLEM — Z00.00 HEALTHCARE MAINTENANCE: Status: RESOLVED | Noted: 2024-07-08 | Resolved: 2024-10-07

## 2024-10-07 PROCEDURE — 90832 PSYTX W PT 30 MINUTES: CPT | Mod: 95,,, | Performed by: SOCIAL WORKER

## 2024-10-07 NOTE — PROGRESS NOTES
"Individual Psychotherapy (LCSW/PhD)  Joan Wilde,  10/7/2024    Site:  Telemed         Therapeutic Intervention: Met with patient for individual psychotherapy.    Chief complaint/reason for encounter: anxiety     Interval history and content of current session: Pt states they are anxious about returning to the office in-person. LCSW and pt explored worries about returning to the [employer] in-person. Pt states they have little hope that return to work with be normal at all. LCSW used CBT concepts to help pt identify all or nothing thinking and difficult thoughts. Pt did not identify any all or nothing thinking and states they feel like they don't trust their colleagues.    Pt states they have begun applying to other jobs because they are "fed up". LCSW utilized active listening to support pt in processing feelings and discussing anxious thoughts including feelings of prosecution. LCSW and pt discussed boundaries in current setting. Pt states they feel like they will reduce their time spent around co-workers and be very careful about their interactions with colleagues. LCSW supported pt in processing feelings of frustration. Pt states they are ready to "move on" and feel like they do not have any other options but to do so.        Treatment plan:  Target symptoms: anxiety , adjustment, work stress  Why chosen therapy is appropriate versus another modality: relevant to diagnosis, evidence based practice  Outcome monitoring methods: self-report, checklist/rating scale  Therapeutic intervention type: supportive psychotherapy    Risk parameters:  Patient reports no suicidal ideation  Patient reports no homicidal ideation  Patient reports no self-injurious behavior  Patient reports no violent behavior    Verbal deficits: None    Patient's response to intervention:  The patient's response to intervention is accepting.    Progress toward goals and other mental status changes:  The patient's progress toward goals is " good.    Diagnosis:   No diagnosis found.    Plan: Pt plans to continue individual psychotherapy    Return to clinic: as scheduled    Length of Service (minutes): 60      Answers submitted by the patient for this visit:  Review of Systems Questionnaire (Submitted on 10/6/2024)  activity change: No  unexpected weight change: No  neck pain: No  hearing loss: No  rhinorrhea: No  trouble swallowing: No  eye discharge: No  visual disturbance: No  chest tightness: No  wheezing: No  chest pain: No  palpitations: No  blood in stool: No  constipation: No  vomiting: No  diarrhea: No  polydipsia: No  polyuria: No  difficulty urinating: No  hematuria: No  menstrual problem: No  dysuria: No  joint swelling: No  arthralgias: No  headaches: No  weakness: No  confusion: No  dysphoric mood: Yes

## 2024-10-09 DIAGNOSIS — E66.09 CLASS 2 OBESITY DUE TO EXCESS CALORIES WITHOUT SERIOUS COMORBIDITY WITH BODY MASS INDEX (BMI) OF 36.0 TO 36.9 IN ADULT: ICD-10-CM

## 2024-10-09 DIAGNOSIS — E66.812 CLASS 2 OBESITY DUE TO EXCESS CALORIES WITHOUT SERIOUS COMORBIDITY WITH BODY MASS INDEX (BMI) OF 36.0 TO 36.9 IN ADULT: ICD-10-CM

## 2024-10-10 RX ORDER — TOPIRAMATE 25 MG/1
25 TABLET ORAL NIGHTLY
Qty: 30 TABLET | Refills: 5 | Status: SHIPPED | OUTPATIENT
Start: 2024-10-10 | End: 2025-10-10

## 2024-10-10 RX ORDER — PHENTERMINE HYDROCHLORIDE 15 MG/1
15 CAPSULE ORAL EVERY MORNING
Qty: 30 CAPSULE | Refills: 1 | Status: SHIPPED | OUTPATIENT
Start: 2024-10-10 | End: 2024-12-09

## 2024-10-15 ENCOUNTER — CLINICAL SUPPORT (OUTPATIENT)
Dept: PRIMARY CARE CLINIC | Facility: CLINIC | Age: 39
End: 2024-10-15
Payer: COMMERCIAL

## 2024-10-15 DIAGNOSIS — F43.23 ADJUSTMENT DISORDER WITH MIXED ANXIETY AND DEPRESSED MOOD: ICD-10-CM

## 2024-10-15 DIAGNOSIS — F32.A MODERATELY SEVERE DEPRESSION: Primary | ICD-10-CM

## 2024-10-15 PROCEDURE — 90837 PSYTX W PT 60 MINUTES: CPT | Mod: 95,,, | Performed by: SOCIAL WORKER

## 2024-10-15 NOTE — PROGRESS NOTES
"Individual Psychotherapy (LCSW/PhD)  Joan Wilde,  10/15/2024    Site:  Telemed         Therapeutic Intervention: Met with patient for individual psychotherapy.    Chief complaint/reason for encounter: depression, anxiety, and interpersonal     Interval history and content of current session: Pt expressed feelings of frustration and excessive worry regarding work. Pt states they feel like administrators are intentionally "targeting" them. Pt states they feel like manager is ignoring them and that they are not being treated the same as other colleagues. LCSW used CBT to support pt in identify helpful for unhelpful thoughts. Pt states they are already committed to changing jobs. LCSW inquired about areas of better engagement with manager. Pt expressed disinterest at trying to engage with manager further as they do not trust their manger.    LCSW used active listening to help pt process feelings and identify their concerns. Pt states that  has been supportive and helping them to cope with stress. Pt expressed concern for racially motivated biases in the department. LCSW inquired about diversity in department and prior Hx of discrimination. Pt pointed to another care where they heard that a person of color was "pushed out". LCSW and pt discussed current support from colleagues. Pt states they used to feel close with colleagues but have felt like isolated and that colleagues have pulled back from them.    Pt expressed sadness at experience and hope for a better experience. LCSW and pt discussed importance of pt continuing to advocate for their-self in future jobs.    Treatment plan:  Target symptoms: depression, anxiety   Why chosen therapy is appropriate versus another modality: relevant to diagnosis, evidence based practice  Outcome monitoring methods: self-report, checklist/rating scale  Therapeutic intervention type: insight oriented psychotherapy, behavior modifying psychotherapy, supportive " psychotherapy    Risk parameters:  Patient reports no suicidal ideation  Patient reports no homicidal ideation  Patient reports no self-injurious behavior  Patient reports no violent behavior    Verbal deficits: None    Patient's response to intervention:  The patient's response to intervention is accepting.    Progress toward goals and other mental status changes:  The patient's progress toward goals is fair .    Diagnosis:   No diagnosis found.    Plan: Pt plans to continue individual psychotherapy    Return to clinic: as needed    Length of Service (minutes): 60      Answers submitted by the patient for this visit:  Review of Systems Questionnaire (Submitted on 10/15/2024)  activity change: No  unexpected weight change: No  neck pain: No  hearing loss: No  rhinorrhea: No  trouble swallowing: No  eye discharge: No  visual disturbance: No  chest tightness: No  wheezing: No  chest pain: No  palpitations: No  blood in stool: No  constipation: No  vomiting: No  diarrhea: No  polydipsia: No  polyuria: No  difficulty urinating: No  hematuria: No  menstrual problem: No  dysuria: No  joint swelling: No  arthralgias: No  headaches: No  weakness: No  confusion: No  dysphoric mood: No

## 2024-10-16 ENCOUNTER — NUTRITION (OUTPATIENT)
Dept: INTERNAL MEDICINE | Facility: CLINIC | Age: 39
End: 2024-10-16
Payer: COMMERCIAL

## 2024-10-16 DIAGNOSIS — E66.9 OBESITY (BMI 30-39.9): ICD-10-CM

## 2024-10-16 DIAGNOSIS — Z71.3 DIETARY COUNSELING: Primary | ICD-10-CM

## 2024-10-16 PROCEDURE — 99999 PR PBB SHADOW E&M-EST. PATIENT-LVL I: CPT | Mod: PBBFAC,,, | Performed by: DIETITIAN, REGISTERED

## 2024-10-16 NOTE — PROGRESS NOTES
"Nutrition Assessment  Session Time:  30 minutes      Client name:  Joan Wilde  :  1985  Age:  39 y.o.  Gender:  female    Client states:  Very pleasant OTC patient here for nutrition reassessment.  [facility] scale revealed 5.1# weight loss since previous assessment in September!  Currently has a viral illness and wearing mask.  Despite such, however, continues to walk 5x/week and increased duration to 35 minutes some days.  Also, noticed calorie burn increase from <100 to 112 kcal/strength training session (15 minutes 2x/week).  Performs strength training movements slow and controlled.  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  Researches nutrition and health, sharing knowledge of risk between CVD and ramen.  Consumes ramen (ramen noodles, broth, mixed vegetables, and pork) yet recently reduced intake due to sodium content and potential CVD risk.  Also, alternated homemade smoothie with Ezekial toast and eggs for lunch for variation and reduced risk of nutrient deficiencies.  No longer snacking as often after dinner and if so, chooses fruit.  Replaced tultulsi with chamomile tea as she believed tultulsi had an impact on rising BP levels.  Overall, expressed lisa regarding weight trends and remains committed to health.    Anthropometrics  Height:  5' 6"     Weight:  216.7# (-5.1# since previous assessment)   BMI:  34.9  % Body Fat:  Unable to assess    Clinical Signs/Symptoms  N/V/D:  None  Appetite:  good       Past Medical History:   Diagnosis Date    Acne vulgaris 2018    Allergic rhinitis due to dust mite 2019    Asthma     Eczema     Endometriosis 2018    Fibroid     Laryngopharyngeal reflux     Migraine syndrome     associated with flu vaccines    Sleep apnea        Past Surgical History:   Procedure Laterality Date    DIAGNOSTIC LAPAROSCOPY Bilateral 5/15/2023    Procedure: LAPAROSCOPY, DIAGNOSTIC; "  Surgeon: Raffi Gao MD;  Location: [facility] OR;  Service: OB/GYN;  Laterality: Bilateral;    ESOPHAGOGASTRODUODENOSCOPY N/A 12/7/2023    Procedure: EGD (ESOPHAGOGASTRODUODENOSCOPY);  Surgeon: Mitchel Abreu MD;  Location: [facility];  Service: Endoscopy;  Laterality: N/A;    HERNIA REPAIR  2014    umbilical    LAPAROSCOPIC APPENDECTOMY N/A 4/21/2021    Procedure: APPENDECTOMY, LAPAROSCOPIC WITH LYSIS OF ADHESIONS.;  Surgeon: Trent Cannon MD;  Location: [address];  Service: General;  Laterality: N/A;    LAPAROSCOPIC LYSIS OF ADHESIONS N/A 5/15/2023    Procedure: LYSIS, ADHESIONS, LAPAROSCOPIC;  Surgeon: Raffi Gao MD;  Location: [facility];  Service: OB/GYN;  Laterality: N/A;       Medications    has a current medication list which includes the following prescription(s): albuterol, ascorbic acid (vitamin c), azelastine, conjugated estrogens, diltiazem hcl, famotidine, fluticasone propionate, naproxen, norethindrone, norethindrone, olopatadine, omeprazole, phentermine, and topiramate.    Vitamins, Minerals, and/or Supplements:  *Please see list above     Food/Medication Interactions:  Reviewed     Food Allergies or Intolerances:  NKFA     Social History    Marital status:    Employment:  Clinical Analyst [employer]    Social History     Tobacco Use    Smoking status: Never     Passive exposure: Never    Smokeless tobacco: Never   Substance Use Topics    Alcohol use: No        Lab Reports   Sodium   Date Value Ref Range Status   04/11/2024 137 136 - 145 mmol/L Final     Potassium   Date Value Ref Range Status   04/11/2024 4.0 3.5 - 5.1 mmol/L Final     Chloride   Date Value Ref Range Status   04/11/2024 106 95 - 110 mmol/L Final     CO2   Date Value Ref Range Status   04/11/2024 22 (L) 23 - 29 mmol/L Final     Glucose   Date Value Ref Range Status   04/11/2024 82 70 - 110 mg/dL Final     BUN   Date Value Ref Range Status   04/11/2024 12 6 - 20 mg/dL Final     Creatinine   Date Value Ref  Range Status   04/11/2024 1.0 0.5 - 1.4 mg/dL Final     Calcium   Date Value Ref Range Status   04/11/2024 9.7 8.7 - 10.5 mg/dL Final     Total Protein   Date Value Ref Range Status   03/14/2024 6.8 6.0 - 8.4 g/dL Final     Albumin   Date Value Ref Range Status   03/14/2024 3.6 3.5 - 5.2 g/dL Final     Total Bilirubin   Date Value Ref Range Status   03/14/2024 0.2 0.1 - 1.0 mg/dL Final     Comment:     For infants and newborns, interpretation of results should be based  on gestational age, weight and in agreement with clinical  observations.    Premature Infant recommended reference ranges:  Up to 24 hours.............<8.0 mg/dL  Up to 48 hours............<12.0 mg/dL  3-5 days..................<15.0 mg/dL  6-29 days.................<15.0 mg/dL       Alkaline Phosphatase   Date Value Ref Range Status   03/14/2024 59 55 - 135 U/L Final     AST   Date Value Ref Range Status   03/14/2024 28 10 - 40 U/L Final     ALT   Date Value Ref Range Status   03/14/2024 46 (H) 10 - 44 U/L Final     Anion Gap   Date Value Ref Range Status   04/11/2024 9 8 - 16 mmol/L Final     eGFR if    Date Value Ref Range Status   05/30/2022 >60.0 >60 mL/min/1.73 m^2 Final     eGFR if non    Date Value Ref Range Status   05/30/2022 >60.0 >60 mL/min/1.73 m^2 Final     Comment:     Calculation used to obtain the estimated glomerular filtration  rate (eGFR) is the CKD-EPI equation.         Lab Results   Component Value Date    WBC 4.92 03/27/2024    HGB 12.7 03/27/2024    HCT 38.1 03/27/2024    MCV 75 (L) 03/27/2024     03/27/2024        Lab Results   Component Value Date    CHOL 151 03/03/2023     Lab Results   Component Value Date    HDL 43 03/03/2023     Lab Results   Component Value Date    LDLCALC 98.4 03/03/2023     Lab Results   Component Value Date    TRIG 48 03/03/2023     Lab Results   Component Value Date    CHOLHDL 28.5 03/03/2023     Lab Results   Component Value Date    HGBA1C 5.5 04/11/2024      BP Readings from Last 1 Encounters:   07/18/24 124/76       Food History  Breakfast:  Skips  Mid-morning Snack:  None  Lunch:  Homemade smoothie comprised of 1.25 cups pea protein milk, ¾ cup kefir (8 gm RPO), 1 Tbsp psyllium husk, 1 scoop collagen powder (10 gm PRO), ½ Tbsp honey, ½ Tbsp almond butter (2 gm PRO), and handful of blueberries/2 fried eggs + 1 slice Carlos Alberto toast  Mid-afternoon Snack:  None    Dinner:  Sushi  Snack:  +/- fruit  *Fluid intake:  120 oz water + tea (chamomile tea, black or lemon grass tea)    Exercise History:  15 minutes weight training 2x/week + 30-35 minutes walking 5x/week    Cultural/Spiritual/Personal Preferences:  None identified    Support System:  spouse    State of Change:  Action    Barriers to Change:  None    Diagnosis    Overweight related to imbalanced meal pattern and [previous] lack of exercise variation as evidenced by BMI:  34.9.    Intervention    RMR (Method:  [place] Jeor):  1713 kcal  Activity Factor:  1.4    LYNDON:  2398 - 500 = 1898 kcal    Goals:  1.  Aim for 0.5-1# weight loss/week  2.  Increase walking to 35 minutes 5x/week consistently as tolerated  3.  Continue strength training 2x/week as tolerated    Nutrition Education  The following education was provided to the patient:  Complimented patient on proactive role in health maintenance.  Complimented patient on dietary compliance/modifications and resulting health improvements.  Complimented patient on physical activity efforts.  Discussed meal planning/[employer] My Plate design.  Discussed weight management.  Discussed HTN Nutrition Therapy.  Suggested dietary modifications based on current dietary behaviors and individual food preferences.  Discussed physical activity guidelines and its associated benefits.  Discussed goal setting.  Provided ongoing support, encouragement, and guidance toward improved health efforts.    Patient verbalized understanding of nutrition education and recommendations  received.    Handouts Provided  No new handouts were provided.    Monitoring/Evaluation    Monitor the following:  Weight  BMI  Caloric intake    Follow Up Plan:  Communication with referring healthcare provider is unnecessary at this time.  However, will follow up with patient in 4 weeks.

## 2024-10-21 ENCOUNTER — OFFICE VISIT (OUTPATIENT)
Dept: DERMATOLOGY | Facility: CLINIC | Age: 39
End: 2024-10-21
Payer: COMMERCIAL

## 2024-10-21 DIAGNOSIS — L20.9 ATOPIC DERMATITIS, UNSPECIFIED TYPE: ICD-10-CM

## 2024-10-21 DIAGNOSIS — L25.9 CONTACT DERMATITIS, UNSPECIFIED CONTACT DERMATITIS TYPE, UNSPECIFIED TRIGGER: Primary | ICD-10-CM

## 2024-10-21 DIAGNOSIS — L24.9 IRRITANT DERMATITIS: ICD-10-CM

## 2024-10-21 PROCEDURE — 99214 OFFICE O/P EST MOD 30 MIN: CPT | Mod: S$GLB,,, | Performed by: DERMATOLOGY

## 2024-10-21 PROCEDURE — 3044F HG A1C LEVEL LT 7.0%: CPT | Mod: CPTII,S$GLB,, | Performed by: DERMATOLOGY

## 2024-10-21 PROCEDURE — 99999 PR PBB SHADOW E&M-EST. PATIENT-LVL I: CPT | Mod: PBBFAC,,, | Performed by: DERMATOLOGY

## 2024-10-21 RX ORDER — PIMECROLIMUS 10 MG/G
CREAM TOPICAL 2 TIMES DAILY PRN
Qty: 60 G | Refills: 2 | Status: ACTIVE | OUTPATIENT
Start: 2024-10-21

## 2024-10-21 NOTE — PROGRESS NOTES
"  Subjective:      Patient ID:  Joan Wilde is a 39 y.o. female who presents for   Chief Complaint   Patient presents with    Rash     HPI  39 y.o. F with PMH acne, atopic dermatitis, and irritant/contact dermatitis who presents for follow-up. Patient last seen by Dr. Perea in 2022 during which she was continued on alclometasone 0.05% cream BID PRN and dapsone 5% gel BID for acne.     Today, patient reports her skin has become much more sensitive in the past month. She is no longer able to use the products she was using previously. She reports she has likely been "over-exfoliating". She was previously using a exfoliating cleanser containing glycolic acid, salicylic acid, and lactic acid almost nightly. She was getting several dry patches so she went down to twice weekly . Noticed improved but continued dry patches. She then added a soothing green tea cleanser (mad hippie brand). She then tried an antioxidant serum that contained alcohol that also broke her face out. She was also using the ordinary niacinamide serum which also broke her out. So she stopped using all of the products and serums.   She is now using a hydrating cream cleanser (mad hippie; contains green tea), bland cerave moisturizer, and toner. She is also using bifida lysate firming serum (prebiotic/probiotic). She reports her skin is still very sensitive and irritated but is getting better. She uses a tinted mineral sunscreen without irritation.     Hx of patch testing in 2018: Nickel sulfate 2+, compositae mix 2+    Review of Systems   Skin:  Positive for itching and rash.       Objective:   Physical Exam   Constitutional: She appears well-developed and well-nourished. No distress.   Neurological: She is alert and oriented to person, place, and time. She is not disoriented.   Psychiatric: She has a normal mood and affect.   Skin:   Areas Examined (abnormalities noted in diagram):   Head / Face Inspection Performed            Diagram " Legend     Erythematous scaling macule/papule c/w actinic keratosis       Vascular papule c/w angioma      Pigmented verrucoid papule/plaque c/w seborrheic keratosis      Yellow umbilicated papule c/w sebaceous hyperplasia      Irregularly shaped tan macule c/w lentigo     1-2 mm smooth white papules consistent with Milia      Movable subcutaneous cyst with punctum c/w epidermal inclusion cyst      Subcutaneous movable cyst c/w pilar cyst      Firm pink to brown papule c/w dermatofibroma      Pedunculated fleshy papule(s) c/w skin tag(s)      Evenly pigmented macule c/w junctional nevus     Mildly variegated pigmented, slightly irregular-bordered macule c/w mildly atypical nevus      Flesh colored to evenly pigmented papule c/w intradermal nevus       Pink pearly papule/plaque c/w basal cell carcinoma      Erythematous hyperkeratotic cursted plaque c/w SCC      Surgical scar with no sign of skin cancer recurrence      Open and closed comedones      Inflammatory papules and pustules      Verrucoid papule consistent consistent with wart     Erythematous eczematous patches and plaques     Dystrophic onycholytic nail with subungual debris c/w onychomycosis     Umbilicated papule    Erythematous-base heme-crusted tan verrucoid plaque consistent with inflamed seborrheic keratosis     Erythematous Silvery Scaling Plaque c/w Psoriasis     See annotation      Assessment / Plan:        Contact dermatitis, unspecified contact dermatitis type, unspecified trigger  -     pimecrolimus (ELIDEL) 1 % cream; Apply topically 2 (two) times daily as needed (rash).  Dispense: 60 g; Refill: 2    Irritant dermatitis  -     pimecrolimus (ELIDEL) 1 % cream; Apply topically 2 (two) times daily as needed (rash).  Dispense: 60 g; Refill: 2    Atopic dermatitis, unspecified type  -     pimecrolimus (ELIDEL) 1 % cream; Apply topically 2 (two) times daily as needed (rash).  Dispense: 60 g; Refill: 2    Patient using several different fragranced  and potentially irritating serums and exfoliative products containing lactic acid, glycolic acid, and salicylic acid. Likely ICD +/- ACD. Hx of atopic dermatitis also likely contributing. Hx of patch testing in 2018: Nickel sulfate 2+, compositae mix 2+.  Plan:  - recommend patient stop using all serums or fragrance-containing products; can re-introduce slowly  - stop chemical exfoliants until skin is healed; can re-introduce slowly  - start sensitive skin care regimen with products like cerave, cetaphil, or vanicream; samples provided in office  - start elidel 1% cream BID PRN for breakouts  - can consider repeat patch testing in future         Follow up if symptoms worsen or fail to improve.    Karyn Schmitt MD  [facility] Dermatology PGY-III

## 2024-10-22 ENCOUNTER — PATIENT MESSAGE (OUTPATIENT)
Dept: ALLERGY | Facility: CLINIC | Age: 39
End: 2024-10-22
Payer: COMMERCIAL

## 2024-10-22 ENCOUNTER — HOSPITAL ENCOUNTER (EMERGENCY)
Facility: HOSPITAL | Age: 39
Discharge: HOME | End: 2024-10-22
Attending: EMERGENCY MEDICINE
Payer: COMMERCIAL

## 2024-10-22 ENCOUNTER — NURSE TRIAGE (OUTPATIENT)
Dept: ADMINISTRATIVE | Facility: CLINIC | Age: 39
End: 2024-10-22
Payer: COMMERCIAL

## 2024-10-22 VITALS
SYSTOLIC BLOOD PRESSURE: 125 MMHG | BODY MASS INDEX: 34.72 KG/M2 | HEIGHT: 66 IN | TEMPERATURE: 98 F | HEART RATE: 82 BPM | OXYGEN SATURATION: 100 % | DIASTOLIC BLOOD PRESSURE: 84 MMHG | RESPIRATION RATE: 12 BRPM | WEIGHT: 216 LBS

## 2024-10-22 DIAGNOSIS — R07.9 CHEST PAIN: Primary | ICD-10-CM

## 2024-10-22 LAB
ALBUMIN SERPL BCP-MCNC: 3.9 G/DL (ref 3.5–5.2)
ALP SERPL-CCNC: 74 U/L (ref 40–150)
ALT SERPL W/O P-5'-P-CCNC: 34 U/L (ref 10–44)
ANION GAP SERPL CALC-SCNC: 9 MMOL/L (ref 8–16)
AST SERPL-CCNC: 24 U/L (ref 10–40)
B-HCG UR QL: NEGATIVE
BASOPHILS # BLD AUTO: 0.03 K/UL (ref 0–0.2)
BASOPHILS NFR BLD: 0.5 % (ref 0–1.9)
BILIRUB SERPL-MCNC: 0.3 MG/DL (ref 0.1–1)
BNP SERPL-MCNC: <10 PG/ML (ref 0–99)
BUN SERPL-MCNC: 10 MG/DL (ref 6–20)
CALCIUM SERPL-MCNC: 9.3 MG/DL (ref 8.7–10.5)
CHLORIDE SERPL-SCNC: 110 MMOL/L (ref 95–110)
CO2 SERPL-SCNC: 19 MMOL/L (ref 23–29)
CREAT SERPL-MCNC: 1 MG/DL (ref 0.5–1.4)
CTP QC/QA: YES
D DIMER PPP IA.FEU-MCNC: 0.79 MG/L FEU
DIFFERENTIAL METHOD BLD: ABNORMAL
EOSINOPHIL # BLD AUTO: 0 K/UL (ref 0–0.5)
EOSINOPHIL NFR BLD: 0.2 % (ref 0–8)
ERYTHROCYTE [DISTWIDTH] IN BLOOD BY AUTOMATED COUNT: 14.6 % (ref 11.5–14.5)
EST. GFR  (NO RACE VARIABLE): >60 ML/MIN/1.73 M^2
GLUCOSE SERPL-MCNC: 83 MG/DL (ref 70–110)
HCT VFR BLD AUTO: 39.5 % (ref 37–48.5)
HCV AB SERPL QL IA: NORMAL
HGB BLD-MCNC: 13.3 G/DL (ref 12–16)
HIV 1+2 AB+HIV1 P24 AG SERPL QL IA: NORMAL
IMM GRANULOCYTES # BLD AUTO: 0.01 K/UL (ref 0–0.04)
IMM GRANULOCYTES NFR BLD AUTO: 0.2 % (ref 0–0.5)
LYMPHOCYTES # BLD AUTO: 1.7 K/UL (ref 1–4.8)
LYMPHOCYTES NFR BLD: 30.3 % (ref 18–48)
MCH RBC QN AUTO: 26.9 PG (ref 27–31)
MCHC RBC AUTO-ENTMCNC: 33.7 G/DL (ref 32–36)
MCV RBC AUTO: 80 FL (ref 82–98)
MONOCYTES # BLD AUTO: 0.3 K/UL (ref 0.3–1)
MONOCYTES NFR BLD: 4.6 % (ref 4–15)
NEUTROPHILS # BLD AUTO: 3.6 K/UL (ref 1.8–7.7)
NEUTROPHILS NFR BLD: 64.2 % (ref 38–73)
NRBC BLD-RTO: 0 /100 WBC
OHS QRS DURATION: 74 MS
OHS QTC CALCULATION: 422 MS
PLATELET # BLD AUTO: 336 K/UL (ref 150–450)
PMV BLD AUTO: 9.8 FL (ref 9.2–12.9)
POTASSIUM SERPL-SCNC: 4.3 MMOL/L (ref 3.5–5.1)
PROT SERPL-MCNC: 7.2 G/DL (ref 6–8.4)
RBC # BLD AUTO: 4.94 M/UL (ref 4–5.4)
SODIUM SERPL-SCNC: 138 MMOL/L (ref 136–145)
TROPONIN I SERPL DL<=0.01 NG/ML-MCNC: <0.006 NG/ML (ref 0–0.03)
WBC # BLD AUTO: 5.64 K/UL (ref 3.9–12.7)

## 2024-10-22 PROCEDURE — 99285 EMERGENCY DEPT VISIT HI MDM: CPT | Mod: 25

## 2024-10-22 PROCEDURE — 80053 COMPREHEN METABOLIC PANEL: CPT | Performed by: NURSE PRACTITIONER

## 2024-10-22 PROCEDURE — 63600175 PHARM REV CODE 636 W HCPCS: Performed by: EMERGENCY MEDICINE

## 2024-10-22 PROCEDURE — 84484 ASSAY OF TROPONIN QUANT: CPT | Performed by: NURSE PRACTITIONER

## 2024-10-22 PROCEDURE — 87389 HIV-1 AG W/HIV-1&-2 AB AG IA: CPT | Performed by: PHYSICIAN ASSISTANT

## 2024-10-22 PROCEDURE — 25000003 PHARM REV CODE 250: Performed by: EMERGENCY MEDICINE

## 2024-10-22 PROCEDURE — 83880 ASSAY OF NATRIURETIC PEPTIDE: CPT | Performed by: NURSE PRACTITIONER

## 2024-10-22 PROCEDURE — 85025 COMPLETE CBC W/AUTO DIFF WBC: CPT | Performed by: NURSE PRACTITIONER

## 2024-10-22 PROCEDURE — 86803 HEPATITIS C AB TEST: CPT | Performed by: PHYSICIAN ASSISTANT

## 2024-10-22 PROCEDURE — 93010 ELECTROCARDIOGRAM REPORT: CPT | Mod: ,,, | Performed by: INTERNAL MEDICINE

## 2024-10-22 PROCEDURE — 93005 ELECTROCARDIOGRAM TRACING: CPT

## 2024-10-22 PROCEDURE — 85379 FIBRIN DEGRADATION QUANT: CPT | Performed by: EMERGENCY MEDICINE

## 2024-10-22 PROCEDURE — 25500020 PHARM REV CODE 255: Performed by: EMERGENCY MEDICINE

## 2024-10-22 PROCEDURE — 81025 URINE PREGNANCY TEST: CPT | Performed by: EMERGENCY MEDICINE

## 2024-10-22 RX ORDER — PREDNISONE 10 MG/1
10 TABLET ORAL DAILY
Qty: 4 TABLET | Refills: 0 | Status: SHIPPED | OUTPATIENT
Start: 2024-10-22 | End: 2024-11-01

## 2024-10-22 RX ORDER — LIDOCAINE HYDROCHLORIDE 20 MG/ML
15 SOLUTION OROPHARYNGEAL ONCE
Status: COMPLETED | OUTPATIENT
Start: 2024-10-22 | End: 2024-10-22

## 2024-10-22 RX ORDER — ALUMINUM HYDROXIDE, MAGNESIUM HYDROXIDE, AND SIMETHICONE 1200; 120; 1200 MG/30ML; MG/30ML; MG/30ML
30 SUSPENSION ORAL ONCE
Status: COMPLETED | OUTPATIENT
Start: 2024-10-22 | End: 2024-10-22

## 2024-10-22 RX ORDER — PREDNISONE 10 MG/1
10 TABLET ORAL
Status: COMPLETED | OUTPATIENT
Start: 2024-10-22 | End: 2024-10-22

## 2024-10-22 RX ADMIN — IOHEXOL 100 ML: 350 INJECTION, SOLUTION INTRAVENOUS at 05:10

## 2024-10-22 RX ADMIN — LIDOCAINE HYDROCHLORIDE 15 ML: 20 SOLUTION ORAL at 04:10

## 2024-10-22 RX ADMIN — ALUMINUM HYDROXIDE, MAGNESIUM HYDROXIDE, AND SIMETHICONE 30 ML: 200; 200; 20 SUSPENSION ORAL at 04:10

## 2024-10-22 RX ADMIN — PREDNISONE 10 MG: 10 TABLET ORAL at 05:10

## 2024-10-22 NOTE — TELEPHONE ENCOUNTER
Pt sent message to provider that she was having chest pains and that she was going to the ED earlier. Pt calling now saying she has a cough and still with chest pains and having trouble taking a deep breath. Pt triaged and care advice to hang up and call 911. Pt said that she would like the EMILY on call and I told her that dispo sends to 911. Pt told to call back if any problems once back home if any other issues but not to wait for her appt this afternoon.               Reason for Disposition   SEVERE difficulty breathing (e.g., struggling for each breath, speaks in single words)    Protocols used: Chest Pain-A-OH    
Spoke to pt scheduled an appt with Dr. Ibrahim at  3pm today 10/22  
Statement Selected

## 2024-10-22 NOTE — ED PROVIDER NOTES
Source of History:  The patient    Chief complaint:  Chest Pain (Onset this morning upon waking. Worse upon taking a deep breath and walking. Denies hx of blood clots. +OCP use)      HPI:  Joan Wilde is a 39 y.o. female  who complains of chest tightness that started this morning proximally 8 hours prior to arrival.  States it got worse when she got up and then was moving around with some shortness of breath.  Felt it may be her asthma and took a breathing treatment with no significant improvement.  Pain persisted and came to the emergency department for further evaluation and management.  States she also has a history of esophageal spasm so she has not eaten anything today thinking it may be causing the pain to get worse.  States she recently got over a upper respiratory like infection last week.    Patient denies any family history of cardiac disease and no history specifically in young patients.  She does not drink or smoke.  No other risk factors.    This is the extent to the patients complaints today here in the emergency department.    ROS:   See HPI.    Review of patient's allergies indicates:   Allergen Reactions    Metoclopramide Other (See Comments)     Causes tongue to roll back in mouth       PMH:  As per HPI and below:  Past Medical History:   Diagnosis Date    Acne vulgaris 11/20/2018    Allergic rhinitis due to dust mite 05/13/2019    Asthma     Eczema     Endometriosis 11/20/2018    Fibroid     Laryngopharyngeal reflux     Migraine syndrome     associated with flu vaccines    Sleep apnea      Past Surgical History:   Procedure Laterality Date    DIAGNOSTIC LAPAROSCOPY Bilateral 5/15/2023    Procedure: LAPAROSCOPY, DIAGNOSTIC;  Surgeon: Raffi Gao MD;  Location: [facility] OR;  Service: OB/GYN;  Laterality: Bilateral;    ESOPHAGOGASTRODUODENOSCOPY N/A 12/7/2023    Procedure: EGD (ESOPHAGOGASTRODUODENOSCOPY);  Surgeon: Mitchel Abreu MD;  Location: [facility];  Service:  "Endoscopy;  Laterality: N/A;    HERNIA REPAIR  2014    umbilical    LAPAROSCOPIC APPENDECTOMY N/A 4/21/2021    Procedure: APPENDECTOMY, LAPAROSCOPIC WITH LYSIS OF ADHESIONS.;  Surgeon: Trent Cannon MD;  Location: [facility] OR [address] [place] [address];  Service: General;  Laterality: N/A;    LAPAROSCOPIC LYSIS OF ADHESIONS N/A 5/15/2023    Procedure: LYSIS, ADHESIONS, LAPAROSCOPIC;  Surgeon: Raffi Gao MD;  Location: [facility] OR;  Service: OB/GYN;  Laterality: N/A;       Social History     Tobacco Use    Smoking status: Never     Passive exposure: Never    Smokeless tobacco: Never   Substance Use Topics    Alcohol use: No    Drug use: No       Physical Exam:    /89   Pulse 88   Temp 98.7 °F (37.1 °C) (Oral)   Resp 15   Ht 5' 6" (1.676 m)   Wt 98 kg (216 lb)   LMP  (LMP Unknown) Comment: LMP March  SpO2 99%   Breastfeeding No   BMI 34.86 kg/m²   Nursing note and vital signs reviewed.  Constitutional: No acute distress.  Nontoxic  Eyes:  Conjunctiva normal.  Extraocular muscles are intact.  Cardiovascular: Regular rate and rhythm.  No murmurs. No gallops. No rubs  Respiratory: Clear to auscultation bilaterally.  Good air movement.  No wheezes.  No rhonchi. No rales. No accessory muscle use..  Abdomen: Soft.  Not distended.  Nontender.  No guarding.  No rebound. Non-peritoneal.  Neuro: alert. At baseline.  No focal neurological deficits.  MSK: No deformities.      Summary of Previous Medical Records:        Differential Dx/ MDM/ Workup:  A 39-year-old female with chest pain.  Patient is in no distress and I have a low suspicion for acute coronary syndrome.  Patient has no major risk factors however will get labs as well as an EKG and a chest x-ray.  I have also considered but have a low suspicion for pulmonary embolus.  History is inconsistent with COVID or pneumonia.  I do not suspect dissection or pneumothorax.  Could be secondary to esophageal spasm.    Additional MDM:   Heart Score:    History:          Slightly " suspicious.  ECG:             Normal  Age:               Less than 45 years  Risk factors: no risk factors known  Troponin:       Less than or equal to normal limit  Heart Score = 0               ED Course as of 10/22/24 1709   Tujanie Oct 22, 2024   1417 WBC: 5.64 [SM]   1417 Hemoglobin: 13.3 [SM]   1417 Platelet Count: 336 [SM]   1431 EKG 12-lead  EKG independently interpreted by myself showing normal sinus rhythm at a rate of 93 , normal intervals, normal QRS, no ST, T-wave abnormalities.  Overall impression negative EKG.  Compared to an EKG on April 17, 2024 shows no change.   [SM]   1442 WBC: 5.64 [SM]   1442 Hemoglobin: 13.3 [SM]   1442 Platelet Count: 336 [SM]   1544 Troponin I: <0.006 [SM]   1544 Sodium: 138 [SM]   1544 Potassium: 4.3 [SM]   1544 Creatinine: 1.0 [SM]   1544 X-Ray Chest AP Portable  Chest x-ray independently interpreted by myself shows normal cardiac size, no infiltrate or focal consolidation, no pneumothorax, no bony abnormalities.  Overall impression negative chest x-ray. [SM]   1627 I discussed all the results with the patient and answered all questions.  States she is having more pain.  Will give a GI cocktail to see if that improves as she does have esophageal spasm and possible gastritis.  As the D-dimer is elevated made her aware that we will be getting a CT angiogram PE study. [SM]   1628 She did state in the past that her GI doctor had put her on low-dose steroids for 5 days which typically resolves the esophageal issue.  If the patient's CT scan is unremarkable will plan on discharging her home with this and have her follow up as an outpatient.  Patient agrees with the plan of care. [SM]   1636 hCG Qualitative, Urine: Negative [SM]   1709 Will sign out results ofCT PA to Dr Hi. Updated patient. []      ED Course User Index  [] Rhett Mathis,                  Diagnostic Impression:    1. Chest pain                  Rhett Mathis,   10/22/24 1709

## 2024-10-22 NOTE — PROVIDER PROGRESS NOTES - EMERGENCY DEPT.
Encounter Date: 10/22/2024    ED Physician Progress Notes        Physician Note:   Patient care assumed from Dr. Mathis at shift change. Briefly, patient presents with chest pain.  Her cardiac enzyme testing was negative, chest x-ray negative, D-dimer was found to be mildly elevated, and a CTA of the chest was performed.  This was found to be overall negative though did miss some distal branches.  Patient was re-evaluated after results of CT, and I discussed these findings with her.  She was comfortable with plan for discharge, and close outpatient follow up.  Discussed strict return precautions including if symptoms are persistent after she begins taking steroids, she feels dizzy, lightheaded, or passes out, or as any trouble breathing. Patient verbalized understanding and agrees with plan. Patient discharged home.    Imaging Results          CTA Chest Non-Coronary (PE Studies) (Final result)  Result time 10/22/24   17:35:58    Final result by Say Edwards MD (10/22/24 17:35:58)                 Impression:      1. Allowing for bolus timing and motion artifact, no convincing pulmonary   thromboembolism to the level of the distal lobar branches.  Distal   embolism cannot be excluded, correlation of these findings with D-dimer   and/or lower extremity ultrasound as warranted.  2. No acute pulmonary process.  3. Findings suggest hepatic steatosis, correlation with LFTs recommended.  4. Please see above for several additional findings.      Electronically signed by: Say Edwards MD  Date:    10/22/2024  Time:    17:35             Narrative:    EXAMINATION:  CTA CHEST NON CORONARY (PE STUDIES)    CLINICAL HISTORY:  Pulmonary embolism (PE) suspected, positive D-dimer;    TECHNIQUE:  Low dose axial images, sagittal and coronal reformations were obtained   from the thoracic inlet to the lung bases following the IV administration   of 100 mL of Omnipaque 350.  Contrast timing was optimized to evaluate the    pulmonary arteries.  MIP images were performed.    COMPARISON:  Radiograph 10/22/2024    FINDINGS:  The structures at the base of the neck are unremarkable.  No significant   mediastinal lymphadenopathy.  The heart is not enlarged.  The thoracic   aorta tapers normally.  Allowing for motion artifact, the visualized   portions of the spleen, kidneys, adrenal glands, gallbladder and pancreas   are grossly unremarkable.  The liver is hypoattenuating, likely related to   contrast phase however steatosis not excluded.  The stomach is   decompressed noting there may be residual wall thickening.  Correlation   with any history of gastritis.    Allowing for motion artifact, the airways are patent.  No large focal   consolidation.  No pneumothorax.  No pleural effusion.  No pneumothorax.    Bolus timing is suboptimal for evaluation of pulmonary thromboembolism,   noting examination is limited given artifact from motion.  Allowing for   this, no convincing pulmonary arterial filling defect to the level of the   distal lobar branches bilaterally to suggest pulmonary thromboembolism.    Distal embolism cannot be excluded on the basis of this exam.    There are degenerative changes of the spine.  No significant axillary   lymphadenopathy.                               X-Ray Chest AP Portable (Final result)  Result time 10/22/24 14:59:53    Final result by Michael Almaguer MD (10/22/24 14:59:53)                 Impression:      No acute intrathoracic abnormality.      Electronically signed by: Michael Almaguer  Date:    10/22/2024  Time:    14:59             Narrative:    EXAMINATION:  XR CHEST AP PORTABLE    CLINICAL HISTORY:  Chest Pain;    TECHNIQUE:  Single frontal view of the chest was performed.    COMPARISON:  None    FINDINGS:  The lungs are clear, with normal appearance of pulmonary vasculature and   no pleural effusion or pneumothorax.    The cardiac silhouette is normal in size. The hilar and mediastinal   contours are  unremarkable.    Bones are intact.

## 2024-10-22 NOTE — DISCHARGE INSTRUCTIONS
You were seen in the Emergency Department today for chest pain. We performed blood work, EKG, chest x-ray, and a CT scan of the chest with contrast.  These tests did not find any evidence of stress or strain on the heart due to a heart attack, or a blood clot in the lungs.  You thought this pain felt similar to your esophageal spasm in the past, and we will start a short course of steroids to take to see if this helps with your pain.    Please follow up with your primary doctor in the next 1 week for a repeat evaluation and further management.    Please return to the Emergency Department immediately for any continued or worsening symptoms such as worsening chest pain, shortness of breath, leg swelling, or if you feel dizzy, lightheaded, or if you pass out.    Your CT scan did identify some incidental findings such as hepatic steatosis.  You indicated you are well aware of this and are in ongoing treatment and evaluation with a outpatient doctor for this.

## 2024-10-22 NOTE — ED NOTES
Patient states mid sternal CP onset this am, reports taking inhaler since 0800, reports walking worse, sitting forward makes the pain better, positive SOB, deneis cough

## 2024-10-22 NOTE — ED NOTES
Patient identifiers verified and correct for  Ms Wilde  C/C: SOB SEE NN  APPEARANCE: awake and alert in NAD. PAIN  0/10  SKIN: warm, dry and intact. No breakdown or bruising.  MUSCULOSKELETAL: Patient moving all extremities spontaneously, no obvious swelling or deformities noted. Ambulates independently.  RESPIRATORY: Denies shortness of breath.Respirations unlabored.   CARDIAC: Denies CP, 2+ distal pulses; no peripheral edema  ABDOMEN: S/ND/NT, Denies nausea  : voids spontaneously, denies difficulty  Neurologic: AAO x 4; follows commands equal strength in all extremities; denies numbness/tingling. Denies dizziness  Denies new weaknes

## 2024-10-23 NOTE — PROGRESS NOTES
I have seen the patient, reviewed the Resident's progress note. I have personally interviewed and examined the patient at bedside and agree with the findings.     Iesha Perea MD  [facility]

## 2024-10-25 ENCOUNTER — OFFICE VISIT (OUTPATIENT)
Dept: OTOLARYNGOLOGY | Facility: CLINIC | Age: 39
End: 2024-10-25
Payer: COMMERCIAL

## 2024-10-25 VITALS
WEIGHT: 218.06 LBS | BODY MASS INDEX: 35.19 KG/M2 | DIASTOLIC BLOOD PRESSURE: 90 MMHG | SYSTOLIC BLOOD PRESSURE: 126 MMHG | HEART RATE: 96 BPM

## 2024-10-25 DIAGNOSIS — J30.9 CHRONIC ALLERGIC RHINITIS: Primary | Chronic | ICD-10-CM

## 2024-10-25 DIAGNOSIS — K21.9 LARYNGOPHARYNGEAL REFLUX (LPR): Chronic | ICD-10-CM

## 2024-10-25 DIAGNOSIS — G47.33 OSA (OBSTRUCTIVE SLEEP APNEA): Chronic | ICD-10-CM

## 2024-10-25 PROCEDURE — 99999 PR PBB SHADOW E&M-EST. PATIENT-LVL III: CPT | Mod: PBBFAC,,, | Performed by: OTOLARYNGOLOGY

## 2024-10-25 RX ORDER — OMEPRAZOLE 40 MG/1
40 CAPSULE, DELAYED RELEASE ORAL EVERY MORNING
Qty: 30 CAPSULE | Refills: 3 | Status: SHIPPED | OUTPATIENT
Start: 2024-10-25 | End: 2025-10-25

## 2024-10-25 NOTE — PROCEDURES
Laryngoscopy    Date/Time: 10/25/2024 3:20 PM    Performed by: Paige Hercules MD  Authorized by: Paige Hercules MD    Consent Done?:  Yes (Verbal)  Anesthesia:     Local anesthetic:  Lidocaine 2% and Nikita-Synephrine 1/2%  Laryngoscopy:     Areas examined:  Nasal cavities, nasopharynx, oropharynx, hypopharynx, larynx and vocal cords  Nose External:      No external nasal deformity  Nose Intranasal:      Mucosa no polyps     Mucosa ulcers not present     No mucosa lesions present     No septum gross deformity     Turbinates not enlarged  Nasopharynx:      No mucosa lesions     Adenoids not present     Posterior choanae patent     Eustachian tube patent  Larynx/hypopharynx:      No epiglottis lesions     No epiglottis edema     No AE folds lesions     No vocal cord polyps     Equal and normal bilateral     No hypopharynx lesions     No piriform sinus pooling     No piriform sinus lesions     Post cricoid edema (trace)     Post cricoid erythema (mild)

## 2024-10-25 NOTE — PROGRESS NOTES
Chief Complaint   Patient presents with    Follow-up     Doing good no new issues also stated that she is still using medications as prescribed    .       Interval HPI 1/9/2023:  Follow up visit. Reports that she has been feeling very well. She feels throat pain and globus sensation are overall better. She has been on Protonix 40mg PO daily. She has noted in the last month that coffee seemed to be a trigger for heartburn.   She has been using Gaviscon advance daily.  She notes that recently she has been snoring more throughout the last year.  She states that her  has noted this has been more of a problem- he has noticed episodes which concerned him for apnea. She lost 38 lbs in 2020 and reports that she has gained most of  this weight back over the last 2 years. She thinks that the snoring has been worse with the weight gain. She notes that she has trouble waking in the morning, decreased energy, and daytime somnolence. She states that her watch has shown decreased oxygen levels while sleeping.     Interval HPI10/20/2023:  Follow up visit. She did note that she had some episodes of reflux last month and not sure what triggered it. She felt it was severe. She did note increased stress at work that week. The symptoms were present for a few days and then resolved.    She is still taking gaviscon advance and Prilosec 40mg PO daily. She is now on CPAP for AMARJIT and has been on it for last 4 months with great improvement.   She has been on Flonase.  She has been using Claritin intermittently.     Interval HPI 6/24/24:   Follow up visit. Reports that she has had interval improvement in  throat clearing and more soreness in her throat.  She feels that omeprazole has been more helpful than famotidine.  She has been using nasal saline rinses and feels this has been helpful. She has been using Flonase, Astelin, and allegra.     Interval HPI 10/25/2024:  Follow up visit. Reports that she has been doing well overall.  She did have episode of chest pain/SOB that she had evaluated in the ED on 10/22/2024. She states work up was negative for MI/DVT and was told she was having esophageal spasms. She has follow up with with PCP for this on 11/1/2024.  She has been taking Protonix as prescribed. Her GI provider added Famotidine a few weeks ago that she has been using nightly. She reports minimal globus sensation. Voice is at baseline. She has had increased stress at work.     Past Medical History:   Diagnosis Date    Acne vulgaris 11/20/2018    Allergic rhinitis due to dust mite 05/13/2019    Asthma     Eczema     Endometriosis 11/20/2018    Fibroid     Laryngopharyngeal reflux     Migraine syndrome     associated with flu vaccines    Sleep apnea      Social History     Socioeconomic History    Marital status:    Tobacco Use    Smoking status: Never     Passive exposure: Never    Smokeless tobacco: Never   Substance and Sexual Activity    Alcohol use: No    Drug use: No    Sexual activity: Yes     Partners: Male     Birth control/protection: OCP   Social History Narrative    Performance improvement specialist at [employer]    She is single.      Social Drivers of Health     Financial Resource Strain: Low Risk  (12/5/2023)    Overall Financial Resource Strain (CARDIA)     Difficulty of Paying Living Expenses: Not hard at all   Food Insecurity: No Food Insecurity (12/5/2023)    Hunger Vital Sign     Worried About Running Out of Food in the Last Year: Never true     Ran Out of Food in the Last Year: Never true   Transportation Needs: No Transportation Needs (12/5/2023)    PRAPARE - Transportation     Lack of Transportation (Medical): No     Lack of Transportation (Non-Medical): No   Physical Activity: Insufficiently Active (12/5/2023)    Exercise Vital Sign     Days of Exercise per Week: 5 days     Minutes of Exercise per Session: 20 min   Stress: Stress Concern Present (12/5/2023)    [employer] -  Occupational Stress Questionnaire     Feeling of Stress : To some extent   Housing Stability: Low Risk  (2023)    Housing Stability Vital Sign     Unable to Pay for Housing in the Last Year: No     Number of Places Lived in the Last Year: 1     Unstable Housing in the Last Year: No     Past Surgical History:   Procedure Laterality Date    DIAGNOSTIC LAPAROSCOPY Bilateral 5/15/2023    Procedure: LAPAROSCOPY, DIAGNOSTIC;  Surgeon: Raffi Gao MD;  Location: [facility];  Service: OB/GYN;  Laterality: Bilateral;    ESOPHAGOGASTRODUODENOSCOPY N/A 2023    Procedure: EGD (ESOPHAGOGASTRODUODENOSCOPY);  Surgeon: Mitchel Abreu MD;  Location: [facility];  Service: Endoscopy;  Laterality: N/A;    HERNIA REPAIR      umbilical    LAPAROSCOPIC APPENDECTOMY N/A 2021    Procedure: APPENDECTOMY, LAPAROSCOPIC WITH LYSIS OF ADHESIONS.;  Surgeon: Trent Cannon MD;  Location: [address];  Service: General;  Laterality: N/A;    LAPAROSCOPIC LYSIS OF ADHESIONS N/A 5/15/2023    Procedure: LYSIS, ADHESIONS, LAPAROSCOPIC;  Surgeon: Raffi Gao MD;  Location: [facility];  Service: OB/GYN;  Laterality: N/A;     Family History   Problem Relation Name Age of Onset    Hypertension Mother      Allergic rhinitis Father      Allergies Father      Diabetes Father      Hypertension Father      Stroke Father      Hyperlipidemia Father      Allergic rhinitis Brother      Eczema Brother      Conjunctivitis Brother      No Known Problems Brother      Breast cancer Neg Hx      Colon cancer Neg Hx      Ovarian cancer Neg Hx      Melanoma Neg Hx      Blindness Neg Hx      Amblyopia Neg Hx      Cataracts Neg Hx      Glaucoma Neg Hx      Macular degeneration Neg Hx      Retinal detachment Neg Hx      Strabismus Neg Hx       labor Neg Hx      Cancer Neg Hx      Eclampsia Neg Hx             Review of Systems  General: negative for chills, fever or weight loss  Psychological: negative for mood changes or  depression  Ophthalmic: negative for blurry vision, photophobia or eye pain  ENT: see HPI  Respiratory: no cough, shortness of breath, or wheezing  Cardiovascular: no chest pain or dyspnea on exertion  Gastrointestinal: no abdominal pain, change in bowel habits, or black/ bloody stools  Musculoskeletal: negative for gait disturbance or muscular weakness  Neurological: no syncope or seizures; no ataxia  Dermatological: negative for puritis,  rash and jaundice  Hematologic/lymphatic: no easy bruising, no new lumps or bumps      Physical Exam:    Vitals:    10/25/24 1527   BP: (!) 126/90   Pulse: 96         Constitutional: Well appearing / communicating without difficutly.  NAD.  Eyes: EOM I Bilaterally  Head/Face: Normocephalic.  Negative paranasal sinus pressure/tenderness.  Salivary glands WNL.  House Brackmann I Bilaterally.    Right Ear: Auricle normal appearance. External Auditory Canal within normal limits no lesions or masses,TM w/o masses/lesions/perforations. TM mobility noted.   Left Ear: Auricle normal appearance. External Auditory Canal within normal limits no lesions or masses,TM w/o masses/lesions/perforations. TM mobility noted.  Nose: + mucosal edema. No gross nasal septal deviation. Inferior Turbinates 3+ bilaterally. No septal perforation. No masses/lesions. External nasal skin appears normal without masses/lesions.  Oral Cavity: Gingiva/lips within normal limits.  Dentition/gingiva healthy appearing. Mucus membranes moist. Floor of mouth soft, no masses palpated. Oral Tongue mobile. Hard Palate appears normal.    Oropharynx: Base of tongue appears normal. No masses/lesions noted. Tonsillar fossa/pharyngeal wall without lesions. Posterior oropharynx WNL.  Soft palate without masses. Midline uvula.   Neck/Lymphatic: No LAD I-VI bilaterally.  No thyromegaly.  No masses noted on exam.      See separate procedure note for FFL.    Diagnostic studies reviewed:   Laboratory 04/15/2019:  IgE level:  Less  than 35.  ImmunoCAP:  Negative.     Spirometry 04/16/2019:  Normal spirometry. Airflow not improved after bronchodilator.     Inhalant skin tests 05/13/2019:  3+ histamine control.  3+ dust mites.    Assessment:    ICD-10-CM ICD-9-CM    1. Chronic allergic rhinitis  J30.9 477.9       2. Laryngopharyngeal reflux (LPR)  K21.9 478.79       3. AMARJIT (obstructive sleep apnea)  G47.33 327.23                     The primary encounter diagnosis was Chronic allergic rhinitis. Diagnoses of Laryngopharyngeal reflux (LPR) and AMARJIT (obstructive sleep apnea) were also pertinent to this visit.      Plan:  No orders of the defined types were placed in this encounter.    Continue  nasal saline rinses BID  Continue Flonase 2 sprays per nostril daily  Continue Allegra daily   Continue Prilosec 40mg PO daily.  Continue refrain from eating within 3 hours of going to bed, to elevate the head of bed very subtly and optimize the impact of gravity on the potential reflux, and to avoid alcohol, caffeine, tobacco, tomato sauce, spicy foods, fried food, and chocolate.   Recommend follow up with GI for ? Esophageal spasms    Continue CPAP therapy for AMARJIT     Follow up in approximately 4-6  months to reassess progress with treatment regimen.     Paige Grant MD

## 2024-10-29 ENCOUNTER — OFFICE VISIT (OUTPATIENT)
Dept: ALLERGY | Facility: CLINIC | Age: 39
End: 2024-10-29
Payer: COMMERCIAL

## 2024-10-29 VITALS — BODY MASS INDEX: 35.4 KG/M2 | HEIGHT: 66 IN | WEIGHT: 220.25 LBS

## 2024-10-29 DIAGNOSIS — J45.20 ASTHMA IN ADULT, MILD INTERMITTENT, UNCOMPLICATED: Primary | ICD-10-CM

## 2024-10-29 DIAGNOSIS — K21.9 LARYNGOPHARYNGEAL REFLUX: ICD-10-CM

## 2024-10-29 DIAGNOSIS — J30.89 ALLERGIC RHINITIS DUE TO DUST MITE: ICD-10-CM

## 2024-10-29 DIAGNOSIS — H10.13 ALLERGIC CONJUNCTIVITIS, BILATERAL: ICD-10-CM

## 2024-10-29 PROCEDURE — 3044F HG A1C LEVEL LT 7.0%: CPT | Mod: CPTII,S$GLB,, | Performed by: ALLERGY & IMMUNOLOGY

## 2024-10-29 PROCEDURE — 99999 PR PBB SHADOW E&M-EST. PATIENT-LVL III: CPT | Mod: PBBFAC,,, | Performed by: ALLERGY & IMMUNOLOGY

## 2024-10-29 PROCEDURE — 3008F BODY MASS INDEX DOCD: CPT | Mod: CPTII,S$GLB,, | Performed by: ALLERGY & IMMUNOLOGY

## 2024-10-29 PROCEDURE — 1159F MED LIST DOCD IN RCRD: CPT | Mod: CPTII,S$GLB,, | Performed by: ALLERGY & IMMUNOLOGY

## 2024-10-29 PROCEDURE — 99214 OFFICE O/P EST MOD 30 MIN: CPT | Mod: S$GLB,,, | Performed by: ALLERGY & IMMUNOLOGY

## 2024-10-29 PROCEDURE — 1160F RVW MEDS BY RX/DR IN RCRD: CPT | Mod: CPTII,S$GLB,, | Performed by: ALLERGY & IMMUNOLOGY

## 2024-10-30 PROBLEM — K75.81 METABOLIC DYSFUNCTION-ASSOCIATED STEATOHEPATITIS (MASH): Status: ACTIVE | Noted: 2023-03-08

## 2024-11-01 ENCOUNTER — OFFICE VISIT (OUTPATIENT)
Dept: PRIMARY CARE CLINIC | Facility: CLINIC | Age: 39
End: 2024-11-01
Attending: INTERNAL MEDICINE
Payer: COMMERCIAL

## 2024-11-01 VITALS
DIASTOLIC BLOOD PRESSURE: 86 MMHG | OXYGEN SATURATION: 99 % | RESPIRATION RATE: 16 BRPM | HEIGHT: 66 IN | BODY MASS INDEX: 35.24 KG/M2 | SYSTOLIC BLOOD PRESSURE: 134 MMHG | WEIGHT: 219.25 LBS | HEART RATE: 98 BPM

## 2024-11-01 DIAGNOSIS — E66.09 CLASS 2 OBESITY DUE TO EXCESS CALORIES WITHOUT SERIOUS COMORBIDITY WITH BODY MASS INDEX (BMI) OF 35.0 TO 35.9 IN ADULT: Primary | ICD-10-CM

## 2024-11-01 DIAGNOSIS — R07.9 CHEST PAIN, UNSPECIFIED TYPE: ICD-10-CM

## 2024-11-01 DIAGNOSIS — G47.30 SLEEP APNEA, UNSPECIFIED TYPE: ICD-10-CM

## 2024-11-01 DIAGNOSIS — F43.23 ADJUSTMENT DISORDER WITH MIXED ANXIETY AND DEPRESSED MOOD: ICD-10-CM

## 2024-11-01 DIAGNOSIS — K75.81 METABOLIC DYSFUNCTION-ASSOCIATED STEATOHEPATITIS (MASH): ICD-10-CM

## 2024-11-01 DIAGNOSIS — E66.812 CLASS 2 OBESITY DUE TO EXCESS CALORIES WITHOUT SERIOUS COMORBIDITY WITH BODY MASS INDEX (BMI) OF 35.0 TO 35.9 IN ADULT: Primary | ICD-10-CM

## 2024-11-01 RX ORDER — ESCITALOPRAM OXALATE 10 MG/1
10 TABLET ORAL DAILY
Qty: 90 TABLET | Refills: 3 | Status: SHIPPED | OUTPATIENT
Start: 2024-11-01 | End: 2025-11-01

## 2024-11-12 ENCOUNTER — NUTRITION (OUTPATIENT)
Dept: INTERNAL MEDICINE | Facility: CLINIC | Age: 39
End: 2024-11-12
Payer: COMMERCIAL

## 2024-11-12 DIAGNOSIS — E66.9 OBESITY (BMI 30-39.9): ICD-10-CM

## 2024-11-12 DIAGNOSIS — Z71.3 DIETARY COUNSELING: Primary | ICD-10-CM

## 2024-11-12 PROCEDURE — 99999 PR PBB SHADOW E&M-EST. PATIENT-LVL I: CPT | Mod: PBBFAC,,, | Performed by: DIETITIAN, REGISTERED

## 2024-11-12 NOTE — PROGRESS NOTES
"Nutrition Assessment  Session Time:  30 minutes      Client name:  Joan Wilde  :  1985  Age:  39 y.o.  Gender:  female    Client states:  Very pleasant OTC patient here for nutrition reassessment.  Since previous assessment, went to the ED due to chest pain, which she attributed to esophageal spasms as a result of stress/anxiety.  Continues to encounter stress at work every day, describing work environment as "toxic" and "unbearable."  Limits exposure to negative coworkers as she realizes the impact it has on her.  Continues to work with a  who is helping her to look for a new job (within [employer]).  During [previous] viral illness and thereafter, chest pain, noticed that her recovery time was slower than in the past, recalling feelings of fatigue, drowsiness, etc.  Also, noticed herself gravitating to sugar again, and so, as she began to feel better, adopted a low CHO meal plan in an attempt to "reset."  Soon later, returned to usual healthy eating habits and noticed improved GI regularity.  Resumed walking, strength training, and yoga recently, recalling the benefits of daily yoga in relation to stress management.  Weight essentially remains stable from previous visit.  Possesses greater physical and mental strength than in the past!  Expressed gratitude for RD's guidance and support, sharing that she always feels better after our appointments.    Anthropometrics  Height:  5' 6"     Weight:  216.3# (-0.4# since previous assessment)   BMI:  34.9  % Body Fat:  Unable to assess    Clinical Signs/Symptoms  N/V/D:  None  Appetite:  good       Past Medical History:   Diagnosis Date    Acne vulgaris 2018    Allergic rhinitis due to dust mite 2019    Asthma     Eczema     Endometriosis 2018    Fibroid     Laryngopharyngeal reflux     Migraine syndrome     associated with flu vaccines    Sleep apnea        Past Surgical History:   Procedure Laterality Date    DIAGNOSTIC " LAPAROSCOPY Bilateral 5/15/2023    Procedure: LAPAROSCOPY, DIAGNOSTIC;  Surgeon: Raffi Gao MD;  Location: [facility];  Service: OB/GYN;  Laterality: Bilateral;    ESOPHAGOGASTRODUODENOSCOPY N/A 12/7/2023    Procedure: EGD (ESOPHAGOGASTRODUODENOSCOPY);  Surgeon: Mitchel Abreu MD;  Location: [facility];  Service: Endoscopy;  Laterality: N/A;    HERNIA REPAIR  2014    umbilical    LAPAROSCOPIC APPENDECTOMY N/A 4/21/2021    Procedure: APPENDECTOMY, LAPAROSCOPIC WITH LYSIS OF ADHESIONS.;  Surgeon: Trent Cannon MD;  Location: [address];  Service: General;  Laterality: N/A;    LAPAROSCOPIC LYSIS OF ADHESIONS N/A 5/15/2023    Procedure: LYSIS, ADHESIONS, LAPAROSCOPIC;  Surgeon: Raffi Gao MD;  Location: [facility];  Service: OB/GYN;  Laterality: N/A;       Medications    has a current medication list which includes the following prescription(s): albuterol, ascorbic acid (vitamin c), azelastine, conjugated estrogens, diltiazem hcl, escitalopram oxalate, famotidine, fluticasone propionate, naproxen, norethindrone, norethindrone, olopatadine, omeprazole, phentermine, pimecrolimus, and topiramate.    Vitamins, Minerals, and/or Supplements:  *Please see list above     Food/Medication Interactions:  Reviewed     Food Allergies or Intolerances:  NKFA     Social History    Marital status:    Employment:  Clinical Analyst [employer]    Social History     Tobacco Use    Smoking status: Never     Passive exposure: Never    Smokeless tobacco: Never   Substance Use Topics    Alcohol use: No        Lab Reports   Sodium   Date Value Ref Range Status   10/22/2024 138 136 - 145 mmol/L Final     Potassium   Date Value Ref Range Status   10/22/2024 4.3 3.5 - 5.1 mmol/L Final     Chloride   Date Value Ref Range Status   10/22/2024 110 95 - 110 mmol/L Final     CO2   Date Value Ref Range Status   10/22/2024 19 (L) 23 - 29 mmol/L Final     Glucose   Date Value Ref Range Status   10/22/2024 83 70 - 110 mg/dL Final      BUN   Date Value Ref Range Status   10/22/2024 10 6 - 20 mg/dL Final     Creatinine   Date Value Ref Range Status   10/22/2024 1.0 0.5 - 1.4 mg/dL Final     Calcium   Date Value Ref Range Status   10/22/2024 9.3 8.7 - 10.5 mg/dL Final     Total Protein   Date Value Ref Range Status   10/22/2024 7.2 6.0 - 8.4 g/dL Final     Albumin   Date Value Ref Range Status   10/22/2024 3.9 3.5 - 5.2 g/dL Final     Total Bilirubin   Date Value Ref Range Status   10/22/2024 0.3 0.1 - 1.0 mg/dL Final     Comment:     For infants and newborns, interpretation of results should be based  on gestational age, weight and in agreement with clinical  observations.    Premature Infant recommended reference ranges:  Up to 24 hours.............<8.0 mg/dL  Up to 48 hours............<12.0 mg/dL  3-5 days..................<15.0 mg/dL  6-29 days.................<15.0 mg/dL       Alkaline Phosphatase   Date Value Ref Range Status   10/22/2024 74 40 - 150 U/L Final     AST   Date Value Ref Range Status   10/22/2024 24 10 - 40 U/L Final     ALT   Date Value Ref Range Status   10/22/2024 34 10 - 44 U/L Final     Anion Gap   Date Value Ref Range Status   10/22/2024 9 8 - 16 mmol/L Final     eGFR if    Date Value Ref Range Status   05/30/2022 >60.0 >60 mL/min/1.73 m^2 Final     eGFR if non    Date Value Ref Range Status   05/30/2022 >60.0 >60 mL/min/1.73 m^2 Final     Comment:     Calculation used to obtain the estimated glomerular filtration  rate (eGFR) is the CKD-EPI equation.         Lab Results   Component Value Date    WBC 5.64 10/22/2024    HGB 13.3 10/22/2024    HCT 39.5 10/22/2024    MCV 80 (L) 10/22/2024     10/22/2024        Lab Results   Component Value Date    CHOL 151 03/03/2023     Lab Results   Component Value Date    HDL 43 03/03/2023     Lab Results   Component Value Date    LDLCALC 98.4 03/03/2023     Lab Results   Component Value Date    TRIG 48 03/03/2023     Lab Results   Component Value  Date    CHOLHDL 28.5 03/03/2023     Lab Results   Component Value Date    HGBA1C 5.5 04/11/2024     BP Readings from Last 1 Encounters:   11/01/24 134/86       Food History  Breakfast:  Skips  Mid-morning Snack:  None  Lunch:  Nonfat Fage yogurt + walnuts + honey/Homemade smoothie comprised of 1.25 cups pea protein milk, ¾ cup kefir (8 gm RPO), 1 Tbsp psyllium husk, 1 scoop collagen powder (10 gm PRO), ½ Tbsp honey, ½ Tbsp almond butter (2 gm PRO), and handful of blueberries  Mid-afternoon Snack:  None    Dinner:  Entree avocado salad  Snack:  None  *Fluid intake:  120 oz water + tea (chamomile tea, black or lemon grass tea)    Exercise History:  15 minutes weight training 2x/week + 30-35 minutes walking 5x/week + daily yoga    Cultural/Spiritual/Personal Preferences:  None identified    Support System:  spouse    State of Change:  Action    Barriers to Change:  None    Diagnosis    Overweight related to imbalanced meal pattern and [previous] lack of exercise variation as evidenced by BMI:  34.9.    Intervention    RMR (Method:  [place] Jeor):  1713 kcal  Activity Factor:  1.4    LYNDON:  2398 - 500 = 1898 kcal    Goals:  1.  Aim for 0.5-1# weight loss/week  2.  Increase walking to 35 minutes 5x/week consistently as tolerated  3.  Continue daily yoga and strength training 2x/week as tolerated    Nutrition Education  The following education was provided to the patient:  Complimented patient on proactive role in health maintenance.  Complimented patient on physical activity efforts.  Discussed meal planning/[employer]'s My Plate design.  Discussed weight management.  Discussed physical activity guidelines and its associated benefits.  Discussed goal setting.  Provided ongoing support, encouragement, and guidance toward improved health efforts.  Discussed stress management techniques.    Patient verbalized understanding of nutrition education and recommendations received.    Handouts Provided  No new handouts were  provided.    Monitoring/Evaluation    Monitor the following:  Weight  BMI  Caloric intake    Follow Up Plan:  Communication with referring healthcare provider is unnecessary at this time.  However, will follow up with patient in 4 weeks.

## 2024-11-12 NOTE — PROGRESS NOTES
Total Care Provider Appointment  [facility])    Subjective:   Need to recheck GAD7 (was 19 at her last visit 2 weeks ago)  History of Present Illness    CHIEF COMPLAINT:  Joan presents for a follow-up visit to discuss management of anxiety and weight loss, including medication concerns and lifestyle interventions.    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 Instead, she implemented lifestyle changes and mental health strategies to improve her mood over the past 2 weeks, including cognitive restructuring, improving work relationships, consistent exercise, yoga, proper hydration, and engaging in enjoyable activities. Joan notes mood improvement, with reduced frequency of crying episodes.    Joan's anxiety symptoms have shown improvement. Using the CARINE-7 assessment, she reported feeling nervous, anxious, or on edge more than half the days in the past 2 weeks. She indicated inability to stop worrying, excessive worry about different things, trouble relaxing, restlessness, irritability, and fear of something awful happening on several days. These symptoms are not causing difficulty in daily functioning and her GAD7 score improved from 19 two weeks ago to 10 today.     Regarding weight management, the patient has been taking phentermine and topiramate. Joan's smartwatch data shows that over the past 4 weeks, her average resting heart rate has been between 67-74 bpm, with an average high of 131 bpm. Since starting the medication in March or April, her high heart rates have increased, with averages reaching 140-142 bpm in September and October. Joan reports more palpitations since starting the medication.    Joan denies current suicidal ideation or plans.    MEDICATIONS:  Joan is on Phentermine and Topiramate for weight loss.    MEDICAL  HISTORY:  Joan has a history of anxiety.    TEST RESULTS:  Joan's CARINE-7 score during the current visit is 10, which is reduced from the previous score. She reported improvement. Heart rate monitoring via smartwatch over the last 4 weeks shows an average resting heart rate of 67-74 bpm and an average high of 131 bpm. One-year data indicates high rates ranging from 114-142 bpm.    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 She is .      ROS:  General: no fever, no chills, no fatigue, no weight gain, no weight loss  Eyes: no vision changes, no redness, no discharge  ENT: no ear pain, no nasal congestion, no sore throat  Cardiovascular: no chest pain, reports palpitations, no lower extremity edema  Respiratory: no cough, no shortness of breath  Gastrointestinal: no abdominal pain, no nausea, no vomiting, no diarrhea, no constipation, no blood in stool  Genitourinary: no dysuria, no hematuria, no frequency  Musculoskeletal: no joint pain, no muscle pain  Skin: no rash, no lesion  Neurological: no headache, no dizziness, no numbness, no tingling  Psychiatric: reports anxiety, no depression, no sleep difficulty, no mood swings, no suicidal ideation               7/18/2024   PHQ-9 Depression Patient Health Questionnaire   Over the last two weeks how often have you been bothered by little interest or pleasure in doing things 0   Over the last two weeks how often have you been bothered by feeling down, depressed or hopeless 0         11/15/2024     3:16 PM 11/1/2024     1:34 PM 10/15/2024    11:13 AM   GAD7   1. Feeling nervous, anxious, or on edge? 2 3 0   2. Not being able to stop or control worrying? 2 3 1   3. Worrying too much about different things? 2 3 1   4. Trouble relaxing? 1 3 1   5. Being so restless that it is hard to sit still? 1 1 1   6. Becoming easily annoyed or irritable? 1 3 1   7. Feeling afraid as if something awful might  happen? 1 3 1   8. If you checked off any problems, how difficult have these problems made it for you to do your work, take care of things at home, or get along with other people? 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      Social Drivers of Health     Financial Resource Strain: Low Risk  (12/5/2023)    Overall Financial Resource Strain (CARDIA)     Difficulty of Paying Living Expenses: Not hard at all   Food Insecurity: No Food Insecurity (12/5/2023)    Hunger Vital Sign     Worried About Running Out of Food in the Last Year: Never true     Ran Out of Food in the Last Year: Never true   Transportation Needs: No Transportation Needs (12/5/2023)    PRAPARE - Transportation     Lack of Transportation (Medical): No     Lack of Transportation (Non-Medical): No   Physical Activity: Insufficiently Active (12/5/2023)    Exercise Vital Sign     Days of Exercise per Week: 5 days     Minutes of Exercise per Session: 20 min   Stress: Stress Concern Present (12/5/2023)    [employer] - Occupational Stress Questionnaire     Feeling of Stress : To some extent   Housing Stability: Low Risk  (12/5/2023)    Housing Stability Vital Sign     Unable to Pay for Housing in the Last Year: No     Number of Places Lived in the Last Year: 1     Unstable Housing in the Last Year: No         Objective:   Mental Health Screening  PHQ-9       CARINE-7  Generalized Anxiety Disorder 7-item (CARINE-7) Scale. HOW OFTEN DURING THE PAST 2 WEEKS HAVE YOU FELT BOTHERED BY:  1. Feeling nervous, anxious, or on edge?: More than half the days  2. Not  "being able to stop or control worrying?: More than half the days  3. Worrying too much about different things?: More than half the days  4. Trouble relaxing?: Several days  5. Being so restless that it is hard to sit still?: Several days  6. Becoming easily annoyed or irritable?: Several days  7. Feeling afraid as if something awful might happen?: Several days  8. If you checked off any problems, how difficult have these problems made it for you to do your work, take care of things at home, or get along with other people?: Not difficult at all  CARINE-7 Score: 10  Number answered (out of first 7): 7  Interpretation: Moderate Anxiety    Vital Signs  /78 (BP Location: Right arm, Patient Position: Sitting)   Pulse 100   Temp 99.9 °F (37.7 °C) (Oral)   Ht 5' 6" (1.676 m)   Wt 99.6 kg (219 lb 9.3 oz)   LMP  (LMP Unknown) Comment: LMP March  SpO2 98%   BMI 35.44 kg/m²     Physical Exam    Vitals: Heart rate: 100 bpm.  General: Well-developed. Well-nourished. No acute distress.  Eyes: EOMI. Sclerae anicteric.  HENT: Normocephalic. Atraumatic. Nares patent. Moist oral mucosa.  Cardiovascular: Regular rate. Regular rhythm. No murmurs. No rubs. No gallops. Normal S1, S2.  Respiratory: Normal respiratory effort. Clear to auscultation bilaterally. No rales. No rhonchi. No wheezing.  Musculoskeletal: No  obvious deformity.  Extremities: No lower extremity edema.  Neurological: Alert & oriented x3. No slurred speech. Normal gait.  Psychiatric: Normal mood. Normal affect. Good insight. Good judgment.  Skin: Warm. Dry. No rash.           Assessment:   39 y.o. female here for primary care visit       Plan:   1. Adjustment disorder with mixed anxiety and depressed mood  Overview:  GAD7 score 8 on 4/12/24, but anxiety symptoms not present for more than 6 months (most of her anxiety is situational and work-related)  GAD7 score 7 on 7/12/24  GAD7 score 19 on 11/1/24  GAD7 score 10 on 11/15/24    Assessment & Plan:  She decided " not to start Lexapro after all (worries about side effects, especially weight gain)  --instead she has been going to yoga, exercising, seeing our LCSW, and changing how she responds to challenging situations and people at work  **has had a significant improvement with her GAD7 going from 19 to 10 in the past 2 weeks      2. Metabolic dysfunction-associated steatohepatitis (MASH)  Overview:  Fibroscan 4/12/24:  Findings  Median liver stiffness score:  3.5  CAP Reading: dB/m:  238  IQR/med %:  9  Interpretation  Fibrosis interpretation is based on medial liver stiffness - Kilopascal (kPa).  Fibrosis Stage:  F 0-1  Steatosis interpretation is based on controlled attenuation parameter - (dB/m).  Steatosis Grade:  S1    FIB-4 Calculation: 0.58 at 4/12/2024  2:30 PM   FIB-4 Calculation: 0.50 at 10/30/2024  4:37 PM     FIB-4 below 1.30 is considered as low-risk for advanced fibrosis  FIB-4 over 2.67 is considered as high-risk for advanced fibrosis  FIB-4 values between 1.30 and 2.67 are considered as intermediate-risk of advanced fibrosis for ages 36-64.       Assessment & Plan:  **no further evaluation or specific treatment at this point  **she will try to enroll in our digital medicine weight management program      3. Class 2 obesity due to excess calories without serious comorbidity with body mass index (BMI) of 35.0 to 35.9 in adult  Overview:  Baseline weight upon entry to [facility] is 233 lbs (106.1 kg) which is the heaviest she has ever been  Weight as of 7/12/24 = 225 lbs (102.3 kg)  Weight as of 11/1/24 = 219 lbs (99.5 kg)    Current regimen:  --phentermine 15 mg daily  --topiramate 25 mg daily  Started this regimen 4/12/24    Assessment & Plan:  **she will look into the digital medicine weight management program  --her HR has been averaging between 74 and 131   --we discussed the risk of tachycardia-induced cardiomyopathy with HR > 100  --we discussed stopping the phentermine to avoid this risk  --she would  prefer to continue for now, but with closer monitoring of HR to determine the frequency of how often her HR is staying > 100  **she will monitor HR very closely over next 2 weeks and F/U then to determine discontinuation        Follow up in about 2 weeks (around 11/29/2024).    [facility]vng Tay MD/[facility]  [facility]  [facility]

## 2024-11-15 ENCOUNTER — OFFICE VISIT (OUTPATIENT)
Dept: PRIMARY CARE CLINIC | Facility: CLINIC | Age: 39
End: 2024-11-15
Attending: INTERNAL MEDICINE
Payer: COMMERCIAL

## 2024-11-15 VITALS
SYSTOLIC BLOOD PRESSURE: 132 MMHG | HEIGHT: 66 IN | DIASTOLIC BLOOD PRESSURE: 78 MMHG | OXYGEN SATURATION: 98 % | WEIGHT: 219.56 LBS | HEART RATE: 100 BPM | BODY MASS INDEX: 35.29 KG/M2 | TEMPERATURE: 100 F

## 2024-11-15 DIAGNOSIS — E66.812 CLASS 2 OBESITY DUE TO EXCESS CALORIES WITHOUT SERIOUS COMORBIDITY WITH BODY MASS INDEX (BMI) OF 35.0 TO 35.9 IN ADULT: ICD-10-CM

## 2024-11-15 DIAGNOSIS — E66.09 CLASS 2 OBESITY DUE TO EXCESS CALORIES WITHOUT SERIOUS COMORBIDITY WITH BODY MASS INDEX (BMI) OF 35.0 TO 35.9 IN ADULT: ICD-10-CM

## 2024-11-15 DIAGNOSIS — F43.23 ADJUSTMENT DISORDER WITH MIXED ANXIETY AND DEPRESSED MOOD: Primary | ICD-10-CM

## 2024-11-15 DIAGNOSIS — K75.81 METABOLIC DYSFUNCTION-ASSOCIATED STEATOHEPATITIS (MASH): ICD-10-CM

## 2024-11-15 PROCEDURE — 1160F RVW MEDS BY RX/DR IN RCRD: CPT | Mod: CPTII,S$GLB,, | Performed by: INTERNAL MEDICINE

## 2024-11-15 PROCEDURE — 3078F DIAST BP <80 MM HG: CPT | Mod: CPTII,S$GLB,, | Performed by: INTERNAL MEDICINE

## 2024-11-15 PROCEDURE — 99213 OFFICE O/P EST LOW 20 MIN: CPT | Mod: S$GLB,,, | Performed by: INTERNAL MEDICINE

## 2024-11-15 PROCEDURE — 3075F SYST BP GE 130 - 139MM HG: CPT | Mod: CPTII,S$GLB,, | Performed by: INTERNAL MEDICINE

## 2024-11-15 PROCEDURE — 3044F HG A1C LEVEL LT 7.0%: CPT | Mod: CPTII,S$GLB,, | Performed by: INTERNAL MEDICINE

## 2024-11-15 PROCEDURE — 1159F MED LIST DOCD IN RCRD: CPT | Mod: CPTII,S$GLB,, | Performed by: INTERNAL MEDICINE

## 2024-11-15 PROCEDURE — 3008F BODY MASS INDEX DOCD: CPT | Mod: CPTII,S$GLB,, | Performed by: INTERNAL MEDICINE

## 2024-11-15 NOTE — ASSESSMENT & PLAN NOTE
**she will look into the digital medicine weight management program  --her HR has been averaging between 74 and 131   --we discussed the risk of tachycardia-induced cardiomyopathy with HR > 100  --we discussed stopping the phentermine to avoid this risk  --she would prefer to continue for now, but with closer monitoring of HR to determine the frequency of how often her HR is staying > 100  **she will monitor HR very closely over next 2 weeks and F/U then to determine discontinuation

## 2024-11-15 NOTE — ASSESSMENT & PLAN NOTE
She decided not to start Lexapro after all (worries about side effects, especially weight gain)  --instead she has been going to yoga, exercising, seeing our LCSW, and changing how she responds to challenging situations and people at work  **has had a significant improvement with her GAD7 going from 19 to 10 in the past 2 weeks

## 2024-11-15 NOTE — PATIENT INSTRUCTIONS
Patients go to [email or web address] - there they validate their financial eligibility (attribution), select the program of interest (weight management), screen for primary exclusions, and consent to Digital Medicine. After the portal, they immediately start onboarding for WM. Once onboarding, they can download the DM angelika and submit their first reading to complete enrollment. After the first reading is submitting in the DM angelika, they'll be eligible to schedule a virtual visit for medication management.

## 2024-11-16 NOTE — ASSESSMENT & PLAN NOTE
**no further evaluation or specific treatment at this point  **she will try to enroll in our digital medicine weight management program

## 2024-12-04 PROBLEM — R07.9 CHEST PAIN: Status: RESOLVED | Noted: 2024-11-01 | Resolved: 2024-12-04

## 2024-12-04 PROBLEM — R00.2 PALPITATIONS: Status: RESOLVED | Noted: 2024-04-16 | Resolved: 2024-12-04

## 2024-12-04 NOTE — PROGRESS NOTES
Total [facility] Appointment      Subjective:   Follow-up appt after monitoring HR for past 2 weeks to determine whether to stop or continue phentermine  Patient ID: Joan Wilde is a 39 y.o. female.    Chief Complaint: Anxiety      HPI:    History of Present Illness    CHIEF COMPLAINT:  Joan presents today for follow-up on weight management and stress.    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 She continues weight lifting and performs yoga daily. She expresses concern about her current weight plateau and desires to address this issue. She acknowledges eating less than usual during Thanksgiving. She is currently taking Phentermine and Topiramate for weight management and is open to continuing the medication for a few more months, with the goal of maintaining weight stability through the holiday season. She expresses interest in starting Wegovy but has not yet registered for the medication through the remote weight management program. She intends to review the after-visit summary and complete the registration process over the weekend.    STRESS MANAGEMENT:  She is actively trying to manage work-related stress. She reports implementing various stress management techniques, including daily yoga practice, which she finds particularly helpful. She expresses concern about a decrease in her walking routine due to later work hours since transitioning from remote work. She is seeking ways to address this reduction in exercise. She has resumed scheduling appointments with Massimo ([facility]) and has an upcoming meeting planned.    MEDICATIONS:  She discontinued Lexapro. She continues to drink chamomile tea regularly and has begun drinking cardmon tea, which she believes may help manage blood pressure.      ROS:  General: no fever, no chills, no fatigue, no weight gain, no weight loss  Eyes: no vision changes, no  redness, no discharge  ENT: no ear pain, no nasal congestion, no sore throat  Cardiovascular: no chest pain, no palpitations, no lower extremity edema  Respiratory: no cough, no shortness of breath  Gastrointestinal: no abdominal pain, no nausea, no vomiting, no diarrhea, no constipation, no blood in stool  Genitourinary: no dysuria, no hematuria, no frequency  Musculoskeletal: no joint pain, no muscle pain  Skin: no rash, no lesion  Neurological: no headache, no dizziness, no numbness, no tingling  Psychiatric: no anxiety, no depression, no sleep difficulty             7/18/2024   PHQ-9 Depression Patient Health Questionnaire   Over the last two weeks how often have you been bothered by little interest or pleasure in doing things 0   Over the last two weeks how often have you been bothered by feeling down, depressed or hopeless 0          11/15/2024     3:16 PM 11/1/2024     1:34 PM 10/15/2024    11:13 AM   GAD7   1. Feeling nervous, anxious, or on edge? 2 3 0   2. Not being able to stop or control worrying? 2 3 1   3. Worrying too much about different things? 2 3 1   4. Trouble relaxing? 1 3 1   5. Being so restless that it is hard to sit still? 1 1 1   6. Becoming easily annoyed or irritable? 1 3 1   7. Feeling afraid as if something awful might happen? 1 3 1   8. If you checked off any problems, how difficult have these problems made it for you to do your work, take care of things at home, or get along with other people? 0 1 1   CARINE-7 Score 10 19 6         Objective:     PHYSICAL EXAM   Vital Signs  /80 (BP Location: Right arm, Patient Position: Sitting)   Pulse 96   Wt 99.2 kg (218 lb 9.4 oz)   SpO2 97%   BMI 35.28 kg/m²     Physical Exam    General: Well-developed. Well-nourished. No acute distress.  Eyes: EOMI. Sclerae anicteric.  HENT: Normocephalic. Atraumatic. Nares patent. Moist oral mucosa.  Cardiovascular: Regular rate. Regular rhythm. No murmurs. No rubs. No gallops. Normal S1,  S2.  Respiratory: Normal respiratory effort. Clear to auscultation bilaterally. No rales. No rhonchi. No wheezing.  Musculoskeletal: No  obvious deformity.  Extremities: No lower extremity edema.  Neurological: Alert & oriented x3. No slurred speech. Normal gait.  Psychiatric: Normal mood. Normal affect. Good insight. Good judgment.  Skin: Warm. Dry. No rash.       Assessment:   39 y.o. female here for primary care visit       Plan:   1. Class 2 obesity due to excess calories without serious comorbidity with body mass index (BMI) of 35.0 to 35.9 in adult  Overview:  Baseline weight upon entry to [facility] is 233 lbs (106.1 kg) which is the heaviest she has ever been  Weight as of 7/12/24 = 225 lbs (102.3 kg)  Weight as of 11/1/24 = 219 lbs (99.5 kg)    Current regimen:  --phentermine 30 mg daily  --topiramate 50 mg daily  Started this regimen 4/12/24    Assessment & Plan:  She has been meticulously monitoring her BP and HR for the past 2 weeks  --BPs have consistently been in the 115 to 120+ range with DBP mostly in the 70s and low 80s  --HR has been between 77 and 90  **okay to increase phentermine to 30 mg daily and topiramate to 50 mg daily, but continue to closely monitor HR and BP  **F/U again in 2 weeks    Orders:  -     phentermine 30 MG Cap; Take 1 capsule (30 mg total) by mouth before breakfast.  Dispense: 30 capsule; Refill: 2  -     topiramate (TOPAMAX) 50 MG tablet; Take 1 tablet (50 mg total) by mouth once daily.  Dispense: 30 tablet; Refill: 5    2. Metabolic dysfunction-associated steatohepatitis (MASH)  Overview:  Fibroscan 4/12/24:  Findings  Median liver stiffness score:  3.5  CAP Reading: dB/m:  238  IQR/med %:  9  Interpretation  Fibrosis interpretation is based on medial liver stiffness - Kilopascal (kPa).  Fibrosis Stage:  F 0-1  Steatosis interpretation is based on controlled attenuation parameter - (dB/m).  Steatosis Grade:  S1    FIB-4 Calculation: 0.58 at 4/12/2024  2:30 PM   FIB-4  Calculation: 0.50 at 10/30/2024  4:37 PM     FIB-4 below 1.30 is considered as low-risk for advanced fibrosis  FIB-4 over 2.67 is considered as high-risk for advanced fibrosis  FIB-4 values between 1.30 and 2.67 are considered as intermediate-risk of advanced fibrosis for ages 36-64.       Assessment & Plan:  Lab Results   Component Value Date    ALT 34 10/22/2024    AST 24 10/22/2024    ALKPHOS 74 10/22/2024    BILITOT 0.3 10/22/2024    ALBUMIN 3.9 10/22/2024     10/22/2024   FIB-4 Calculation: 0.5 at 10/22/2024  2:01 PM  Calculated from:  SGOT/AST: 24 U/L at 10/22/2024  2:01 PM  SGPT/ALT: 34 U/L at 10/22/2024  2:01 PM  Platelets: 336 K/uL at 10/22/2024  2:01 PM  Age: 39 years     FIB-4 below 1.30 is considered as low-risk for advanced fibrosis  FIB-4 over 2.67 is considered as high-risk for advanced fibrosis  FIB-4 values between 1.30 and 2.67 are considered as intermediate-risk of advanced fibrosis for ages 36-64.       3. Adjustment disorder with mixed anxiety and depressed mood  Overview:  GAD7 score 8 on 4/12/24, but anxiety symptoms not present for more than 6 months (most of her anxiety is situational and work-related)  GAD7 score 7 on 7/12/24  GAD7 score 19 on 11/1/24  GAD7 score 10 on 11/15/24    Assessment & Plan:  She has continued to deploy her techniques of yoga, mindfulness, along with other relaxation techniques, and has started a new herb (Cardmon) along with her Chamomile tea  --she has found these to be helpful and feels like her overall anxiety has improved (although the work stressors remain)        Follow up in about 2 weeks (around 12/20/2024).    Kvng Tay MD/[facility]  Internal Medicine  [facility]

## 2024-12-06 ENCOUNTER — OFFICE VISIT (OUTPATIENT)
Dept: PRIMARY CARE CLINIC | Facility: CLINIC | Age: 39
End: 2024-12-06
Attending: INTERNAL MEDICINE
Payer: COMMERCIAL

## 2024-12-06 VITALS
DIASTOLIC BLOOD PRESSURE: 80 MMHG | SYSTOLIC BLOOD PRESSURE: 128 MMHG | HEART RATE: 96 BPM | WEIGHT: 218.56 LBS | BODY MASS INDEX: 35.28 KG/M2 | OXYGEN SATURATION: 97 %

## 2024-12-06 DIAGNOSIS — F43.23 ADJUSTMENT DISORDER WITH MIXED ANXIETY AND DEPRESSED MOOD: ICD-10-CM

## 2024-12-06 DIAGNOSIS — E66.09 CLASS 2 OBESITY DUE TO EXCESS CALORIES WITHOUT SERIOUS COMORBIDITY WITH BODY MASS INDEX (BMI) OF 35.0 TO 35.9 IN ADULT: Primary | ICD-10-CM

## 2024-12-06 DIAGNOSIS — K75.81 METABOLIC DYSFUNCTION-ASSOCIATED STEATOHEPATITIS (MASH): ICD-10-CM

## 2024-12-06 DIAGNOSIS — E66.812 CLASS 2 OBESITY DUE TO EXCESS CALORIES WITHOUT SERIOUS COMORBIDITY WITH BODY MASS INDEX (BMI) OF 35.0 TO 35.9 IN ADULT: Primary | ICD-10-CM

## 2024-12-06 RX ORDER — TOPIRAMATE 50 MG/1
50 TABLET, FILM COATED ORAL DAILY
Qty: 30 TABLET | Refills: 5 | Status: SHIPPED | OUTPATIENT
Start: 2024-12-06 | End: 2025-06-04

## 2024-12-06 RX ORDER — PHENTERMINE HYDROCHLORIDE 30 MG/1
30 CAPSULE ORAL
Qty: 30 CAPSULE | Refills: 2 | Status: SHIPPED | OUTPATIENT
Start: 2024-12-06 | End: 2025-03-06

## 2024-12-06 NOTE — ASSESSMENT & PLAN NOTE
Lab Results   Component Value Date    ALT 34 10/22/2024    AST 24 10/22/2024    ALKPHOS 74 10/22/2024    BILITOT 0.3 10/22/2024    ALBUMIN 3.9 10/22/2024     10/22/2024   FIB-4 Calculation: 0.5 at 10/22/2024  2:01 PM  Calculated from:  SGOT/AST: 24 U/L at 10/22/2024  2:01 PM  SGPT/ALT: 34 U/L at 10/22/2024  2:01 PM  Platelets: 336 K/uL at 10/22/2024  2:01 PM  Age: 39 years     FIB-4 below 1.30 is considered as low-risk for advanced fibrosis  FIB-4 over 2.67 is considered as high-risk for advanced fibrosis  FIB-4 values between 1.30 and 2.67 are considered as intermediate-risk of advanced fibrosis for ages 36-64.   
She has been meticulously monitoring her BP and HR for the past 2 weeks  --BPs have consistently been in the 115 to 120+ range with DBP mostly in the 70s and low 80s  --HR has been between 77 and 90  **okay to increase phentermine to 30 mg daily and topiramate to 50 mg daily, but continue to closely monitor HR and BP  **F/U again in 2 weeks  
She has continued to deploy her techniques of yoga, mindfulness, along with other relaxation techniques, and has started a new herb (Cardmon) along with her Chamomile tea  --she has found these to be helpful and feels like her overall anxiety has improved (although the work stressors remain)  
normal...

## 2024-12-10 ENCOUNTER — NUTRITION (OUTPATIENT)
Dept: INTERNAL MEDICINE | Facility: CLINIC | Age: 39
End: 2024-12-10
Payer: COMMERCIAL

## 2024-12-10 ENCOUNTER — CLINICAL SUPPORT (OUTPATIENT)
Dept: PRIMARY CARE CLINIC | Facility: CLINIC | Age: 39
End: 2024-12-10
Payer: COMMERCIAL

## 2024-12-10 DIAGNOSIS — E66.9 OBESITY (BMI 30-39.9): ICD-10-CM

## 2024-12-10 DIAGNOSIS — F32.A MODERATELY SEVERE DEPRESSION: Primary | ICD-10-CM

## 2024-12-10 DIAGNOSIS — Z71.3 DIETARY COUNSELING: Primary | ICD-10-CM

## 2024-12-10 DIAGNOSIS — Z56.6 STRESS AT WORK: ICD-10-CM

## 2024-12-10 PROCEDURE — 97803 MED NUTRITION INDIV SUBSEQ: CPT | Mod: S$GLB,,, | Performed by: DIETITIAN, REGISTERED

## 2024-12-10 PROCEDURE — 99999 PR PBB SHADOW E&M-EST. PATIENT-LVL I: CPT | Mod: PBBFAC,,, | Performed by: DIETITIAN, REGISTERED

## 2024-12-10 SDOH — SOCIAL DETERMINANTS OF HEALTH (SDOH): OTHER PHYSICAL AND MENTAL STRAIN RELATED TO WORK: Z56.6

## 2024-12-10 NOTE — PROGRESS NOTES
"Nutrition Assessment  Session Time:  30 minutes      Client name:  Joan Wilde  :  1985  Age:  39 y.o.  Gender:  female    Client states:  Very pleasant OTC patient here for nutrition reassessment.  Recalls ongoing stressful environment at work.  Recently escalated concerns to HR.  Continues to work with  and mentor who has helped her to understand the importance of focusing on her reactions in lieu of actual events.  Finds great benefit practicing daily yoga, particularly as it relates to stress management.  Also, improved strength training frequency, averaging 2x/week the past few weeks.  Continues to walk daily yet desires for greater time (35 minutes).  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  Finds that she frequently works through lunch and so, struggles to disconnect from work.  Recalls time in which she left the office with coworkers for lunch but remained silent as she was thinking about work that needed to be done.  Remains conscious of PO intake, specifically vegetable and protein intake as she understands its importance.  Previously met with Dr. Tay and is scheduled to follow up in a few weeks.  Advised increased dosage of Phentermine/Topiramate due to weight plateau albeit concerns of elevated HR.  Currently averaging 76-90 bpm in lieu of >100 bpm as in the past.  Was advised to enroll in [facility] program with goal of eventually changing to [place].      Anthropometrics  Height:  5' 6"     Weight:  216.8# (+0.5# since previous assessment)   BMI:  34.9  % Body Fat:  Unable to assess    Clinical Signs/Symptoms  N/V/D:  None  Appetite:  good       Past Medical History:   Diagnosis Date    Acne vulgaris 2018    Allergic rhinitis due to dust mite 2019    Asthma     Eczema     Endometriosis 2018    Fibroid     Laryngopharyngeal reflux     Migraine syndrome     associated with flu " vaccines    Sleep apnea        Past Surgical History:   Procedure Laterality Date    DIAGNOSTIC LAPAROSCOPY Bilateral 5/15/2023    Procedure: LAPAROSCOPY, DIAGNOSTIC;  Surgeon: Raffi Gao MD;  Location: [facility];  Service: OB/GYN;  Laterality: Bilateral;    ESOPHAGOGASTRODUODENOSCOPY N/A 12/7/2023    Procedure: EGD (ESOPHAGOGASTRODUODENOSCOPY);  Surgeon: Mitchel Abreu MD;  Location: [facility];  Service: Endoscopy;  Laterality: N/A;    HERNIA REPAIR  2014    umbilical    LAPAROSCOPIC APPENDECTOMY N/A 4/21/2021    Procedure: APPENDECTOMY, LAPAROSCOPIC WITH LYSIS OF ADHESIONS.;  Surgeon: Trent Cannon MD;  Location: [address];  Service: General;  Laterality: N/A;    LAPAROSCOPIC LYSIS OF ADHESIONS N/A 5/15/2023    Procedure: LYSIS, ADHESIONS, LAPAROSCOPIC;  Surgeon: Raffi Gao MD;  Location: [facility];  Service: OB/GYN;  Laterality: N/A;       Medications    has a current medication list which includes the following prescription(s): albuterol, ascorbic acid (vitamin c), azelastine, conjugated estrogens, diltiazem hcl, famotidine, fluticasone propionate, naproxen, norethindrone, norethindrone, olopatadine, omeprazole, phentermine, pimecrolimus, and topiramate.    Vitamins, Minerals, and/or Supplements:  *Please see list above     Food/Medication Interactions:  Reviewed     Food Allergies or Intolerances:  NKFA     Social History    Marital status:    Employment:  Clinical Analyst [employer]    Social History     Tobacco Use    Smoking status: Never     Passive exposure: Never    Smokeless tobacco: Never   Substance Use Topics    Alcohol use: No        Lab Reports   Sodium   Date Value Ref Range Status   10/22/2024 138 136 - 145 mmol/L Final     Potassium   Date Value Ref Range Status   10/22/2024 4.3 3.5 - 5.1 mmol/L Final     Chloride   Date Value Ref Range Status   10/22/2024 110 95 - 110 mmol/L Final     CO2   Date Value Ref Range Status   10/22/2024 19 (L) 23 - 29 mmol/L Final      Glucose   Date Value Ref Range Status   10/22/2024 83 70 - 110 mg/dL Final     BUN   Date Value Ref Range Status   10/22/2024 10 6 - 20 mg/dL Final     Creatinine   Date Value Ref Range Status   10/22/2024 1.0 0.5 - 1.4 mg/dL Final     Calcium   Date Value Ref Range Status   10/22/2024 9.3 8.7 - 10.5 mg/dL Final     Total Protein   Date Value Ref Range Status   10/22/2024 7.2 6.0 - 8.4 g/dL Final     Albumin   Date Value Ref Range Status   10/22/2024 3.9 3.5 - 5.2 g/dL Final     Total Bilirubin   Date Value Ref Range Status   10/22/2024 0.3 0.1 - 1.0 mg/dL Final     Comment:     For infants and newborns, interpretation of results should be based  on gestational age, weight and in agreement with clinical  observations.    Premature Infant recommended reference ranges:  Up to 24 hours.............<8.0 mg/dL  Up to 48 hours............<12.0 mg/dL  3-5 days..................<15.0 mg/dL  6-29 days.................<15.0 mg/dL       Alkaline Phosphatase   Date Value Ref Range Status   10/22/2024 74 40 - 150 U/L Final     AST   Date Value Ref Range Status   10/22/2024 24 10 - 40 U/L Final     ALT   Date Value Ref Range Status   10/22/2024 34 10 - 44 U/L Final     Anion Gap   Date Value Ref Range Status   10/22/2024 9 8 - 16 mmol/L Final     eGFR if    Date Value Ref Range Status   05/30/2022 >60.0 >60 mL/min/1.73 m^2 Final     eGFR if non    Date Value Ref Range Status   05/30/2022 >60.0 >60 mL/min/1.73 m^2 Final     Comment:     Calculation used to obtain the estimated glomerular filtration  rate (eGFR) is the CKD-EPI equation.         Lab Results   Component Value Date    WBC 5.64 10/22/2024    HGB 13.3 10/22/2024    HCT 39.5 10/22/2024    MCV 80 (L) 10/22/2024     10/22/2024        Lab Results   Component Value Date    CHOL 151 03/03/2023     Lab Results   Component Value Date    HDL 43 03/03/2023     Lab Results   Component Value Date    LDLCALC 98.4 03/03/2023     Lab Results    Component Value Date    TRIG 48 03/03/2023     Lab Results   Component Value Date    CHOLHDL 28.5 03/03/2023     Lab Results   Component Value Date    HGBA1C 5.5 04/11/2024     BP Readings from Last 1 Encounters:   12/06/24 128/80       Food History  Breakfast:  Skips  Mid-morning Snack:  None  Lunch:  [employer] sushi/kefir + granola with flax seeds and dried fruit + collagen powder + psyllium husk  Mid-afternoon Snack:  None    Dinner:  [employer] riced kale and broccoli with pesto sauce + brussels sprouts + shrimp/entrée avocado salad with salmon  Snack:  None  *Fluid intake:  120 oz water + tea (chamomile tea, black or lemon grass tea)    Exercise History:  15 minutes weight training 2x/week + 30-35 minutes walking 5x/week + daily yoga    Cultural/Spiritual/Personal Preferences:  None identified    Support System:  spouse    State of Change:  Action    Barriers to Change:  None    Diagnosis    Overweight related to imbalanced meal pattern and [previous] lack of exercise variation as evidenced by BMI:  34.9.    Intervention    RMR (Method:  [place]. Jeor):  1713 kcal  Activity Factor:  1.4    LYNDON:  2398 - 500 = 1898 kcal    Goals:  1.  Aim for 0.5-1# weight loss/week  2.  Increase walking to 35 minutes 5x/week consistently as tolerated  3.  Continue daily yoga and strength training 2x/week as tolerated  4.  Enroll in [facility]  5.  Set recurring [place]/calendar appointment for 30 minutes Monday-Friday (lunch)  6.  Eat lunch away from desk and office    Nutrition Education  The following education was provided to the patient:  Complimented patient on proactive role in health maintenance.  Complimented patient on dietary compliance/modifications and resulting health improvements.  Complimented patient on physical activity efforts.  Discussed healthy snacking.   Discussed weight management.  Suggested dietary modifications based on current dietary behaviors and individual food  preferences.  Discussed physical activity guidelines and its associated benefits.  Discussed recommended protein intake (may include but not limited to recommended food sources, benefits of adequate protein intake, importance of protein distribution, etc.)   Discussed recommended servings of fruit/day and food sources of such.  Discussed importance of small behavior/habit changes in improving long-term adherence and sustainability.  Discussed goal setting.  Provided ongoing support, encouragement, and guidance toward improved health efforts.  Discussed stress management techniques.    Patient verbalized understanding of nutrition education and recommendations received.    Handouts Provided  No new handouts were provided.    Monitoring/Evaluation    Monitor the following:  Weight  BMI  Caloric intake    Follow Up Plan:  Communication with referring healthcare provider is unnecessary at this time.  However, will follow up with patient in 4 weeks.

## 2024-12-10 NOTE — PROGRESS NOTES
"Individual Psychotherapy (LCSW/PhD)  Joan Wilde,  12/10/2024    Site:  [facility]         Therapeutic Intervention: Met with patient for individual psychotherapy.    Chief complaint/reason for encounter: work stress,anxiety     Interval history and content of current session: Pt states they recently went to the ED with a "panic attack". Pt attributes panic attack to work stress. LCSW supported pt in processing feelings of frustration and anger at job issues. Pt states they have been trying to apply for different jobs but with no luck. LCSW and pt discussed pt's workplace stressors. Pt states they are often frustrated with perceived lack of support from supervisor. LCSW inquired about pt's efforts to address concerns with supervisor. Pt states they are suspicious of colleagues and feel uncomfortable with confrontation. Pt cites outbursts from supervisor as a main source of this feeling.     LCSW supported pt in creating solutions to issues including more direct communication and documenting concerns more consistently. Pt states they could make additional efforts to document warren but don't fully trust their supervisor which is frustrating. LCSW and pt discussed pt's family support and level of support from . Pt states  and family is supportive but are not able to fully help.      LCSW and pt discussed breathing techniques to help reduce panic attacks.     Treatment plan:  Target symptoms: work stress  Why chosen therapy is appropriate versus another modality: relevant to diagnosis, evidence based practice  Outcome monitoring methods: self-report, checklist/rating scale  Therapeutic intervention type: behavior modifying psychotherapy, supportive psychotherapy    Risk parameters:  Patient reports no suicidal ideation  Patient reports no homicidal ideation  Patient reports no self-injurious behavior  Patient reports no violent behavior    Verbal deficits: None    Patient's response to " intervention:  The patient's response to intervention is accepting.    Progress toward goals and other mental status changes:  The patient's progress toward goals is fair .    Diagnosis:   No diagnosis found.    Plan: Pt plans to continue individual psychotherapy    Return to clinic: as scheduled    Length of Service (minutes): 60      Answers submitted by the patient for this visit:  Review of Systems Questionnaire (Submitted on 12/10/2024)  activity change: No  unexpected weight change: No  neck pain: No  hearing loss: No  rhinorrhea: No  trouble swallowing: No  eye discharge: No  visual disturbance: No  chest tightness: No  wheezing: No  chest pain: No  palpitations: No  blood in stool: No  constipation: No  vomiting: No  diarrhea: No  polydipsia: No  polyuria: No  difficulty urinating: No  hematuria: No  menstrual problem: No  dysuria: No  joint swelling: No  arthralgias: No  headaches: No  weakness: No  confusion: No  dysphoric mood: No

## 2024-12-16 NOTE — PROGRESS NOTES
[facility] Appointment      Subjective:      Patient ID: Joan Wilde is a 39 y.o. female.    Chief Complaint: Follow-up    At her last visit, her HR and BP were fine on phentermine, but we increased her dose to 30 mg daily and asked her to continue to monitor and F/U again in 2 weeks.  Need to recheck another GAD7    HPI:    History of Present Illness    CHIEF COMPLAINT:  Joan presents today feeling devastated due to work-related stress.    MENTAL HEALTH:  She reports feeling helpless and hopeless following recent workplace changes. She reported her new boss to  for concerning behavior and subsequently learned this individual would become her direct supervisor. She is actively strategizing departure from current workplace and continues to engage with social work services for support. Her CARINE-7 score has improved from 19 to 8 despite ongoing work-related stressors. She declines SSRI medication, citing concerns about cognitive dulling during job interviews and expressing that her anxiety is situational.    WEIGHT MANAGEMENT:  She has achieved a total weight loss of 22 lbs since initial visit, with 14 lbs lost between July and present.    RESPIRATORY:  She experienced chest tightness and asthma symptoms in the morning, with mild improvement after using her inhaler at 10:00 AM. She reports asthma symptoms predominantly during winter and spring seasons.    OCULAR:  She reports persistent eye redness despite using eye drops, expressing concern about appearance during prolonged screen time at work.    LABS AND IMAGING:  FibroScan in April 2024 showed stage zero to one fibrosis with liver stiffness score of 3.5 and FIB-4 score of 0.5, indicating low risk. A1c was 5.5, which is not in pre-diabetes range.      ROS:  General: no fever, no chills, no fatigue, no weight gain, no weight loss  Eyes: no vision changes, no redness, no discharge  ENT: no ear pain, no nasal congestion, no sore  throat  Cardiovascular: no chest pain, no palpitations, no lower extremity edema, reports chest tightness  Respiratory: no cough, no shortness of breath  Skin: no rash, no lesion  Neurological: no headache, no dizziness, no numbness, no tingling  Psychiatric: reports anxiety, no depression, no sleep difficulty             10/15/2024   PHQ9   Little interest or pleasure in doing things 1   Feeling down, depressed, or hopeless 1   Trouble falling or staying asleep, or sleeping too much 1   Feeling tired or having little energy 1   Poor appetite or overeating 1   Feeling bad about yourself - or that you are a failure or have let yourself or your family down 1   Trouble concentrating on things, such as reading the newspaper or watching television 0   Moving or speaking so slowly that other people could have noticed. Or the opposite - being so fidgety or restless that you have been moving around a lot more than usual 0   Thoughts that you would be better off dead, or of hurting yourself in some way 0   If you checked off any problems, how difficult have these problems made it for you to do your work, take care of things at home, or get along with other people? Very difficult   PHQ-9 Total Score 6           12/20/2024     2:12 PM 11/15/2024     3:16 PM 11/1/2024     1:34 PM   GAD7   1. Feeling nervous, anxious, or on edge? 2 2 3   2. Not being able to stop or control worrying? 1 2 3   3. Worrying too much about different things? 1 2 3   4. Trouble relaxing? 1 1 3   5. Being so restless that it is hard to sit still? 1 1 1   6. Becoming easily annoyed or irritable? 1 1 3   7. Feeling afraid as if something awful might happen? 1 1 3   8. If you checked off any problems, how difficult have these problems made it for you to do your work, take care of things at home, or get along with other people?  0 1   CARINE-7 Score 8 10 19         Objective:   Mental Health Screening  PHQ-9       CARINE-7  Generalized Anxiety Disorder 7-item  "(CARINE-7) Scale. HOW OFTEN DURING THE PAST 2 WEEKS HAVE YOU FELT BOTHERED BY:  1. Feeling nervous, anxious, or on edge?: More than half the days  2. Not being able to stop or control worrying?: Several days  3. Worrying too much about different things?: Several days  4. Trouble relaxing?: Several days  5. Being so restless that it is hard to sit still?: Several days  6. Becoming easily annoyed or irritable?: Several days  7. Feeling afraid as if something awful might happen?: Several days  CARINE-7 Score: 8  Number answered (out of first 7): 7  Interpretation: Mild Anxiety    PHYSICAL EXAM   Vital Signs  /81 (BP Location: Right arm, Patient Position: Sitting)   Pulse 97   Ht 5' 6" (1.676 m)   Wt 96.1 kg (211 lb 12 oz)   SpO2 99%   BMI 34.18 kg/m²     Physical Exam    Vitals: Weight: 225 lbs.  General: Well-developed. Well-nourished. No acute distress.  Eyes: EOMI. Sclerae anicteric.  HENT: Normocephalic. Atraumatic. Nares patent. Moist oral mucosa.  Cardiovascular: Regular rate. Regular rhythm. No murmurs. No rubs. No gallops. Normal S1, S2.  Respiratory: Normal respiratory effort. Clear to auscultation bilaterally. No rales. No rhonchi. No wheezing.  Musculoskeletal: No  obvious deformity.  Extremities: No lower extremity edema.  Neurological: Alert & oriented x3. No slurred speech. Normal gait.  Psychiatric: Normal mood. Normal affect. Good insight. Good judgment.  Skin: Warm. Dry. No rash.       Assessment:   39 y.o. female here for primary care visit       Plan:   1. Class 1 obesity due to excess calories without serious comorbidity with body mass index (BMI) of 34.0 to 34.9 in adult  Overview:  Baseline weight upon entry to [facility] is 233 lbs (106.1 kg) which is the heaviest she has ever been  Weight as of 7/12/24 = 225 lbs (102.3 kg)  Weight as of 11/1/24 = 219 lbs (99.5 kg)  Weight as of 12/20/24 = 211 lbs (96.1 kg)    Current regimen:  --phentermine 30 mg daily  --topiramate 50 mg daily  Started " this regimen 4/12/24    Assessment & Plan:  Has lost 22 lbs intentionally this year (with the help of phentermine and topiramate)  --she has enrolled in the digital weight management program      2. Adjustment disorder with mixed anxiety and depressed mood  Overview:  GAD7 score 8 on 4/12/24, but anxiety symptoms not present for more than 6 months (most of her anxiety is situational and work-related)  GAD7 score 7 on 7/12/24  GAD7 score 19 on 11/1/24  GAD7 score 10 on 11/15/24  GAD7 score 8 on 12/20/24    Assessment & Plan:  She continues to see our LCSW  --has continued to face work-related stressors   --she is still focusing on her yoga, mindfulness, and other relaxation techniques  --also exercising daily with weight lifting 2 days per week and walking daily  --she continues decline pharmacotherapy because she feels like her anxiety is entirely situational and related to her work situation      3. Metabolic dysfunction-associated steatohepatitis (MASH)  Overview:  Fibroscan 4/12/24:  Findings  Median liver stiffness score:  3.5  CAP Reading: dB/m:  238  IQR/med %:  9  Interpretation  Fibrosis interpretation is based on medial liver stiffness - Kilopascal (kPa).  Fibrosis Stage:  F 0-1  Steatosis interpretation is based on controlled attenuation parameter - (dB/m).  Steatosis Grade:  S1    FIB-4 Calculation: 0.58 at 4/12/2024  2:30 PM   FIB-4 Calculation: 0.50 at 10/22/2024  4:37 PM     Assessment & Plan:  FIB-4 Calculation: 0.5 at 10/22/2024  2:01 PM  Calculated from:  SGOT/AST: 24 U/L at 10/22/2024  2:01 PM  SGPT/ALT: 34 U/L at 10/22/2024  2:01 PM  Platelets: 336 K/uL at 10/22/2024  2:01 PM  Age: 39 years     FIB-4 below 1.30 is considered as low-risk for advanced fibrosis  FIB-4 over 2.67 is considered as high-risk for advanced fibrosis  FIB-4 values between 1.30 and 2.67 are considered as intermediate-risk of advanced fibrosis for ages 36-64.   Lab Results   Component Value Date    HGBA1C 5.5 04/11/2024          4. Asthma in adult, mild intermittent, uncomplicated  Overview:  Has laryngopharyngeal reflux >> started omeprazole  Has never required a controller medication  Rather her symptoms have been managed with occasional JOSE J, needing to use albuterol perhaps 6 times in the past year    Assessment & Plan:  She did experience mild chest tightness with coughing this morning, necessitating use of her inhaler  --she finds that her inhaler use typically increases in winter and spring  **PCV20 today    Orders:  -     pneumoc 20-gregorio conj-dip cr(PF) (PREVNAR-20 (PF)) injection Syrg 0.5 mL      Follow up in about 3 months (around 3/20/2025).    Kvng Tay MD/[facility]  [facility] Medicine  [facility]

## 2024-12-20 ENCOUNTER — OFFICE VISIT (OUTPATIENT)
Dept: PRIMARY CARE CLINIC | Facility: CLINIC | Age: 39
End: 2024-12-20
Attending: INTERNAL MEDICINE
Payer: COMMERCIAL

## 2024-12-20 VITALS
WEIGHT: 211.75 LBS | SYSTOLIC BLOOD PRESSURE: 122 MMHG | BODY MASS INDEX: 34.03 KG/M2 | OXYGEN SATURATION: 99 % | HEIGHT: 66 IN | HEART RATE: 97 BPM | DIASTOLIC BLOOD PRESSURE: 81 MMHG

## 2024-12-20 DIAGNOSIS — K75.81 METABOLIC DYSFUNCTION-ASSOCIATED STEATOHEPATITIS (MASH): ICD-10-CM

## 2024-12-20 DIAGNOSIS — J45.20 ASTHMA IN ADULT, MILD INTERMITTENT, UNCOMPLICATED: ICD-10-CM

## 2024-12-20 DIAGNOSIS — E66.09 CLASS 1 OBESITY DUE TO EXCESS CALORIES WITHOUT SERIOUS COMORBIDITY WITH BODY MASS INDEX (BMI) OF 34.0 TO 34.9 IN ADULT: Primary | ICD-10-CM

## 2024-12-20 DIAGNOSIS — F43.23 ADJUSTMENT DISORDER WITH MIXED ANXIETY AND DEPRESSED MOOD: ICD-10-CM

## 2024-12-20 DIAGNOSIS — E66.811 CLASS 1 OBESITY DUE TO EXCESS CALORIES WITHOUT SERIOUS COMORBIDITY WITH BODY MASS INDEX (BMI) OF 34.0 TO 34.9 IN ADULT: Primary | ICD-10-CM

## 2024-12-20 NOTE — ASSESSMENT & PLAN NOTE
Has lost 22 lbs intentionally this year (with the help of phentermine and topiramate)  --she has enrolled in the digital weight management program

## 2024-12-20 NOTE — ASSESSMENT & PLAN NOTE
FIB-4 Calculation: 0.5 at 10/22/2024  2:01 PM  Calculated from:  SGOT/AST: 24 U/L at 10/22/2024  2:01 PM  SGPT/ALT: 34 U/L at 10/22/2024  2:01 PM  Platelets: 336 K/uL at 10/22/2024  2:01 PM  Age: 39 years     FIB-4 below 1.30 is considered as low-risk for advanced fibrosis  FIB-4 over 2.67 is considered as high-risk for advanced fibrosis  FIB-4 values between 1.30 and 2.67 are considered as intermediate-risk of advanced fibrosis for ages 36-64.   Lab Results   Component Value Date    HGBA1C 5.5 04/11/2024

## 2024-12-20 NOTE — ASSESSMENT & PLAN NOTE
She did experience mild chest tightness with coughing this morning, necessitating use of her inhaler  --she finds that her inhaler use typically increases in winter and spring  **PCV20 today

## 2024-12-20 NOTE — PROGRESS NOTES
Two pt identifier verified. Pt tolerated well. Advised pt to wait 15 min post immunization. Pt verbalized understanding.    Dianne RUSHING

## 2024-12-20 NOTE — ASSESSMENT & PLAN NOTE
She continues to see our LCSW  --has continued to face work-related stressors   --she is still focusing on her yoga, mindfulness, and other relaxation techniques  --also exercising daily with weight lifting 2 days per week and walking daily  --she continues decline pharmacotherapy because she feels like her anxiety is entirely situational and related to her work situation

## 2025-01-07 ENCOUNTER — PATIENT MESSAGE (OUTPATIENT)
Dept: PRIMARY CARE CLINIC | Facility: CLINIC | Age: 40
End: 2025-01-07
Payer: COMMERCIAL

## 2025-01-08 DIAGNOSIS — R05.8 UPPER AIRWAY COUGH SYNDROME: Primary | ICD-10-CM

## 2025-01-08 RX ORDER — AZELASTINE 1 MG/ML
1 SPRAY, METERED NASAL 2 TIMES DAILY
Qty: 30 ML | Refills: 3 | Status: SHIPPED | OUTPATIENT
Start: 2025-01-08 | End: 2026-01-08

## 2025-01-11 ENCOUNTER — OFFICE VISIT (OUTPATIENT)
Dept: URGENT CARE | Facility: CLINIC | Age: 40
End: 2025-01-11
Payer: COMMERCIAL

## 2025-01-11 VITALS
OXYGEN SATURATION: 97 % | HEIGHT: 66 IN | RESPIRATION RATE: 16 BRPM | HEART RATE: 95 BPM | SYSTOLIC BLOOD PRESSURE: 127 MMHG | TEMPERATURE: 98 F | DIASTOLIC BLOOD PRESSURE: 85 MMHG | WEIGHT: 211 LBS | BODY MASS INDEX: 33.91 KG/M2

## 2025-01-11 DIAGNOSIS — R05.9 COUGH, UNSPECIFIED TYPE: ICD-10-CM

## 2025-01-11 DIAGNOSIS — R05.8 POST-VIRAL COUGH SYNDROME: Primary | ICD-10-CM

## 2025-01-11 PROCEDURE — 71046 X-RAY EXAM CHEST 2 VIEWS: CPT | Mod: FY,S$GLB,, | Performed by: RADIOLOGY

## 2025-01-11 PROCEDURE — 99213 OFFICE O/P EST LOW 20 MIN: CPT | Mod: S$GLB,,, | Performed by: NURSE PRACTITIONER

## 2025-01-11 RX ORDER — BENZONATATE 200 MG/1
200 CAPSULE ORAL 3 TIMES DAILY PRN
Qty: 20 CAPSULE | Refills: 0 | Status: SHIPPED | OUTPATIENT
Start: 2025-01-11 | End: 2025-01-21

## 2025-01-11 NOTE — PROGRESS NOTES
"Subjective:      Patient ID: Joan Wilde is a 39 y.o. female.    Vitals:  height is 5' 6" (1.676 m) and weight is 95.7 kg (211 lb). Her oral temperature is 98.1 °F (36.7 °C). Her blood pressure is 127/85 and her pulse is 95. Her respiration is 16 and oxygen saturation is 97%.     Chief Complaint: Cough    This is a 39 y.o. female who presents today with a chief complaint of cough. Symptoms started on Dec.20th. Cough has been persistant. Patient  become worse during daily activities.         Provider note begins here:  Patient is a 39 year old female here with complaints of dry cough x4 weeks. She believes it started after receiving her pneumonia vaccine. A few days after the cough started, she developed chills,fatigue, weakness, and mild fever. She took otc supplements and medications with improvement in all symptoms except the persistent dry cough. She messaged with her PCP on 1/7/2025 regarding the symptoms and he believes it to be post infectious cough. He recommend she start back on the astelin nasal spray and antihistamine (allegra). She has been doing both as directed. She also uses an albuterol inhaler a few times a day as it helps with the cough. She did not take any today. She does have a history of asthma and allergies.      Cough  This is a new problem. The current episode started 1 to 4 weeks ago. The cough is Non-productive. Associated symptoms include chills and a fever. Pertinent negatives include no ear congestion, ear pain, myalgias, nasal congestion, postnasal drip, sore throat or shortness of breath. Associated symptoms comments: fatigue. She has tried OTC cough suppressant and a beta-agonist inhaler (oscillococcinum) for the symptoms. The treatment provided mild relief. Her past medical history is significant for asthma. There is no history of COPD or pneumonia.       Constitution: Positive for chills and fever.   HENT:  Negative for ear pain, postnasal drip and sore throat.  "   Respiratory:  Positive for cough. Negative for shortness of breath.    Musculoskeletal:  Negative for muscle ache.      Objective:     Physical Exam   Constitutional: She is oriented to person, place, and time. She appears well-developed. She is cooperative.  Non-toxic appearance. She does not appear ill. No distress.   HENT:   Head: Normocephalic and atraumatic.   Ears:   Right Ear: Hearing, tympanic membrane, external ear and ear canal normal.   Left Ear: Hearing, tympanic membrane, external ear and ear canal normal.   Nose: Nose normal. No mucosal edema, rhinorrhea or nasal deformity. No epistaxis. Right sinus exhibits no maxillary sinus tenderness and no frontal sinus tenderness. Left sinus exhibits no maxillary sinus tenderness and no frontal sinus tenderness.   Mouth/Throat: Uvula is midline, oropharynx is clear and moist and mucous membranes are normal. No trismus in the jaw. Normal dentition. No uvula swelling. No oropharyngeal exudate, posterior oropharyngeal edema or posterior oropharyngeal erythema.   Eyes: Conjunctivae and lids are normal. No scleral icterus.   Neck: Trachea normal and phonation normal. Neck supple. No edema present. No erythema present. No neck rigidity present.   Cardiovascular: Normal rate, regular rhythm, normal heart sounds and normal pulses.   Pulmonary/Chest: Effort normal and breath sounds normal. No stridor. No respiratory distress. She has no decreased breath sounds. She has no wheezes. She has no rhonchi. She has no rales. She exhibits no tenderness.   Abdominal: Normal appearance.   Musculoskeletal: Normal range of motion.         General: No deformity. Normal range of motion.   Neurological: She is alert and oriented to person, place, and time. She exhibits normal muscle tone. Coordination normal.   Skin: Skin is warm, dry, intact, not diaphoretic and not pale.   Psychiatric: Her speech is normal and behavior is normal. Judgment and thought content normal.   Nursing note  and vitals reviewed.      Assessment:     1. Post-viral cough syndrome    2. Cough, unspecified type        Plan:       Post-viral cough syndrome  -     benzonatate (TESSALON) 200 MG capsule; Take 1 capsule (200 mg total) by mouth 3 (three) times daily as needed for Cough.  Dispense: 20 capsule; Refill: 0    Cough, unspecified type  -     XR CHEST PA AND LATERAL; Future; Expected date: 01/11/2025           XR CHEST PA AND LATERAL    Result Date: 1/11/2025  EXAMINATION: XR CHEST PA AND LATERAL CLINICAL HISTORY: Cough, unspecified TECHNIQUE: PA and lateral views of the chest were performed. COMPARISON: 10/22/2024. FINDINGS: The lungs are well expanded and clear. No focal opacities are seen. The pleural spaces are clear. The cardiac silhouette is unremarkable. The visualized osseous structures are unremarkable.     No acute abnormality. Electronically signed by: Marcelino Bustos Date:    01/11/2025 Time:    17:12        Patient Instructions   Your chest xray was normal. No pneumonia  Please taken benzonatate as needed for cough. Follow up with PCP if symptoms continue or worsen in the next 1-2 weeks.

## 2025-01-11 NOTE — PATIENT INSTRUCTIONS
Your chest xray was normal. No pneumonia  Please taken benzonatate as needed for cough. Follow up with PCP if symptoms continue or worsen in the next 1-2 weeks.

## 2025-01-13 ENCOUNTER — PATIENT MESSAGE (OUTPATIENT)
Dept: OBSTETRICS AND GYNECOLOGY | Facility: CLINIC | Age: 40
End: 2025-01-13
Payer: COMMERCIAL

## 2025-01-30 ENCOUNTER — NUTRITION (OUTPATIENT)
Dept: INTERNAL MEDICINE | Facility: CLINIC | Age: 40
End: 2025-01-30
Payer: COMMERCIAL

## 2025-01-30 DIAGNOSIS — E66.9 OBESITY (BMI 30-39.9): ICD-10-CM

## 2025-01-30 DIAGNOSIS — Z71.3 DIETARY COUNSELING: Primary | ICD-10-CM

## 2025-01-30 PROCEDURE — 99999 PR PBB SHADOW E&M-EST. PATIENT-LVL I: CPT | Mod: PBBFAC,,, | Performed by: DIETITIAN, REGISTERED

## 2025-01-30 NOTE — PROGRESS NOTES
"Nutrition Assessment  Session Time:  45 minutes      Client name:  Joan Wilde  :  1985  Age:  39 y.o.  Gender:  female    Client states:  Very pleasant OTC patient here for nutrition reassessment.  Shares that she has been "stress eating" here lately as a result of heightened stress at work.  Shares that work environment remains "toxic."  Previously escalated concerns to HR yet has not received follow up consultation.  Previously met with  and mentor and applied to other internal jobs.  Was excited about the potential yet was recently advised to remain in current job as she is in the process of transitioning her work visa to permanent citizenship.  Her direct leader shared a concern with her yesterday, which upset her.  Feels unsupported from her leadership team as well as her colleagues as she believes she has been labeled as a "trouble maker."  Thus, is hesitant to share her thoughts and concerns with her leader.  Became tearful due to unfortunate situation she is in.  Does not understand the actions and communication of her leadership team as she has pure and good intentions only.  Wants to perform well and do the best job she can do.  As a result of such stress, walking has been less consistent yet continues with strength training.  Wishes to resume yoga, recalling the benefits of such.  Adds that despite her unhappiness yesterday, she focused on making others happy, which improved her happiness.  Adds that her  is very supportive and encouraging.  Expresses great comfort with RD and looks forward to visits.      Anthropometrics (25)  Height:  5' 6"     Weight:  211#  BMI:  34  % Body Fat:  Unable to assess    Clinical Signs/Symptoms  N/V/D:  None  Appetite:  good       Past Medical History:   Diagnosis Date    Acne vulgaris 2018    Allergic rhinitis due to dust mite 2019    Asthma     Eczema     Endometriosis 2018    Fibroid     Laryngopharyngeal " reflux     Migraine syndrome     associated with flu vaccines    Sleep apnea        Past Surgical History:   Procedure Laterality Date    DIAGNOSTIC LAPAROSCOPY Bilateral 5/15/2023    Procedure: LAPAROSCOPY, DIAGNOSTIC;  Surgeon: Raffi Gao MD;  Location: [facility];  Service: OB/GYN;  Laterality: Bilateral;    ESOPHAGOGASTRODUODENOSCOPY N/A 12/7/2023    Procedure: EGD (ESOPHAGOGASTRODUODENOSCOPY);  Surgeon: Mitchel Abreu MD;  Location: [facility];  Service: Endoscopy;  Laterality: N/A;    HERNIA REPAIR  2014    umbilical    LAPAROSCOPIC APPENDECTOMY N/A 4/21/2021    Procedure: APPENDECTOMY, LAPAROSCOPIC WITH LYSIS OF ADHESIONS.;  Surgeon: Trent Cannon MD;  Location: [address];  Service: General;  Laterality: N/A;    LAPAROSCOPIC LYSIS OF ADHESIONS N/A 5/15/2023    Procedure: LYSIS, ADHESIONS, LAPAROSCOPIC;  Surgeon: Raffi Gao MD;  Location: [facility];  Service: OB/GYN;  Laterality: N/A;       Medications    has a current medication list which includes the following prescription(s): albuterol, ascorbic acid (vitamin c), azelastine, conjugated estrogens, diltiazem hcl, famotidine, fluticasone propionate, naproxen, norethindrone, norethindrone, olopatadine, omeprazole, phentermine, pimecrolimus, and topiramate.    Vitamins, Minerals, and/or Supplements:  *Please see list above     Food/Medication Interactions:  Reviewed     Food Allergies or Intolerances:  NKFA     Social History    Marital status:    Employment:  Clinical Analyst [employer]    Social History     Tobacco Use    Smoking status: Never     Passive exposure: Never    Smokeless tobacco: Never   Substance Use Topics    Alcohol use: No        Lab Reports   Sodium   Date Value Ref Range Status   10/22/2024 138 136 - 145 mmol/L Final     Potassium   Date Value Ref Range Status   10/22/2024 4.3 3.5 - 5.1 mmol/L Final     Chloride   Date Value Ref Range Status   10/22/2024 110 95 - 110 mmol/L Final     CO2   Date Value Ref Range  Status   10/22/2024 19 (L) 23 - 29 mmol/L Final     Glucose   Date Value Ref Range Status   10/22/2024 83 70 - 110 mg/dL Final     BUN   Date Value Ref Range Status   10/22/2024 10 6 - 20 mg/dL Final     Creatinine   Date Value Ref Range Status   10/22/2024 1.0 0.5 - 1.4 mg/dL Final     Calcium   Date Value Ref Range Status   10/22/2024 9.3 8.7 - 10.5 mg/dL Final     Total Protein   Date Value Ref Range Status   10/22/2024 7.2 6.0 - 8.4 g/dL Final     Albumin   Date Value Ref Range Status   10/22/2024 3.9 3.5 - 5.2 g/dL Final     Total Bilirubin   Date Value Ref Range Status   10/22/2024 0.3 0.1 - 1.0 mg/dL Final     Comment:     For infants and newborns, interpretation of results should be based  on gestational age, weight and in agreement with clinical  observations.    Premature Infant recommended reference ranges:  Up to 24 hours.............<8.0 mg/dL  Up to 48 hours............<12.0 mg/dL  3-5 days..................<15.0 mg/dL  6-29 days.................<15.0 mg/dL       Alkaline Phosphatase   Date Value Ref Range Status   10/22/2024 74 40 - 150 U/L Final     AST   Date Value Ref Range Status   10/22/2024 24 10 - 40 U/L Final     ALT   Date Value Ref Range Status   10/22/2024 34 10 - 44 U/L Final     Anion Gap   Date Value Ref Range Status   10/22/2024 9 8 - 16 mmol/L Final     eGFR if    Date Value Ref Range Status   05/30/2022 >60.0 >60 mL/min/1.73 m^2 Final     eGFR if non    Date Value Ref Range Status   05/30/2022 >60.0 >60 mL/min/1.73 m^2 Final     Comment:     Calculation used to obtain the estimated glomerular filtration  rate (eGFR) is the CKD-EPI equation.         Lab Results   Component Value Date    WBC 5.64 10/22/2024    HGB 13.3 10/22/2024    HCT 39.5 10/22/2024    MCV 80 (L) 10/22/2024     10/22/2024        Lab Results   Component Value Date    CHOL 151 03/03/2023     Lab Results   Component Value Date    HDL 43 03/03/2023     Lab Results   Component Value  Date    LDLCALC 98.4 03/03/2023     Lab Results   Component Value Date    TRIG 48 03/03/2023     Lab Results   Component Value Date    CHOLHDL 28.5 03/03/2023     Lab Results   Component Value Date    HGBA1C 5.5 04/11/2024     BP Readings from Last 1 Encounters:   01/11/25 127/85       Food History  Unable to assess    Exercise History:  Weight training 2x/week    Cultural/Spiritual/Personal Preferences:  None identified    Support System:  spouse    State of Change:  Action    Barriers to Change:  None    Diagnosis    Overweight related to imbalanced meal pattern and [previous] lack of exercise variation as evidenced by BMI:  34.    Intervention    RMR (Method:  [place] [address] [place]):  1713 kcal  Activity Factor:  1.4    LYNDON:  2398 - 500 = 1898 kcal    Goals:  1.  Aim for 0.5-1# weight loss/week  2.  Resume walking when able  3.  Continue daily yoga and strength training 2x/week as tolerated  4.  Eat lunch away from desk and office  5.  Focus on personal lisa*    Nutrition Education  The following education was provided to the patient:  Complimented patient on proactive role in health maintenance.  Complimented patient on physical activity efforts.  Discussed physical activity guidelines and its associated benefits.  Discussed goal setting.  Provided ongoing support, encouragement, and guidance toward improved health efforts.  Discussed stress management techniques.  Discussed the importance of mental health, strategies to improve it, and factors that bring her personal lisa.    Patient verbalized understanding of nutrition education and recommendations received.    Handouts Provided  No new handouts were provided.    Monitoring/Evaluation    Monitor the following:  Weight  BMI  Caloric intake    Follow Up Plan:  Communication with referring healthcare provider is unnecessary at this time.  However, will follow up with patient in 4 weeks.

## 2025-02-05 NOTE — PROGRESS NOTES
[facility] Appointment      Subjective:      Patient ID: Joan Wilde is a 39 y.o. female.    Chief Complaint: Follow-up      HPI:    History of Present Illness        Only here today because she needs some labs for immigration purposes.   No other problems or concerns.         4/11/2024 7/18/2024 2/7/2025   PHQ-9 Depression Patient Health Questionnaire   Over the last two weeks how often have you been bothered by little interest or pleasure in doing things 0 0 0   Over the last two weeks how often have you been bothered by feeling down, depressed or hopeless 0 0 0          2/7/2025   PHQ-9 Depression Patient Health Questionnaire   Over the last two weeks how often have you been bothered by little interest or pleasure in doing things 0   Over the last two weeks how often have you been bothered by feeling down, depressed or hopeless 0          12/20/2024     2:12 PM 11/15/2024     3:16 PM 11/1/2024     1:34 PM   GAD7   1. Feeling nervous, anxious, or on edge? 2 2 3   2. Not being able to stop or control worrying? 1 2 3   3. Worrying too much about different things? 1 2 3   4. Trouble relaxing? 1 1 3   5. Being so restless that it is hard to sit still? 1 1 1   6. Becoming easily annoyed or irritable? 1 1 3   7. Feeling afraid as if something awful might happen? 1 1 3   8. If you checked off any problems, how difficult have these problems made it for you to do your work, take care of things at home, or get along with other people?  0 1   CARINE-7 Score 8 10 19         Objective:   Mental Health Screening  PHQ-9  Depression Patient Health Questionnaire (PHQ-9)  Over the last two weeks how often have you been bothered by little interest or pleasure in doing things: Not at all  Over the last two weeks how often have you been bothered by feeling down, depressed or hopeless: Not at all    CARINE-7       PHYSICAL EXAM   Vital Signs  /87 (BP Location: Left arm, Patient Position: Sitting)   Pulse 89   Resp 16    "Ht 5' 6" (1.676 m)   Wt 97.6 kg (215 lb 2.7 oz)   SpO2 100%   BMI 34.73 kg/m²     Physical Exam  Vitals and nursing note reviewed.   Constitutional:       Appearance: Normal appearance.   HENT:      Head: Normocephalic.   Eyes:      Conjunctiva/sclera: Conjunctivae normal.   Cardiovascular:      Rate and Rhythm: Normal rate and regular rhythm.   Pulmonary:      Effort: Pulmonary effort is normal.      Breath sounds: Normal breath sounds.   Skin:     General: Skin is warm and dry.   Neurological:      Mental Status: She is alert and oriented to person, place, and time. Mental status is at baseline.   Psychiatric:         Mood and Affect: Mood normal.         Behavior: Behavior normal.         Thought Content: Thought content normal.        Assessment:   39 y.o. female here for primary care visit       Plan:   1. Exposure to TB  Assessment & Plan:  Needs Quantiferon gold for immigration purposes, not really for known exposure to TB    Orders:  -     QUANTIFERON GOLD TB; Future; Expected date: 02/07/2025    2. STD exposure  Assessment & Plan:  Needs urine for GC/Chlamydia and syphilis serologies for immigration purposes (not really for STD exposure)    Orders:  -     C. trachomatis/N. gonorrhoeae by AMP DNA [facility]; Urine  -     Treponema Pallidium Antibodies IgG, IgM; Future; Expected date: 02/07/2025      Follow up for regular follow-up as previously scheduled.    Kvng Tay MD/[facility]  Internal Medicine  [facility]          "

## 2025-02-07 ENCOUNTER — LAB VISIT (OUTPATIENT)
Dept: LAB | Facility: HOSPITAL | Age: 40
End: 2025-02-07
Attending: INTERNAL MEDICINE
Payer: COMMERCIAL

## 2025-02-07 ENCOUNTER — OFFICE VISIT (OUTPATIENT)
Dept: PRIMARY CARE CLINIC | Facility: CLINIC | Age: 40
End: 2025-02-07
Attending: INTERNAL MEDICINE
Payer: COMMERCIAL

## 2025-02-07 VITALS
DIASTOLIC BLOOD PRESSURE: 87 MMHG | RESPIRATION RATE: 16 BRPM | HEART RATE: 89 BPM | OXYGEN SATURATION: 100 % | SYSTOLIC BLOOD PRESSURE: 127 MMHG | WEIGHT: 215.19 LBS | HEIGHT: 66 IN | BODY MASS INDEX: 34.58 KG/M2

## 2025-02-07 DIAGNOSIS — Z20.1 EXPOSURE TO TB: ICD-10-CM

## 2025-02-07 DIAGNOSIS — Z20.1 EXPOSURE TO TB: Primary | ICD-10-CM

## 2025-02-07 DIAGNOSIS — Z20.2 STD EXPOSURE: ICD-10-CM

## 2025-02-07 LAB — TREPONEMA PALLIDUM IGG+IGM AB [PRESENCE] IN SERUM OR PLASMA BY IMMUNOASSAY: NONREACTIVE

## 2025-02-07 PROCEDURE — 3008F BODY MASS INDEX DOCD: CPT | Mod: CPTII,S$GLB,, | Performed by: INTERNAL MEDICINE

## 2025-02-07 PROCEDURE — 86480 TB TEST CELL IMMUN MEASURE: CPT | Performed by: INTERNAL MEDICINE

## 2025-02-07 PROCEDURE — 86593 SYPHILIS TEST NON-TREP QUANT: CPT | Performed by: INTERNAL MEDICINE

## 2025-02-07 PROCEDURE — 36415 COLL VENOUS BLD VENIPUNCTURE: CPT | Performed by: INTERNAL MEDICINE

## 2025-02-07 PROCEDURE — 99213 OFFICE O/P EST LOW 20 MIN: CPT | Mod: S$GLB,,, | Performed by: INTERNAL MEDICINE

## 2025-02-07 PROCEDURE — 3079F DIAST BP 80-89 MM HG: CPT | Mod: CPTII,S$GLB,, | Performed by: INTERNAL MEDICINE

## 2025-02-07 PROCEDURE — 1159F MED LIST DOCD IN RCRD: CPT | Mod: CPTII,S$GLB,, | Performed by: INTERNAL MEDICINE

## 2025-02-07 PROCEDURE — 3074F SYST BP LT 130 MM HG: CPT | Mod: CPTII,S$GLB,, | Performed by: INTERNAL MEDICINE

## 2025-02-07 PROCEDURE — 1160F RVW MEDS BY RX/DR IN RCRD: CPT | Mod: CPTII,S$GLB,, | Performed by: INTERNAL MEDICINE

## 2025-02-07 PROCEDURE — 87491 CHLMYD TRACH DNA AMP PROBE: CPT | Performed by: INTERNAL MEDICINE

## 2025-02-07 NOTE — ASSESSMENT & PLAN NOTE
Needs urine for GC/Chlamydia and syphilis serologies for immigration purposes (not really for STD exposure)

## 2025-02-08 LAB
GAMMA INTERFERON BACKGROUND BLD IA-ACNC: 0.19 IU/ML
M TB IFN-G CD4+ BCKGRND COR BLD-ACNC: -0.06 IU/ML
M TB IFN-G CD4+ BCKGRND COR BLD-ACNC: -0.12 IU/ML
MITOGEN IGNF BCKGRD COR BLD-ACNC: 9.81 IU/ML
TB GOLD PLUS: NEGATIVE

## 2025-02-09 LAB
C TRACH DNA SPEC QL NAA+PROBE: NOT DETECTED
N GONORRHOEA DNA SPEC QL NAA+PROBE: NOT DETECTED

## 2025-02-11 ENCOUNTER — CLINICAL SUPPORT (OUTPATIENT)
Dept: PRIMARY CARE CLINIC | Facility: CLINIC | Age: 40
End: 2025-02-11
Payer: COMMERCIAL

## 2025-02-11 DIAGNOSIS — Z56.6 STRESS AT WORK: Primary | ICD-10-CM

## 2025-02-11 SDOH — SOCIAL DETERMINANTS OF HEALTH (SDOH): OTHER PHYSICAL AND MENTAL STRAIN RELATED TO WORK: Z56.6

## 2025-02-11 NOTE — PROGRESS NOTES
Individual Psychotherapy (LCSW/PhD)  Joan Llanos Andry,  2/11/2025    Site:  [facility]         Therapeutic Intervention: Met with patient for individual psychotherapy.    Chief complaint/reason for encounter: interpersonal     Interval history and content of current session: Pt provided update on work situation and stressors from supervisor. Pt states they feel isolated and feel ostracized due to reporting supervisor to . LCSW used empathic listening to help pt process emotions.  Pt states they are waiting for their VISA status to change and are planning on leaving [facility].     Treatment plan:  Target symptoms: work stress  Why chosen therapy is appropriate versus another modality: relevant to diagnosis, patient responds to this modality, evidence based practice  Outcome monitoring methods: self-report, checklist/rating scale  Therapeutic intervention type: supportive psychotherapy    Risk parameters:  Patient reports no suicidal ideation  Patient reports no homicidal ideation  Patient reports no self-injurious behavior  Patient reports no violent behavior    Verbal deficits: None    Patient's response to intervention:  The patient's response to intervention is accepting.    Progress toward goals and other mental status changes:  The patient's progress toward goals is fair .    Diagnosis:   No diagnosis found.    Plan: Pt plans to continue individual psychotherapy    Return to clinic: as needed    Length of Service (minutes): 60      Answers submitted by the patient for this visit:  Review of Systems Questionnaire (Submitted on 2/6/2025)  activity change: No  unexpected weight change: No  neck pain: No  hearing loss: No  rhinorrhea: No  trouble swallowing: No  eye discharge: No  visual disturbance: No  chest tightness: No  wheezing: No  chest pain: No  palpitations: No  blood in stool: No  constipation: No  vomiting: No  diarrhea: No  polydipsia: No  polyuria: No  difficulty urinating:  No  hematuria: No  menstrual problem: No  dysuria: No  joint swelling: No  arthralgias: No  headaches: No  weakness: No  confusion: No  dysphoric mood: No

## 2025-02-13 ENCOUNTER — TELEPHONE (OUTPATIENT)
Dept: OPTOMETRY | Facility: CLINIC | Age: 40
End: 2025-02-13
Payer: COMMERCIAL

## 2025-02-13 NOTE — TELEPHONE ENCOUNTER
"----- Message from Marcelino sent at 2/13/2025  2:19 PM CST -----  Consult/Advisory    Name Of Caller: Self    Contact Preference?: [phone]     What is the nature of the call?: Returning call to [facility]    Additional Notes:  "Thank you for all that you do for our patients"  "

## 2025-02-19 ENCOUNTER — OFFICE VISIT (OUTPATIENT)
Dept: OTOLARYNGOLOGY | Facility: CLINIC | Age: 40
End: 2025-02-19
Payer: COMMERCIAL

## 2025-02-19 VITALS
BODY MASS INDEX: 34.68 KG/M2 | DIASTOLIC BLOOD PRESSURE: 92 MMHG | HEART RATE: 103 BPM | SYSTOLIC BLOOD PRESSURE: 128 MMHG | WEIGHT: 214.81 LBS

## 2025-02-19 DIAGNOSIS — H60.8X3 CHRONIC ECZEMATOUS OTITIS EXTERNA OF BOTH EARS: Chronic | ICD-10-CM

## 2025-02-19 DIAGNOSIS — K21.9 LARYNGOPHARYNGEAL REFLUX (LPR): Chronic | ICD-10-CM

## 2025-02-19 DIAGNOSIS — G47.33 OSA (OBSTRUCTIVE SLEEP APNEA): Chronic | ICD-10-CM

## 2025-02-19 DIAGNOSIS — R09.A2 GLOBUS SENSATION: ICD-10-CM

## 2025-02-19 DIAGNOSIS — J30.9 CHRONIC ALLERGIC RHINITIS: Primary | Chronic | ICD-10-CM

## 2025-02-19 PROCEDURE — 3074F SYST BP LT 130 MM HG: CPT | Mod: CPTII,S$GLB,, | Performed by: OTOLARYNGOLOGY

## 2025-02-19 PROCEDURE — 3008F BODY MASS INDEX DOCD: CPT | Mod: CPTII,S$GLB,, | Performed by: OTOLARYNGOLOGY

## 2025-02-19 PROCEDURE — 99214 OFFICE O/P EST MOD 30 MIN: CPT | Mod: 25,S$GLB,, | Performed by: OTOLARYNGOLOGY

## 2025-02-19 PROCEDURE — 31575 DIAGNOSTIC LARYNGOSCOPY: CPT | Mod: S$GLB,,, | Performed by: OTOLARYNGOLOGY

## 2025-02-19 PROCEDURE — 3080F DIAST BP >= 90 MM HG: CPT | Mod: CPTII,S$GLB,, | Performed by: OTOLARYNGOLOGY

## 2025-02-19 PROCEDURE — 1159F MED LIST DOCD IN RCRD: CPT | Mod: CPTII,S$GLB,, | Performed by: OTOLARYNGOLOGY

## 2025-02-19 PROCEDURE — 99999 PR PBB SHADOW E&M-EST. PATIENT-LVL III: CPT | Mod: PBBFAC,,, | Performed by: OTOLARYNGOLOGY

## 2025-02-19 RX ORDER — FLUOCINOLONE ACETONIDE 0.11 MG/ML
3 OIL AURICULAR (OTIC) 2 TIMES DAILY
Qty: 20 ML | Refills: 0 | Status: SHIPPED | OUTPATIENT
Start: 2025-02-19

## 2025-02-19 RX ORDER — OMEPRAZOLE 40 MG/1
40 CAPSULE, DELAYED RELEASE ORAL EVERY MORNING
Qty: 30 CAPSULE | Refills: 4 | Status: SHIPPED | OUTPATIENT
Start: 2025-02-19 | End: 2026-02-19

## 2025-02-19 NOTE — PROGRESS NOTES
Chief Complaint   Patient presents with    Follow-up     Overall well- discontinued famotidine due to bloating and abdominal discomfort    Other Misc     Dryness inside both ears   .         Interval HPI 2/19/2025:  Follow up visit. Reports that she has been doing well overall. She notes that she has been taking omeprazole 40mg PO daily. Gi had started famotidine but she felt that it was causing her bloating so she stopped it.  She does have some reflux episodes intermittently. She reports minimal globus sensation. Voice is at baseline. She has had dry ears and has noted some skin flaking from the outer ear and ear canal.  She has tried oils without relief.     Past Medical History:   Diagnosis Date    Acne vulgaris 11/20/2018    Allergic rhinitis due to dust mite 05/13/2019    Asthma     Eczema     Endometriosis 11/20/2018    Fibroid     Laryngopharyngeal reflux     Migraine syndrome     associated with flu vaccines    Sleep apnea      Social History     Socioeconomic History    Marital status:    Tobacco Use    Smoking status: Never     Passive exposure: Never    Smokeless tobacco: Never   Substance and Sexual Activity    Alcohol use: No    Drug use: No    Sexual activity: Yes     Partners: Male     Birth control/protection: OCP   Social History Narrative    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      Social Drivers of Health     Financial Resource Strain: Low Risk  (12/20/2024)    Overall Financial Resource Strain (CARDIA)     Difficulty of Paying Living Expenses: Not hard at all   Food Insecurity: No Food Insecurity (12/20/2024)    Hunger Vital Sign     Worried About Running Out of Food in the Last Year: Never true     Ran Out of Food in the Last Year: Never true   Transportation Needs: No Transportation Needs (12/5/2023)    PRAPARE - Transportation     Lack of Transportation (Medical): No     Lack of Transportation (Non-Medical): No   Physical Activity: Insufficiently Active  (12/20/2024)    Exercise Vital Sign     Days of Exercise per Week: 6 days     Minutes of Exercise per Session: 20 min   Stress: Stress Concern Present (12/20/2024)    [employer] [place] [employer] - Occupational Stress Questionnaire     Feeling of Stress : To some extent   Housing Stability: Low Risk  (12/5/2023)    Housing Stability Vital Sign     Unable to Pay for Housing in the Last Year: No     Number of Places Lived in the Last Year: 1     Unstable Housing in the Last Year: No     Past Surgical History:   Procedure Laterality Date    DIAGNOSTIC LAPAROSCOPY Bilateral 5/15/2023    Procedure: LAPAROSCOPY, DIAGNOSTIC;  Surgeon: Raffi Gao MD;  Location: [facility];  Service: OB/GYN;  Laterality: Bilateral;    ESOPHAGOGASTRODUODENOSCOPY N/A 12/7/2023    Procedure: EGD (ESOPHAGOGASTRODUODENOSCOPY);  Surgeon: Mitchel Abreu MD;  Location: [facility];  Service: Endoscopy;  Laterality: N/A;    HERNIA REPAIR  2014    umbilical    LAPAROSCOPIC APPENDECTOMY N/A 4/21/2021    Procedure: APPENDECTOMY, LAPAROSCOPIC WITH LYSIS OF ADHESIONS.;  Surgeon: Trent Cannon MD;  Location: [address];  Service: General;  Laterality: N/A;    LAPAROSCOPIC LYSIS OF ADHESIONS N/A 5/15/2023    Procedure: LYSIS, ADHESIONS, LAPAROSCOPIC;  Surgeon: Raffi Gao MD;  Location: [facility];  Service: OB/GYN;  Laterality: N/A;     Family History   Problem Relation Name Age of Onset    Hypertension Mother      Allergic rhinitis Father      Allergies Father      Diabetes Father      Hypertension Father      Stroke Father      Hyperlipidemia Father      Allergic rhinitis Brother      Eczema Brother      Conjunctivitis Brother      No Known Problems Brother      Breast cancer Neg Hx      Colon cancer Neg Hx      Ovarian cancer Neg Hx      Melanoma Neg Hx      Blindness Neg Hx      Amblyopia Neg Hx      Cataracts Neg Hx      Glaucoma Neg Hx      Macular degeneration Neg Hx      Retinal detachment Neg Hx      Strabismus Neg  Hx       labor Neg Hx      Cancer Neg Hx      Eclampsia Neg Hx             Review of Systems  General: negative for chills, fever or weight loss  Psychological: negative for mood changes or depression  Ophthalmic: negative for blurry vision, photophobia or eye pain  ENT: see HPI  Respiratory: no cough, shortness of breath, or wheezing  Cardiovascular: no chest pain or dyspnea on exertion  Gastrointestinal: no abdominal pain, change in bowel habits, or black/ bloody stools  Musculoskeletal: negative for gait disturbance or muscular weakness  Neurological: no syncope or seizures; no ataxia  Dermatological: negative for puritis,  rash and jaundice  Hematologic/lymphatic: no easy bruising, no new lumps or bumps      Physical Exam:    Vitals:    25 1321   BP: (!) 128/92   Pulse: 103         Constitutional: Well appearing / communicating without difficutly.  NAD.  Eyes: EOM I Bilaterally  Head/Face: Normocephalic.  Negative paranasal sinus pressure/tenderness.  Salivary glands WNL.  House Brackmann I Bilaterally.    Right Ear: Auricle normal appearance. External Auditory Canal within normal limits no lesions or masses,TM w/o masses/lesions/perforations. TM mobility noted.   Left Ear: Auricle normal appearance. External Auditory Canal within normal limits no lesions or masses,TM w/o masses/lesions/perforations. TM mobility noted.  Nose: + mucosal edema. No gross nasal septal deviation. Inferior Turbinates 3+ bilaterally. No septal perforation. No masses/lesions. External nasal skin appears normal without masses/lesions.  Oral Cavity: Gingiva/lips within normal limits.  Dentition/gingiva healthy appearing. Mucus membranes moist. Floor of mouth soft, no masses palpated. Oral Tongue mobile. Hard Palate appears normal.    Oropharynx: Base of tongue appears normal. No masses/lesions noted. Tonsillar fossa/pharyngeal wall without lesions. Posterior oropharynx WNL.  Soft palate without masses. Midline uvula.    Neck/Lymphatic: No LAD I-VI bilaterally.  No thyromegaly.  No masses noted on exam.      See separate procedure note for FFL.    Diagnostic studies reviewed:   Laboratory 04/15/2019:  IgE level:  Less than 35.  ImmunoCAP:  Negative.     Spirometry 04/16/2019:  Normal spirometry. Airflow not improved after bronchodilator.     Inhalant skin tests 05/13/2019:  3+ histamine control.  3+ dust mites.    Assessment:    ICD-10-CM ICD-9-CM    1. Chronic allergic rhinitis  J30.9 477.9       2. Laryngopharyngeal reflux (LPR)  K21.9 478.79       3. AMARJIT (obstructive sleep apnea)  G47.33 327.23       4. Chronic eczematous otitis externa of both ears  H60.8X3 380.23                       The primary encounter diagnosis was Chronic allergic rhinitis. Diagnoses of Laryngopharyngeal reflux (LPR), AMARJIT (obstructive sleep apnea), and Chronic eczematous otitis externa of both ears were also pertinent to this visit.      Plan:  No orders of the defined types were placed in this encounter.  Start derm otic to bilateral ears BID for 14 days.   Continue  nasal saline rinses BID  Continue Flonase 2 sprays per nostril daily  Continue Allegra daily   Continue Prilosec 40mg PO daily.  Continue refrain from eating within 3 hours of going to bed, to elevate the head of bed very subtly and optimize the impact of gravity on the potential reflux, and to avoid alcohol, caffeine, tobacco, tomato sauce, spicy foods, fried food, and chocolate.   Keep Gi follow up    Continue CPAP therapy for AMARJIT     Follow up in approximately 4-6  months to reassess progress with treatment regimen.     Paige Grant MD

## 2025-02-19 NOTE — PROCEDURES
Laryngoscopy    Date/Time: 2/19/2025 1:00 PM    Performed by: Paige Hercules MD  Authorized by: Paige Hercules MD    Consent Done?:  Yes (Verbal)  Anesthesia:     Local anesthetic:  Lidocaine 2% and Nikita-Synephrine 1/2%  Laryngoscopy:     Areas examined:  Nasal cavities, nasopharynx, oropharynx, hypopharynx, larynx and vocal cords  Nose External:      No external nasal deformity  Nose Intranasal:      Mucosa no polyps     Mucosa ulcers not present     No mucosa lesions present     No septum gross deformity     Turbinates not enlarged  Nasopharynx:      No mucosa lesions     Adenoids not present     Posterior choanae patent     Eustachian tube patent  Larynx/hypopharynx:      No epiglottis lesions     No epiglottis edema     No AE folds lesions     No vocal cord polyps     Equal and normal bilateral     No hypopharynx lesions     No piriform sinus pooling     No piriform sinus lesions     Post cricoid edema (mild)     Post cricoid erythema (mild)

## 2025-03-05 ENCOUNTER — PATIENT MESSAGE (OUTPATIENT)
Dept: PRIMARY CARE CLINIC | Facility: CLINIC | Age: 40
End: 2025-03-05
Payer: COMMERCIAL

## 2025-03-06 ENCOUNTER — TELEPHONE (OUTPATIENT)
Dept: OPTOMETRY | Facility: CLINIC | Age: 40
End: 2025-03-06
Payer: COMMERCIAL

## 2025-03-06 NOTE — TELEPHONE ENCOUNTER
----- Message from Trixie sent at 3/6/2025 10:35 AM CST -----  Type:  Patient Returning CallWho Left Message for Patient:Pastora Does the patient know what this is regarding?:RSWould the patient rather a call back or a response via [facility]? callBest Call Back Number: [phone] Information: Patient has been trying to RS since 3/13/2025 unable to RS N/A July

## 2025-03-11 ENCOUNTER — PATIENT MESSAGE (OUTPATIENT)
Dept: PRIMARY CARE CLINIC | Facility: CLINIC | Age: 40
End: 2025-03-11

## 2025-03-13 NOTE — PROGRESS NOTES
[facility] Appointment      Subjective:      Patient ID: Joan Wilde is a 40 y.o. female.    Chief Complaint: Fatigue    Has been having some bouts of tachycardia on phentermine.  She stopped taking it from 3/6 to 3/11 and the bouts lessened, but then she resumed it on 3/12 and experienced some dyspnea.     HPI:    History of Present Illness    CHIEF COMPLAINT:  Joan presents today with flu-like symptoms    HISTORY OF PRESENT ILLNESS:  She presents with flu-like symptoms that began Monday with muscle aches, runny nose, and sneezing. She developed chills and headache yesterday, which was also present on Wednesday. She reports waking up this morning with weakness, fatigue, and nausea without vomiting. She experienced dizziness on Wednesday, initially attributed to not eating. She reports exposure to confirmed flu cases at workplace, where multiple coworkers have tested positive.    SLEEP:  She reports difficulty sleeping for the past few days, waking up at 4 or 5 AM with poor sleep quality.    MEDICATIONS:  She received flu vaccination in November of last year. She recently discontinued Phentermine due to elevated heart rate, with rates exceeding 100 at rest and reaching a maximum of 144 on March 12th.      ROS:  General: no fever, reports chills, reports fatigue, no weight gain, no weight loss, reports weakness, reports difficulty staying asleep, reports sleep disturbances  Eyes: no vision changes, no redness, no discharge  ENT: no ear pain, no nasal congestion, no sore throat, reports runny nose, reports nasal discharge, reports frequent sneezing  Cardiovascular: no chest pain, no palpitations, no lower extremity edema  Respiratory: no cough, no shortness of breath  Gastrointestinal: no abdominal pain, reports nausea, no vomiting, no diarrhea, no constipation, no blood in stool  Genitourinary: no dysuria, no hematuria, no frequency  Musculoskeletal: no joint pain, reports muscle pain, reports body  "aches  Skin: no rash, no lesion  Neurological: reports headache, reports dizziness, no numbness, no tingling  Psychiatric: no anxiety, no depression, no sleep difficulty  Head: reports head pain             2/7/2025 7/18/2024 4/11/2024   PHQ-9 Depression Patient Health Questionnaire   Over the last two weeks how often have you been bothered by little interest or pleasure in doing things 0 0 0   Over the last two weeks how often have you been bothered by feeling down, depressed or hopeless 0 0 0          2/7/2025   PHQ-9 Depression Patient Health Questionnaire   Over the last two weeks how often have you been bothered by little interest or pleasure in doing things 0   Over the last two weeks how often have you been bothered by feeling down, depressed or hopeless 0          12/20/2024     2:12 PM 11/15/2024     3:16 PM 11/1/2024     1:34 PM   GAD7   1. Feeling nervous, anxious, or on edge? 2 2 3   2. Not being able to stop or control worrying? 1 2 3   3. Worrying too much about different things? 1 2 3   4. Trouble relaxing? 1 1 3   5. Being so restless that it is hard to sit still? 1 1 1   6. Becoming easily annoyed or irritable? 1 1 3   7. Feeling afraid as if something awful might happen? 1 1 3   8. If you checked off any problems, how difficult have these problems made it for you to do your work, take care of things at home, or get along with other people?  0 1   CARINE-7 Score 8 10 19         Objective:     PHYSICAL EXAM   Vital Signs  /82 (BP Location: Left arm, Patient Position: Sitting)   Pulse 98   Temp 98.3 °F (36.8 °C) (Oral)   Resp 16   Ht 5' 6" (1.676 m)   Wt 91 kg (200 lb 8.8 oz)   SpO2 98%   BMI 32.37 kg/m²     Physical Exam    Vitals: Temp: 98.3 degrees.  General: Well-developed. Well-nourished. No acute distress.  Eyes: EOMI. Sclerae anicteric.  HENT: Normocephalic. Atraumatic. Nares patent. Moist oral mucosa. Swollen, reddened turbinates  Cardiovascular: Regular rate. Regular rhythm. No " murmurs. No rubs. No gallops. Normal S1, S2.  Respiratory: Normal respiratory effort. Clear to auscultation bilaterally. No rales. No rhonchi. No wheezing.  Musculoskeletal: No  obvious deformity.  Extremities: No lower extremity edema.  Neurological: Alert & oriented x3. No slurred speech. Normal gait.  Psychiatric: Normal mood. Normal affect. Good insight. Good judgment.  Skin: Warm. Dry. No rash.       Assessment:   40 y.o. female here for primary care visit       Plan:   1. Class 1 obesity due to excess calories without serious comorbidity with body mass index (BMI) of 34.0 to 34.9 in adult  Overview:  Baseline weight upon entry to [facility] is 233 lbs (106.1 kg) which is the heaviest she has ever been  Weight as of 7/12/24 = 225 lbs (102.3 kg)  Weight as of 11/1/24 = 219 lbs (99.5 kg)  Weight as of 12/20/24 = 211 lbs (96.1 kg)    Current regimen:  --phentermine 30 mg daily  --topiramate 50 mg daily  Started this regimen 4/12/24    Assessment & Plan:  Her digital medicine pharmacy appointment is next month in April.   --she is down 33 lbs from her peak weight of 233 lbs  --however, she has been experiencing some tachycardia while on phentermine  **stop phentermine to avoid risk of tachycardia induced CMO  **also stop topiramate (but okay to continue until she starts GLP1 therapy next month)      2. Metabolic dysfunction-associated steatohepatitis (MASH)  Overview:  Fibroscan 4/12/24:  Findings  Median liver stiffness score:  3.5  CAP Reading: dB/m:  238  IQR/med %:  9  Interpretation  Fibrosis interpretation is based on medial liver stiffness - Kilopascal (kPa).  Fibrosis Stage:  F 0-1  Steatosis interpretation is based on controlled attenuation parameter - (dB/m).  Steatosis Grade:  S1    FIB-4 Calculation: 0.58 at 4/12/2024  2:30 PM   FIB-4 Calculation: 0.50 at 10/22/2024  4:37 PM     3/14/24 US abdomen:  FINDINGS:  Complete scan of the patient's abdomen was obtained.  The liver is enlarged.  It measures 16.3  "cm and has increased in size since previous examination.  No focal mass lesions are noted within the liver.  The HR I index is 1.2 suggesting less than 5% steatosis.  The spleen is not enlarged.  It measures 7 x 2.2 cm.  No focal defects are seen in the spleen.  The aorta tapers normally. The proximal inferior vena cava is unremarkable.   No para-aortic adenopathy is seen.  The pancreas is not enlarged.  The tail is obscured by gas however.  The gallbladder shows no calculi.  No dilated common bile duct is noted.  No hydronephrosis or significant mass lesion is noted.  No ascites is noted.     Impression:  Hepatomegaly  --history of mildly elevated liver enzymes    Assessment & Plan:  Liver Function & Enzymes Lab Results   Component Value Date    ALT 34 10/22/2024    AST 24 10/22/2024    ALKPHOS 74 10/22/2024    BILITOT 0.3 10/22/2024    ALBUMIN 3.9 10/22/2024     10/22/2024       AFP Monitoring No results found for: "AFP"    Last 3 Weights Wt Readings from Last 3 Encounters:   02/19/25 97.4 kg (214 lb 13.4 oz)   02/07/25 97.6 kg (215 lb 2.7 oz)   01/11/25 95.7 kg (211 lb)        FIB-4 Calculation: 0.5 at 10/22/2024  2:01 PM  Calculated from:  SGOT/AST: 24 U/L at 10/22/2024  2:01 PM  SGPT/ALT: 34 U/L at 10/22/2024  2:01 PM  Platelets: 336 K/uL at 10/22/2024  2:01 PM  Age: 39 years     --no further evaluation at this point      3. Influenza-like illness  Assessment & Plan:  Afebrile, but acute onset of malaise, fatigue, myalgia, and chills  --NP swab for flu >> negative  **no indication for Tamiflu (education on management of viral URI symptoms provided)        Follow up if symptoms worsen or fail to improve.    Kvng Tay MD/[facility] [place]  Internal Medicine  [facility]          "

## 2025-03-13 NOTE — ASSESSMENT & PLAN NOTE
Her digital medicine pharmacy appointment is next month in April.   --she is down 33 lbs from her peak weight of 233 lbs  --however, she has been experiencing some tachycardia while on phentermine  **stop phentermine to avoid risk of tachycardia induced CMO  **also stop topiramate (but okay to continue until she starts GLP1 therapy next month)

## 2025-03-13 NOTE — ASSESSMENT & PLAN NOTE
"Liver Function & Enzymes Lab Results   Component Value Date    ALT 34 10/22/2024    AST 24 10/22/2024    ALKPHOS 74 10/22/2024    BILITOT 0.3 10/22/2024    ALBUMIN 3.9 10/22/2024     10/22/2024       AFP Monitoring No results found for: "AFP"    Last 3 Weights Wt Readings from Last 3 Encounters:   02/19/25 97.4 kg (214 lb 13.4 oz)   02/07/25 97.6 kg (215 lb 2.7 oz)   01/11/25 95.7 kg (211 lb)        FIB-4 Calculation: 0.5 at 10/22/2024  2:01 PM  Calculated from:  SGOT/AST: 24 U/L at 10/22/2024  2:01 PM  SGPT/ALT: 34 U/L at 10/22/2024  2:01 PM  Platelets: 336 K/uL at 10/22/2024  2:01 PM  Age: 39 years     --no further evaluation at this point  "

## 2025-03-14 ENCOUNTER — OFFICE VISIT (OUTPATIENT)
Dept: PRIMARY CARE CLINIC | Facility: CLINIC | Age: 40
End: 2025-03-14
Attending: INTERNAL MEDICINE
Payer: COMMERCIAL

## 2025-03-14 VITALS
HEART RATE: 98 BPM | TEMPERATURE: 98 F | HEIGHT: 66 IN | DIASTOLIC BLOOD PRESSURE: 82 MMHG | OXYGEN SATURATION: 98 % | SYSTOLIC BLOOD PRESSURE: 123 MMHG | BODY MASS INDEX: 32.23 KG/M2 | RESPIRATION RATE: 16 BRPM | WEIGHT: 200.56 LBS

## 2025-03-14 DIAGNOSIS — E66.811 CLASS 1 OBESITY DUE TO EXCESS CALORIES WITHOUT SERIOUS COMORBIDITY WITH BODY MASS INDEX (BMI) OF 34.0 TO 34.9 IN ADULT: Primary | ICD-10-CM

## 2025-03-14 DIAGNOSIS — J11.1 INFLUENZA-LIKE ILLNESS: ICD-10-CM

## 2025-03-14 DIAGNOSIS — K75.81 METABOLIC DYSFUNCTION-ASSOCIATED STEATOHEPATITIS (MASH): ICD-10-CM

## 2025-03-14 DIAGNOSIS — E66.09 CLASS 1 OBESITY DUE TO EXCESS CALORIES WITHOUT SERIOUS COMORBIDITY WITH BODY MASS INDEX (BMI) OF 34.0 TO 34.9 IN ADULT: Primary | ICD-10-CM

## 2025-03-14 NOTE — ASSESSMENT & PLAN NOTE
Afebrile, but acute onset of malaise, fatigue, myalgia, and chills  --NP swab for flu >> negative  **no indication for Tamiflu (education on management of viral URI symptoms provided)

## 2025-03-14 NOTE — PATIENT INSTRUCTIONS
Upper Respiratory Infections and Sinus Infections  Most adults average 2 to 3 upper respiratory infections (URIs) per year.  Children in day care often average twice that many and the adult parents of those children may see a similar increase in their own frequency of URIs.  These URIs are caused by many different types of viruses.  When the symptoms are severe - like fever > 101o, severe muscle aches, severe malaise or fatigue, shortness of breath, or difficulty getting out of bed - then checking for Covid-19 or influenza (flu) may be necessary.  Some viruses, like the flu, tend to predominate in the winter, but many other viruses are capable of causing URIs all year long.    Virus transmission is via inhalation or direct contact with the viral particles, which are found in abundance in respiratory secretions from nasal mucous, sneezing, or coughing.  Therefore, covering your nose and mouth when sneezing or coughing is the best way to avoid infecting others.  Frequent hand washing is the most effective way of both avoiding getting infected yourself as well as reducing your risk of infecting others.    Viral URIs typically start with a sore throat as the first symptom (although not always).  The sore throat usually resolves on its own within a day or two.  Runny nose, sneezing, malaise, mild achiness, and fatigue are often present, and you typically feel worst first thing in the morning and then again late in the evening.  These symptoms will usually resolve on their own within 5 to 7 days even without any specific treatment, although sometimes post-nasal drip and cough can last for a couple of weeks.    It is very important to know that viral URIs are not caused by bacteria, and therefore antibiotics, including Z packs, should be avoided.  Many people think Z packs significantly relieve their symptoms from their own prior experience.  Although azithromycin - the antibiotic otherwise known as Z pack does have  some anti-inflammatory effects that may offer some benefit, the risk of altering your microbiome (the normal gut bacteria that live inside you) is not worth the risk from the medication, especially since simply taking some ibuprofen might confer just as much anti-inflammatory benefit for these infections.    Likewise, although steroid shots like Celestone, or courses of oral steroids like Prednisone or Medrol Dose Packs are commonly asked for by patients and often given by doctors, they should not be routinely given for URIs.  So, why are they commonly administered or prescribed in doctor offices?  Because they are easy to give and sometimes it is easier to give a shot or prescribe a steroid than explain how it could cause harm.  The problem is that steroids can cause an abundance of side effects and problems over the long-term and too many steroid shots or courses can be harmful.  Yes, they do often make people feel better more quickly because of their anti-inflammatory properties and you can certainly ask for one.  Just know that it may not shorten the overall course of the illness (sometimes can even prolong it) and they do come with a small potential risk of harm.    So, what should you do to feel better?  The answer is to treat the symptoms.  I will tell you exactly what I do for myself and my loved ones when we get sick from a viral URI.  Medication doses listed below are for most people and can be followed unless instructed otherwise:  For aches, pain, sore throat, or headache, it is okay to take acetaminophen (Tylenol) or ibuprofen (Advil, Motrin) or naproxen (Aleve)  Usual dose of Tylenol would be 1 to 2 tablets of extra strength (500 mg per tablet) every 6 hours (up to 4 times per day and never to exceed 4000 mg per day; much less if you drink more than 2 alcohol containing drinks per day)  Usual dose of ibuprofen (Advil or Motrin) is 2 to 4 tablets (200 mg per tablet) every 8 hours (up to 3 times in a  day and never to exceed more than 2400 mg [12 tablets] in a day; as few as 1 or 2 Advil can potentially cause mild gastritis (stomach irritation) or even an ulcer  Usual dose of Aleve is 2 tablets twice a day (not to exceed 4 tablets per day); and like ibuprofen, naproxen can cause gastritis or an ulcer  For nasal symptoms, we can prescribe intranasal cromolyn or intranasal ipratropium (Atrovent)  Another option for nasal symptoms is an antihistamine/decongestant combination.  For reasons that are complicated to explain, the most effective combinations are those containing a first-generation antihistamine (i.e., NOT Claritin, Allegra, or Zyrtec).  My own personal favorite is [place] plus cold medicine (see images below), but Coricidin HBP is a good option for patients with inadequately controlled blood pressure  First generation antihistamines include brompheniramine, chlorpheniramine, and doxylamine   The most commonly available over the counter (OTC) decongestant is phenylephrine  OTC nasal spray decongestants - oxymetazoline (Afrin) - should be used cautiously but can be used several times per day in each nostril to help relieve congestion, and should never be used for more than 3 days (to avoid becoming dependent on them to keep your nasal passages open)  For cough, there are not a lot of great options.  Despite lots of medications found on the pharmacy shelves that advertise themselves as cough suppressants, mucolytics, or expectorants - like Mucinex, Delsym, Robitussin, and others - there is very little evidence to support their use.  Codeine should be avoided in almost all circumstances.  Benzonatate (Tessalon Perles) are reasonable but not always effective.  Personally, I typically just use [place] cough drops multiple times per day until the cough eventually subsides (which can take anywhere from several days to several weeks).    Over the counter zinc-containing intranasal products should be avoided  owing to risk of permanent loss of smell  When might antibiotics be needed?  When you have been sick from a URI for at least 7 to 10 days and your symptoms start to worsen after that time frame - especially if you had started feeling better but then start feeling worse again, then it could mean that you are developed a secondary bacterial sinus infection known as sinusitis.  Symptoms might include worsening facial pain or pressure, upper toothache, severe nasal congestion, thick green pus nasal discharge, or high fever  Indications for antibiotics include:  Persistent symptoms of > 10 days' duration  Onset of severe symptoms of high fever > 102o with green nasal discharge or pus or facial pain lasting > 3 days in a row  Onset of worsening symptoms after a typical viral URI that lasted 5 days after initially improving  First choice of antibiotic is amoxicillin-clavulanate (Augmentin) or amoxicillin  If you are allergic to penicillin, then doxycycline is often the next best choice  Acute sinusitis is when symptoms have been present for less than a month  Chronic sinusitis is when symptoms have been present for more than 3 months and is associated with decreased sense of smell, thick green pus nasal discharge, and facial pain or pressure.  Treatment typically involves both a longer course of antibiotics and oral steroids (like Prednisone).

## 2025-03-18 ENCOUNTER — PATIENT MESSAGE (OUTPATIENT)
Dept: OTOLARYNGOLOGY | Facility: CLINIC | Age: 40
End: 2025-03-18
Payer: COMMERCIAL

## 2025-03-20 ENCOUNTER — LAB VISIT (OUTPATIENT)
Dept: LAB | Facility: HOSPITAL | Age: 40
End: 2025-03-20
Payer: COMMERCIAL

## 2025-03-20 ENCOUNTER — RESULTS FOLLOW-UP (OUTPATIENT)
Dept: HEPATOLOGY | Facility: CLINIC | Age: 40
End: 2025-03-20

## 2025-03-20 DIAGNOSIS — K76.0 FATTY LIVER: ICD-10-CM

## 2025-03-20 LAB
ALBUMIN SERPL BCP-MCNC: 3.7 G/DL (ref 3.5–5.2)
ALP SERPL-CCNC: 84 U/L (ref 40–150)
ALT SERPL W/O P-5'-P-CCNC: 22 U/L (ref 10–44)
AST SERPL-CCNC: 19 U/L (ref 10–40)
BILIRUB DIRECT SERPL-MCNC: 0.1 MG/DL (ref 0.1–0.3)
BILIRUB SERPL-MCNC: 0.3 MG/DL (ref 0.1–1)
PROT SERPL-MCNC: 6.9 G/DL (ref 6–8.4)

## 2025-03-20 PROCEDURE — 80076 HEPATIC FUNCTION PANEL: CPT | Performed by: NURSE PRACTITIONER

## 2025-03-20 PROCEDURE — 36415 COLL VENOUS BLD VENIPUNCTURE: CPT | Performed by: NURSE PRACTITIONER

## 2025-03-21 ENCOUNTER — CLINICAL SUPPORT (OUTPATIENT)
Dept: AUDIOLOGY | Facility: CLINIC | Age: 40
End: 2025-03-21
Payer: COMMERCIAL

## 2025-03-21 DIAGNOSIS — H93.293 ABNORMAL AUDITORY PERCEPTION OF BOTH EARS: Primary | ICD-10-CM

## 2025-03-21 PROCEDURE — 99999 PR PBB SHADOW E&M-EST. PATIENT-LVL I: CPT | Mod: PBBFAC,,, | Performed by: AUDIOLOGIST

## 2025-03-21 NOTE — PROGRESS NOTES
Joan Wilde was seen in the clinic today for an audiological evaluation.  Joan Wilde reported decreased hearing of both ears.  She denied tinnitus, otalgia, and vertigo.    Audiological testing revealed essentially normal hearing, with a moderate threshold noted at 8000 Hz, for the right ear and essentially normal hearing, with a mild threshold noted at 8000 Hz, for the left ear.  A speech reception threshold was obtained at 5 dBHL for the right ear and at 5 dBHL for the left ear.  Speech discrimination was 100% for the right ear and 100% for the left ear.      Tympanometry testing revealed a Type A tympanogram for the right ear and a Type A tympanogram for the left ear.      Recommendations:  1. Otologic evaluation  2. Annual audiological evaluation  3. Hearing protection when in noise

## 2025-03-26 ENCOUNTER — PROCEDURE VISIT (OUTPATIENT)
Dept: HEPATOLOGY | Facility: CLINIC | Age: 40
End: 2025-03-26
Payer: COMMERCIAL

## 2025-03-26 ENCOUNTER — OFFICE VISIT (OUTPATIENT)
Dept: HEPATOLOGY | Facility: CLINIC | Age: 40
End: 2025-03-26
Payer: COMMERCIAL

## 2025-03-26 VITALS — BODY MASS INDEX: 33.87 KG/M2 | WEIGHT: 215.81 LBS | HEIGHT: 67 IN

## 2025-03-26 DIAGNOSIS — E66.811 CLASS 1 OBESITY DUE TO EXCESS CALORIES WITHOUT SERIOUS COMORBIDITY WITH BODY MASS INDEX (BMI) OF 33.0 TO 33.9 IN ADULT: ICD-10-CM

## 2025-03-26 DIAGNOSIS — K76.0 FATTY LIVER: ICD-10-CM

## 2025-03-26 DIAGNOSIS — K76.0 METABOLIC DYSFUNCTION-ASSOCIATED STEATOTIC LIVER DISEASE (MASLD): Primary | ICD-10-CM

## 2025-03-26 DIAGNOSIS — E66.09 CLASS 1 OBESITY DUE TO EXCESS CALORIES WITHOUT SERIOUS COMORBIDITY WITH BODY MASS INDEX (BMI) OF 33.0 TO 33.9 IN ADULT: ICD-10-CM

## 2025-03-26 PROCEDURE — 1160F RVW MEDS BY RX/DR IN RCRD: CPT | Mod: CPTII,S$GLB,, | Performed by: NURSE PRACTITIONER

## 2025-03-26 PROCEDURE — 3008F BODY MASS INDEX DOCD: CPT | Mod: CPTII,S$GLB,, | Performed by: NURSE PRACTITIONER

## 2025-03-26 PROCEDURE — 91200 LIVER ELASTOGRAPHY: CPT | Mod: S$GLB,,, | Performed by: NURSE PRACTITIONER

## 2025-03-26 PROCEDURE — 1159F MED LIST DOCD IN RCRD: CPT | Mod: CPTII,S$GLB,, | Performed by: NURSE PRACTITIONER

## 2025-03-26 PROCEDURE — 99999 PR PBB SHADOW E&M-EST. PATIENT-LVL IV: CPT | Mod: PBBFAC,,, | Performed by: NURSE PRACTITIONER

## 2025-03-26 PROCEDURE — 99214 OFFICE O/P EST MOD 30 MIN: CPT | Mod: S$GLB,,, | Performed by: NURSE PRACTITIONER

## 2025-03-26 NOTE — PROGRESS NOTES
[facility] VISIT FOLLOW UP NOTE    PCP: Kvng Tay MD     CHIEF COMPLAINT:  Metabolic associated steatotic liver disease       HPI: This is a 40 y.o. Black or  female with PMH noted below, presenting for follow up of above    Previous serologic w/u negative for hemochromatosis and viral hepatitis A, B and C     Prior serologic workup:   Lab Results   Component Value Date    FESATURATED 6 (L) 10/18/2023    HEPBSAG Non-reactive 08/08/2023    HEPCAB Non-reactive 10/22/2024    HEPAIGM Non-reactive 03/03/2023       Liver fibrosis staging:  -- fibroscan 3/2023 noted F0, S3 (kPA 4, )  -- fibroscan 3/2024 noted F0, S1 (kPA 3.5, )  -- fibroscan 5/2025 noted F0, S0-1 (kPA 3.4, )    Risk factors for NAFLD include obesity    Interval HPI: Presents today alone. Doing well with weight loss and lifestyle changes   Previous max weight  233 lbs, currently 215 lbs    Following Low carb (<100 grams per day), exercising often  Fibroscan with mild steatosis   Plans to start Wegovy soon for weight loss via [facility]     Labs done 3/2025 show normal transaminase levels   Platelets WNL, alk phos WNL  Synthetic liver functioning WNL    Lab Results   Component Value Date    ALT 22 03/20/2025    AST 19 03/20/2025    ALKPHOS 84 03/20/2025    BILITOT 0.3 03/20/2025    ALBUMIN 3.7 03/20/2025     10/22/2024       Abd U/S done 3/2024 hepatomegaly - will repeat with RTC    Denies family history of liver disease . Denies alcohol consumption currently or in the past-     +Immunity to Hep A and B          Allergy and medication list reviewed and updated     PMHX:  has a past medical history of Acne vulgaris (11/20/2018), Allergic rhinitis due to dust mite (05/13/2019), Asthma, Eczema, Endometriosis (11/20/2018), Fibroid, Laryngopharyngeal reflux, Migraine syndrome, and Sleep apnea.    PSHX:  has a past surgical history that includes Hernia repair (2014); Laparoscopic  "appendectomy (N/A, 4/21/2021); Diagnostic laparoscopy (Bilateral, 5/15/2023); Laparoscopic lysis of adhesions (N/A, 5/15/2023); and Esophagogastroduodenoscopy (N/A, 12/7/2023).    FAMILY HISTORY: Updated and reviewed in [facility]    SOCIAL HISTORY:   Social History     Substance and Sexual Activity   Alcohol Use No       Social History     Substance and Sexual Activity   Drug Use No       ROS:   GENERAL: Denies fatigue  CARDIOVASCULAR: Denies edema  GI: Denies abdominal pain  SKIN: Denies rash, itching   NEURO: Denies confusion, memory loss, or mood changes    PHYSICAL EXAM:   Friendly Black or  female, in no acute distress; alert and oriented to person, place and time  VITALS: Ht 5' 7" (1.702 m)   Wt 97.9 kg (215 lb 13.3 oz)   BMI 33.80 kg/m²   EYES: Sclerae anicteric  GI: Soft, non-tender, non-distended. No ascites.  EXTREMITIES:  No edema.  SKIN: Warm and dry. No jaundice. No telangectasias noted. No palmar erythema.  NEURO:  No asterixis.  PSYCH:  Thought and speech pattern appropriate. Behavior normal      EDUCATION:  See instructions discussed with patient in Instructions section of the After Visit Summary     ASSESSMENT & PLAN:  40 y.o. Black or  female with:  1.  Metabolic associated steatotic liver disease   - see HPI  -- Fibroscan 3/2025 noted F0, S1 - will repeat in 2 years given mild fatty liver, no fibrosis and normal liver enzymes   -- Recommendations discussed with patient:  Limit alcohol consumption (pt does not drink currently)  2 Continue Weight loss   3. Low carb/sugar, high fiber and protein diet, limit your carb intake to LESS than 30-45 grams of carbs with a meal or LESS than 5-10 grams with any snack   4. Exercise, 5 days per week, 30 minutes per day, as tolerated  5. Recommend good cholesterol, blood pressure, blood sugar levels   6. No indication for Rezdiffra, no fibrosis     2.  Obesity  -- Body mass index is 33.8 kg/m².   -- increases risk for fatty " liver        Follow up in about 2 years (around 3/26/2027). With US and fibroscan before  - recall entered   No orders of the defined types were placed in this encounter.       Thank you for allowing me to participate in the care of Joan Millsubunmi BARRETT Herr    I spent a total of 30 minutes on the day of the visit.This includes face to face time and non-face to face time preparing to see the patient (eg, review of tests), obtaining and/or reviewing separately obtained history, documenting clinical information in the electronic or other health record, independently interpreting results and communicating results to the patient/family/caregiver, and coordinating care.         CC'ed note to:

## 2025-03-26 NOTE — PATIENT INSTRUCTIONS
1. Fibroscan to look for fat or scar tissue in the liver showed mild fatty liver, no scar tissue damage   2. Recommend vaccines for Hepatitis  A and B if you have not completed them in the past, see below   3. Follow up in 2 years with US and fibroscan before    These things are important to improve fatty liver:  Limit alcohol consumption, no more than 2 serving(s) of alcohol in any day (1 serving is 5 ounces of wine, 12 ounces of beer, or 1.5 ounces of liquor) and do NOT recommend any daily alcohol use    2 Weight loss goal of 20 lbs. ok to use weight loss medications to help with weight loss from a liver standpoint if you don't have any other contraindications   3. [facility] offers benefits to patients so let me know if you want a referral to the [facility] gym. Also, [facility] has dieticians that can create a weight loss plan. If you are interested let me know and I can place a referral. If so, contact the dietician team at [facility] at email [email or web address] or call [phone].  to get scheduled. They do offer video visits   4. Avoid processed foods. No fried/fast foods. No sugary drinks or drinks with any calories.   5. Low carb/sugar and high protein diet (100 grams per day of protein).Try to limit your carb intake to LESS than  grams per day total. Use MyFitness Pal or Lose It angelika to add up your carbs through the day. Do NOT drink any beverages with calories or carbs (this can lead to high blood sugar and weight gain). The main thing to focus on is high protein, low carb)  6. Exercise, 5 days per week, 30 minutes per day, as tolerated  7. Recommend good cholesterol, blood pressure, blood sugar levels   8. There is a new medication called Rezdiffra for the treatment of F2-F3 liver scarring due to fatty liver. It is only indicated for patients with significant scar tissue from fatty liver (not you currently)    In some people, fatty liver can  progress to steatohepatitis (inflamatory fatty liver) and possibly to cirrhosis, increasing the risk for liver cancer, liver failure, and death. Therefore, the lifestyle changes are very important to decrease this risk.     Helpful website for [facility]/fatty liver: [email or web address]

## 2025-04-10 ENCOUNTER — PATIENT MESSAGE (OUTPATIENT)
Dept: INTERNAL MEDICINE | Facility: CLINIC | Age: 40
End: 2025-04-10

## 2025-04-10 ENCOUNTER — OFFICE VISIT (OUTPATIENT)
Dept: INTERNAL MEDICINE | Facility: CLINIC | Age: 40
End: 2025-04-10
Payer: COMMERCIAL

## 2025-04-10 DIAGNOSIS — E66.811 OBESITY, CLASS I, BMI 30.0-34.9 (SEE ACTUAL BMI): Primary | ICD-10-CM

## 2025-04-10 RX ORDER — SEMAGLUTIDE 0.25 MG/.5ML
0.25 INJECTION, SOLUTION SUBCUTANEOUS
Qty: 2 ML | Refills: 0 | Status: ACTIVE | OUTPATIENT
Start: 2025-04-10

## 2025-04-10 RX ORDER — SEMAGLUTIDE 0.5 MG/.5ML
0.5 INJECTION, SOLUTION SUBCUTANEOUS
Qty: 2 ML | Refills: 0 | Status: ACTIVE | OUTPATIENT
Start: 2025-04-10

## 2025-04-10 RX ORDER — SEMAGLUTIDE 1 MG/.5ML
1 INJECTION, SOLUTION SUBCUTANEOUS
Qty: 2 ML | Refills: 0 | Status: ACTIVE | OUTPATIENT
Start: 2025-04-10

## 2025-04-10 NOTE — PROGRESS NOTES
Patient ID: Joan Wilde is a 40 y.o. Black or  female    Subjective  Chief Complaint: patient presents for medical weight loss management.    Contraindications to GLP-1 receptor agonist therapy:   Denies personal or family history of MTC and personal history of MEN2     Pregnancy Status:   - Pt denies current pregnancy, breastfeeding, or plans to become pregnant.  - Pt denies current use of oral hormonal contraception. Micronor    Co-morbidities: NAFLD, AMARJIT    History of weight loss therapy: Pt previously used Adipex and Topamax but denies use of GLP-1 medications.    Weight loss history:  Starting weight:    4/2/2025   Recent Readings    Weight (lbs) 215 lb    BMI 34.7 BMI      Objective  Lab Results   Component Value Date     10/22/2024     04/11/2024     03/03/2023     Lab Results   Component Value Date    K 4.3 10/22/2024    K 4.0 04/11/2024    K 4.4 03/03/2023     Lab Results   Component Value Date     10/22/2024     04/11/2024     03/03/2023     Lab Results   Component Value Date    CO2 19 (L) 10/22/2024    CO2 22 (L) 04/11/2024    CO2 23 03/03/2023     Lab Results   Component Value Date    BUN 10 10/22/2024    BUN 12 04/11/2024    BUN 13 03/03/2023     Lab Results   Component Value Date    GLU 83 10/22/2024    GLU 82 04/11/2024    GLU 89 03/03/2023     Lab Results   Component Value Date    CALCIUM 9.3 10/22/2024    CALCIUM 9.7 04/11/2024    CALCIUM 9.4 03/03/2023     Lab Results   Component Value Date    PROT 6.9 03/20/2025    PROT 7.2 10/22/2024    PROT 6.8 03/14/2024     Lab Results   Component Value Date    ALBUMIN 3.7 03/20/2025    ALBUMIN 3.9 10/22/2024    ALBUMIN 3.6 03/14/2024     Lab Results   Component Value Date    BILITOT 0.3 03/20/2025    BILITOT 0.3 10/22/2024    BILITOT 0.2 03/14/2024     Lab Results   Component Value Date    AST 19 03/20/2025    AST 24 10/22/2024    AST 28 03/14/2024     Lab Results   Component Value Date    ALT  22 03/20/2025    ALT 34 10/22/2024    ALT 46 (H) 03/14/2024     Lab Results   Component Value Date    ANIONGAP 9 10/22/2024    ANIONGAP 9 04/11/2024    ANIONGAP 9 03/03/2023     Lab Results   Component Value Date    CREATININE 1.0 10/22/2024    CREATININE 1.0 04/11/2024    CREATININE 1.0 03/03/2023     Lab Results   Component Value Date    EGFRNORACEVR >60.0 10/22/2024    EGFRNORACEVR >60.0 04/11/2024    EGFRNORACEVR >60.0 03/03/2023     Assessment/Plan  -Pt qualifies for GLP-1 RA therapy based on BMI greater than or equal to 30 kg/m2  - Initiate Wegovy 0.25 mg once weekly for 1 month  - Then increase to Wegovy 0.5 mg once weekly for 1 month  - Then increase to Wegovy 1 mg once weekly  - RTC in 3 months for follow-up evaluation      Patient consented to pharmacist management via collaborative practice.

## 2025-04-17 ENCOUNTER — OFFICE VISIT (OUTPATIENT)
Dept: URGENT CARE | Facility: CLINIC | Age: 40
End: 2025-04-17
Payer: COMMERCIAL

## 2025-04-17 VITALS
WEIGHT: 215 LBS | SYSTOLIC BLOOD PRESSURE: 147 MMHG | RESPIRATION RATE: 16 BRPM | OXYGEN SATURATION: 95 % | TEMPERATURE: 98 F | BODY MASS INDEX: 33.74 KG/M2 | DIASTOLIC BLOOD PRESSURE: 90 MMHG | HEART RATE: 89 BPM | HEIGHT: 67 IN

## 2025-04-17 DIAGNOSIS — R11.0 NAUSEA: ICD-10-CM

## 2025-04-17 DIAGNOSIS — T88.7XXA MEDICATION SIDE EFFECT: Primary | ICD-10-CM

## 2025-04-17 DIAGNOSIS — R68.83 CHILLS: ICD-10-CM

## 2025-04-17 LAB
CTP QC/QA: YES
CTP QC/QA: YES
POC MOLECULAR INFLUENZA A AGN: NEGATIVE
POC MOLECULAR INFLUENZA B AGN: NEGATIVE
SARS CORONAVIRUS 2 ANTIGEN: NEGATIVE

## 2025-04-17 PROCEDURE — 87502 INFLUENZA DNA AMP PROBE: CPT | Mod: QW,S$GLB,,

## 2025-04-17 RX ORDER — ONDANSETRON 4 MG/1
4 TABLET, ORALLY DISINTEGRATING ORAL 2 TIMES DAILY
Qty: 28 TABLET | Refills: 0 | Status: SHIPPED | OUTPATIENT
Start: 2025-04-17 | End: 2025-05-01

## 2025-04-17 RX ORDER — ONDANSETRON 4 MG/1
4 TABLET, ORALLY DISINTEGRATING ORAL
Status: COMPLETED | OUTPATIENT
Start: 2025-04-17 | End: 2025-04-17

## 2025-04-17 RX ADMIN — ONDANSETRON 4 MG: 4 TABLET, ORALLY DISINTEGRATING ORAL at 06:04

## 2025-04-17 NOTE — PROGRESS NOTES
"Subjective:      Patient ID: Joan Wilde is a 40 y.o. female.    Vitals:  height is 5' 7" (1.702 m) and weight is 97.5 kg (215 lb). Her oral temperature is 98.4 °F (36.9 °C). Her blood pressure is 147/90 (abnormal) and her pulse is 89. Her respiration is 16 and oxygen saturation is 95%.     Chief Complaint: Nausea    This is a 40 y.o. female who presents today with a chief complaint of fatigue chills nausea and feel weak, lack of appetite that started this morning. Pt stated that she started wegovy yesterday and feels like her symptoms are coming from that.       Nausea  This is a new problem. The current episode started today. The problem has been unchanged. Associated symptoms include chills, fatigue and nausea. Pertinent negatives include no congestion, coughing, diaphoresis, fever, myalgias, sore throat or vomiting.       Constitution: Positive for chills and fatigue. Negative for sweating and fever.   HENT:  Negative for congestion, sinus pain, sinus pressure and sore throat.    Respiratory:  Negative for cough.    Gastrointestinal:  Positive for nausea. Negative for vomiting, constipation and diarrhea.   Musculoskeletal:  Negative for muscle ache.      Objective:     Physical Exam   Constitutional: She is oriented to person, place, and time.  Non-toxic appearance. No distress. obesity  Eyes: Conjunctivae are normal.   Cardiovascular: Normal rate and regular rhythm.   Abdominal: Bowel sounds are normal. She exhibits no distension and no mass. Soft. flat abdomen There is no abdominal tenderness. There is no rebound, no guarding, no left CVA tenderness and no right CVA tenderness. No hernia.   Musculoskeletal: Normal range of motion.         General: Normal range of motion.   Neurological: She is alert and oriented to person, place, and time.   Skin: Skin is warm and dry.       Assessment:     1. Medication side effect    2. Nausea    3. Chills        Plan:   Discussed symptoms likely from " medication. Advised to contact prescribing provider before continuing with next dose. Zofran sent to pharmacy. Pt verbalized understanding.     Medication side effect    Nausea  -     SARS Coronavirus 2 Antigen, POCT Manual Read  -     POCT Influenza A/B MOLECULAR  -     ondansetron disintegrating tablet 4 mg  -     ondansetron (ZOFRAN-ODT) 4 MG TbDL; Take 1 tablet (4 mg total) by mouth 2 (two) times daily. for 14 days  Dispense: 28 tablet; Refill: 0    Chills  -     SARS Coronavirus 2 Antigen, POCT Manual Read  -     POCT Influenza A/B MOLECULAR      Nausea & Vomiting    Use prescribed anti-nausea medicine as needed and prescribed     Increase fluids and rest is important     Start off with liquid diet and progress as tolerated (see below)  Water and clear liquids are important so you do not get dehydrated. Drink a small amount at a time.  Do not force yourself to eat, especially if you have cramps, vomiting, or diarrhea. When you finally decide to start eating, do not eat large amounts at a time, even if you are hungry.  If you eat, avoid fatty, greasy, spicy, or fried foods.  Do not eat dairy products if you have diarrhea; they can make the diarrhea worse    Watch for any increase pain, fever, localized pain to right lower abdomen or continued vomiting or diarrhea.     If your condition worsens or fails to improve we recommend that you receive another evaluation at the ER immediately or contact your PCP to discuss your concerns or return here.

## 2025-04-18 ENCOUNTER — PATIENT MESSAGE (OUTPATIENT)
Dept: INTERNAL MEDICINE | Facility: CLINIC | Age: 40
End: 2025-04-18
Payer: COMMERCIAL

## 2025-04-24 ENCOUNTER — OFFICE VISIT (OUTPATIENT)
Dept: OPTOMETRY | Facility: CLINIC | Age: 40
End: 2025-04-24
Payer: COMMERCIAL

## 2025-04-24 DIAGNOSIS — H52.13 MYOPIA OF BOTH EYES: Primary | ICD-10-CM

## 2025-04-24 PROCEDURE — 92014 COMPRE OPH EXAM EST PT 1/>: CPT | Mod: S$GLB,,, | Performed by: OPTOMETRIST

## 2025-04-24 PROCEDURE — 92015 DETERMINE REFRACTIVE STATE: CPT | Mod: S$GLB,,, | Performed by: OPTOMETRIST

## 2025-04-24 PROCEDURE — 99999 PR PBB SHADOW E&M-EST. PATIENT-LVL III: CPT | Mod: PBBFAC,,, | Performed by: OPTOMETRIST

## 2025-04-24 PROCEDURE — 1159F MED LIST DOCD IN RCRD: CPT | Mod: CPTII,S$GLB,, | Performed by: OPTOMETRIST

## 2025-04-25 NOTE — PROGRESS NOTES
HPI    Pt is here today for routine eye exam. Denies pain/discomfort. States that   her eyes sometimes feels like there are tara in them.   DLS: 4/3/2024 Dr. Dolan  (-)Flashes   (-)Floaters   (-)Diplopia   (-)Headaches   (-)Itching   (-)Tearing  (+)Burning  (+)Dryness   (-)Photophobia  (-)Glare   (-)Blurred VA  Past Eye Sx: (-)  Eye Meds:  Refresh Gel PRN OU                      Warm Compress PRN OU (usually once a week) *Salud Mask  Last edited by Blank Dolan, OD on 4/25/2025  9:01 AM.            Assessment /Plan     For exam results, see Encounter Report.    Myopia of both eyes      Eyeglass Final Rx       Eyeglass Final Rx         Sphere Cylinder Add    Right -0.75 Sphere +1.00    Left -0.75 Sphere +1.00      Type: PAL    Expiration Date: 4/24/2026                   RTC in 1 year for annual eye exam unless changes noted sooner.

## 2025-05-05 ENCOUNTER — PATIENT MESSAGE (OUTPATIENT)
Dept: ADMINISTRATIVE | Facility: OTHER | Age: 40
End: 2025-05-05
Payer: COMMERCIAL

## 2025-05-06 ENCOUNTER — PATIENT MESSAGE (OUTPATIENT)
Dept: INTERNAL MEDICINE | Facility: CLINIC | Age: 40
End: 2025-05-06
Payer: COMMERCIAL

## 2025-05-06 DIAGNOSIS — E66.811 OBESITY, CLASS I, BMI 30.0-34.9 (SEE ACTUAL BMI): Primary | ICD-10-CM

## 2025-05-07 RX ORDER — TIRZEPATIDE 2.5 MG/.5ML
2.5 INJECTION, SOLUTION SUBCUTANEOUS
Qty: 2 ML | Refills: 0 | Status: ACTIVE | OUTPATIENT
Start: 2025-05-07

## 2025-05-07 RX ORDER — TIRZEPATIDE 5 MG/.5ML
5 INJECTION, SOLUTION SUBCUTANEOUS
Qty: 2 ML | Refills: 1 | Status: ACTIVE | OUTPATIENT
Start: 2025-05-07

## 2025-05-07 NOTE — TELEPHONE ENCOUNTER
- Pt requested to switch from Wegovy to Zepbound due to issues with side effects on Wegovy 0.25 mg  - Transition to therapeutically comparable dose of Zepbound per protocol  - Discontinue Wegovy 0.25 mg  - Initiate Zepbound 2.5 mg SQ weekly x 4 weeks  - Then increase to Zepbound 5 mg SQ weekly  - RTC in 3 months for follow-up evaluation

## 2025-05-07 NOTE — ASSESSMENT & PLAN NOTE
3/26/25 Fibroscan:  Findings  Median liver stiffness score:  3.4  CAP Reading: dB/m:  254  IQR/med %:  5  Interpretation  Fibrosis interpretation is based on medial liver stiffness - Kilopascal (kPa).  Fibrosis Stage:  F 0-1  Steatosis interpretation is based on controlled attenuation parameter - (dB/m).  Steatosis Grade:  S1  --liver stiffness stable to improved (3.5 from 4/2024 to 3.4 from 3/2025)    FIB-4 Calculation: 0.5 at 10/22/2024  2:01 PM  Calculated from:  SGOT/AST: 24 U/L at 10/22/2024  2:01 PM  SGPT/ALT: 34 U/L at 10/22/2024  2:01 PM  Platelets: 336 K/uL at 10/22/2024  2:01 PM  Age: 39 years     FIB-4 Calculation: 0.5 at 10/22/2024  2:01 PM  Calculated from:  SGOT/AST: 24 U/L at 10/22/2024  2:01 PM  SGPT/ALT: 34 U/L at 10/22/2024  2:01 PM  Platelets: 336 K/uL at 10/22/2024  2:01 PM  Age: 39 years   **can recheck Fibroscan in 2 years (3/2027) at the earliest  **continue to monitor liver enzymes periodically (ALT 34 as of 10/2024)    Lab Results   Component Value Date    ALT 22 03/20/2025    AST 19 03/20/2025    ALKPHOS 84 03/20/2025    BILITOT 0.3 03/20/2025    ALBUMIN 3.7 03/20/2025     10/22/2024   --most recent liver enzymes were much closer to normal (ALT down to 22 from a high of 46 in 3/2024)

## 2025-05-07 NOTE — PROGRESS NOTES
Total Care Appointment      Subjective:      Patient ID: Joan Wilde is a 40 y.o. female.    Chief Complaint: No chief complaint on file.      HPI:    History of Present Illness    CHIEF COMPLAINT:  Joan presents today for FMLA and short-term disability paperwork completion    MENTAL HEALTH:  She has adjustment disorder with mixed anxiety and depressed mood (F43.23). Her condition has worsened due to workplace dynamics, experiencing difficulty focusing and concentrating on work tasks regularly.    WEIGHT MANAGEMENT AND MEDICATIONS:  She reports unusual 4-5 pound weight gain while on Wegovy despite maintaining an active lifestyle including weightlifting, yoga, and daily 30-minute walks. After her first Wegovy dose, she required urgent care for flu-like symptoms including chills, fever, nausea, weakness, and fatigue. She continued to experience ongoing fatigue while taking Wegovy, prompting a change to Zepbound which she plans to start next week.     LIVER FUNCTION:  Most recent liver scan showed liver stiffness of 3.4 and S1 steatosis. ALT was 22.      ROS:  General: reports fever, reports chills, reports fatigue, no weight gain, no weight loss, reports weakness  Eyes: no vision changes, no redness, no discharge  ENT: no ear pain, no nasal congestion, no sore throat  Cardiovascular: no chest pain, no palpitations, no lower extremity edema  Respiratory: no cough, no shortness of breath  Gastrointestinal: no abdominal pain, reports nausea, no vomiting, no diarrhea, no constipation, no blood in stool  Genitourinary: no dysuria, no hematuria, no frequency  Musculoskeletal: no joint pain, no muscle pain  Skin: no rash, no lesion  Neurological: no headache, no dizziness, no numbness, no tingling  Psychiatric: no anxiety, no depression, no sleep difficulty, reports lack of focus/concentration  Allergic: reports allergic reactions             2/7/2025 7/18/2024 4/11/2024   PHQ-9 Depression Patient Health  Questionnaire   Over the last two weeks how often have you been bothered by little interest or pleasure in doing things 0 0 0   Over the last two weeks how often have you been bothered by feeling down, depressed or hopeless 0 0 0          12/20/2024     2:12 PM 11/15/2024     3:16 PM 11/1/2024     1:34 PM   GAD7   1. Feeling nervous, anxious, or on edge? 2 2 3   2. Not being able to stop or control worrying? 1 2 3   3. Worrying too much about different things? 1 2 3   4. Trouble relaxing? 1 1 3   5. Being so restless that it is hard to sit still? 1 1 1   6. Becoming easily annoyed or irritable? 1 1 3   7. Feeling afraid as if something awful might happen? 1 1 3   8. If you checked off any problems, how difficult have these problems made it for you to do your work, take care of things at home, or get along with other people?  0 1   CARINE-7 Score 8 10 19         Objective:     PHYSICAL EXAM   Vital Signs  /79 (BP Location: Right arm, Patient Position: Sitting)   Pulse 93     Physical Exam    General: Well-developed. Well-nourished. No acute distress.  Eyes: EOMI. Sclerae anicteric.  HENT: Normocephalic. Atraumatic. Nares patent. Moist oral mucosa.  Cardiovascular: Regular rate. Regular rhythm. No murmurs. No rubs. No gallops. Normal S1, S2.  Respiratory: Normal respiratory effort. Clear to auscultation bilaterally. No rales. No rhonchi. No wheezing.  Musculoskeletal: No  obvious deformity.  Extremities: No lower extremity edema.  Neurological: Alert & oriented x3. No slurred speech. Normal gait.  Psychiatric: Normal mood. Normal affect. Good insight. Good judgment.  Skin: Warm. Dry. No rash.       Assessment:   40 y.o. female here for primary care visit       Plan:   1. Adjustment disorder with mixed anxiety and depressed mood  Overview:  GAD7 score 8 on 4/12/24, but anxiety symptoms not present for more than 6 months (most of her anxiety is situational and work-related)  GAD7 score 7 on 7/12/24  GAD7 score 19 on  11/1/24  GAD7 score 10 on 11/15/24  GAD7 score 8 on 12/20/24    Assessment & Plan:  Recent worsening of symptoms causing significant limitation in ability to focus and concentrate that are triggered by workplace dynamic issues  --she is requesting as short term disability leave of absence  --with plans to return to work June 13th, 2025      2. Metabolic dysfunction-associated steatotic liver disease (MASLD)  Overview:  FIB-4 Calculation: 0.58 at 4/12/2024  2:30 PM   FIB-4 Calculation: 0.50 at 10/22/2024  4:37 PM     3/14/24 US abdomen:  FINDINGS:  Complete scan of the patient's abdomen was obtained.  The liver is enlarged.  It measures 16.3 cm and has increased in size since previous examination.  No focal mass lesions are noted within the liver.  The HR I index is 1.2 suggesting less than 5% steatosis.  The spleen is not enlarged.  It measures 7 x 2.2 cm.  No focal defects are seen in the spleen.  The aorta tapers normally. The proximal inferior vena cava is unremarkable.   No para-aortic adenopathy is seen.  The pancreas is not enlarged.  The tail is obscured by gas however.  The gallbladder shows no calculi.  No dilated common bile duct is noted.  No hydronephrosis or significant mass lesion is noted.  No ascites is noted.     Impression:  Hepatomegaly  --history of mildly elevated liver enzymes    Assessment & Plan:  3/26/25 Fibroscan:  Findings  Median liver stiffness score:  3.4  CAP Reading: dB/m:  254  IQR/med %:  5  Interpretation  Fibrosis interpretation is based on medial liver stiffness - Kilopascal (kPa).  Fibrosis Stage:  F 0-1  Steatosis interpretation is based on controlled attenuation parameter - (dB/m).  Steatosis Grade:  S1  --liver stiffness stable to improved (3.5 from 4/2024 to 3.4 from 3/2025)    FIB-4 Calculation: 0.5 at 10/22/2024  2:01 PM  Calculated from:  SGOT/AST: 24 U/L at 10/22/2024  2:01 PM  SGPT/ALT: 34 U/L at 10/22/2024  2:01 PM  Platelets: 336 K/uL at 10/22/2024  2:01 PM  Age: 39  years     FIB-4 Calculation: 0.5 at 10/22/2024  2:01 PM  Calculated from:  SGOT/AST: 24 U/L at 10/22/2024  2:01 PM  SGPT/ALT: 34 U/L at 10/22/2024  2:01 PM  Platelets: 336 K/uL at 10/22/2024  2:01 PM  Age: 39 years   **can recheck Fibroscan in 2 years (3/2027) at the earliest  **continue to monitor liver enzymes periodically (ALT 34 as of 10/2024)    Lab Results   Component Value Date    ALT 22 03/20/2025    AST 19 03/20/2025    ALKPHOS 84 03/20/2025    BILITOT 0.3 03/20/2025    ALBUMIN 3.7 03/20/2025     10/22/2024   --most recent liver enzymes were much closer to normal (ALT down to 22 from a high of 46 in 3/2024)        3. Class 1 obesity due to excess calories without serious comorbidity with body mass index (BMI) of 33.0 to 33.9 in adult  Overview:  Baseline weight upon entry to [facility] is 233 lbs (106.1 kg) which is the heaviest she has ever been  Weight as of 7/12/24 = 225 lbs (102.3 kg)  Weight as of 11/1/24 = 219 lbs (99.5 kg)  Weight as of 12/20/24 = 211 lbs (96.1 kg)  Weight as of 3/14/25 = 200 lbs (91 kg)  Weight as of 5/9/25 = 214 lbs (97.1 kg)    Current regimen:  --Zepbound (will start next week in mid-May)    Assessment & Plan:  Starting Zepbound next week, so hopefully will get back on track with weight loss        Follow up in about 4 weeks (around 6/6/2025).    Kvng Tay MD/[facility] [place], [place]  Internal Medicine  [facility]

## 2025-05-08 ENCOUNTER — CLINICAL SUPPORT (OUTPATIENT)
Dept: OTHER | Facility: CLINIC | Age: 40
End: 2025-05-08

## 2025-05-08 DIAGNOSIS — Z00.8 ENCOUNTER FOR OTHER GENERAL EXAMINATION: ICD-10-CM

## 2025-05-09 ENCOUNTER — OFFICE VISIT (OUTPATIENT)
Dept: PRIMARY CARE CLINIC | Facility: CLINIC | Age: 40
End: 2025-05-09
Attending: INTERNAL MEDICINE
Payer: COMMERCIAL

## 2025-05-09 VITALS — DIASTOLIC BLOOD PRESSURE: 79 MMHG | SYSTOLIC BLOOD PRESSURE: 118 MMHG | HEART RATE: 93 BPM

## 2025-05-09 VITALS
HEIGHT: 66 IN | WEIGHT: 214 LBS | BODY MASS INDEX: 34.39 KG/M2 | DIASTOLIC BLOOD PRESSURE: 80 MMHG | SYSTOLIC BLOOD PRESSURE: 128 MMHG

## 2025-05-09 DIAGNOSIS — E66.811 CLASS 1 OBESITY DUE TO EXCESS CALORIES WITHOUT SERIOUS COMORBIDITY WITH BODY MASS INDEX (BMI) OF 33.0 TO 33.9 IN ADULT: ICD-10-CM

## 2025-05-09 DIAGNOSIS — F43.23 ADJUSTMENT DISORDER WITH MIXED ANXIETY AND DEPRESSED MOOD: Primary | ICD-10-CM

## 2025-05-09 DIAGNOSIS — E66.09 CLASS 1 OBESITY DUE TO EXCESS CALORIES WITHOUT SERIOUS COMORBIDITY WITH BODY MASS INDEX (BMI) OF 33.0 TO 33.9 IN ADULT: ICD-10-CM

## 2025-05-09 DIAGNOSIS — K76.0 METABOLIC DYSFUNCTION-ASSOCIATED STEATOTIC LIVER DISEASE (MASLD): ICD-10-CM

## 2025-05-09 LAB
HDLC SERPL-MCNC: 29 MG/DL
POC CHOLESTEROL, LDL (DOCK): 101 MG/DL
POC CHOLESTEROL, TOTAL: 151 MG/DL
POC GLUCOSE, FASTING: 91 MG/DL (ref 60–110)
TRIGL SERPL-MCNC: 112 MG/DL

## 2025-05-09 NOTE — ASSESSMENT & PLAN NOTE
Recent worsening of symptoms causing significant limitation in ability to focus and concentrate that are triggered by workplace dynamic issues  --she is requesting as short term disability leave of absence  --with plans to return to work June 13th, 2025

## 2025-05-14 ENCOUNTER — RESULTS FOLLOW-UP (OUTPATIENT)
Dept: OTHER | Facility: CLINIC | Age: 40
End: 2025-05-14

## 2025-05-14 ENCOUNTER — TELEPHONE (OUTPATIENT)
Dept: INTERNAL MEDICINE | Facility: CLINIC | Age: 40
End: 2025-05-14
Payer: COMMERCIAL

## 2025-05-15 ENCOUNTER — OFFICE VISIT (OUTPATIENT)
Dept: INTERNAL MEDICINE | Facility: CLINIC | Age: 40
End: 2025-05-15
Payer: COMMERCIAL

## 2025-05-15 ENCOUNTER — HOSPITAL ENCOUNTER (OUTPATIENT)
Dept: RADIOLOGY | Facility: HOSPITAL | Age: 40
Discharge: HOME | End: 2025-05-15
Attending: INTERNAL MEDICINE
Payer: COMMERCIAL

## 2025-05-15 VITALS
WEIGHT: 224.19 LBS | DIASTOLIC BLOOD PRESSURE: 60 MMHG | HEIGHT: 66 IN | SYSTOLIC BLOOD PRESSURE: 110 MMHG | BODY MASS INDEX: 36.03 KG/M2

## 2025-05-15 DIAGNOSIS — K76.0 FATTY LIVER: ICD-10-CM

## 2025-05-15 DIAGNOSIS — Z00.00 ANNUAL PHYSICAL EXAM: Primary | ICD-10-CM

## 2025-05-15 DIAGNOSIS — E01.0 THYROMEGALY: ICD-10-CM

## 2025-05-15 DIAGNOSIS — Z00.00 ANNUAL PHYSICAL EXAM: ICD-10-CM

## 2025-05-15 DIAGNOSIS — D57.3 SICKLE CELL TRAIT: ICD-10-CM

## 2025-05-15 DIAGNOSIS — Z12.31 VISIT FOR SCREENING MAMMOGRAM: ICD-10-CM

## 2025-05-15 DIAGNOSIS — K21.9 LARYNGOPHARYNGEAL REFLUX: ICD-10-CM

## 2025-05-15 LAB
BILIRUB UR QL STRIP.AUTO: NEGATIVE
CLARITY UR: CLEAR
COLOR UR AUTO: YELLOW
GLUCOSE UR QL STRIP: NEGATIVE
HGB UR QL STRIP: NEGATIVE
KETONES UR QL STRIP: NEGATIVE
LEUKOCYTE ESTERASE UR QL STRIP: NEGATIVE
NITRITE UR QL STRIP: NEGATIVE
PH UR STRIP: 7 [PH]
PROT UR QL STRIP: NEGATIVE
SP GR UR STRIP: 1.01
UROBILINOGEN UR STRIP-ACNC: NEGATIVE EU/DL

## 2025-05-15 PROCEDURE — 99999 PR PBB SHADOW E&M-EST. PATIENT-LVL IV: CPT | Mod: PBBFAC,,, | Performed by: INTERNAL MEDICINE

## 2025-05-15 PROCEDURE — 1159F MED LIST DOCD IN RCRD: CPT | Mod: CPTII,S$GLB,, | Performed by: INTERNAL MEDICINE

## 2025-05-15 PROCEDURE — 99396 PREV VISIT EST AGE 40-64: CPT | Mod: S$GLB,,, | Performed by: INTERNAL MEDICINE

## 2025-05-15 PROCEDURE — 76536 US EXAM OF HEAD AND NECK: CPT | Mod: 26,,, | Performed by: RADIOLOGY

## 2025-05-15 PROCEDURE — 3078F DIAST BP <80 MM HG: CPT | Mod: CPTII,S$GLB,, | Performed by: INTERNAL MEDICINE

## 2025-05-15 PROCEDURE — 76536 US EXAM OF HEAD AND NECK: CPT | Mod: TC

## 2025-05-15 PROCEDURE — 3074F SYST BP LT 130 MM HG: CPT | Mod: CPTII,S$GLB,, | Performed by: INTERNAL MEDICINE

## 2025-05-15 PROCEDURE — 81003 URINALYSIS AUTO W/O SCOPE: CPT | Performed by: INTERNAL MEDICINE

## 2025-05-15 PROCEDURE — 3008F BODY MASS INDEX DOCD: CPT | Mod: CPTII,S$GLB,, | Performed by: INTERNAL MEDICINE

## 2025-05-15 NOTE — PROGRESS NOTES
CC:  Annual exam    HPI:  The patient is 40-year-old female with migraine headaches, sleep apnea on CPAP, endometriosis, fibroids,  mild asthma, eczema, sickle cell trait, BMI of 36 and laryngopharyngeal reflux who presents today for annual exam.  The patient had a bad reaction to Wegovy for weight loss.  She was changed to Zepbound recently.    ROS: Patient reports weight gain while on Wegovy.  She also experienced chills, fever, nausea and weakness.  She reports she felt like she had the flu while on Wegovy.  Her last eye exam was in April.  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  She does have laryngopharyngeal reflux which is stable.  She still sees ENT for this.  The patient did go to the emergency department for an episode of chest pain which was stress induced.  She reports that her work environment is very stressful.  She does walk 30-40 minutes a day.  She also does yoga and does weights 2 days a week.  The patient does report nausea with Wegovy.  She does report some bloating as well.  No trouble urinating.  No weakness in arms or legs.  Does report having dermatitis.  No numbness or tingling arms or legs.  The patient reports being bullied at work.  She is looking to change department's.  To take some time off as a result.  Last gyn visit was in July of 2024.    Physical exam:   General appearance: No acute distress   HEENT: Conjunctiva is clear.  Pupils equal.  TMs are clear.  Nasal septum is midline without discharge.  Oropharynx is without erythema.  Trachea is midline without JVD.  The patient appeared to have an enlarged thyroid  Pulmonary: Good inspiratory, expiratory breath sounds are heard.  Lungs are clear auscultation.  Cardiovascular: S1-S2, rhythm is regular.  2+ carotid pulse of bruits.  Extremities without edema.  GI: Abdomen was nontender, nondistended did not appreciate any hepatosplenomegaly.  Comments: The patient did have blood test done in regards to a lipid panel.  Total  cholesterol was 151, HDL was 29, LDL was 101 and triglycerides were 112    Assessment:    Annual exam   2.  Elevated BMI   3.  Sickle cell trait   4.  Laryngopharyngeal reflux   5.  Fatty liver   6.  Thyromegaly   Plan:    Will put the patient for screening mammogram   2.  Will schedule a CBC, BMP, A1c and TSH  3.  Will schedule a thyroid ultrasound

## 2025-05-16 ENCOUNTER — PATIENT MESSAGE (OUTPATIENT)
Dept: INTERNAL MEDICINE | Facility: CLINIC | Age: 40
End: 2025-05-16
Payer: COMMERCIAL

## 2025-05-16 ENCOUNTER — RESULTS FOLLOW-UP (OUTPATIENT)
Dept: INTERNAL MEDICINE | Facility: CLINIC | Age: 40
End: 2025-05-16

## 2025-05-16 DIAGNOSIS — E04.1 THYROID NODULE: Primary | ICD-10-CM

## 2025-05-16 NOTE — PROGRESS NOTES
Please contact patient.  She does have a 2 cm thyroid nodule.  I do believe this needs to be biopsied.  This is done by endocrinology.  A referral is in.

## 2025-05-19 ENCOUNTER — TELEPHONE (OUTPATIENT)
Dept: INTERNAL MEDICINE | Facility: CLINIC | Age: 40
End: 2025-05-19
Payer: COMMERCIAL

## 2025-05-19 NOTE — TELEPHONE ENCOUNTER
----- Message from Walter Phipps MD sent at 5/16/2025  1:44 PM CDT -----    Please contact patient.  She does have a 2 cm thyroid nodule.  I do believe this needs to be biopsied.  This is done by endocrinology.  A referral is in.  ----- Message -----  From: Interface, Rad Results In  Sent: 5/15/2025   4:43 PM CDT  To: Walter Phipps MD

## 2025-05-23 ENCOUNTER — PATIENT MESSAGE (OUTPATIENT)
Dept: INTERNAL MEDICINE | Facility: CLINIC | Age: 40
End: 2025-05-23
Payer: COMMERCIAL

## 2025-05-25 NOTE — TELEPHONE ENCOUNTER
Pt is requesting a referral to gen sx for a FNA of thyroid nodule since appt is not until September. Will gen sx do a FNA of thyroid?

## 2025-05-30 ENCOUNTER — PATIENT MESSAGE (OUTPATIENT)
Dept: ADMINISTRATIVE | Facility: OTHER | Age: 40
End: 2025-05-30
Payer: COMMERCIAL

## 2025-06-02 ENCOUNTER — PATIENT MESSAGE (OUTPATIENT)
Dept: INTERNAL MEDICINE | Facility: CLINIC | Age: 40
End: 2025-06-02
Payer: COMMERCIAL

## 2025-06-08 NOTE — PROGRESS NOTES
Subjective:      Patient ID: Joan Wilde is a 40 y.o. female.    Chief Complaint:  Thyroid Nodule and New Patient    History of Present Illness  Joan Wilde is here for initial evaluation of thyroid nodule recommended FNA.  Referred by Dr. Phipps.  This is their first visit with me.      With regards to thyroid nodule:    Found: During physical exam 5/15/2025  Thyroid US 5/15/2025:   COMPARISON:  None.     FINDINGS:  The thyroid is normal in size.  Right lobe of the thyroid measures 5.8 x 1.2 4 x 1.7 cm.  Left lobe of the thyroid measures 5.4 x 1.8 x 2 cm.  Isthmus measures 0.5 cm.  Normal thyroid parenchyma.     Right middle pole mixed cystic and solid thyroid nodule measure 0.5 x 0.3 x 0.4 cm (TR 3).  Two small cystic thyroid nodules largest measure 0.3 x 0.2 x 0.3 cm.  Right lower pole mostly solid, hypoechoic, ill-defined thyroid nodule measure 2 x 1.5 x 1.8 cm (TR 4).  Left upper pole solid, hypoechoic, smooth margin nodule measures 0.3 x 0.2 x 0.3 cm (TR 4).  Cervical lymph nodes demonstrate normal morphology and size.     Impression:  2 cm left TI-RADS 4 lower pole thyroid nodule.  This nodule meets the criteria for FNA per ACR criteria.  Additional bilateral thyroid nodules do not meet the criteria for FNA or follow-up.     FNA: Denies    Signs or Symptoms:   Difficulty breathing: Raspy breathing while lying.  Patient with AMARJIT  Difficulty swallowing: Denies  Voice Changes: Reports increased hoarseness possibly due to  laryngopharyngeals reflux  FH of thyroid cancer: Denies  Personal history of radiation treatment or exposure: Denies    Lab Results   Component Value Date    TSH 1.553 05/15/2025    C2BQIIE 6.4 07/07/2020    FREET4 0.85 03/03/2023       Reports signs or symptoms of hyper or hypothyroidism as below    (+) weight changes:  currently on Zepbound  Denies  bowel changes, heat or cold intolerance, hair nail or skin changes, cp, palpations or sob    Review of Systems:  as  "above    Objective:   Physical Exam  Constitutional:       Appearance: She is well-developed. She is obese.   HENT:      Head: Normocephalic.   Eyes:      Conjunctiva/sclera: Conjunctivae normal.   Pulmonary:      Effort: Pulmonary effort is normal.   Musculoskeletal:         General: Normal range of motion.   Skin:     General: Skin is warm.   Neurological:      Mental Status: She is alert and oriented to person, place, and time.       Visit Vitals  /80 (BP Location: Right arm, Patient Position: Sitting)   Pulse 104   Ht 5' 6" (1.676 m)   Wt 103.3 kg (227 lb 11.8 oz)   SpO2 97%   BMI 36.76 kg/m²       Body mass index is 36.76 kg/m².    Lab Review:   Lab Results   Component Value Date    HGBA1C 5.7 (H) 05/15/2025    HGBA1C 5.5 04/11/2024    HGBA1C 5.5 03/03/2023       Lab Results   Component Value Date    CHOL 151 03/03/2023    HDL 43 03/03/2023    LDLCALC 98.4 03/03/2023    TRIG 48 03/03/2023    CHOLHDL 28.5 03/03/2023     Lab Results   Component Value Date     05/15/2025    K 4.6 05/15/2025     05/15/2025    CO2 25 05/15/2025    GLU 87 05/15/2025    BUN 14 05/15/2025    CREATININE 1.0 05/15/2025    CALCIUM 8.7 05/15/2025    PROT 6.9 03/20/2025    ALBUMIN 3.7 03/20/2025    BILITOT 0.3 03/20/2025    ALKPHOS 84 03/20/2025    AST 19 03/20/2025    ALT 22 03/20/2025    ANIONGAP 7 (L) 05/15/2025    ESTGFRAFRICA >60.0 05/30/2022    EGFRNONAA >60.0 05/30/2022    TSH 1.553 05/15/2025     Vit D, 25-Hydroxy   Date Value Ref Range Status   10/15/2021 39 30 - 96 ng/mL Final     Comment:     Vitamin D deficiency.........<10 ng/mL                              Vitamin D insufficiency......10-29 ng/mL       Vitamin D sufficiency........> or equal to 30 ng/mL  Vitamin D toxicity............>100 ng/mL       Assessment and Plan     1. Class 1 obesity due to excess calories without serious comorbidity with body mass index (BMI) of 33.0 to 33.9 in adult        2. Thyroid nodule  Ambulatory referral/consult to " Endocrinology    US FNA Biopsy w/ Imaging 1st Lesion          Thyroid nodule  -- I have reviewed management options including observation, FNA or surgery.  -- Will proceed with FNA.  --  FNA procedure explained in depth.  -- Discussed indications for repeat FNA as well as surgical indications (abnormal FNA, compressive symptoms or interval change).  -- Discussed possible results of FNA- Benign, Malignant, FLUS, or insufficient cells.  Discussed results auto released to patients, but advised the ordering provider will also contact to discuss.   -- If FNA is negative then plan RTO in 1 year with TSH and thyroid US prior.  -- All of the patients questions were answered.    Class 1 obesity due to excess calories without serious comorbidity with body mass index (BMI) of 33.0 to 33.9 in adult  Continue on Zepbound for positive benefit profile including weight loss.    Follow up in about 1 year (around 6/9/2026).    [facility]     Case discussed with Dr Winslow.  Recommendations were discussed with the patient in detail.  The patient verbalized understanding and agrees with the plan outlined as above.     Visit today included increased complexity associated with the care of the problems addressed and managing the longitudinal care of the patient due to the serious and/or complex managed problems.

## 2025-06-09 ENCOUNTER — OFFICE VISIT (OUTPATIENT)
Dept: ENDOCRINOLOGY | Facility: CLINIC | Age: 40
End: 2025-06-09
Payer: COMMERCIAL

## 2025-06-09 VITALS
HEIGHT: 66 IN | BODY MASS INDEX: 36.6 KG/M2 | WEIGHT: 227.75 LBS | SYSTOLIC BLOOD PRESSURE: 130 MMHG | HEART RATE: 104 BPM | DIASTOLIC BLOOD PRESSURE: 80 MMHG | OXYGEN SATURATION: 97 %

## 2025-06-09 DIAGNOSIS — E04.1 THYROID NODULE: ICD-10-CM

## 2025-06-09 DIAGNOSIS — E66.09 CLASS 1 OBESITY DUE TO EXCESS CALORIES WITHOUT SERIOUS COMORBIDITY WITH BODY MASS INDEX (BMI) OF 33.0 TO 33.9 IN ADULT: Primary | ICD-10-CM

## 2025-06-09 DIAGNOSIS — E66.811 CLASS 1 OBESITY DUE TO EXCESS CALORIES WITHOUT SERIOUS COMORBIDITY WITH BODY MASS INDEX (BMI) OF 33.0 TO 33.9 IN ADULT: Primary | ICD-10-CM

## 2025-06-09 PROCEDURE — 99214 OFFICE O/P EST MOD 30 MIN: CPT | Mod: S$GLB,,,

## 2025-06-09 PROCEDURE — 99999 PR PBB SHADOW E&M-EST. PATIENT-LVL IV: CPT | Mod: PBBFAC,,,

## 2025-06-09 PROCEDURE — 3044F HG A1C LEVEL LT 7.0%: CPT | Mod: CPTII,S$GLB,,

## 2025-06-09 PROCEDURE — 1159F MED LIST DOCD IN RCRD: CPT | Mod: CPTII,S$GLB,,

## 2025-06-09 PROCEDURE — 3075F SYST BP GE 130 - 139MM HG: CPT | Mod: CPTII,S$GLB,,

## 2025-06-09 PROCEDURE — 3079F DIAST BP 80-89 MM HG: CPT | Mod: CPTII,S$GLB,,

## 2025-06-09 PROCEDURE — G2211 COMPLEX E/M VISIT ADD ON: HCPCS | Mod: S$GLB,,,

## 2025-06-09 NOTE — ASSESSMENT & PLAN NOTE
-- I have reviewed management options including observation, FNA or surgery.  -- Will proceed with FNA.  --  FNA procedure explained in depth.  -- Discussed indications for repeat FNA as well as surgical indications (abnormal FNA, compressive symptoms or interval change).  -- Discussed possible results of FNA- Benign, Malignant, FLUS, or insufficient cells.  Discussed results auto released to patients, but advised the ordering provider will also contact to discuss.   -- If FNA is negative then plan RTO in 1 year with TSH and thyroid US prior.  -- All of the patients questions were answered.

## 2025-06-10 ENCOUNTER — HOSPITAL ENCOUNTER (OUTPATIENT)
Dept: RADIOLOGY | Facility: HOSPITAL | Age: 40
Discharge: HOME | End: 2025-06-10
Attending: INTERNAL MEDICINE
Payer: COMMERCIAL

## 2025-06-10 DIAGNOSIS — Z12.31 VISIT FOR SCREENING MAMMOGRAM: ICD-10-CM

## 2025-06-10 PROCEDURE — 77067 SCR MAMMO BI INCL CAD: CPT | Mod: 26,,, | Performed by: RADIOLOGY

## 2025-06-10 PROCEDURE — 77063 BREAST TOMOSYNTHESIS BI: CPT | Mod: TC

## 2025-06-10 PROCEDURE — 77063 BREAST TOMOSYNTHESIS BI: CPT | Mod: 26,,, | Performed by: RADIOLOGY

## 2025-06-17 ENCOUNTER — TELEPHONE (OUTPATIENT)
Dept: ENDOCRINOLOGY | Facility: CLINIC | Age: 40
End: 2025-06-17
Payer: COMMERCIAL

## 2025-06-18 ENCOUNTER — HOSPITAL ENCOUNTER (OUTPATIENT)
Dept: ENDOCRINOLOGY | Facility: CLINIC | Age: 40
Discharge: HOME | End: 2025-06-18
Payer: COMMERCIAL

## 2025-06-18 DIAGNOSIS — E04.1 THYROID NODULE: Primary | ICD-10-CM

## 2025-06-18 PROCEDURE — 10005 FNA BX W/US GDN 1ST LES: CPT | Mod: S$GLB,,, | Performed by: INTERNAL MEDICINE

## 2025-06-18 PROCEDURE — 88173 CYTOPATH EVAL FNA REPORT: CPT | Mod: TC

## 2025-06-20 LAB
ESTROGEN SERPL-MCNC: ABNORMAL PG/ML
INSULIN SERPL-ACNC: ABNORMAL U[IU]/ML
LAB AP CLINICAL INFORMATION: ABNORMAL
LAB AP COMMENTS: ABNORMAL
LAB AP GROSS DESCRIPTION: ABNORMAL
LAB AP NON-GYN INTERPRETATION SPECIMEN 1: ABNORMAL
LAB AP PERFORMING LOCATION(S): ABNORMAL

## 2025-06-27 ENCOUNTER — PATIENT MESSAGE (OUTPATIENT)
Dept: ENDOCRINOLOGY | Facility: CLINIC | Age: 40
End: 2025-06-27
Payer: COMMERCIAL

## 2025-06-30 ENCOUNTER — CLINICAL SUPPORT (OUTPATIENT)
Dept: PSYCHIATRY | Facility: CLINIC | Age: 40
End: 2025-06-30
Payer: COMMERCIAL

## 2025-06-30 DIAGNOSIS — R69 PSYCHIATRIC DIAGNOSIS DEFERRED: Primary | ICD-10-CM

## 2025-06-30 PROCEDURE — 90834 PSYTX W PT 45 MINUTES: CPT | Mod: S$GLB,,, | Performed by: SOCIAL WORKER

## 2025-07-01 ENCOUNTER — PATIENT MESSAGE (OUTPATIENT)
Dept: ADMINISTRATIVE | Facility: OTHER | Age: 40
End: 2025-07-01
Payer: COMMERCIAL

## 2025-07-09 ENCOUNTER — TELEPHONE (OUTPATIENT)
Dept: ENDOCRINOLOGY | Facility: CLINIC | Age: 40
End: 2025-07-09
Payer: COMMERCIAL

## 2025-07-10 ENCOUNTER — PATIENT MESSAGE (OUTPATIENT)
Dept: INTERNAL MEDICINE | Facility: CLINIC | Age: 40
End: 2025-07-10

## 2025-07-10 ENCOUNTER — OFFICE VISIT (OUTPATIENT)
Dept: INTERNAL MEDICINE | Facility: CLINIC | Age: 40
End: 2025-07-10
Payer: COMMERCIAL

## 2025-07-10 DIAGNOSIS — E66.811 OBESITY, CLASS I, BMI 30-34.9: Primary | ICD-10-CM

## 2025-07-10 RX ORDER — TIRZEPATIDE 7.5 MG/.5ML
7.5 INJECTION, SOLUTION SUBCUTANEOUS
Qty: 2 ML | Refills: 2 | Status: ACTIVE | OUTPATIENT
Start: 2025-07-10

## 2025-07-10 NOTE — PROGRESS NOTES
Patient ID: Joan Wilde is a 40 y.o. Black or  female    Subjective  Chief Complaint: patient presents for medical weight loss management.    Co-morbidities: AMARJIT    HPI: Patient [facility]25 and is currently managed on 5 mg. Pt has completed 4 doses of this strength.    Tolerance to current therapy:  Denies nausea, vomiting, diarrhea, constipation, abdominal pain      Weight loss history:  Starting weight:    4/2/2025   Recent Readings    Weight (lbs) 215 lb    BMI 34.7 BMI      Current weight:    6/14/2025   Recent Readings    Weight (lbs) 222.4lb    BMI 35.96 BMI      % weight loss since GLP-1 initiation: -1 %     Contraindications to GLP-1 receptor agonist therapy:   Denies personal or family history of MTC and personal history of MEN2     Pregnancy Status:   - Pt denies current pregnancy, breastfeeding, or plans to become pregnant.  - Pt denies current use of oral hormonal contraception. Micronor    Co-morbidities: NAFLD, AMARJIT    History of weight loss therapy: Pt previously used Adipex and Topamax but denies use of GLP-1 medications.    Weight loss history:  Starting weight:    4/2/2025   Recent Readings    Weight (lbs) 215 lb    BMI 34.7 BMI      Objective  Lab Results   Component Value Date     05/15/2025     10/22/2024     04/11/2024     Lab Results   Component Value Date    K 4.6 05/15/2025    K 4.3 10/22/2024    K 4.0 04/11/2024     Lab Results   Component Value Date     05/15/2025     10/22/2024     04/11/2024     Lab Results   Component Value Date    CO2 25 05/15/2025    CO2 19 (L) 10/22/2024    CO2 22 (L) 04/11/2024     Lab Results   Component Value Date    BUN 14 05/15/2025    BUN 10 10/22/2024    BUN 12 04/11/2024     Lab Results   Component Value Date    GLU 87 05/15/2025    GLU 83 10/22/2024    GLU 82 04/11/2024     Lab Results   Component Value Date    CALCIUM 8.7 05/15/2025    CALCIUM 9.3  10/22/2024    CALCIUM 9.7 04/11/2024     Lab Results   Component Value Date    PROT 6.9 03/20/2025    PROT 7.2 10/22/2024    PROT 6.8 03/14/2024     Lab Results   Component Value Date    ALBUMIN 3.7 03/20/2025    ALBUMIN 3.9 10/22/2024    ALBUMIN 3.6 03/14/2024     Lab Results   Component Value Date    BILITOT 0.3 03/20/2025    BILITOT 0.3 10/22/2024    BILITOT 0.2 03/14/2024     Lab Results   Component Value Date    AST 19 03/20/2025    AST 24 10/22/2024    AST 28 03/14/2024     Lab Results   Component Value Date    ALT 22 03/20/2025    ALT 34 10/22/2024    ALT 46 (H) 03/14/2024     Lab Results   Component Value Date    ANIONGAP 7 (L) 05/15/2025    ANIONGAP 9 10/22/2024    ANIONGAP 9 04/11/2024     Lab Results   Component Value Date    CREATININE 1.0 05/15/2025    CREATININE 1.0 10/22/2024    CREATININE 1.0 04/11/2024     Lab Results   Component Value Date    EGFRNORACEVR >60 05/15/2025    EGFRNORACEVR >60.0 10/22/2024    EGFRNORACEVR >60.0 04/11/2024     Assessment/Plan  - Increase to Zepbound 7.5 mg SQ weekly  - RTC in 3 months for follow-up evaluation    Patient consented to pharmacist management via collaborative practice.

## 2025-07-14 ENCOUNTER — OFFICE VISIT (OUTPATIENT)
Dept: OTOLARYNGOLOGY | Facility: CLINIC | Age: 40
End: 2025-07-14
Payer: COMMERCIAL

## 2025-07-14 VITALS
WEIGHT: 227.19 LBS | HEART RATE: 103 BPM | BODY MASS INDEX: 36.51 KG/M2 | HEIGHT: 66 IN | SYSTOLIC BLOOD PRESSURE: 122 MMHG | DIASTOLIC BLOOD PRESSURE: 81 MMHG

## 2025-07-14 DIAGNOSIS — G47.33 OSA (OBSTRUCTIVE SLEEP APNEA): Chronic | ICD-10-CM

## 2025-07-14 DIAGNOSIS — R09.A2 GLOBUS SENSATION: ICD-10-CM

## 2025-07-14 DIAGNOSIS — R05.8 UPPER AIRWAY COUGH SYNDROME: ICD-10-CM

## 2025-07-14 DIAGNOSIS — K21.9 LARYNGOPHARYNGEAL REFLUX (LPR): Chronic | ICD-10-CM

## 2025-07-14 DIAGNOSIS — J30.9 CHRONIC ALLERGIC RHINITIS: Primary | Chronic | ICD-10-CM

## 2025-07-14 PROCEDURE — 31575 DIAGNOSTIC LARYNGOSCOPY: CPT | Mod: S$GLB,,, | Performed by: OTOLARYNGOLOGY

## 2025-07-14 PROCEDURE — 3044F HG A1C LEVEL LT 7.0%: CPT | Mod: CPTII,S$GLB,, | Performed by: OTOLARYNGOLOGY

## 2025-07-14 PROCEDURE — 99214 OFFICE O/P EST MOD 30 MIN: CPT | Mod: 25,S$GLB,, | Performed by: OTOLARYNGOLOGY

## 2025-07-14 PROCEDURE — 3008F BODY MASS INDEX DOCD: CPT | Mod: CPTII,S$GLB,, | Performed by: OTOLARYNGOLOGY

## 2025-07-14 PROCEDURE — 1159F MED LIST DOCD IN RCRD: CPT | Mod: CPTII,S$GLB,, | Performed by: OTOLARYNGOLOGY

## 2025-07-14 PROCEDURE — 3079F DIAST BP 80-89 MM HG: CPT | Mod: CPTII,S$GLB,, | Performed by: OTOLARYNGOLOGY

## 2025-07-14 PROCEDURE — 3074F SYST BP LT 130 MM HG: CPT | Mod: CPTII,S$GLB,, | Performed by: OTOLARYNGOLOGY

## 2025-07-14 PROCEDURE — 99999 PR PBB SHADOW E&M-EST. PATIENT-LVL III: CPT | Mod: PBBFAC,,, | Performed by: OTOLARYNGOLOGY

## 2025-07-14 RX ORDER — OMEPRAZOLE 40 MG/1
40 CAPSULE, DELAYED RELEASE ORAL EVERY MORNING
Qty: 30 CAPSULE | Refills: 4 | Status: SHIPPED | OUTPATIENT
Start: 2025-07-14 | End: 2026-07-14

## 2025-07-14 RX ORDER — FLUOCINOLONE ACETONIDE 0.11 MG/ML
3 OIL AURICULAR (OTIC) 2 TIMES DAILY
Qty: 20 ML | Refills: 0 | Status: SHIPPED | OUTPATIENT
Start: 2025-07-14

## 2025-07-14 RX ORDER — AZELASTINE 1 MG/ML
1 SPRAY, METERED NASAL 2 TIMES DAILY
Qty: 30 ML | Refills: 3 | Status: SHIPPED | OUTPATIENT
Start: 2025-07-14 | End: 2026-07-14

## 2025-07-14 NOTE — PROCEDURES
Laryngoscopy    Date/Time: 7/14/2025 1:40 PM    Performed by: Paige Hercules MD  Authorized by: Paige Hercules MD    Consent Done?:  Yes (Verbal)  Anesthesia:     Local anesthetic:  Lidocaine 2% and Nikita-Synephrine 1/2%  Laryngoscopy:     Areas examined:  Nasal cavities, nasopharynx, oropharynx, hypopharynx, larynx and vocal cords  Nose External:      No external nasal deformity  Nose Intranasal:      Mucosa no polyps     Mucosa ulcers not present     No mucosa lesions present     No septum gross deformity     Turbinates not enlarged  Nasopharynx:      No mucosa lesions     Adenoids not present     Posterior choanae patent     Eustachian tube patent  Larynx/hypopharynx:      No epiglottis lesions     No epiglottis edema     No AE folds lesions     No vocal cord polyps     Equal and normal bilateral     No hypopharynx lesions     No piriform sinus pooling     No piriform sinus lesions     Post cricoid edema (moderate)     Post cricoid erythema (moderate)

## 2025-07-14 NOTE — PROGRESS NOTES
Chief Complaint   Patient presents with    Follow-up       History of Present Illness  7/14/2025:    CHIEF COMPLAINT:  - Patient presents for a follow-up visit to discuss the effectiveness of prescribed medications for GI and allergy-related issues.    HPI:  Patient reports generally good adherence to her medication regimen, which includes Omeprazole, Flonase, eye wash and spray, Azelastine nasal spray, and Gaviscon advance. She had a brief interruption in taking Omeprazole due to a pharmacy delay, lasting for 1 or 2 nights.    Patient notes improvement in her eczema symptoms with the use of an oil for the air. The eczema has resolved, leading to less consistent use of the oil treatment. The healthcare provider confirms that the oil treatment is most necessary during flare-ups.    The conversation suggests ongoing management of GI issues (treated with Omeprazole and Gaviscon) and allergy-related concerns (addressed with Flonase, eye treatments, and Azelastine nasal spray).   Reports that she started zepbound and her reflux has been worse.          Past Medical History:   Diagnosis Date    Acne vulgaris 11/20/2018    Allergic rhinitis due to dust mite 05/13/2019    Asthma     Eczema     Endometriosis 11/20/2018    Fibroid     Laryngopharyngeal reflux     Migraine syndrome     associated with flu vaccines    Sleep apnea      Social History     Socioeconomic History    Marital status:    Tobacco Use    Smoking status: Never     Passive exposure: Never    Smokeless tobacco: Never   Substance and Sexual Activity    Alcohol use: No    Drug use: No    Sexual activity: Yes     Partners: Male     Birth control/protection: OCP   Social History Narrative    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      Social Drivers of Health     Financial Resource Strain: Low Risk  (12/20/2024)    Overall Financial Resource Strain (CARDIA)     Difficulty of Paying Living Expenses: Not hard at all   Food  Insecurity: No Food Insecurity (12/20/2024)    Hunger Vital Sign     Worried About Running Out of Food in the Last Year: Never true     Ran Out of Food in the Last Year: Never true   Transportation Needs: No Transportation Needs (12/5/2023)    PRAPARE - Transportation     Lack of Transportation (Medical): No     Lack of Transportation (Non-Medical): No   Physical Activity: Insufficiently Active (12/20/2024)    Exercise Vital Sign     Days of Exercise per Week: 6 days     Minutes of Exercise per Session: 20 min   Stress: Stress Concern Present (12/20/2024)    [employer] - Occupational Stress Questionnaire     Feeling of Stress : To some extent   Housing Stability: Low Risk  (12/5/2023)    Housing Stability Vital Sign     Unable to Pay for Housing in the Last Year: No     Number of Places Lived in the Last Year: 1     Unstable Housing in the Last Year: No     Past Surgical History:   Procedure Laterality Date    DIAGNOSTIC LAPAROSCOPY Bilateral 5/15/2023    Procedure: LAPAROSCOPY, DIAGNOSTIC;  Surgeon: Raffi Gao MD;  Location: [facility];  Service: OB/GYN;  Laterality: Bilateral;    ESOPHAGOGASTRODUODENOSCOPY N/A 12/7/2023    Procedure: EGD (ESOPHAGOGASTRODUODENOSCOPY);  Surgeon: Mitchel Abreu MD;  Location: [facility];  Service: Endoscopy;  Laterality: N/A;    HERNIA REPAIR  2014    umbilical    LAPAROSCOPIC APPENDECTOMY N/A 4/21/2021    Procedure: APPENDECTOMY, LAPAROSCOPIC WITH LYSIS OF ADHESIONS.;  Surgeon: Trent Cannon MD;  Location: [address];  Service: General;  Laterality: N/A;    LAPAROSCOPIC LYSIS OF ADHESIONS N/A 5/15/2023    Procedure: LYSIS, ADHESIONS, LAPAROSCOPIC;  Surgeon: Raffi Gao MD;  Location: [facility];  Service: OB/GYN;  Laterality: N/A;     Family History   Problem Relation Name Age of Onset    Hypertension Mother      Allergic rhinitis Father      Allergies Father      Diabetes Father      Hypertension Father      Stroke Father       Hyperlipidemia Father      Allergic rhinitis Brother      Eczema Brother      Conjunctivitis Brother      No Known Problems Brother      Breast cancer Neg Hx      Colon cancer Neg Hx      Ovarian cancer Neg Hx      Melanoma Neg Hx      Blindness Neg Hx      Amblyopia Neg Hx      Cataracts Neg Hx      Glaucoma Neg Hx      Macular degeneration Neg Hx      Retinal detachment Neg Hx      Strabismus Neg Hx       labor Neg Hx      Cancer Neg Hx      Eclampsia Neg Hx             Review of Systems  General: negative for chills, fever or weight loss  Psychological: negative for mood changes or depression  Ophthalmic: negative for blurry vision, photophobia or eye pain  ENT: see HPI  Respiratory: no cough, shortness of breath, or wheezing  Cardiovascular: no chest pain or dyspnea on exertion  Gastrointestinal: no abdominal pain, change in bowel habits, or black/ bloody stools  Musculoskeletal: negative for gait disturbance or muscular weakness  Neurological: no syncope or seizures; no ataxia  Dermatological: negative for puritis,  rash and jaundice  Hematologic/lymphatic: no easy bruising, no new lumps or bumps      Physical Exam:    Vitals:    25 1349   BP: 122/81   Pulse: 103         Constitutional: Well appearing / communicating without difficutly.  NAD.  Eyes: EOM I Bilaterally  Head/Face: Normocephalic.  Negative paranasal sinus pressure/tenderness.  Salivary glands WNL.  House Brackmann I Bilaterally.    Right Ear: Auricle normal appearance. External Auditory Canal within normal limits no lesions or masses,TM w/o masses/lesions/perforations. TM mobility noted.   Left Ear: Auricle normal appearance. External Auditory Canal within normal limits no lesions or masses,TM w/o masses/lesions/perforations. TM mobility noted.  Nose: + mucosal edema. No gross nasal septal deviation. Inferior Turbinates 3+ bilaterally. No septal perforation. No masses/lesions. External nasal skin appears normal without  masses/lesions.  Oral Cavity: Gingiva/lips within normal limits.  Dentition/gingiva healthy appearing. Mucus membranes moist. Floor of mouth soft, no masses palpated. Oral Tongue mobile. Hard Palate appears normal.    Oropharynx: Base of tongue appears normal. No masses/lesions noted. Tonsillar fossa/pharyngeal wall without lesions. Posterior oropharynx WNL.  Soft palate without masses. Midline uvula.   Neck/Lymphatic: No LAD I-VI bilaterally.  No thyromegaly.  No masses noted on exam.      See separate procedure note for FFL.    Diagnostic studies reviewed:   Laboratory 04/15/2019:  IgE level:  Less than 35.  ImmunoCAP:  Negative.     Spirometry 04/16/2019:  Normal spirometry. Airflow not improved after bronchodilator.     Inhalant skin tests 05/13/2019:  3+ histamine control.  3+ dust mites.    Assessment:    ICD-10-CM ICD-9-CM    1. Chronic allergic rhinitis  J30.9 477.9       2. Laryngopharyngeal reflux (LPR)  K21.9 478.79       3. AMARJIT (obstructive sleep apnea)  G47.33 327.23       4. Upper airway cough syndrome  R05.8 786.2 azelastine (ASTELIN) 137 mcg (0.1 %) nasal spray                        The primary encounter diagnosis was Chronic allergic rhinitis. Diagnoses of Laryngopharyngeal reflux (LPR), AMARJIT (obstructive sleep apnea), and Upper airway cough syndrome were also pertinent to this visit.      Plan:  No orders of the defined types were placed in this encounter.    Continue  nasal saline rinses BID  Continue Flonase 2 sprays per nostril daily  Continue Allegra daily   Continue Prilosec 40mg PO daily.  Continue refrain from eating within 3 hours of going to bed, to elevate the head of bed very subtly and optimize the impact of gravity on the potential reflux, and to avoid alcohol, caffeine, tobacco, tomato sauce, spicy foods, fried food, and chocolate.   Keep Gi follow up  Refill derm otic.     Continue CPAP therapy for AMARJIT     Follow up in approximately 4-6  months to reassess progress with treatment  regimen.     Paige Grant MD      This note was generated with the assistance of ambient listening technology. Verbal consent was obtained by the patient and accompanying visitor(s) for the recording of patient appointment to facilitate this note. I attest to having reviewed and edited the generated note for accuracy, though some syntax or spelling errors may persist. Please contact the author of this note for any clarification.

## 2025-07-29 ENCOUNTER — TELEPHONE (OUTPATIENT)
Dept: ENDOCRINOLOGY | Facility: CLINIC | Age: 40
End: 2025-07-29
Payer: COMMERCIAL

## 2025-08-01 ENCOUNTER — PATIENT MESSAGE (OUTPATIENT)
Dept: ADMINISTRATIVE | Facility: OTHER | Age: 40
End: 2025-08-01
Payer: COMMERCIAL

## 2025-08-02 DIAGNOSIS — N80.9 ENDOMETRIOSIS: ICD-10-CM

## 2025-08-03 NOTE — TELEPHONE ENCOUNTER
Refill Routing Note   Medication(s) are not appropriate for processing by [facility] for the following reason(s):        Drug-drug interaction  Drug-disease interaction    [facility] action(s):  Defer        Medication Therapy Plan: FOV in 2 weeks      Appointments  past 12m or future 3m with PCP    Date Provider   Last Visit   7/18/2024 Rain Ambrocio MD   Next Visit   8/20/2025 Rani Ambrocio MD   ED visits in past 90 days: 0        Note composed:8:56 AM 08/03/2025

## 2025-08-04 RX ORDER — NORETHINDRONE 5 MG/1
5 TABLET ORAL DAILY
Qty: 90 TABLET | Refills: 0 | Status: SHIPPED | OUTPATIENT
Start: 2025-08-04

## (undated) DEVICE — TROCAR ENDOPATH XCEL 5MM 7.5CM

## (undated) DEVICE — DRAPE CORETEMP FLD WRM 56X62IN

## (undated) DEVICE — TROCAR ENDOPATH XCEL 12MM 10CM

## (undated) DEVICE — CART STAPLE RELD 45MM WHT

## (undated) DEVICE — GOWN POLY REINF BRTH SLV XL

## (undated) DEVICE — TROCAR KII FIOS 5MM X 100MM

## (undated) DEVICE — SOL POVIDONE PREP IODINE 4 OZ

## (undated) DEVICE — SYS SEE SHARP SCP ANTIFG LNG

## (undated) DEVICE — ELECTRODE REM PLYHSV RETURN 9

## (undated) DEVICE — STAPLER INT LINEAR ARTC 3.5-45

## (undated) DEVICE — MANIPULATOR VCARE PLUS 34MM

## (undated) DEVICE — SOL IRR SOD CHL .9% POUR

## (undated) DEVICE — SUT 0 VICRYL / UR6 (J603)

## (undated) DEVICE — KIT WING PAD POSITIONING

## (undated) DEVICE — VIDEO RENTAL SERVICE

## (undated) DEVICE — SUT MCRYL PLUS 4-0 PS2 27IN

## (undated) DEVICE — SOL NORMAL USPCA 0.9%

## (undated) DEVICE — NDL 18GA X1 1/2 REG BEVEL

## (undated) DEVICE — SYR 30CC LUER LOCK

## (undated) DEVICE — SUT ENDOLOOP PDSII 18 LIGA

## (undated) DEVICE — TRAY MINOR GEN SURG

## (undated) DEVICE — TRAY FOLEY 16FR INFECTION CONT

## (undated) DEVICE — CART STAPLE FLEX ETX 3.5MM BLU

## (undated) DEVICE — NDL INSUFFLATION VERRES 120MM

## (undated) DEVICE — DRESSING TRANS 2X2 TEGADERM

## (undated) DEVICE — DRAPE STERI INSTRUMENT 1018

## (undated) DEVICE — KIT ANTIFOG

## (undated) DEVICE — GLOVE BIOGEL SKINSENSE PI 6.5

## (undated) DEVICE — IRRIGATOR ENDOSCOPY DISP.

## (undated) DEVICE — PACK LAPAROSCOPY BAPTIST

## (undated) DEVICE — ADHESIVE DERMABOND MINI HV

## (undated) DEVICE — DRESSING TELFA STRL 4X3 LF

## (undated) DEVICE — NDL INSUF ULTRA VERESS 120MM

## (undated) DEVICE — PAD PREP CUFFED NS 24X48IN

## (undated) DEVICE — DRAPE ABDOMINAL TIBURON 14X11

## (undated) DEVICE — SOL NS 1000CC

## (undated) DEVICE — TOWEL OR DISP STRL BLUE 4/PK

## (undated) DEVICE — SCISSOR 5MMX35CM DIRECT DRIVE

## (undated) DEVICE — GLOVE GAMMEX SURG LF PI SZ 7.5